# Patient Record
Sex: FEMALE | Race: WHITE | Employment: OTHER | URBAN - NONMETROPOLITAN AREA
[De-identification: names, ages, dates, MRNs, and addresses within clinical notes are randomized per-mention and may not be internally consistent; named-entity substitution may affect disease eponyms.]

---

## 2017-01-02 ENCOUNTER — HISTORY (OUTPATIENT)
Dept: EMERGENCY MEDICINE | Facility: OTHER | Age: 72
End: 2017-01-02

## 2017-01-03 ENCOUNTER — AMBULATORY - GICH (OUTPATIENT)
Dept: SCHEDULING | Facility: OTHER | Age: 72
End: 2017-01-03

## 2017-01-04 ENCOUNTER — AMBULATORY - GICH (OUTPATIENT)
Dept: SCHEDULING | Facility: OTHER | Age: 72
End: 2017-01-04

## 2017-01-06 ENCOUNTER — AMBULATORY - GICH (OUTPATIENT)
Dept: INTERNAL MEDICINE | Facility: OTHER | Age: 72
End: 2017-01-06

## 2017-01-06 DIAGNOSIS — S31.829D UNSPECIFIED OPEN WOUND OF LEFT BUTTOCK, SUBSEQUENT ENCOUNTER: ICD-10-CM

## 2017-01-09 ENCOUNTER — AMBULATORY - GICH (OUTPATIENT)
Dept: SCHEDULING | Facility: OTHER | Age: 72
End: 2017-01-09

## 2017-01-10 ENCOUNTER — HISTORY (OUTPATIENT)
Dept: INTERNAL MEDICINE | Facility: OTHER | Age: 72
End: 2017-01-10

## 2017-01-10 ENCOUNTER — OFFICE VISIT - GICH (OUTPATIENT)
Dept: INTERNAL MEDICINE | Facility: OTHER | Age: 72
End: 2017-01-10

## 2017-01-10 DIAGNOSIS — M06.9 RHEUMATOID ARTHRITIS (H): ICD-10-CM

## 2017-01-10 DIAGNOSIS — E11.29 TYPE 2 DIABETES MELLITUS WITH OTHER DIABETIC KIDNEY COMPLICATION (CODE): ICD-10-CM

## 2017-01-10 DIAGNOSIS — Z79.4 LONG TERM CURRENT USE OF INSULIN (H): ICD-10-CM

## 2017-01-10 DIAGNOSIS — N39.0 URINARY TRACT INFECTION: ICD-10-CM

## 2017-01-10 DIAGNOSIS — S31.829D UNSPECIFIED OPEN WOUND OF LEFT BUTTOCK, SUBSEQUENT ENCOUNTER: ICD-10-CM

## 2017-01-10 ASSESSMENT — PATIENT HEALTH QUESTIONNAIRE - PHQ9: SUM OF ALL RESPONSES TO PHQ QUESTIONS 1-9: 0

## 2017-01-15 ENCOUNTER — HISTORY (OUTPATIENT)
Dept: EMERGENCY MEDICINE | Facility: OTHER | Age: 72
End: 2017-01-15

## 2017-01-16 ENCOUNTER — HISTORY (OUTPATIENT)
Dept: MEDSURG UNIT | Facility: OTHER | Age: 72
End: 2017-01-16

## 2017-01-17 ENCOUNTER — COMMUNICATION - GICH (OUTPATIENT)
Dept: INTERNAL MEDICINE | Facility: OTHER | Age: 72
End: 2017-01-17

## 2017-01-19 ENCOUNTER — COMMUNICATION - GICH (OUTPATIENT)
Dept: INTERNAL MEDICINE | Facility: OTHER | Age: 72
End: 2017-01-19

## 2017-01-19 ENCOUNTER — HISTORY (OUTPATIENT)
Dept: FAMILY MEDICINE | Facility: OTHER | Age: 72
End: 2017-01-19

## 2017-01-19 ENCOUNTER — OFFICE VISIT - GICH (OUTPATIENT)
Dept: FAMILY MEDICINE | Facility: OTHER | Age: 72
End: 2017-01-19

## 2017-01-19 DIAGNOSIS — N12 TUBULO-INTERSTITIAL NEPHRITIS: ICD-10-CM

## 2017-01-23 ENCOUNTER — OFFICE VISIT - GICH (OUTPATIENT)
Dept: INTERNAL MEDICINE | Facility: OTHER | Age: 72
End: 2017-01-23

## 2017-01-23 ENCOUNTER — COMMUNICATION - GICH (OUTPATIENT)
Dept: INTERNAL MEDICINE | Facility: OTHER | Age: 72
End: 2017-01-23

## 2017-01-23 ENCOUNTER — HISTORY (OUTPATIENT)
Dept: INTERNAL MEDICINE | Facility: OTHER | Age: 72
End: 2017-01-23

## 2017-01-23 DIAGNOSIS — S31.829D UNSPECIFIED OPEN WOUND OF LEFT BUTTOCK, SUBSEQUENT ENCOUNTER: ICD-10-CM

## 2017-01-23 DIAGNOSIS — N12 TUBULO-INTERSTITIAL NEPHRITIS: ICD-10-CM

## 2017-01-23 DIAGNOSIS — M06.9 RHEUMATOID ARTHRITIS (H): ICD-10-CM

## 2017-01-23 DIAGNOSIS — M79.10 MYALGIA: ICD-10-CM

## 2017-01-24 ENCOUNTER — COMMUNICATION - GICH (OUTPATIENT)
Dept: INTERNAL MEDICINE | Facility: OTHER | Age: 72
End: 2017-01-24

## 2017-01-24 ENCOUNTER — AMBULATORY - GICH (OUTPATIENT)
Dept: SCHEDULING | Facility: OTHER | Age: 72
End: 2017-01-24

## 2017-01-24 DIAGNOSIS — I10 ESSENTIAL (PRIMARY) HYPERTENSION: ICD-10-CM

## 2017-01-25 ENCOUNTER — AMBULATORY - GICH (OUTPATIENT)
Dept: INTERNAL MEDICINE | Facility: OTHER | Age: 72
End: 2017-01-25

## 2017-01-31 ENCOUNTER — COMMUNICATION - GICH (OUTPATIENT)
Dept: INTERNAL MEDICINE | Facility: OTHER | Age: 72
End: 2017-01-31

## 2017-01-31 DIAGNOSIS — M05.9 RHEUMATOID ARTHRITIS WITH RHEUMATOID FACTOR (H): ICD-10-CM

## 2017-02-07 ENCOUNTER — COMMUNICATION - GICH (OUTPATIENT)
Dept: INTERNAL MEDICINE | Facility: OTHER | Age: 72
End: 2017-02-07

## 2017-02-07 DIAGNOSIS — I10 ESSENTIAL (PRIMARY) HYPERTENSION: ICD-10-CM

## 2017-02-13 ENCOUNTER — COMMUNICATION - GICH (OUTPATIENT)
Dept: INTERNAL MEDICINE | Facility: OTHER | Age: 72
End: 2017-02-13

## 2017-02-13 DIAGNOSIS — M05.9 RHEUMATOID ARTHRITIS WITH RHEUMATOID FACTOR (H): ICD-10-CM

## 2017-02-16 ENCOUNTER — HOSPITAL ENCOUNTER (OUTPATIENT)
Dept: INFUSION THERAPY | Facility: OTHER | Age: 72
End: 2017-02-16

## 2017-02-20 ENCOUNTER — AMBULATORY - GICH (OUTPATIENT)
Dept: SCHEDULING | Facility: OTHER | Age: 72
End: 2017-02-20

## 2017-02-21 ENCOUNTER — COMMUNICATION - GICH (OUTPATIENT)
Dept: INTERNAL MEDICINE | Facility: OTHER | Age: 72
End: 2017-02-21

## 2017-02-21 DIAGNOSIS — M06.9 RHEUMATOID ARTHRITIS (H): ICD-10-CM

## 2017-02-25 ENCOUNTER — COMMUNICATION - GICH (OUTPATIENT)
Dept: INTERNAL MEDICINE | Facility: OTHER | Age: 72
End: 2017-02-25

## 2017-02-25 DIAGNOSIS — M05.9 RHEUMATOID ARTHRITIS WITH RHEUMATOID FACTOR (H): ICD-10-CM

## 2017-02-27 ENCOUNTER — COMMUNICATION - GICH (OUTPATIENT)
Dept: INTERNAL MEDICINE | Facility: OTHER | Age: 72
End: 2017-02-27

## 2017-02-27 DIAGNOSIS — E11.29 TYPE 2 DIABETES MELLITUS WITH OTHER DIABETIC KIDNEY COMPLICATION (CODE): ICD-10-CM

## 2017-02-27 DIAGNOSIS — Z79.4 LONG TERM CURRENT USE OF INSULIN (H): ICD-10-CM

## 2017-03-02 ENCOUNTER — HISTORY (OUTPATIENT)
Dept: EMERGENCY MEDICINE | Facility: OTHER | Age: 72
End: 2017-03-02

## 2017-03-03 ENCOUNTER — HISTORY (OUTPATIENT)
Dept: MEDSURG UNIT | Facility: OTHER | Age: 72
End: 2017-03-03

## 2017-03-07 ENCOUNTER — COMMUNICATION - GICH (OUTPATIENT)
Dept: INTERNAL MEDICINE | Facility: OTHER | Age: 72
End: 2017-03-07

## 2017-03-08 ENCOUNTER — COMMUNICATION - GICH (OUTPATIENT)
Dept: INTERNAL MEDICINE | Facility: OTHER | Age: 72
End: 2017-03-08

## 2017-03-09 ENCOUNTER — COMMUNICATION - GICH (OUTPATIENT)
Dept: INTERNAL MEDICINE | Facility: OTHER | Age: 72
End: 2017-03-09

## 2017-03-09 DIAGNOSIS — M06.9 RHEUMATOID ARTHRITIS (H): ICD-10-CM

## 2017-03-10 ENCOUNTER — HOSPITAL ENCOUNTER (OUTPATIENT)
Dept: LAB | Facility: OTHER | Age: 72
End: 2017-03-10
Attending: EMERGENCY MEDICINE | Admitting: EMERGENCY MEDICINE

## 2017-03-10 ENCOUNTER — COMMUNICATION - GICH (OUTPATIENT)
Dept: INTERNAL MEDICINE | Facility: OTHER | Age: 72
End: 2017-03-10

## 2017-03-14 ENCOUNTER — COMMUNICATION - GICH (OUTPATIENT)
Dept: INTERNAL MEDICINE | Facility: OTHER | Age: 72
End: 2017-03-14

## 2017-03-14 ENCOUNTER — AMBULATORY - GICH (OUTPATIENT)
Dept: SURGERY | Facility: OTHER | Age: 72
End: 2017-03-14

## 2017-03-14 ENCOUNTER — COMMUNICATION - GICH (OUTPATIENT)
Dept: SURGERY | Facility: OTHER | Age: 72
End: 2017-03-14

## 2017-03-14 DIAGNOSIS — M06.9 RHEUMATOID ARTHRITIS (H): ICD-10-CM

## 2017-03-14 DIAGNOSIS — L89.304: ICD-10-CM

## 2017-03-14 DIAGNOSIS — S31.829D UNSPECIFIED OPEN WOUND OF LEFT BUTTOCK, SUBSEQUENT ENCOUNTER: ICD-10-CM

## 2017-03-14 DIAGNOSIS — F19.20 OTHER PSYCHOACTIVE SUBSTANCE DEPENDENCE, UNCOMPLICATED (H): ICD-10-CM

## 2017-03-16 ENCOUNTER — HISTORY (OUTPATIENT)
Dept: INTERNAL MEDICINE | Facility: OTHER | Age: 72
End: 2017-03-16

## 2017-03-16 ENCOUNTER — COMMUNICATION - GICH (OUTPATIENT)
Dept: INTERNAL MEDICINE | Facility: OTHER | Age: 72
End: 2017-03-16

## 2017-03-16 ENCOUNTER — OFFICE VISIT - GICH (OUTPATIENT)
Dept: INTERNAL MEDICINE | Facility: OTHER | Age: 72
End: 2017-03-16

## 2017-03-16 DIAGNOSIS — E11.29 TYPE 2 DIABETES MELLITUS WITH OTHER DIABETIC KIDNEY COMPLICATION (CODE): ICD-10-CM

## 2017-03-16 DIAGNOSIS — L89.304: ICD-10-CM

## 2017-03-16 DIAGNOSIS — M06.9 RHEUMATOID ARTHRITIS (H): ICD-10-CM

## 2017-03-16 DIAGNOSIS — Z79.4 LONG TERM CURRENT USE OF INSULIN (H): ICD-10-CM

## 2017-03-16 DIAGNOSIS — E11.9 TYPE 2 DIABETES MELLITUS WITHOUT COMPLICATIONS (H): ICD-10-CM

## 2017-03-17 ENCOUNTER — AMBULATORY - GICH (OUTPATIENT)
Dept: SCHEDULING | Facility: OTHER | Age: 72
End: 2017-03-17

## 2017-03-17 ENCOUNTER — COMMUNICATION - GICH (OUTPATIENT)
Dept: INTERNAL MEDICINE | Facility: OTHER | Age: 72
End: 2017-03-17

## 2017-03-22 ENCOUNTER — AMBULATORY - GICH (OUTPATIENT)
Dept: INTERNAL MEDICINE | Facility: OTHER | Age: 72
End: 2017-03-22

## 2017-03-22 ENCOUNTER — COMMUNICATION - GICH (OUTPATIENT)
Dept: INTERNAL MEDICINE | Facility: OTHER | Age: 72
End: 2017-03-22

## 2017-03-22 ENCOUNTER — HISTORY (OUTPATIENT)
Dept: INTERNAL MEDICINE | Facility: OTHER | Age: 72
End: 2017-03-22

## 2017-03-22 DIAGNOSIS — E44.0 MODERATE PROTEIN-CALORIE MALNUTRITION (H): ICD-10-CM

## 2017-03-22 DIAGNOSIS — L89.304: ICD-10-CM

## 2017-03-22 DIAGNOSIS — Z79.4 LONG TERM CURRENT USE OF INSULIN (H): ICD-10-CM

## 2017-03-22 DIAGNOSIS — N39.0 URINARY TRACT INFECTION: ICD-10-CM

## 2017-03-22 DIAGNOSIS — A41.9 SEPSIS (H): ICD-10-CM

## 2017-03-22 DIAGNOSIS — E11.29 TYPE 2 DIABETES MELLITUS WITH OTHER DIABETIC KIDNEY COMPLICATION (CODE): ICD-10-CM

## 2017-03-27 ENCOUNTER — COMMUNICATION - GICH (OUTPATIENT)
Dept: INTERNAL MEDICINE | Facility: OTHER | Age: 72
End: 2017-03-27

## 2017-03-27 DIAGNOSIS — E11.29 TYPE 2 DIABETES MELLITUS WITH OTHER DIABETIC KIDNEY COMPLICATION (CODE): ICD-10-CM

## 2017-03-27 DIAGNOSIS — Z79.4 LONG TERM CURRENT USE OF INSULIN (H): ICD-10-CM

## 2017-03-30 ENCOUNTER — AMBULATORY - GICH (OUTPATIENT)
Dept: INTERNAL MEDICINE | Facility: OTHER | Age: 72
End: 2017-03-30

## 2017-03-30 ENCOUNTER — COMMUNICATION - GICH (OUTPATIENT)
Dept: INTERNAL MEDICINE | Facility: OTHER | Age: 72
End: 2017-03-30

## 2017-03-30 DIAGNOSIS — S31.829D UNSPECIFIED OPEN WOUND OF LEFT BUTTOCK, SUBSEQUENT ENCOUNTER: ICD-10-CM

## 2017-03-30 DIAGNOSIS — M05.9 RHEUMATOID ARTHRITIS WITH RHEUMATOID FACTOR (H): ICD-10-CM

## 2017-04-11 ENCOUNTER — COMMUNICATION - GICH (OUTPATIENT)
Dept: INTERNAL MEDICINE | Facility: OTHER | Age: 72
End: 2017-04-11

## 2017-04-11 DIAGNOSIS — I10 ESSENTIAL (PRIMARY) HYPERTENSION: ICD-10-CM

## 2017-04-12 ENCOUNTER — HISTORY (OUTPATIENT)
Dept: INTERNAL MEDICINE | Facility: OTHER | Age: 72
End: 2017-04-12

## 2017-04-12 ENCOUNTER — COMMUNICATION - GICH (OUTPATIENT)
Dept: INTERNAL MEDICINE | Facility: OTHER | Age: 72
End: 2017-04-12

## 2017-04-12 ENCOUNTER — AMBULATORY - GICH (OUTPATIENT)
Dept: INTERNAL MEDICINE | Facility: OTHER | Age: 72
End: 2017-04-12

## 2017-04-12 DIAGNOSIS — M05.9 RHEUMATOID ARTHRITIS WITH RHEUMATOID FACTOR (H): ICD-10-CM

## 2017-04-12 DIAGNOSIS — E44.0 MODERATE PROTEIN-CALORIE MALNUTRITION (H): ICD-10-CM

## 2017-04-12 DIAGNOSIS — L89.304: ICD-10-CM

## 2017-04-17 ENCOUNTER — AMBULATORY - GICH (OUTPATIENT)
Dept: SCHEDULING | Facility: OTHER | Age: 72
End: 2017-04-17

## 2017-04-18 ENCOUNTER — HISTORY (OUTPATIENT)
Dept: INTERNAL MEDICINE | Facility: OTHER | Age: 72
End: 2017-04-18

## 2017-04-18 ENCOUNTER — OFFICE VISIT - GICH (OUTPATIENT)
Dept: INTERNAL MEDICINE | Facility: OTHER | Age: 72
End: 2017-04-18

## 2017-04-18 ENCOUNTER — HOSPITAL ENCOUNTER (OUTPATIENT)
Dept: INFUSION THERAPY | Facility: OTHER | Age: 72
End: 2017-04-18

## 2017-04-18 DIAGNOSIS — G89.4 CHRONIC PAIN SYNDROME: ICD-10-CM

## 2017-04-18 DIAGNOSIS — L89.304: ICD-10-CM

## 2017-04-18 DIAGNOSIS — E11.29 TYPE 2 DIABETES MELLITUS WITH OTHER DIABETIC KIDNEY COMPLICATION (CODE): ICD-10-CM

## 2017-04-18 DIAGNOSIS — Z79.4 LONG TERM CURRENT USE OF INSULIN (H): ICD-10-CM

## 2017-04-18 DIAGNOSIS — Z02.89 ENCOUNTER FOR OTHER ADMINISTRATIVE EXAMINATIONS: ICD-10-CM

## 2017-04-18 DIAGNOSIS — M06.9 RHEUMATOID ARTHRITIS (H): ICD-10-CM

## 2017-04-18 LAB
A/G RATIO - HISTORICAL: 1.4 (ref 1–2)
ABSOLUTE BASOPHILS - HISTORICAL: 0.1 THOU/CU MM
ABSOLUTE EOSINOPHILS - HISTORICAL: 0 THOU/CU MM
ABSOLUTE LYMPHOCYTES - HISTORICAL: 3.2 THOU/CU MM (ref 0.9–2.9)
ABSOLUTE MONOCYTES - HISTORICAL: 0.7 THOU/CU MM
ABSOLUTE NEUTROPHILS - HISTORICAL: 7.6 THOU/CU MM (ref 1.7–7)
ALBUMIN SERPL-MCNC: 3.6 G/DL (ref 3.5–5.7)
ALP SERPL-CCNC: 59 IU/L (ref 34–104)
ALT (SGPT) - HISTORICAL: 20 IU/L (ref 7–52)
ANION GAP - HISTORICAL: 11 (ref 5–18)
AST SERPL-CCNC: 17 IU/L (ref 13–39)
BASOPHILS # BLD AUTO: 0.9 %
BILIRUB SERPL-MCNC: 0.2 MG/DL (ref 0.3–1)
BUN SERPL-MCNC: 33 MG/DL (ref 7–25)
BUN/CREAT RATIO - HISTORICAL: 43
CALCIUM SERPL-MCNC: 9.5 MG/DL (ref 8.6–10.3)
CHLORIDE SERPLBLD-SCNC: 97 MMOL/L (ref 98–107)
CO2 SERPL-SCNC: 24 MMOL/L (ref 21–31)
CREAT SERPL-MCNC: 0.76 MG/DL (ref 0.7–1.3)
EOSINOPHIL NFR BLD AUTO: 0.2 %
ERYTHROCYTE [DISTWIDTH] IN BLOOD BY AUTOMATED COUNT: 17.5 % (ref 11.5–15.5)
ESTIMATED AVERAGE GLUCOSE: 157 MG/DL
GFR IF NOT AFRICAN AMERICAN - HISTORICAL: >60 ML/MIN/1.73M2
GLOBULIN - HISTORICAL: 2.6 G/DL (ref 2–3.7)
GLUCOSE SERPL-MCNC: 139 MG/DL (ref 70–105)
HCT VFR BLD AUTO: 31.7 % (ref 33–51)
HEMOGLOBIN A1C MONITORING (POCT) - HISTORICAL: 7.1 % (ref 4–6.2)
HEMOGLOBIN: 9.5 G/DL (ref 12–16)
LYMPHOCYTES NFR BLD AUTO: 27.4 % (ref 20–44)
MCH RBC QN AUTO: 24.1 PG (ref 26–34)
MCHC RBC AUTO-ENTMCNC: 29.9 G/DL (ref 32–36)
MCV RBC AUTO: 81 FL (ref 80–100)
MONOCYTES NFR BLD AUTO: 5.9 %
NEUTROPHILS NFR BLD AUTO: 65.6 % (ref 42–72)
PLATELET # BLD AUTO: 679 THOU/CU MM (ref 140–440)
PMV BLD: 5.8 FL (ref 6.5–11)
POTASSIUM SERPL-SCNC: 4.3 MMOL/L (ref 3.5–5.1)
PROT SERPL-MCNC: 6.2 G/DL (ref 6.4–8.9)
RED BLOOD COUNT - HISTORICAL: 3.94 MIL/CU MM (ref 4–5.2)
SODIUM SERPL-SCNC: 132 MMOL/L (ref 133–143)
WHITE BLOOD COUNT - HISTORICAL: 11.6 THOU/CU MM (ref 4.5–11)

## 2017-04-25 ENCOUNTER — COMMUNICATION - GICH (OUTPATIENT)
Dept: INTERNAL MEDICINE | Facility: OTHER | Age: 72
End: 2017-04-25

## 2017-04-25 DIAGNOSIS — M05.9 RHEUMATOID ARTHRITIS WITH RHEUMATOID FACTOR (H): ICD-10-CM

## 2017-04-26 ENCOUNTER — COMMUNICATION - GICH (OUTPATIENT)
Dept: INTERNAL MEDICINE | Facility: OTHER | Age: 72
End: 2017-04-26

## 2017-04-26 DIAGNOSIS — M06.9 RHEUMATOID ARTHRITIS (H): ICD-10-CM

## 2017-04-28 ENCOUNTER — COMMUNICATION - GICH (OUTPATIENT)
Dept: INTERNAL MEDICINE | Facility: OTHER | Age: 72
End: 2017-04-28

## 2017-04-28 ENCOUNTER — HISTORY (OUTPATIENT)
Dept: EMERGENCY MEDICINE | Facility: OTHER | Age: 72
End: 2017-04-28

## 2017-05-03 ENCOUNTER — OFFICE VISIT - GICH (OUTPATIENT)
Dept: INTERNAL MEDICINE | Facility: OTHER | Age: 72
End: 2017-05-03

## 2017-05-03 ENCOUNTER — HISTORY (OUTPATIENT)
Dept: INTERNAL MEDICINE | Facility: OTHER | Age: 72
End: 2017-05-03

## 2017-05-03 DIAGNOSIS — L08.9 LOCAL INFECTION OF SKIN AND SUBCUTANEOUS TISSUE: ICD-10-CM

## 2017-05-03 DIAGNOSIS — E44.0 MODERATE PROTEIN-CALORIE MALNUTRITION (H): ICD-10-CM

## 2017-05-03 DIAGNOSIS — D64.9 ANEMIA: ICD-10-CM

## 2017-05-03 DIAGNOSIS — L89.304: ICD-10-CM

## 2017-05-03 DIAGNOSIS — T14.8XXA OTHER INJURY OF UNSPECIFIED BODY REGION: ICD-10-CM

## 2017-05-06 ENCOUNTER — COMMUNICATION - GICH (OUTPATIENT)
Dept: INTERNAL MEDICINE | Facility: OTHER | Age: 72
End: 2017-05-06

## 2017-05-06 DIAGNOSIS — M05.9 RHEUMATOID ARTHRITIS WITH RHEUMATOID FACTOR (H): ICD-10-CM

## 2017-05-06 DIAGNOSIS — E11.29 TYPE 2 DIABETES MELLITUS WITH OTHER DIABETIC KIDNEY COMPLICATION (CODE): ICD-10-CM

## 2017-05-09 ENCOUNTER — COMMUNICATION - GICH (OUTPATIENT)
Dept: INTERNAL MEDICINE | Facility: OTHER | Age: 72
End: 2017-05-09

## 2017-05-09 DIAGNOSIS — M06.9 RHEUMATOID ARTHRITIS (H): ICD-10-CM

## 2017-05-13 ENCOUNTER — COMMUNICATION - GICH (OUTPATIENT)
Dept: INTERNAL MEDICINE | Facility: OTHER | Age: 72
End: 2017-05-13

## 2017-05-13 DIAGNOSIS — A41.9 SEPSIS (H): ICD-10-CM

## 2017-05-13 DIAGNOSIS — N39.0 URINARY TRACT INFECTION: ICD-10-CM

## 2017-05-16 ENCOUNTER — AMBULATORY - GICH (OUTPATIENT)
Dept: LAB | Facility: OTHER | Age: 72
End: 2017-05-16

## 2017-05-16 ENCOUNTER — OFFICE VISIT - GICH (OUTPATIENT)
Dept: INTERNAL MEDICINE | Facility: OTHER | Age: 72
End: 2017-05-16

## 2017-05-16 ENCOUNTER — HISTORY (OUTPATIENT)
Dept: EMERGENCY MEDICINE | Facility: OTHER | Age: 72
End: 2017-05-16

## 2017-05-16 ENCOUNTER — HOSPITAL ENCOUNTER (OUTPATIENT)
Dept: INFUSION THERAPY | Facility: OTHER | Age: 72
End: 2017-05-16
Attending: INTERNAL MEDICINE

## 2017-05-16 ENCOUNTER — AMBULATORY - GICH (OUTPATIENT)
Dept: INTERNAL MEDICINE | Facility: OTHER | Age: 72
End: 2017-05-16

## 2017-05-16 ENCOUNTER — HISTORY (OUTPATIENT)
Dept: INTERNAL MEDICINE | Facility: OTHER | Age: 72
End: 2017-05-16

## 2017-05-16 ENCOUNTER — AMBULATORY - GICH (OUTPATIENT)
Dept: SCHEDULING | Facility: OTHER | Age: 72
End: 2017-05-16

## 2017-05-16 DIAGNOSIS — Z02.89 ENCOUNTER FOR OTHER ADMINISTRATIVE EXAMINATIONS: ICD-10-CM

## 2017-05-16 DIAGNOSIS — S31.829D UNSPECIFIED OPEN WOUND OF LEFT BUTTOCK, SUBSEQUENT ENCOUNTER: ICD-10-CM

## 2017-05-16 DIAGNOSIS — G89.4 CHRONIC PAIN SYNDROME: ICD-10-CM

## 2017-05-16 DIAGNOSIS — M06.9 RHEUMATOID ARTHRITIS (H): ICD-10-CM

## 2017-05-16 DIAGNOSIS — F19.20 OTHER PSYCHOACTIVE SUBSTANCE DEPENDENCE, UNCOMPLICATED (H): ICD-10-CM

## 2017-05-16 LAB
A/G RATIO - HISTORICAL: 1.2 (ref 1–2)
ABSOLUTE BASOPHILS - HISTORICAL: 0 THOU/CU MM
ABSOLUTE EOSINOPHILS - HISTORICAL: 0 THOU/CU MM
ABSOLUTE LYMPHOCYTES - HISTORICAL: 1.5 THOU/CU MM (ref 0.9–2.9)
ABSOLUTE MONOCYTES - HISTORICAL: 1.4 THOU/CU MM
ABSOLUTE NEUTROPHILS - HISTORICAL: 14.4 THOU/CU MM (ref 1.7–7)
ALBUMIN SERPL-MCNC: 3.3 G/DL (ref 3.5–5.7)
ALP SERPL-CCNC: 60 IU/L (ref 34–104)
ALT (SGPT) - HISTORICAL: 13 IU/L (ref 7–52)
ANION GAP - HISTORICAL: 13 (ref 5–18)
AST SERPL-CCNC: 14 IU/L (ref 13–39)
BASOPHILS # BLD AUTO: 0.2 %
BILIRUB SERPL-MCNC: 0.2 MG/DL (ref 0.3–1)
BUN SERPL-MCNC: 30 MG/DL (ref 7–25)
BUN/CREAT RATIO - HISTORICAL: 45
CALCIUM SERPL-MCNC: 9.9 MG/DL (ref 8.6–10.3)
CHLORIDE SERPLBLD-SCNC: 94 MMOL/L (ref 98–107)
CO2 SERPL-SCNC: 24 MMOL/L (ref 21–31)
CREAT SERPL-MCNC: 0.67 MG/DL (ref 0.7–1.3)
EOSINOPHIL NFR BLD AUTO: 0.1 %
ERYTHROCYTE [DISTWIDTH] IN BLOOD BY AUTOMATED COUNT: 22.2 % (ref 11.5–15.5)
ERYTHROCYTE [SEDIMENTATION RATE] IN BLOOD: 53 MM/HR
GFR IF NOT AFRICAN AMERICAN - HISTORICAL: >60 ML/MIN/1.73M2
GLOBULIN - HISTORICAL: 2.8 G/DL (ref 2–3.7)
GLUCOSE SERPL-MCNC: 292 MG/DL (ref 70–105)
HCT VFR BLD AUTO: 30.8 % (ref 33–51)
HEMOGLOBIN: 9.3 G/DL (ref 12–16)
LYMPHOCYTES NFR BLD AUTO: 8.5 % (ref 20–44)
MCH RBC QN AUTO: 24.2 PG (ref 26–34)
MCHC RBC AUTO-ENTMCNC: 30.2 G/DL (ref 32–36)
MCV RBC AUTO: 80 FL (ref 80–100)
MONOCYTES NFR BLD AUTO: 8 %
NEUTROPHILS NFR BLD AUTO: 82.6 % (ref 42–72)
PLATELET # BLD AUTO: 568 THOU/CU MM (ref 140–440)
PMV BLD: 9.6 FL (ref 6.5–11)
POTASSIUM SERPL-SCNC: 4.1 MMOL/L (ref 3.5–5.1)
PROT SERPL-MCNC: 6.1 G/DL (ref 6.4–8.9)
RED BLOOD COUNT - HISTORICAL: 3.85 MIL/CU MM (ref 4–5.2)
SODIUM SERPL-SCNC: 131 MMOL/L (ref 133–143)
WHITE BLOOD COUNT - HISTORICAL: 17.4 THOU/CU MM (ref 4.5–11)

## 2017-05-16 ASSESSMENT — PATIENT HEALTH QUESTIONNAIRE - PHQ9: SUM OF ALL RESPONSES TO PHQ QUESTIONS 1-9: 0

## 2017-05-17 ENCOUNTER — AMBULATORY - GICH (OUTPATIENT)
Dept: SCHEDULING | Facility: OTHER | Age: 72
End: 2017-05-17

## 2017-05-21 ENCOUNTER — AMBULATORY - GICH (OUTPATIENT)
Dept: SCHEDULING | Facility: OTHER | Age: 72
End: 2017-05-21

## 2017-05-22 LAB
6-MONOACETYL MORPHINE - HISTORICAL: ABNORMAL NG/ML
AMPHETAMINE URINE - HISTORICAL: ABNORMAL NG/ML
BARBITURATE URINE - HISTORICAL: ABNORMAL NG/ML
BENZODIAZEPINE URINE - HISTORICAL: ABNORMAL NG/ML
BUPRENORPHRINE URINE - HISTORICAL: ABNORMAL NG/ML
COCAINE METAB URINE - HISTORICAL: ABNORMAL NG/ML
CREAT UR - HISTORICAL: 12 MG/DL
ETHYLGLUCURONIDE URINE - HISTORICAL: ABNORMAL NG/ML
FENTANYL URINE - HISTORICAL: ABNORMAL NG/ML
MASS SPECTROMETRY URINE - HISTORICAL: ABNORMAL
METHADONE URINE - HISTORICAL: ABNORMAL NG/ML
OPIATES URINE - HISTORICAL: ABNORMAL NG/ML
OXYCODONE URINE - HISTORICAL: ABNORMAL NG/ML
PH URINE - HISTORICAL: 7.5
PROPOXYPHENE URINE - HISTORICAL: ABNORMAL NG/ML
THC 50 URINE - HISTORICAL: ABNORMAL NG/ML
TRAMADOL - HISTORICAL: ABNORMAL NG/ML

## 2017-05-23 ENCOUNTER — COMMUNICATION - GICH (OUTPATIENT)
Dept: INTERNAL MEDICINE | Facility: OTHER | Age: 72
End: 2017-05-23

## 2017-05-23 DIAGNOSIS — E78.5 HYPERLIPIDEMIA: ICD-10-CM

## 2017-05-26 ENCOUNTER — HISTORY (OUTPATIENT)
Dept: INTERNAL MEDICINE | Facility: OTHER | Age: 72
End: 2017-05-26

## 2017-05-26 ENCOUNTER — OFFICE VISIT - GICH (OUTPATIENT)
Dept: INTERNAL MEDICINE | Facility: OTHER | Age: 72
End: 2017-05-26

## 2017-05-26 DIAGNOSIS — Z22.39: ICD-10-CM

## 2017-05-26 DIAGNOSIS — N30.00 ACUTE CYSTITIS WITHOUT HEMATURIA: ICD-10-CM

## 2017-05-26 DIAGNOSIS — L89.304: ICD-10-CM

## 2017-05-27 ENCOUNTER — HISTORY (OUTPATIENT)
Dept: EMERGENCY MEDICINE | Facility: OTHER | Age: 72
End: 2017-05-27

## 2017-05-30 ENCOUNTER — COMMUNICATION - GICH (OUTPATIENT)
Dept: INTERNAL MEDICINE | Facility: OTHER | Age: 72
End: 2017-05-30

## 2017-05-30 DIAGNOSIS — I10 ESSENTIAL (PRIMARY) HYPERTENSION: ICD-10-CM

## 2017-06-05 ENCOUNTER — OFFICE VISIT - GICH (OUTPATIENT)
Dept: INTERNAL MEDICINE | Facility: OTHER | Age: 72
End: 2017-06-05

## 2017-06-05 ENCOUNTER — HISTORY (OUTPATIENT)
Dept: INTERNAL MEDICINE | Facility: OTHER | Age: 72
End: 2017-06-05

## 2017-06-05 DIAGNOSIS — Z22.39: ICD-10-CM

## 2017-06-05 DIAGNOSIS — A41.02 SEPSIS DUE TO METHICILLIN RESISTANT STAPHYLOCOCCUS AUREUS (H): ICD-10-CM

## 2017-06-05 DIAGNOSIS — L89.304: ICD-10-CM

## 2017-06-19 ENCOUNTER — COMMUNICATION - GICH (OUTPATIENT)
Dept: INTERNAL MEDICINE | Facility: OTHER | Age: 72
End: 2017-06-19

## 2017-06-19 DIAGNOSIS — M05.9 RHEUMATOID ARTHRITIS WITH RHEUMATOID FACTOR (H): ICD-10-CM

## 2017-06-21 ENCOUNTER — COMMUNICATION - GICH (OUTPATIENT)
Dept: INTERNAL MEDICINE | Facility: OTHER | Age: 72
End: 2017-06-21

## 2017-06-21 DIAGNOSIS — M05.9 RHEUMATOID ARTHRITIS WITH RHEUMATOID FACTOR (H): ICD-10-CM

## 2017-06-30 ENCOUNTER — OFFICE VISIT - GICH (OUTPATIENT)
Dept: INTERNAL MEDICINE | Facility: OTHER | Age: 72
End: 2017-06-30

## 2017-06-30 ENCOUNTER — HOSPITAL ENCOUNTER (OUTPATIENT)
Dept: INFUSION THERAPY | Facility: OTHER | Age: 72
End: 2017-06-30
Attending: INTERNAL MEDICINE

## 2017-06-30 ENCOUNTER — HISTORY (OUTPATIENT)
Dept: INTERNAL MEDICINE | Facility: OTHER | Age: 72
End: 2017-06-30

## 2017-06-30 ENCOUNTER — COMMUNICATION - GICH (OUTPATIENT)
Dept: INTERNAL MEDICINE | Facility: OTHER | Age: 72
End: 2017-06-30

## 2017-06-30 DIAGNOSIS — I48.0 PAROXYSMAL ATRIAL FIBRILLATION (H): ICD-10-CM

## 2017-06-30 DIAGNOSIS — A41.9 SEPSIS (H): ICD-10-CM

## 2017-06-30 DIAGNOSIS — N39.0 URINARY TRACT INFECTION: ICD-10-CM

## 2017-06-30 DIAGNOSIS — M06.9 RHEUMATOID ARTHRITIS (H): ICD-10-CM

## 2017-06-30 DIAGNOSIS — Z79.4 LONG TERM CURRENT USE OF INSULIN (H): ICD-10-CM

## 2017-06-30 DIAGNOSIS — L89.304: ICD-10-CM

## 2017-06-30 DIAGNOSIS — E11.29 TYPE 2 DIABETES MELLITUS WITH OTHER DIABETIC KIDNEY COMPLICATION (CODE): ICD-10-CM

## 2017-06-30 DIAGNOSIS — I10 ESSENTIAL (PRIMARY) HYPERTENSION: ICD-10-CM

## 2017-06-30 DIAGNOSIS — G89.4 CHRONIC PAIN SYNDROME: ICD-10-CM

## 2017-06-30 DIAGNOSIS — F19.20 OTHER PSYCHOACTIVE SUBSTANCE DEPENDENCE, UNCOMPLICATED (H): ICD-10-CM

## 2017-06-30 DIAGNOSIS — E78.49 OTHER HYPERLIPIDEMIA: ICD-10-CM

## 2017-06-30 LAB
ABSOLUTE BASOPHILS - HISTORICAL: 0.1 THOU/CU MM
ABSOLUTE EOSINOPHILS - HISTORICAL: 0.1 THOU/CU MM
ABSOLUTE IMMATURE GRANULOCYTES(METAS,MYELOS,PROS) - HISTORICAL: 0.2 THOU/CU MM
ABSOLUTE LYMPHOCYTES - HISTORICAL: 2.7 THOU/CU MM (ref 0.9–2.9)
ABSOLUTE MONOCYTES - HISTORICAL: 1.4 THOU/CU MM
ABSOLUTE NEUTROPHILS - HISTORICAL: 12.5 THOU/CU MM (ref 1.7–7)
ANION GAP - HISTORICAL: 16 (ref 5–18)
BASOPHILS # BLD AUTO: 0.4 %
BUN SERPL-MCNC: 29 MG/DL (ref 7–25)
BUN/CREAT RATIO - HISTORICAL: 47
CALCIUM SERPL-MCNC: 9.1 MG/DL (ref 8.6–10.3)
CHLORIDE SERPLBLD-SCNC: 96 MMOL/L (ref 98–107)
CO2 SERPL-SCNC: 21 MMOL/L (ref 21–31)
CREAT SERPL-MCNC: 0.62 MG/DL (ref 0.7–1.3)
EOSINOPHIL NFR BLD AUTO: 0.4 %
ERYTHROCYTE [DISTWIDTH] IN BLOOD BY AUTOMATED COUNT: 21 % (ref 11.5–15.5)
GFR IF NOT AFRICAN AMERICAN - HISTORICAL: >60 ML/MIN/1.73M2
GLUCOSE SERPL-MCNC: 294 MG/DL (ref 70–105)
HCT VFR BLD AUTO: 30.5 % (ref 33–51)
HEMOGLOBIN: 9.6 G/DL (ref 12–16)
IMMATURE GRANULOCYTES(METAS,MYELOS,PROS) - HISTORICAL: 1.3 %
LYMPHOCYTES NFR BLD AUTO: 16.1 % (ref 20–44)
MCH RBC QN AUTO: 24.9 PG (ref 26–34)
MCHC RBC AUTO-ENTMCNC: 31.5 G/DL (ref 32–36)
MCV RBC AUTO: 79 FL (ref 80–100)
MONOCYTES NFR BLD AUTO: 8.4 %
NEUTROPHILS NFR BLD AUTO: 73.4 % (ref 42–72)
PLATELET # BLD AUTO: 1120 THOU/CU MM (ref 140–440)
PMV BLD: 8.9 FL (ref 6.5–11)
POTASSIUM SERPL-SCNC: 4.2 MMOL/L (ref 3.5–5.1)
RED BLOOD COUNT - HISTORICAL: 3.86 MIL/CU MM (ref 4–5.2)
SODIUM SERPL-SCNC: 133 MMOL/L (ref 133–143)
WHITE BLOOD COUNT - HISTORICAL: 17 THOU/CU MM (ref 4.5–11)

## 2017-07-04 ENCOUNTER — COMMUNICATION - GICH (OUTPATIENT)
Dept: INTERNAL MEDICINE | Facility: OTHER | Age: 72
End: 2017-07-04

## 2017-07-04 DIAGNOSIS — M05.9 RHEUMATOID ARTHRITIS WITH RHEUMATOID FACTOR (H): ICD-10-CM

## 2017-07-13 ENCOUNTER — AMBULATORY - GICH (OUTPATIENT)
Dept: SCHEDULING | Facility: OTHER | Age: 72
End: 2017-07-13

## 2017-07-20 ENCOUNTER — AMBULATORY - GICH (OUTPATIENT)
Dept: SCHEDULING | Facility: OTHER | Age: 72
End: 2017-07-20

## 2017-08-27 ENCOUNTER — AMBULATORY - GICH (OUTPATIENT)
Dept: SCHEDULING | Facility: OTHER | Age: 72
End: 2017-08-27

## 2017-08-30 ENCOUNTER — AMBULATORY - GICH (OUTPATIENT)
Dept: SCHEDULING | Facility: OTHER | Age: 72
End: 2017-08-30

## 2017-08-31 ENCOUNTER — AMBULATORY - GICH (OUTPATIENT)
Dept: SCHEDULING | Facility: OTHER | Age: 72
End: 2017-08-31

## 2017-09-05 ENCOUNTER — AMBULATORY - GICH (OUTPATIENT)
Dept: SCHEDULING | Facility: OTHER | Age: 72
End: 2017-09-05

## 2017-09-06 ENCOUNTER — AMBULATORY - GICH (OUTPATIENT)
Dept: SCHEDULING | Facility: OTHER | Age: 72
End: 2017-09-06

## 2017-10-11 ENCOUNTER — AMBULATORY - GICH (OUTPATIENT)
Dept: SCHEDULING | Facility: OTHER | Age: 72
End: 2017-10-11

## 2017-10-12 ENCOUNTER — AMBULATORY - GICH (OUTPATIENT)
Dept: SCHEDULING | Facility: OTHER | Age: 72
End: 2017-10-12

## 2017-10-13 ENCOUNTER — AMBULATORY - GICH (OUTPATIENT)
Dept: SCHEDULING | Facility: OTHER | Age: 72
End: 2017-10-13

## 2017-10-13 ENCOUNTER — COMMUNICATION - GICH (OUTPATIENT)
Dept: INTERNAL MEDICINE | Facility: OTHER | Age: 72
End: 2017-10-13

## 2017-10-13 DIAGNOSIS — E28.39 OTHER PRIMARY OVARIAN FAILURE: ICD-10-CM

## 2017-10-13 DIAGNOSIS — K21.9 GASTRO-ESOPHAGEAL REFLUX DISEASE WITHOUT ESOPHAGITIS: ICD-10-CM

## 2017-10-17 ENCOUNTER — HISTORY (OUTPATIENT)
Dept: INTERNAL MEDICINE | Facility: OTHER | Age: 72
End: 2017-10-17

## 2017-10-17 ENCOUNTER — OFFICE VISIT - GICH (OUTPATIENT)
Dept: INTERNAL MEDICINE | Facility: OTHER | Age: 72
End: 2017-10-17

## 2017-10-17 DIAGNOSIS — Z79.4 LONG TERM CURRENT USE OF INSULIN (H): ICD-10-CM

## 2017-10-17 DIAGNOSIS — K21.9 GASTRO-ESOPHAGEAL REFLUX DISEASE WITHOUT ESOPHAGITIS: ICD-10-CM

## 2017-10-17 DIAGNOSIS — M06.9 RHEUMATOID ARTHRITIS (H): ICD-10-CM

## 2017-10-17 DIAGNOSIS — L89.304: ICD-10-CM

## 2017-10-17 DIAGNOSIS — Z02.89 ENCOUNTER FOR OTHER ADMINISTRATIVE EXAMINATIONS: ICD-10-CM

## 2017-10-17 DIAGNOSIS — E11.29 TYPE 2 DIABETES MELLITUS WITH OTHER DIABETIC KIDNEY COMPLICATION (CODE): ICD-10-CM

## 2017-10-18 ENCOUNTER — AMBULATORY - GICH (OUTPATIENT)
Dept: SCHEDULING | Facility: OTHER | Age: 72
End: 2017-10-18

## 2017-10-18 ENCOUNTER — AMBULATORY - GICH (OUTPATIENT)
Dept: INTERNAL MEDICINE | Facility: OTHER | Age: 72
End: 2017-10-18

## 2017-10-18 DIAGNOSIS — L89.304: ICD-10-CM

## 2017-10-19 ENCOUNTER — COMMUNICATION - GICH (OUTPATIENT)
Dept: INTERNAL MEDICINE | Facility: OTHER | Age: 72
End: 2017-10-19

## 2017-10-19 DIAGNOSIS — E11.42 TYPE 2 DIABETES MELLITUS WITH DIABETIC POLYNEUROPATHY (H): ICD-10-CM

## 2017-10-24 ENCOUNTER — COMMUNICATION - GICH (OUTPATIENT)
Dept: INTERNAL MEDICINE | Facility: OTHER | Age: 72
End: 2017-10-24

## 2017-10-24 DIAGNOSIS — M05.9 RHEUMATOID ARTHRITIS WITH RHEUMATOID FACTOR (H): ICD-10-CM

## 2017-10-26 ENCOUNTER — HOSPITAL ENCOUNTER (OUTPATIENT)
Dept: INFUSION THERAPY | Facility: OTHER | Age: 72
End: 2017-10-26
Attending: INTERNAL MEDICINE

## 2017-11-08 ENCOUNTER — COMMUNICATION - GICH (OUTPATIENT)
Dept: INTERNAL MEDICINE | Facility: OTHER | Age: 72
End: 2017-11-08

## 2017-11-08 DIAGNOSIS — Z79.4 LONG TERM CURRENT USE OF INSULIN (H): ICD-10-CM

## 2017-11-08 DIAGNOSIS — E11.29 TYPE 2 DIABETES MELLITUS WITH OTHER DIABETIC KIDNEY COMPLICATION (CODE): ICD-10-CM

## 2017-11-14 ENCOUNTER — HISTORY (OUTPATIENT)
Dept: INTERNAL MEDICINE | Facility: OTHER | Age: 72
End: 2017-11-14

## 2017-11-14 ENCOUNTER — HOSPITAL ENCOUNTER (OUTPATIENT)
Dept: INFUSION THERAPY | Facility: OTHER | Age: 72
End: 2017-11-14
Attending: INTERNAL MEDICINE

## 2017-11-14 ENCOUNTER — OFFICE VISIT - GICH (OUTPATIENT)
Dept: INTERNAL MEDICINE | Facility: OTHER | Age: 72
End: 2017-11-14

## 2017-11-14 DIAGNOSIS — E11.29 TYPE 2 DIABETES MELLITUS WITH OTHER DIABETIC KIDNEY COMPLICATION (CODE): ICD-10-CM

## 2017-11-14 DIAGNOSIS — L89.304: ICD-10-CM

## 2017-11-14 DIAGNOSIS — E46 PROTEIN-CALORIE MALNUTRITION (H): ICD-10-CM

## 2017-11-14 DIAGNOSIS — Z02.89 ENCOUNTER FOR OTHER ADMINISTRATIVE EXAMINATIONS: ICD-10-CM

## 2017-11-14 DIAGNOSIS — M06.9 RHEUMATOID ARTHRITIS (H): ICD-10-CM

## 2017-11-14 DIAGNOSIS — F19.20 OTHER PSYCHOACTIVE SUBSTANCE DEPENDENCE, UNCOMPLICATED (H): ICD-10-CM

## 2017-11-14 DIAGNOSIS — Z79.4 LONG TERM CURRENT USE OF INSULIN (H): ICD-10-CM

## 2017-11-14 LAB
A/G RATIO - HISTORICAL: 2.3 (ref 1–2)
ABSOLUTE BASOPHILS - HISTORICAL: 0.1 THOU/CU MM
ABSOLUTE EOSINOPHILS - HISTORICAL: 0.1 THOU/CU MM
ABSOLUTE IMMATURE GRANULOCYTES(METAS,MYELOS,PROS) - HISTORICAL: 0.1 THOU/CU MM
ABSOLUTE LYMPHOCYTES - HISTORICAL: 1.6 THOU/CU MM (ref 0.9–2.9)
ABSOLUTE MONOCYTES - HISTORICAL: 0.9 THOU/CU MM
ABSOLUTE NEUTROPHILS - HISTORICAL: 12.8 THOU/CU MM (ref 1.7–7)
ALBUMIN SERPL-MCNC: 4.1 G/DL (ref 3.5–5.7)
ALP SERPL-CCNC: 83 IU/L (ref 34–104)
ALT (SGPT) - HISTORICAL: 30 IU/L (ref 7–52)
ANION GAP - HISTORICAL: 14 (ref 5–18)
AST SERPL-CCNC: 17 IU/L (ref 13–39)
BASOPHILS # BLD AUTO: 0.4 %
BILIRUB SERPL-MCNC: 0.2 MG/DL (ref 0.3–1)
BUN SERPL-MCNC: 39 MG/DL (ref 7–25)
BUN/CREAT RATIO - HISTORICAL: 49
CALCIUM SERPL-MCNC: 9.6 MG/DL (ref 8.6–10.3)
CHLORIDE SERPLBLD-SCNC: 99 MMOL/L (ref 98–107)
CO2 SERPL-SCNC: 21 MMOL/L (ref 21–31)
CREAT SERPL-MCNC: 0.79 MG/DL (ref 0.7–1.3)
EOSINOPHIL NFR BLD AUTO: 0.6 %
ERYTHROCYTE [DISTWIDTH] IN BLOOD BY AUTOMATED COUNT: 17.5 % (ref 11.5–15.5)
ESTIMATED AVERAGE GLUCOSE: 197 MG/DL
GFR IF NOT AFRICAN AMERICAN - HISTORICAL: >60 ML/MIN/1.73M2
GLOBULIN - HISTORICAL: 1.8 G/DL (ref 2–3.7)
GLUCOSE SERPL-MCNC: 273 MG/DL (ref 70–105)
HCT VFR BLD AUTO: 41.5 % (ref 33–51)
HEMOGLOBIN A1C MONITORING (POCT) - HISTORICAL: 8.5 % (ref 4–6.2)
HEMOGLOBIN: 13.6 G/DL (ref 12–16)
IMMATURE GRANULOCYTES(METAS,MYELOS,PROS) - HISTORICAL: 0.8 %
LYMPHOCYTES NFR BLD AUTO: 10 % (ref 20–44)
MCH RBC QN AUTO: 29.8 PG (ref 26–34)
MCHC RBC AUTO-ENTMCNC: 32.8 G/DL (ref 32–36)
MCV RBC AUTO: 91 FL (ref 80–100)
MONOCYTES NFR BLD AUTO: 5.9 %
NEUTROPHILS NFR BLD AUTO: 82.3 % (ref 42–72)
PLATELET # BLD AUTO: 436 THOU/CU MM (ref 140–440)
PMV BLD: 9.4 FL (ref 6.5–11)
POTASSIUM SERPL-SCNC: 4.4 MMOL/L (ref 3.5–5.1)
PROT SERPL-MCNC: 5.9 G/DL (ref 6.4–8.9)
RED BLOOD COUNT - HISTORICAL: 4.56 MIL/CU MM (ref 4–5.2)
SODIUM SERPL-SCNC: 134 MMOL/L (ref 133–143)
TRANSFERRIN: 273.1 MG/DL (ref 203–362)
WHITE BLOOD COUNT - HISTORICAL: 15.6 THOU/CU MM (ref 4.5–11)

## 2017-11-14 ASSESSMENT — PATIENT HEALTH QUESTIONNAIRE - PHQ9: SUM OF ALL RESPONSES TO PHQ QUESTIONS 1-9: 0

## 2017-11-21 ENCOUNTER — COMMUNICATION - GICH (OUTPATIENT)
Dept: INTERNAL MEDICINE | Facility: OTHER | Age: 72
End: 2017-11-21

## 2017-11-21 DIAGNOSIS — I10 ESSENTIAL (PRIMARY) HYPERTENSION: ICD-10-CM

## 2017-11-28 ENCOUNTER — COMMUNICATION - GICH (OUTPATIENT)
Dept: INTERNAL MEDICINE | Facility: OTHER | Age: 72
End: 2017-11-28

## 2017-11-28 DIAGNOSIS — I10 ESSENTIAL (PRIMARY) HYPERTENSION: ICD-10-CM

## 2017-12-05 ENCOUNTER — COMMUNICATION - GICH (OUTPATIENT)
Dept: INTERNAL MEDICINE | Facility: OTHER | Age: 72
End: 2017-12-05

## 2017-12-05 DIAGNOSIS — M54.12 RADICULOPATHY OF CERVICAL REGION: ICD-10-CM

## 2017-12-11 ENCOUNTER — COMMUNICATION - GICH (OUTPATIENT)
Dept: INTERNAL MEDICINE | Facility: OTHER | Age: 72
End: 2017-12-11

## 2017-12-11 DIAGNOSIS — M05.9 RHEUMATOID ARTHRITIS WITH RHEUMATOID FACTOR (H): ICD-10-CM

## 2017-12-12 ENCOUNTER — HOSPITAL ENCOUNTER (OUTPATIENT)
Dept: INFUSION THERAPY | Facility: OTHER | Age: 72
End: 2017-12-12
Attending: INTERNAL MEDICINE

## 2017-12-12 ENCOUNTER — COMMUNICATION - GICH (OUTPATIENT)
Dept: INTERNAL MEDICINE | Facility: OTHER | Age: 72
End: 2017-12-12

## 2017-12-12 DIAGNOSIS — M06.9 RHEUMATOID ARTHRITIS (H): ICD-10-CM

## 2017-12-27 ENCOUNTER — HISTORY (OUTPATIENT)
Dept: INTERNAL MEDICINE | Facility: OTHER | Age: 72
End: 2017-12-27

## 2017-12-27 ENCOUNTER — COMMUNICATION - GICH (OUTPATIENT)
Dept: INTERNAL MEDICINE | Facility: OTHER | Age: 72
End: 2017-12-27

## 2017-12-27 ENCOUNTER — OFFICE VISIT - GICH (OUTPATIENT)
Dept: INTERNAL MEDICINE | Facility: OTHER | Age: 72
End: 2017-12-27

## 2017-12-27 DIAGNOSIS — E11.29 TYPE 2 DIABETES MELLITUS WITH OTHER DIABETIC KIDNEY COMPLICATION (CODE): ICD-10-CM

## 2017-12-27 DIAGNOSIS — M06.9 RHEUMATOID ARTHRITIS (H): ICD-10-CM

## 2017-12-27 DIAGNOSIS — L89.304: ICD-10-CM

## 2017-12-27 DIAGNOSIS — G89.4 CHRONIC PAIN SYNDROME: ICD-10-CM

## 2017-12-27 DIAGNOSIS — Z79.4 LONG TERM CURRENT USE OF INSULIN (H): ICD-10-CM

## 2017-12-27 ASSESSMENT — PATIENT HEALTH QUESTIONNAIRE - PHQ9: SUM OF ALL RESPONSES TO PHQ QUESTIONS 1-9: 0

## 2017-12-27 NOTE — PROGRESS NOTES
Patient Information     Patient Name MRN Sex Karen Francis [id number] Female 1945      Progress Notes by Hammann, Jean, RN at 2017  1:45 PM     Author:  Hammann, Jean, RN  Service:  (none) Author Type:  NURS- Registered Nurse     Filed:  2017  2:57 PM  Date of Service:  2017  1:45 PM Status:  Addendum     :  Hammann, Jean, RN (NURS- Registered Nurse)        Related Notes: Original Note by Hammann, Jean, RN (NURS- Registered Nurse) filed at 2017  2:57 PM            Patient here for lab draw from clinic orders, port accessed with out difficulty, easily identified landmarks. Flushes with out resistance but unable to with draw labs, no blood return. Patient unable to wait today for alteplase but is willing to have lad technician draw and made appointment for next month for flush and possible alteplase. Will seek orders from primary physician for orders incase needed next time. Patient left via wheelchair with .

## 2017-12-27 NOTE — PROGRESS NOTES
Patient Information     Patient Name MRN Karen Randle [id number] Female 1945      Progress Notes by Hammann, Jean, RN at 10/26/2017 10:36 AM     Author:  Hammann, Jean, RN Service:  (none) Author Type:  NURS- Registered Nurse     Filed:  10/26/2017 10:37 AM Date of Service:  10/26/2017 10:36 AM Status:  Signed     :  Hammann, Jean, RN (NURS- Registered Nurse)            Patient here for port maintenance, good brisk blood return and no labs needed today. Patient will call and schedule next appointment when needed. Patient left via wheelchair.
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
## 2017-12-27 NOTE — PROGRESS NOTES
Patient Information     Patient Name MRN Karen Randle [id number] Female 1945      Progress Notes by Hugo Duarte MD at 2017  1:00 PM     Author:  Hugo Duarte MD Service:  (none) Author Type:  Physician     Filed:  2017  1:14 PM Encounter Date:  2017 Status:  Signed     :  Hugo Duarte MD (Physician)            SUBJECTIVE:    Karen Duncan is a 72 y.o. female who presents for recheck on problems.    HPI Comments: She is here today for follow-up. See previous note. She has a chronic pain syndrome related to her rheumatoid arthritis. She takes prednisone daily and she takes a pain medication every 6 hours. She does not feel that she can decrease the dose of the pain medication at this point in time and doubts that she will ever be able to as her neck is really quite painful for her.    Her decubitus ulcer problem is hopefully behind her. She has no further skin breakdown and everything is healed up fine. She will be going back to [place] in 2 weeks for recheck but that will probably be her last visit there.    She is due for some lab work. She has diabetes and anemia and is likely malnourished. She does try to exercise and is getting a little bit stronger but is somewhat frustrated by how long this is taking. She is trying to add extra protein to her diet.      Allergies      Allergen   Reactions     Adhesive Tapes [Adhesive]  *Unknown     Paper tapes, fragile skin      Aliskiren  Cough     Lisinopril  Cough     Ace cough      Losartan  Cough   ,   Current Outpatient Prescriptions     Medication  Sig     ACCU-CHEK MULTICLIX LANCET USE TO CHECK GLUCOSE ONCE TO TWICE DAILY     amLODIPine (NORVASC) 2.5 mg tablet Take 1 tablet by mouth once daily.     ascorbic acid, vitamin C, (ASCORBIC ACID WITH NIECY HIPS) 500 mg tablet Take 1,000 mg by mouth once daily.     aspirin 81 mg tablet Take 1 tablet by mouth once daily with a meal.     atorvastatin (LIPITOR) 20 mg tablet  "TAKE ONE TABLET BY MOUTH AT BEDTIME     baclofen (LIORESAL) 10 mg tablet Take 1 tablet by mouth 3 times daily.     BENADRYL 25 MG CAP two tablets orally at bedtime     blood sugar diagnostic (ACCU-CHEK LENIN PLUS TEST STRP) strip Test one to two times daily E11.9     blood sugar diagnostic (BLOOD GLUCOSE TEST) strip Dispense item covered by pt ins. Test 4 times daily E11.29     CALCIUM CARBONATE (TUMS 500 ORAL) Take 2-3 Tabs by mouth 2 times daily if needed (HEARTBURN).     cloNIDine HCl (CATAPRES) 0.1 mg tablet Take 1 tablet by mouth once daily.     CYANOCOBALAMIN, VITAMIN B-12, (VITAMIN B-12 ORAL) Take 1 Tab by mouth once daily.     diltiazem CD (CARDIZEM CD) 360 mg extended release 24 hr capsule Take 1 capsule by mouth once daily.     docusate (COLACE) 100 mg capsule Take 100 mg by mouth 2 times daily if needed for Constipation.     estradiol (ESTRACE) 0.5 mg tablet TAKE ONE TABLET BY MOUTH ONCE DAILY     ferrous sulfate 325 mg delayed release tablet Take 650 mg by mouth once daily.     furosemide (LASIX) 40 mg tablet Take 1 tablet by mouth every morning.     gabapentin (NEURONTIN) 100 mg capsule Take 1 capsule by mouth 3 times daily.     HYDROcodone-acetaminophen, 5-325 mg, (NORCO) per tablet Take 1 tablet by mouth 4 times daily  Max acetaminophen dose: 4000 mg in 24 hrs.     insulin aspart (NOVOLOG FLEXPEN) 100 unit/mL solution for injection Inject 4 times daily per sliding scale, 12 units max daily     insulin aspart (NOVOLOG) 100 unit/mL injection Inject 1-3 Units subcutaneous 4 times daily before meals and at bedtime. PER SLIDING SCALE     insulin glargine (LANTUS SOLOSTAR) 100 unit/mL (3 mL) pen 16 units am, 14 units pm     Insulin Safety Needles, Disp, 30 gauge x 1/3\" For administering insulin at home 5 times daily.  Dx code e11.9     metFORMIN (GLUCOPHAGE XR) 500 mg Extended-Release tablet Take 2 tablets by mouth 2 times daily with meals.     Omega-3-DHA-EPA-Fish Oil 1,200 (144-216) mg capsule Take 2 " capsules by mouth once daily.     Omeprazole 20 mg tablet Take 1 tablet by mouth before breakfast.     predniSONE (DELTASONE) 5 mg tablet TAKE ONE TABLET BY MOUTH TWICE DAILY WITH MEALS     psyllium (METAMUCIL) 0.52 gram capsule Take 3 capsules by mouth once daily.     Saccharomyces boulardii (FLORASTOR) 250 mg capsule Take 250 mg by mouth once daily.     spironolactone (ALDACTONE) 25 mg tablet TAKE ONE TABLET BY MOUTH ONCE DAILY     traMADol (ULTRAM) 50 mg tablet TAKE TWO TABLETS BY MOUTH EVERY 6 HOURS AS NEEDED FOR PAIN     No current facility-administered medications for this visit.      Medications have been reviewed by me and are current to the best of my knowledge and ability. ,   Past Medical History:     Diagnosis  Date     Atrial fibrillation (HC) 2000     Diabetes mellitus, type II (HC)      Elevated liver function tests 2010    Fatty liver on US and CT.  Iron nl. 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    IV Vancomycin to resolve, L 3rd digit      Pneumothorax     spondylolysis (right) at age 39 followed by spondylolysis (left) several years later      Psoas muscle abscess (HC) 2016    Strep viridans      Rheumatoid arthritis(714.0) 2000     Urinary sepsis 2012    hospitalized with hypotension, blood cultures showed Escherichia coli    ,   Patient Active Problem List       Diagnosis  Date Noted     Decubitus ulcer of buttock, stage 4 (HC)  05/29/2017     Chronic pain syndrome  04/18/2017     Pain medication agreement  04/18/2017     Anemia  01/16/2017     Elevated LFTs  01/16/2017     Corticosteroid dependence (HC)  01/15/2017     Pseudomonas aeruginosa colonization  08/31/2016     Estrogen deficiency  08/22/2016     Hypertension  10/25/2012     Hyperlipemia       Neuropathy (HC)       Diabetes mellitus type II       a system change updated this  record. This will not affect patient care or billing. This comment can be deleted.        Atrial fibrillation (HC)       DIVERTICULOSIS, COLON  02/18/2010     Rheumatoid arthritis(714.0)  12/07/2006    and   Past Surgical History:      Procedure  Laterality Date     BREAST BIOPSY      Left       CATARACT REMOVAL Bilateral      COLONOSCOPY SCREENING  2001    repeat in 10 years per patient       COLONOSCOPY SCREENING  2010    sigmoid diverticulosis, no polyps, due 2020       COLONOSCOPY SCREENING  2011    mild sigmoid diverticulosis, EGD biopsies       CT GUIDE PERC DRAIN CATH ([place])  2016     ESOPHAGOGASTRODUODENOSCOPY  2/2011    Normal       FOREARM/WRIST SURGERY  2010    removed nodule Right wrist       KNEE REPLACEMENT Bilateral 07/10/08     LUMBAR LAMINECTOMY       MUSCLE FLAP  2017    Left ischial ulcer       PORTACATH      hx chest tube surgery x2       PORTACATH  2012    power port placement, Dr. Taylor       SPINE SURGERY      Back surgery with fusion of the lumbar discs       MOISES AND BSO      secondary to fibroids         REVIEW OF SYSTEMS:  Review of Systems   All other systems reviewed and are negative.      OBJECTIVE:  /84  Pulse 76  Wt 51 kg (112 lb 8 oz)  Breastfeeding? No  BMI 19.93 kg/m2    EXAM:   Physical Exam   Constitutional: She is well-developed, well-nourished, and in no distress. No distress.   In wheelchair   HENT:   Head: Normocephalic.   Neck: Normal range of motion. Neck supple.   Cardiovascular: Normal rate, regular rhythm and normal heart sounds.    Pulmonary/Chest: Effort normal and breath sounds normal. No respiratory distress. She has no wheezes. She has no rales.   Abdominal: Soft. Bowel sounds are normal. She exhibits no distension. There is no tenderness.   Musculoskeletal: She exhibits no edema.   Neurological: She is alert.   Skin: Skin is warm and dry. She is not diaphoretic.   Psychiatric: Affect normal.   Nursing note and vitals reviewed.      ASSESSMENT/PLAN:     ICD-10-CM    1. Pain medication agreement Z02.89    2. Corticosteroid dependence (HC) F19.20    3. Type 2 diabetes mellitus with other diabetic kidney complication, with long-term current use of insulin (HC) E11.29 COMPLETE METABOLIC PANEL     Z79.4 HEMOGLOBIN A1C MONITORING (POCT)   4. Rheumatoid arthritis, involving unspecified site, unspecified rheumatoid factor presence (HC) M06.9 CBC WITH DIFFERENTIAL   5. Decubitus ulcer of buttock, stage 4, unspecified laterality (HC) L89.304    6. Protein-calorie malnutrition, unspecified severity (HC) E46 TRANSFERRIN        Plan:  In general, she appears to certainly be doing better than she has been for a long time. I encouraged her to avoid pressure on the buttocks so she doesn't get a recurrence of any ulceration. Medications will stay the same. Complete lab drawn and pending, I will send her a letter with the results and any recommendations. She will run out of her pain pills right around the first of the year, I will have her back the last week of December or so for recheck. Return sooner for problems. Of note is that it's unlikely that we are going to get her on a lower dose of pain medication as she needs to take it 4 times daily for her rheumatoid arthritis pain.

## 2017-12-27 NOTE — PROGRESS NOTES
Patient Information     Patient Name MRN Karen Randle [id number] Female 1945      Progress Notes by Hugo Duarte MD at 10/17/2017  3:20 PM     Author:  Hugo Duarte MD Service:  (none) Author Type:  Physician     Filed:  10/17/2017  3:44 PM Encounter Date:  10/17/2017 Status:  Signed     :  Hugo Duarte MD (Physician)            SUBJECTIVE:    Karen Duncan is a 72 y.o. female who presents for hospital follow up.    HPI Comments: She comes in today for follow-up. She had a issue will decubitus ulcer that was nonhealing. She had a flap placed over this in July. This was complicated temporarily by a minor infection that was subsequently drained and treated with vancomycin. Overall she spent 3 months in [place]. She is now back home. The wound is entirely healed up. She feels well other than being very weak from being in bed for 3 months. I spent a bit of time today reviewing and reconciling her medications. She feels as though she is doing well with her diabetes. We talked about her prednisone, she is not really able to take any other agent for her rheumatoid arthritis at this time due to the immunosuppression that it causes. She knows that this is detrimental to her healing, etc. but she really does need to take it a cause of her discomfort. She continues taking the hydrocodone pain medication as needed as well. She is quite happy to finally be home and is looking forward to getting her strength back and getting back to normal functioning.      Allergies      Allergen   Reactions     Adhesive Tapes [Adhesive]  *Unknown     Paper tapes, fragile skin      Aliskiren  Cough     Lisinopril  Cough     Ace cough      Losartan  Cough   ,   Current Outpatient Prescriptions     Medication  Sig     ACCU-CHEK MULTICLIX LANCET USE ONE LANCET TO CHECK GLUCOSE ONCE OR TWICE DAILY     amLODIPine (NORVASC) 2.5 mg tablet Take 1 tablet by mouth once daily.     ascorbic acid, vitamin C, (ASCORBIC  "ACID WITH NIECY HIPS) 500 mg tablet Take 1,000 mg by mouth once daily.     aspirin 81 mg tablet Take 1 tablet by mouth once daily with a meal.     atorvastatin (LIPITOR) 20 mg tablet TAKE ONE TABLET BY MOUTH AT BEDTIME     baclofen (LIORESAL) 10 mg tablet Take 1 tablet by mouth 3 times daily.     BENADRYL 25 MG CAP two tablets orally at bedtime     blood sugar diagnostic (ACCU-CHEK LENIN PLUS TEST STRP) strip Test one to two times daily E11.9     blood sugar diagnostic (BLOOD GLUCOSE TEST) strip Dispense item covered by pt ins. Test 4 times daily E11.29     CALCIUM CARBONATE (TUMS 500 ORAL) Take 2-3 Tabs by mouth 2 times daily if needed (HEARTBURN).     cloNIDine HCl (CATAPRES) 0.1 mg tablet Take 1 tablet by mouth once daily.     CYANOCOBALAMIN, VITAMIN B-12, (VITAMIN B-12 ORAL) Take 1 Tab by mouth once daily.     diltiazem CD (CARDIZEM CD) 360 mg extended release 24 hr capsule Take 1 capsule by mouth once daily.     docusate (COLACE) 100 mg capsule Take 100 mg by mouth 2 times daily if needed for Constipation.     estradiol (ESTRACE) 0.5 mg tablet TAKE ONE TABLET BY MOUTH ONCE DAILY     ferrous sulfate 325 mg delayed release tablet Take 650 mg by mouth once daily.     furosemide (LASIX) 40 mg tablet Take 1 tablet by mouth every morning.     gabapentin (NEURONTIN) 100 mg capsule Take 1 capsule by mouth 3 times daily.     HYDROcodone-acetaminophen, 5-325 mg, (NORCO) per tablet Take 1 tablet by mouth 4 times daily  Max acetaminophen dose: 4000 mg in 24 hrs.     insulin aspart (NOVOLOG) 100 unit/mL injection Inject 1-3 Units subcutaneous 4 times daily before meals and at bedtime. PER SLIDING SCALE     insulin glargine (LANTUS SOLOSTAR) 100 unit/mL (3 mL) pen 16 units am, 14 units pm     Insulin Safety Needles, Disp, 30 gauge x 1/3\" For administering insulin at home 5 times daily.  Dx code e11.9     metFORMIN (GLUCOPHAGE XR) 500 mg Extended-Release tablet Take 2 tablets by mouth 2 times daily with meals.     " Omega-3-DHA-EPA-Fish Oil 1,200 (144-216) mg capsule Take 2 capsules by mouth once daily.     Omeprazole 20 mg tablet Take 1 tablet by mouth before breakfast.     predniSONE (DELTASONE) 5 mg tablet TAKE ONE TABLET BY MOUTH TWICE DAILY WITH MEALS     psyllium (METAMUCIL) 0.52 gram capsule Take 3 capsules by mouth once daily.     Saccharomyces boulardii (FLORASTOR) 250 mg capsule Take 250 mg by mouth once daily.     spironolactone (ALDACTONE) 25 mg tablet TAKE ONE TABLET BY MOUTH ONCE DAILY     traMADol (ULTRAM) 50 mg tablet TAKE TWO TABLETS BY MOUTH EVERY 6 HOURS AS NEEDED FOR PAIN     No current facility-administered medications for this visit.      Medications have been reviewed by me and are current to the best of my knowledge and ability. ,   Past Medical History:     Diagnosis  Date     Atrial fibrillation (HC) 2000     Diabetes mellitus, type II (HC)      Elevated liver function tests 2010    Fatty liver on US and CT.  Iron nl. 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    IV Vancomycin to resolve, L 3rd digit      Pneumothorax     spondylolysis (right) at age 39 followed by spondylolysis (left) several years later      Psoas muscle abscess (HC) 2016    Strep viridans      Rheumatoid arthritis(714.0) 2000     Urinary sepsis 2012    hospitalized with hypotension, blood cultures showed Escherichia coli    ,   Patient Active Problem List       Diagnosis  Date Noted     Decubitus ulcer of buttock, stage 4 (HC)  05/29/2017     Chronic pain syndrome  04/18/2017     Pain medication agreement  04/18/2017     Anemia  01/16/2017     Elevated LFTs  01/16/2017     Corticosteroid dependence (HC)  01/15/2017     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 This will not affect patient care or billing. This comment can be deleted.        Atrial fibrillation (HC)       DIVERTICULOSIS, COLON  02/18/2010     Rheumatoid arthritis(714.0)  12/07/2006   ,   Past Surgical History:      Procedure  Laterality Date     BREAST BIOPSY      Left       CATARACT REMOVAL Bilateral      COLONOSCOPY SCREENING  2001    repeat in 10 years per patient       COLONOSCOPY SCREENING  2010    sigmoid diverticulosis, no polyps, due 2020       COLONOSCOPY SCREENING  2011    mild sigmoid diverticulosis, EGD biopsies       CT GUIDE PERC DRAIN CATH (IA)  2016     ESOPHAGOGASTRODUODENOSCOPY  2/2011    Normal       FOREARM/WRIST SURGERY  2010    removed nodule Right wrist       KNEE REPLACEMENT Bilateral 07/10/08     LUMBAR LAMINECTOMY       MUSCLE FLAP  2017    Left ischial ulcer       PORTACATH      hx chest tube surgery x2       PORTACATH  2012    power port placement, Dr. Taylor       SPINE SURGERY      Back surgery with fusion of the lumbar discs       MOISES AND BSO      secondary to fibroids      and   Social History        Substance Use Topics          Smoking status:   Former Smoker      Packs/day:  3.00      Years:  25.00      Types:  Cigarettes      Quit date:  5/27/1974      Smokeless tobacco:   Never Used       Comment: quit 1984       Alcohol use   8.4 oz/week     14 Cans of beer per week        Comment: 1 beers/day         REVIEW OF SYSTEMS:  Review of Systems   All other systems reviewed and are negative.      OBJECTIVE:  /76  Temp 96.5  F (35.8  C) (Temporal)  Breastfeeding? No    EXAM:   Physical Exam   Constitutional: She is well-developed, well-nourished, and in no distress. No distress.   In wheelchair   Cardiovascular: Normal rate, regular rhythm and normal heart sounds.    Pulmonary/Chest: She has decreased breath sounds. She has no  wheezes. She has no rhonchi. She has no rales.   Skin: She is not diaphoretic.   Her left ischial area is well-healed   Nursing note and vitals reviewed.      ASSESSMENT/PLAN:    ICD-10-CM    1. Type 2 diabetes mellitus with other diabetic kidney complication, with long-term current use of insulin (HC) E11.29 insulin glargine (LANTUS SOLOSTAR) 100 unit/mL (3 mL) pen     Z79.4    2. Gastroesophageal reflux disease without esophagitis K21.9 Omeprazole 20 mg tablet   3. Pain medication agreement Z02.89    4. Rheumatoid arthritis, involving unspecified site, unspecified rheumatoid factor presence (HC) M06.9    5. Decubitus ulcer of buttock, stage 4, unspecified laterality () L89.304         Plan:  She appears to be doing well at this time. I encouraged her to keep pressure off of this area and keep her diabetes under control. Unfortunately she needs to stay on prednisone for her rheumatoid arthritis. She will continue with protein supplements as well to try to help her nutritional status. No changes are made in her medications. I will have her back to see me again in 1 month. We will plan on doing lab work at that time including a metabolic panel, CBC and A1c and any other lab that is deemed necessary. Follow-up sooner if there are problems. Continue physical therapy in the interim. Her narcotic contract does not need to be updated until April or May.

## 2017-12-28 ENCOUNTER — COMMUNICATION - GICH (OUTPATIENT)
Dept: INTERNAL MEDICINE | Facility: OTHER | Age: 72
End: 2017-12-28

## 2017-12-28 NOTE — PROGRESS NOTES
Patient Information     Patient Name MRN Karen Randle [id number] Female 1945      Progress Notes by Hugo Duarte MD at 2017  2:00 PM     Author:  Hugo Duarte MD Service:  (none) Author Type:  Physician     Filed:  2017  2:34 PM Encounter Date:  2017 Status:  Signed     :  Hugo Duarte MD (Physician)            SUBJECTIVE:    Karen Duncan is a 72 y.o. female who presents for hospital f/u.    HPI Comments: She comes in today for follow-up. She was recently hospitalized with sepsis. She had multi-organism sepsis from her decubitus wound. She is on Cipro and sulfa and seems to be tolerating these well. She's had no further illness associated with the sepsis since her discharge to home. She knows that she needs to have surgery on the ulcer to ever get this to heal as the wound VAC does not seem to be adequate at this point in time. She keeps having episodes of sepsis, occasionally from lung or urine as well. Her recent chest x-ray was negative and her urine was negative as well. She has no other complaints or concerns at this time other than feeling quite anxious about having the surgery. She knows that this is going to really limit her in regards to her positioning, etc. This is the main reason she's been putting it off for so long.      Allergies      Allergen   Reactions     Adhesive Tapes [Adhesive]  *Unknown     Paper tapes, fragile skin      Aliskiren  Cough     Lisinopril  Cough     Ace cough      Losartan  Cough   ,   Current Outpatient Prescriptions     Medication  Sig     ACCU-CHEK MULTICLIX LANCET USE ONE LANCET TO CHECK GLUCOSE ONCE OR TWICE DAILY     amLODIPine (NORVASC) 2.5 mg tablet Take 1 tablet by mouth once daily.     ascorbic acid, vitamin C, (ASCORBIC ACID WITH NIECY HIPS) 500 mg tablet Take 1,000 mg by mouth once daily.     aspirin 81 mg tablet Take 1 tablet by mouth once daily with a meal.     atorvastatin (LIPITOR) 20 mg tablet TAKE ONE TABLET  "BY MOUTH AT BEDTIME     baclofen (LIORESAL) 10 mg tablet Take 1 tablet by mouth 3 times daily.     BENADRYL 25 MG CAP two tablets orally at bedtime     blood sugar diagnostic (ACCU-CHEK LENIN PLUS TEST STRP) strip Test one to two times daily E11.9     blood sugar diagnostic (BLOOD GLUCOSE TEST) strip Dispense item covered by pt ins. Test 4 times daily E11.29     CALCIUM CARBONATE (TUMS 500 ORAL) Take 2-3 Tabs by mouth 2 times daily if needed (HEARTBURN).     ciprofloxacin HCl (CIPRO) 500 mg tablet Take 1 tablet by mouth 2 times daily for 10 days.     cloNIDine HCl (CATAPRES) 0.1 mg tablet Take 1 tablet by mouth once daily.     CYANOCOBALAMIN, VITAMIN B-12, (VITAMIN B-12 ORAL) Take 1 Tab by mouth once daily.     diltiazem CD (CARDIZEM CD) 360 mg extended release 24 hr capsule Take 1 capsule by mouth once daily.     docusate (COLACE) 100 mg capsule Take 100 mg by mouth 2 times daily if needed for Constipation.     estradiol (ESTRACE) 0.5 mg tablet Take 1 tablet by mouth once daily.     ferrous sulfate 325 mg delayed release tablet Take 650 mg by mouth once daily.     furosemide (LASIX) 40 mg tablet Take 1 tablet by mouth every morning.     gabapentin (NEURONTIN) 100 mg capsule Take 1 capsule by mouth 3 times daily.     HYDROcodone-acetaminophen, 5-325 mg, (NORCO) per tablet Take 1 tablet by mouth 4 times daily  Max acetaminophen dose: 4000 mg in 24 hrs.     insulin aspart (NOVOLOG) 100 unit/mL injection Inject 1-3 Units subcutaneous 4 times daily before meals and at bedtime. PER SLIDING SCALE     insulin glargine (LANTUS SOLOSTAR) 100 unit/mL (3 mL) pen 16 units am, 14 units pm     Insulin Safety Needles, Disp, 30 gauge x 1/3\" For administering insulin at home 5 times daily.  Dx code e11.9     metFORMIN (GLUCOPHAGE XR) 500 mg Extended-Release tablet Take 2 tablets by mouth 2 times daily with meals.     Omega-3-DHA-EPA-Fish Oil 1,200 (144-216) mg capsule Take 2 capsules by mouth once daily.     predniSONE (DELTASONE) 5 " mg tablet TAKE ONE TABLET BY MOUTH TWICE DAILY WITH MEALS     psyllium (METAMUCIL) 0.52 gram capsule Take 3 capsules by mouth once daily.     Saccharomyces boulardii (FLORASTOR) 250 mg capsule Take 250 mg by mouth once daily.     sodium hypochlorite (1/2 STRENGTH DAKIN'S SOLUTION) 0.25% soln Apply  topically to affected area(s) 2 times daily.     Sodium hypochlorite (DAKIN'S) 0.25 % soln Apply 5 mL topically to affected area(s) 2 times daily if needed for Other (Specify).     spironolactone (ALDACTONE) 25 mg tablet TAKE ONE TABLET BY MOUTH ONCE DAILY     TAZTIA  mg capsule TAKE ONE CAPSULE BY MOUTH ONCE DAILY     traMADol (ULTRAM) 50 mg tablet TAKE TWO TABLETS BY MOUTH EVERY 6 HOURS AS NEEDED FOR PAIN     trimethoprim-sulfamethoxazole, 160-800 mg, (BACTRIM DS, SEPTRA DS) tablet Take 1 tablet by mouth 2 times daily for 10 days.     No current facility-administered medications for this visit.      Medications have been reviewed by me and are current to the best of my knowledge and ability. ,   Past Medical History:     Diagnosis  Date     Atrial fibrillation () 2000     Diabetes mellitus, type II ()      Elevated liver function tests 2010    Fatty liver on US and CT.  Iron nl. 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    IV Vancomycin to resolve, L 3rd digit      Pneumothorax     spondylolysis (right) at age 39 followed by spondylolysis (left) several years later      Psoas muscle abscess (HC) 2016    Strep viridans      Rheumatoid arthritis () 2000     Urinary sepsis 2012    hospitalized with hypotension, blood cultures showed Escherichia coli    ,   Patient Active Problem List       Diagnosis  Date Noted     Decubitus ulcer of buttock, stage 4 (HC)  05/29/2017     Sepsis (HC)  05/27/2017     Chronic pain syndrome  04/18/2017     Pain medication  agreement  04/18/2017     Anemia  01/16/2017     Elevated LFTs  01/16/2017     Corticosteroid dependence (HC)  01/15/2017     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 This will not affect patient care or billing. This comment can be deleted.        Atrial fibrillation (HC)       DIVERTICULOSIS, COLON  02/18/2010     Rheumatoid arthritis(714.0)  12/07/2006   ,   Past Surgical History:      Procedure  Laterality Date     BREAST BIOPSY      Left       CATARACT REMOVAL Bilateral      COLONOSCOPY SCREENING  2001    repeat in 10 years per patient       COLONOSCOPY SCREENING  2010    sigmoid diverticulosis, no polyps, due 2020       COLONOSCOPY SCREENING  2011    mild sigmoid diverticulosis, EGD biopsies       CT GUIDE PERC DRAIN CATH (IA)  2016     ESOPHAGOGASTRODUODENOSCOPY  2/2011    Normal       FOREARM/WRIST SURGERY  2010    removed nodule Right wrist       KNEE REPLACEMENT Bilateral 07/10/08     PORTACATH      hx chest tube surgery x2       PORTACATH  2012    power port placement, Dr. Taylor       SPINE SURGERY      Back surgery with fusion of the lumbar discs       MOISES AND BSO      secondary to fibroids      and   Social History        Substance Use Topics          Smoking status:   Former Smoker      Packs/day:  3.00      Years:  25.00      Types:  Cigarettes      Quit date:  5/27/1974      Smokeless tobacco:   Never Used       Comment: quit 1984       Alcohol use   8.4 oz/week     14 Cans of beer per week        Comment: 2 beers/day         REVIEW OF SYSTEMS:  Review of Systems   All other systems reviewed and are negative.      OBJECTIVE:  /68  Pulse 84  Wt 49.9 kg (110 lb)  Breastfeeding? No   BMI 19.49 kg/m2    EXAM:   Physical Exam   Constitutional: She is well-developed, well-nourished, and in no distress. No distress.   Cardiovascular: Normal rate, regular rhythm and normal heart sounds.    Pulmonary/Chest: Effort normal and breath sounds normal. No respiratory distress. She has no wheezes. She has no rales.   Musculoskeletal: She exhibits no edema.   Neurological: She is alert.   Skin: Skin is warm and dry. She is not diaphoretic.   Psychiatric: Affect normal.   Nursing note and vitals reviewed.      ASSESSMENT/PLAN:    ICD-10-CM    1. Decubitus ulcer of buttock, stage 4, unspecified laterality L89.304    2. Sepsis due to methicillin resistant Staphylococcus aureus (MRSA) () A41.02    3. Pseudomonas aeruginosa colonization Z22.39         Plan:  She appears to be stable at this point. Medications were reconciled. No further treatment is needed today as she is doing well and is still finishing off her antibiotics. Her next follow-up visit will be in one week in [place] is mentioned, I did not schedule a follow-up visit with me pending the results of that. It sounds as though she may be having surgery to repair the wound. I will either see her postoperatively or potentially preoperatively for a physical.

## 2017-12-28 NOTE — TELEPHONE ENCOUNTER
Patient Information     Patient Name MRN Karen Randle [id number] Female 1945      Telephone Encounter by Anne Pitts RN at 10/13/2017  4:03 PM     Author:  Anne Pitts RN Service:  (none) Author Type:  NURS- Registered Nurse     Filed:  10/13/2017  4:05 PM Encounter Date:  10/13/2017 Status:  Signed     :  Anne Pitts RN (NURS- Registered Nurse)            Hormones    Office visit in the past 12 months or per provider note.    Last visit with LEO GIVENS was on: 2017 in [facility]  Next visit with LEO GIVENS is on: 10/17/2017 in [facility]  Next visit with Internal Medicine is on: 10/17/2017 in [facility]    Max refill for 12 months from last office visit or per provider note.    Prescription refilled per RN Medication Refill Policy.................... Anne Pitts RN ....................  10/13/2017   4:04 PM

## 2017-12-28 NOTE — TELEPHONE ENCOUNTER
Patient Information     Patient Name MRN Karen Randle [id number] Female 1945      Telephone Encounter by Anne Piña RN at 10/27/2017 10:01 AM     Author:  Anne Piña RN Service:  (none) Author Type:  NURS- Registered Nurse     Filed:  10/27/2017 10:09 AM Encounter Date:  10/24/2017 Status:  Signed     :  Anne Piña RN (NURS- Registered Nurse)            Tramadol not on RN refill protocol. Patient does have appointment scheduled for 1 month follow-up and medication review on 17 as requested by Leo Duarte MD. Routed to PCP for consideration of refill.    Unable to complete prescription refill per RN Medication Refill Policy.................... Anne Piña RN ....................  10/27/2017   10:02 AM    This is a Refill request from: [facility]  Medication: TraMADol (ULTRAM) 50 mg tablet  Prescription #:[id number]    Qty:90 tablet   Ref:0  Start:2017    End:              Route:Oral                  GISELE:No   Class:Print    Sig:TAKE TWO TABLETS BY MOUTH EVERY 6 HOURS AS NEEDED FOR PAIN  Quantity requested: 90  Last fill date: 17 for #90  Last visit with LEO DUARTE was on: 10/17/2017 in [facility]  PCP:  Leo Duarte MD  Controlled Substance Agreement: 17  Diagnosis r/t this medication request: Rheumatoid arthritis

## 2017-12-28 NOTE — TELEPHONE ENCOUNTER
Patient Information     Patient Name MRN Karen Randle [id number] Female 1945      Telephone Encounter by Damaris Potter RN at 2017  9:24 AM     Author:  Damaris Potter RN Service:  (none) Author Type:  NURS- Registered Nurse     Filed:  2017  9:29 AM Encounter Date:  2017 Status:  Signed     :  Damaris Potter RN (NURS- Registered Nurse)            This is a Refill request from: [facility]  Name of Medication: Ultram  Quantity requested: 90 is a 12 day supply  Last fill date: 17  Due for refill: yes  Last visit with LEO GIVENS was on: 2017 in [facility]  PCP:  Leo Givens MD  Controlled Substance Agreement:  17   Diagnosis r/t this medication request: Rheumatoid arthritis with positive rheumatoid factor, involving unspecified site (HC    Dr. Givens out of office until  will route to covering team let for review and consideration of refill.       Unable to complete prescription refill per RN Medication Refill Policy.................... DAMARIS POTTER RN ....................  2017   9:25 AM

## 2017-12-28 NOTE — TELEPHONE ENCOUNTER
Patient Information     Patient Name MRN Karen Randle [id number] Female 1945      Telephone Encounter by Damaris Arora RN at 2017 10:36 AM     Author:  Damaris Arora RN Service:  (none) Author Type:  NURS- Registered Nurse     Filed:  2017 10:55 AM Encounter Date:  2017 Status:  Signed     :  Damaris Arora RN (NURS- Registered Nurse)            cloNIDine HCl (CATAPRES) 0.1 mg tablet  TAKE ONE TABLET BY MOUTH ONCE DAILY       Disp: 90 tablet Refills: 2    Class: eRx Start: 2017    For: Hypertension  Documented:5 years ago  Last refill:2017  To be filled at: [facility] [address] [place], [place] [address]Phone: [phone]    Last visit with LEO GIVENS was on: 2017 in [facility]  Next visit with LEO GIVENS is on: 2017 in [facility]  Next visit with Internal Medicine is on: 2017 in [facility]    Last BP review noted on 17 but patient has been in hospital since.    Unable to complete prescription refill per RN Medication Refill Policy.................... DAMARIS ARORA, ELIEL ....................  2017   10:55 AM

## 2017-12-28 NOTE — TELEPHONE ENCOUNTER
Patient Information     Patient Name MRN Karen Randle [id number] Female 1945      Telephone Encounter by Lissa Bauer RN at 2017 11:29 AM     Author:  Lissa Bauer RN Service:  (none) Author Type:  NURS- Registered Nurse     Filed:  2017 11:32 AM Encounter Date:  2017 Status:  Signed     :  Lissa Bauer RN (NURS- Registered Nurse)            Medication is not on refill protocol. Unable to complete prescription refill per RN Medication Refill Policy.................... LISSA BAUER RN ....................  2017   11:30 AM      This is a Refill request from: [facility]  Name of Medication: Ultram 50 mg  Quantity requested: 90  Last fill date: 17  Last visit with SIOBHAN MENARD was on: 2017 in [facility]  PCP:  Hugo Duarte MD  Controlled Substance Agreement:  17   Diagnosis r/t this medication request: pain

## 2017-12-28 NOTE — TELEPHONE ENCOUNTER
Patient Information     Patient Name MRN Karen Randle [id number] Female 1945      Telephone Encounter by Damaris Arora RN at 2017  4:24 PM     Author:  Damaris Arora RN Service:  (none) Author Type:  NURS- Registered Nurse     Filed:  2017  4:28 PM Encounter Date:  2017 Status:  Signed     :  Damaris Arora RN (NURS- Registered Nurse)            traMADol (ULTRAM) 50 mg tablet  TAKE TWO TABLETS BY MOUTH EVERY 6 HOURS AS NEEDED FOR PAIN  Disp: 90 tablet Refills: 0    Class: eRx Start: 2017    For: Rheumatoid arthritis with positive rheumatoid factor, involving unspecified site (HC)  Documented:7 months ago  Last refill:2017  To be filled at: [facility] [address] [place] [address].Phone: [phone]     Noted as refilled per system on 17 #90 x 2 refills.  Called [facility] and they have refills.    Unable to complete prescription refill per RN Medication Refill Policy.................... DAMARIS ARORA RN ....................  2017   4:24 PM

## 2017-12-28 NOTE — TELEPHONE ENCOUNTER
Patient Information     Patient Name MRN Karen Randle [id number] Female 1945      Telephone Encounter by Pam Guerrero RN at 2017 12:47 PM     Author:  Pam Guerrero RN  Service:  (none) Author Type:  NURS- Registered Nurse     Filed:  2017  2:35 PM  Encounter Date:  2017 Status:  Addendum     :  Pam Guerrero RN (NURS- Registered Nurse)        Related Notes: Original Note by Pam Guerrero RN (NURS- Registered Nurse) filed at 2017 12:48 PM            This is a Refill request from: [facility]  Name of Medication: Diltiazem CD (CARDIZEM CD) 360 mg extended release 24 hr capsule  Quantity requested: 90 x 3   Last fill date: listed as historical dated 2017  Due for refill: yes per pharmacy  Diagnosis r/t this medication request:HTN   Last visit with LEO GIVENS was on: 2017  Next visit with LEO GIVENS is on: 2017     BP Readings from Last 4 Encounters:    17 122/84   10/17/17 126/76   17 137/74   17 112/70     Unable to complete prescription refill per RN Medication Refill Policy.................... Pam Guerrero RN ....................  2017   2:35 PM

## 2017-12-28 NOTE — TELEPHONE ENCOUNTER
Patient Information     Patient Name MRN Karen Randle [id number] Female 1945      Telephone Encounter by Damaris Arora RN at 10/23/2017  2:07 PM     Author:  Damaris Arora RN Service:  (none) Author Type:  NURS- Registered Nurse     Filed:  10/23/2017  2:10 PM Encounter Date:  10/19/2017 Status:  Signed     :  Damaris Arora RN (NURS- Registered Nurse)            Diabetic Supplies    Office visit in the past 12 months.    Last visit with LEO GIVENS was on: 10/17/2017 in [facility]  Next visit with LEO GIVENS is on: 2017 in [facility]  Next visit with Internal Medicine is on: 2017 in [facility]    Max refill for 12 months from last office visit.  Always add ICD-9 code.    Needs not be listed on Med List to fill.  Need new RX every 12 months or if exceeds the limitations set by Medicare, then every 6 months.  Also when testing frequency is changed is there a need to obtain a new order.  A refill request does not need to be approved by the ordering physician-a beneficiary or their caregiver may request refills.  Physicians are not required to fill out additional forms such as home testing results for suppliers or provide additional documentation unless the supplier is audited and the  is requesting such documentation.    Prescription refilled per RN Medication Refill Policy.................... DAMARIS ARORA RN ....................  10/23/2017   2:09 PM

## 2017-12-28 NOTE — TELEPHONE ENCOUNTER
Patient Information     Patient Name MRN Karen Randle [id number] Female 1945      Telephone Encounter by Susana Mcrae at 10/27/2017 10:15 AM     Author:  Susana Mcrae Service:  (none) Author Type:  (none)     Filed:  10/27/2017 10:15 AM Encounter Date:  10/24/2017 Status:  Signed     :  Susana Mcrae            MAF- Patient wants to know why her Tramadol prescription is not being refilled.

## 2017-12-28 NOTE — TELEPHONE ENCOUNTER
Patient Information     Patient Name MRN Karen Randle [id number] Female 1945      Telephone Encounter by Georgina Johnston LPN at 2017 12:55 PM     Author:  Georgina Johnston LPN Service:  (none) Author Type:  NURS- Licensed Practical Nurse     Filed:  2017 12:55 PM Encounter Date:  2017 Status:  Signed     :  Georgina Johnston LPN (NURS- Licensed Practical Nurse)            Rx faxed to pharmacy.  Georgina Johnston LPN.........2017   12:55 PM

## 2017-12-28 NOTE — PROGRESS NOTES
Patient Information     Patient Name MRN Sex Karen Francis [id number] Female 1945      Progress Notes by Anne Gonzales RN at 2017  2:00 PM     Author:  Anne Gonzales RN Service:  (none) Author Type:  NURS- Registered Nurse     Filed:  2017  2:22 PM Date of Service:  2017  2:00 PM Status:  Signed     :  Anne Gonzales RN (NURS- Registered Nurse)            Pt here for lab draw and portacath flush.   Portacath accessed with blood return and flushed with normal saline without incident.  Labs drawn from portacath and sent to lab for analysis.   Portacath with blood return, flushed, and deaccessed upon discharge.  Pt left via w/c with her .

## 2017-12-28 NOTE — TELEPHONE ENCOUNTER
Patient Information     Patient Name MRN Karen Randle [id number] Female 1945      Telephone Encounter by Damaris Potter RN at 2017 11:43 AM     Author:  Damaris Potter RN Service:  (none) Author Type:  NURS- Registered Nurse     Filed:  2017 11:46 AM Encounter Date:  2017 Status:  Signed     :  Damaris Potter RN (NURS- Registered Nurse)            FLORASTOR 250 mg capsule  The source prescription has been discontinued.  TAKE TWO CAPSULES BY MOUTH TWICE DAILY       Disp: 120 capsule Refills: 0 GISELE    Class: eRx Start: 2017    For: Sepsis due to urinary tract infection (HC)  Originally ordered: 4 months ago by Will Ortega MD  Last refill:5/15/2017  To be filled at: [facility] [address] [place] [address].Phone: [phone]  Last visit with LEO GIVENS was on: 2017 in [facility]  PCP:  Leo Givens MD     Noted as historical on current medication list.    Will route to covering team let for consideration of refill due to refill request on 17 and Dr Givens not back until .    Unable to complete prescription refill per RN Medication Refill Policy.................... DAMARIS POTTER RN ....................  2017   11:46 AM

## 2017-12-28 NOTE — TELEPHONE ENCOUNTER
Patient Information     Patient Name MRN Karen Randle [id number] Female 1945      Telephone Encounter by Anne Piña RN at 10/13/2017  4:06 PM     Author:  Anne Piña RN Service:  (none) Author Type:  NURS- Registered Nurse     Filed:  10/13/2017  4:15 PM Encounter Date:  10/13/2017 Status:  Signed     :  Anne Piña RN (NURS- Registered Nurse)            [facility] faxed a request to refill Omeprazole 20mg which was not listed in chart. Per chart, this was not ordered here, but medication is listed on a discharge summary dated 10-11-17 from [facility] after surgical procedure. Instructions read take one capsule once daily before breakfast.      Omeprazole orders pulled in from new orders to reconcile and routed to Hugo Duarte MD for final approval of fill request.   Unable to complete prescription refill per RN Medication Refill Policy.................... Anne Piña RN ....................  10/13/2017   4:10 PM

## 2017-12-28 NOTE — TELEPHONE ENCOUNTER
Patient Information     Patient Name MRN Karen Randle [id number] Female 1945      Telephone Encounter by Damaris Arora RN at 2017  3:09 PM     Author:  Damaris Arora RN Service:  (none) Author Type:  NURS- Registered Nurse     Filed:  2017  3:18 PM Encounter Date:  2017 Status:  Signed     :  Damaris Arora RN (NURS- Registered Nurse)            Called patient and patient states that she was prescribed Novolog flexpen while in hospital at [facility]. Noted as discharged on 10/11/17  States is seeing Dr. Duarte on 17 but does not have enough to get her through.  States it is the Novolog 100 unit/ml flexpen 4 times daily per sliding scale max units 12 per day. Would like refill sent to [facility].  This is set up according to patient.  Please review and sign if okay.  DAMARIS ARORA RN ....................  2017   3:12 PM

## 2017-12-30 NOTE — NURSING NOTE
Patient Information     Patient Name MRN Karen Randle [id number] Female 1945      Nursing Note by Daisha Hunter at 10/17/2017  3:20 PM     Author:  Daisha Hunter Service:  (none) Author Type:  (none)     Filed:  10/17/2017  3:24 PM Encounter Date:  10/17/2017 Status:  Signed     :  Daisha Hunter            Patient presents to the clinic for a follow up on a pressure sore on her bottom, she was in [place] for 3 months for the pressure sore.  Daisha Hunter LPN....................10/17/2017 3:15 PM

## 2017-12-30 NOTE — NURSING NOTE
Patient Information     Patient Name MRN Karen Randle [id number] Female 1945      Nursing Note by Josefa Pandya LPN at 2017  2:00 PM     Author:  Josefa Pandya LPN Service:  (none) Author Type:  NURS- Licensed Practical Nurse     Filed:  2017  2:23 PM Encounter Date:  2017 Status:  Signed     :  Josefa Pandya LPN (NURS- Licensed Practical Nurse)            The patient is here today to have a hospital follow up.  Josefa Pandya LPN......2017  2:19 PM

## 2017-12-30 NOTE — NURSING NOTE
Patient Information     Patient Name MRN Sex Karen Francis [id number] Female 1945      Nursing Note by Josefa Pandya LPN at 2017  1:20 PM     Author:  Josefa Pandya LPN Service:  (none) Author Type:  NURS- Licensed Practical Nurse     Filed:  2017  1:27 PM Encounter Date:  2017 Status:  Signed     :  Josefa Pandya LPN (NURS- Licensed Practical Nurse)            This patient presents today for a Preoperative exam for this procedure: buttock wound flap surgery    Date of Surgery: 2017   Surgeon:  Dr. blank   Facility:  [facility] LPN..................2017  1:22 PM

## 2017-12-30 NOTE — NURSING NOTE
Patient Information     Patient Name MRN Karen Randle [id number] Female 1945      Nursing Note by Josefa Pandya LPN at 2017  1:00 PM     Author:  Josefa Pandya LPN Service:  (none) Author Type:  NURS- Licensed Practical Nurse     Filed:  2017  1:05 PM Encounter Date:  2017 Status:  Signed     :  Josefa Pandya LPN (NURS- Licensed Practical Nurse)            The patient is here today to have a four week follow up.  Josefa Pandya LPN......2017  12:59 PM

## 2018-01-01 ENCOUNTER — OFFICE VISIT (OUTPATIENT)
Dept: INTERNAL MEDICINE | Facility: OTHER | Age: 73
End: 2018-01-01
Attending: INTERNAL MEDICINE
Payer: COMMERCIAL

## 2018-01-01 ENCOUNTER — TELEPHONE (OUTPATIENT)
Dept: INTERNAL MEDICINE | Facility: OTHER | Age: 73
End: 2018-01-01

## 2018-01-01 VITALS
RESPIRATION RATE: 18 BRPM | DIASTOLIC BLOOD PRESSURE: 76 MMHG | HEART RATE: 72 BPM | BODY MASS INDEX: 23.95 KG/M2 | SYSTOLIC BLOOD PRESSURE: 124 MMHG | HEIGHT: 60 IN | WEIGHT: 122 LBS

## 2018-01-01 VITALS
BODY MASS INDEX: 23.83 KG/M2 | WEIGHT: 122 LBS | HEART RATE: 80 BPM | SYSTOLIC BLOOD PRESSURE: 126 MMHG | DIASTOLIC BLOOD PRESSURE: 82 MMHG

## 2018-01-01 DIAGNOSIS — I10 ESSENTIAL HYPERTENSION: Primary | ICD-10-CM

## 2018-01-01 DIAGNOSIS — G62.9 NEUROPATHY: ICD-10-CM

## 2018-01-01 DIAGNOSIS — G89.29 CHRONIC RIGHT-SIDED LOW BACK PAIN WITH RIGHT-SIDED SCIATICA: Primary | ICD-10-CM

## 2018-01-01 DIAGNOSIS — I10 HYPERTENSION: ICD-10-CM

## 2018-01-01 DIAGNOSIS — M54.41 CHRONIC RIGHT-SIDED LOW BACK PAIN WITH RIGHT-SIDED SCIATICA: Primary | ICD-10-CM

## 2018-01-01 DIAGNOSIS — G89.4 CHRONIC PAIN DISORDER: ICD-10-CM

## 2018-01-01 PROCEDURE — 99213 OFFICE O/P EST LOW 20 MIN: CPT | Performed by: INTERNAL MEDICINE

## 2018-01-01 PROCEDURE — G0463 HOSPITAL OUTPT CLINIC VISIT: HCPCS

## 2018-01-01 RX ORDER — CLONIDINE HYDROCHLORIDE 0.1 MG/1
TABLET ORAL
Qty: 90 TABLET | Refills: 0 | Status: SHIPPED | OUTPATIENT
Start: 2018-01-01 | End: 2019-01-01

## 2018-01-01 RX ORDER — GABAPENTIN 100 MG/1
CAPSULE ORAL
Qty: 270 CAPSULE | Refills: 3 | COMMUNITY
Start: 2018-01-01 | End: 2019-01-01

## 2018-01-01 RX ORDER — HYDROCODONE BITARTRATE AND ACETAMINOPHEN 5; 325 MG/1; MG/1
1 TABLET ORAL 4 TIMES DAILY
Qty: 120 TABLET | Refills: 0 | Status: SHIPPED | OUTPATIENT
Start: 2018-01-01 | End: 2018-01-01

## 2018-01-01 RX ORDER — DILTIAZEM HYDROCHLORIDE 360 MG/1
CAPSULE, EXTENDED RELEASE ORAL
Qty: 90 CAPSULE | Refills: 3 | Status: SHIPPED | OUTPATIENT
Start: 2018-01-01

## 2018-01-01 RX ORDER — TRAMADOL HYDROCHLORIDE 50 MG/1
TABLET ORAL
Qty: 112 TABLET | Refills: 3 | Status: SHIPPED | OUTPATIENT
Start: 2018-01-01 | End: 2019-01-01

## 2018-01-01 RX ORDER — TRAMADOL HYDROCHLORIDE 50 MG/1
TABLET ORAL
Qty: 112 TABLET | Refills: 0 | Status: SHIPPED | OUTPATIENT
Start: 2018-01-01 | End: 2018-01-01

## 2018-01-01 RX ORDER — HYDROCODONE BITARTRATE AND ACETAMINOPHEN 5; 325 MG/1; MG/1
1 TABLET ORAL 4 TIMES DAILY
Qty: 120 TABLET | Refills: 0 | Status: SHIPPED | OUTPATIENT
Start: 2018-01-01 | End: 2019-01-01

## 2018-01-01 RX ORDER — AMLODIPINE BESYLATE 2.5 MG/1
TABLET ORAL
Qty: 90 TABLET | Refills: 0 | Status: SHIPPED | OUTPATIENT
Start: 2018-01-01 | End: 2019-01-01

## 2018-01-01 ASSESSMENT — ENCOUNTER SYMPTOMS
ENDOCRINE NEGATIVE: 1
CONSTITUTIONAL NEGATIVE: 1
CONSTITUTIONAL NEGATIVE: 1
EYES NEGATIVE: 1
ENDOCRINE NEGATIVE: 1
EYES NEGATIVE: 1
ALLERGIC/IMMUNOLOGIC NEGATIVE: 1
ALLERGIC/IMMUNOLOGIC NEGATIVE: 1

## 2018-01-01 ASSESSMENT — MIFFLIN-ST. JEOR: SCORE: 984.89

## 2018-01-01 ASSESSMENT — PATIENT HEALTH QUESTIONNAIRE - PHQ9
SUM OF ALL RESPONSES TO PHQ QUESTIONS 1-9: 0
SUM OF ALL RESPONSES TO PHQ QUESTIONS 1-9: 0

## 2018-01-02 NOTE — NURSING NOTE
Patient Information     Patient Name MRN Karen Randle [id number] Female 1945      Nursing Note by Josefa Pandya LPN at 1/10/2017  1:20 PM     Author:  Josefa Pandya LPN Service:  (none) Author Type:  NURS- Licensed Practical Nurse     Filed:  1/10/2017  1:20 PM Encounter Date:  1/10/2017 Status:  Signed     :  Josefa Pandya LPN (NURS- Licensed Practical Nurse)            The patient is here today to have a hospital follow up.  Josefa Pandya LPN......1/10/2017  1:13 PM

## 2018-01-03 NOTE — TELEPHONE ENCOUNTER
Patient Information     Patient Name MRN Karen Randle [id number] Female 1945      Telephone Encounter by Damaris Potter RN at 2017 10:23 AM     Author:  Damaris Potter RN Service:  (none) Author Type:  NURS- Registered Nurse     Filed:  2017 10:44 AM Encounter Date:  2017 Status:  Signed     :  Damaris Potter RN (NURS- Registered Nurse)            traMADol (ULTRAM) 50 mg tablet  TAKE TWO TABLETS BY MOUTH EVERY 6 HOURS AS NEEDED FOR PAIN  Disp: 90 tablet Refills: 0    Class: eRx Start: 2017    For: Rheumatoid arthritis with positive rheumatoid factor, involving unspecified site  Documented:3 months ago  Last refill: 2017  [phone]    Last visit with LEO GIVENS was on: 2017 in [facility]  PCP:  Leo Givens MD  Controlled Substance Agreement:  None noted,       Unable to complete prescription refill per RN Medication Refill Policy.................... DAMARIS POTTER RN ....................  2017   10:23 AM

## 2018-01-03 NOTE — TELEPHONE ENCOUNTER
Patient Information     Patient Name MRN Karen Randle [id number] Female 1945      Telephone Encounter by Josefa Pandya LPN at 3/10/2017  3:49 PM     Author:  Josefa Pandya LPN Service:  (none) Author Type:  NURS- Licensed Practical Nurse     Filed:  3/10/2017  3:49 PM Encounter Date:  3/10/2017 Status:  Signed     :  Josefa Pandya LPN (NURS- Licensed Practical Nurse)            Orders for port flush have been faxed to [phone].  Josefa Pandya LPN......3/10/2017  3:49 PM

## 2018-01-03 NOTE — TELEPHONE ENCOUNTER
Patient Information     Patient Name MRN Karen Randle [id number] Female 1945      Telephone Encounter by Josefa Pandya LPN at 3/9/2017  8:24 AM     Author:  Josefa Pandya LPN Service:  (none) Author Type:  NURS- Licensed Practical Nurse     Filed:  3/9/2017  8:26 AM Encounter Date:  3/9/2017 Status:  Signed     :  Josefa Pandya LPN (NURS- Licensed Practical Nurse)            Contacted [facility] the nurse stated the patient is taking 100mg of tramadol every 6 hours as needed for pain in her hip area. The patient is not getting pain relief from this and is rating her pain 7/10 a hour or two after taking the tramadol. They are requesting a different pain medication for the patient to help give her some relief.  Josefa Pandya LPN......3/9/2017  8:26 AM

## 2018-01-03 NOTE — TELEPHONE ENCOUNTER
Patient Information     Patient Name MRN Karen Randle [id number] Female 1945      Telephone Encounter by Lissa Bauer RN at 2017  9:17 AM     Author:  Lissa Bauer RN Service:  (none) Author Type:  NURS- Registered Nurse     Filed:  2017  9:20 AM Encounter Date:  2017 Status:  Signed     :  Lissa Bauer RN (NURS- Registered Nurse)            Diuretics (may be prescribed for edema)    Office visit in the past 12 months or per provider note.    Last visit with LEO GIVENS was on: 2017 in [facility]  Next visit with LEO GIVENS is on: No future appointment listed with this provider  Next visit with Internal Medicine is on: No future appointment listed in this department    Lab testing requirements:  Creatinine and Potassium annually, if ordering lab, order BMP.  CREATININE (mg/dL)    Date Value   2017 0.33 (L)     POTASSIUM (mmol/L)    Date Value   2017 4.2       Review last provider visit note.  If BP reviewed and plan is noted, can refill.  Max refill for 12 months from last office visit or per provider note.  Prescription refilled per RN Medication Refill Policy.................... LISSA BAUER RN ....................  2017   9:18 AM

## 2018-01-03 NOTE — NURSING NOTE
Patient Information     Patient Name MRN Karen Randle [id number] Female 1945      Nursing Note by Marah Mosquera at 3/16/2017  8:30 AM     Author:  Marah Mosquera Service:  (none) Author Type:  (none)     Filed:  3/16/2017  8:54 AM Encounter Date:  3/16/2017 Status:  Signed     :  Marah Mosquera            Patient presents to the clinic for hospital follow up and to discuss pain medication.  Marah Mosquera..[facility], ([facility]3/16/2017..8:29 AM

## 2018-01-03 NOTE — TELEPHONE ENCOUNTER
Patient Information     Patient Name MRN Karen Randle [id number] Female 1945      Telephone Encounter by Itzel Palafox at 3/7/2017  3:08 PM     Author:  Itzel Palafox Service:  (none) Author Type:  (none)     Filed:  3/7/2017  3:08 PM Encounter Date:  3/7/2017 Status:  Signed     :  Itzel Palafox            Left message to call back  ....................  3/7/2017   3:08 PM  Itzel Palafox LPN.......................... 3/7/2017  3:08 PM

## 2018-01-03 NOTE — NURSING NOTE
Patient Information     Patient Name MRKaren Ramirez [id number] Female 1945      Nursing Note by Ranjan Stoner at 2017 10:45 AM     Author:  Ranjan Stoner Service:  (none) Author Type:  (none)     Filed:  2017 10:53 AM Encounter Date:  2017 Status:  Signed     :  Ranjan Stoner            Pt here today with breathing problems that started this morning, pt stated its her pills shes taking. Pt has had a stomach ache ever since she started taking the medication Cefrin.  Ranjan Stoner LPN .............2017  10:44 AM

## 2018-01-03 NOTE — TELEPHONE ENCOUNTER
Patient Information     Patient Name MRN Karen Randle [id number] Female 1945      Telephone Encounter by Lissa Bauer RN at 2017 10:33 AM     Author:  Lissa Bauer RN Service:  (none) Author Type:  NURS- Registered Nurse     Filed:  2017 10:36 AM Encounter Date:  2017 Status:  Signed     :  Lissa Bauer RN (NURS- Registered Nurse)            Medication is not on refill protocol. Unable to complete prescription refill per RN Medication Refill Policy.................... LISSA BAUER RN ....................  2017   10:34 AM        This is a Refill request from: [facility]  Name of Medication: Baclofen 10mg  Quantity requested: 100  Last fill date: 1/10/17  Last visit with LEO GIVENS was on: 2017 in [facility]  PCP:  Leo Givens MD  Controlled Substance Agreement:  na   Diagnosis r/t this medication request: unsure

## 2018-01-03 NOTE — TELEPHONE ENCOUNTER
Patient Information     Patient Name MRN Karen Randle [id number] Female 1945      Telephone Encounter by Hugo Duarte MD at 2017 11:51 AM     Author:  Hugo Duarte MD Service:  (none) Author Type:  Physician     Filed:  2017 11:51 AM Encounter Date:  2017 Status:  Signed     :  Hugo Duarte MD (Physician)            With her blood sugars up, I would worry about an infection. She needs to have a follow-up in [place]. For the elevated blood sugars, consultation with diabetic education is placed.

## 2018-01-03 NOTE — TELEPHONE ENCOUNTER
Patient Information     Patient Name MRKaren Ramirez [id number] Female 1945      Telephone Encounter by Josefa Pandya LPN at 3/9/2017  8:54 AM     Author:  Josefa Pandya LPN Service:  (none) Author Type:  NURS- Licensed Practical Nurse     Filed:  3/9/2017  8:54 AM Encounter Date:  3/9/2017 Status:  Signed     :  Josefa Pandya LPN (NURS- Licensed Practical Nurse)            Contacted santiago at [facility] and gave her the new medication orders for the norco. The script was faxed to [facility] and to lcaudio daniel.  Josefa Pandya LPN......3/9/2017  8:54 AM

## 2018-01-03 NOTE — PROGRESS NOTES
Patient Information     Patient Name MRN Karen Randle [id number] Female 1945      Progress Notes by Goodell, Brenda, RN at 2017 10:37 AM     Author:  Goodell, Brenda, RN Service:  (none) Author Type:  NURS- Registered Nurse     Filed:  2017 10:39 AM Date of Service:  2017 10:37 AM Status:  Signed     :  Goodell, Brenda, RN (NURS- Registered Nurse)            Patient arrived via w/c for port flush. Brisk blood return. No labs needed. Port de accessed. Small drsg applied to site. Patient left via w/c with her .

## 2018-01-03 NOTE — TELEPHONE ENCOUNTER
Patient Information     Patient Name MRN Karen Randle [id number] Female 1945      Telephone Encounter by Lissa Bauer RN at 2017  8:21 AM     Author:  Lissa Bauer RN Service:  (none) Author Type:  NURS- Registered Nurse     Filed:  2017  8:23 AM Encounter Date:  2017 Status:  Signed     :  Lissa Bauer RN (NURS- Registered Nurse)            Medication is not on refill protocol. Unable to complete prescription refill per RN Medication Refill Policy.................... LISSA BAUER RN ....................  2017   8:22 AM        This is a Refill request from: [facility]  Name of Medication: taztia XT 360mg capsule  Quantity requested: 90  Last fill date: 16  Last visit with LEO GIVENS was on: 2017 in [facility]  PCP:  Leo Givens MD  Controlled Substance Agreement:  na   Diagnosis r/t this medication request: hypertension

## 2018-01-03 NOTE — TELEPHONE ENCOUNTER
Patient Information     Patient Name MRKaren Ramirez [id number] Female 1945      Telephone Encounter by Daisy Ascencio at 3/14/2017  2:51 PM     Author:  Daisy Ascencio Service:  (none) Author Type:  (none)     Filed:  3/14/2017  2:54 PM Encounter Date:  3/14/2017 Status:  Signed     :  Daisy Ascencio            [facility] called with questions regarding her upcoming appt with [facility].  It is a hospital follow up but they are wondering if they can change it to a face-to-face so she can get home care, or if she would need another appt.  Please call to discuss and advise.  Daisy Ascencio ....................  3/14/2017   2:54 PM

## 2018-01-03 NOTE — TELEPHONE ENCOUNTER
Patient Information     Patient Name MRN Karen Randle [id number] Female 1945      Telephone Encounter by Lissa Bauer RN at 3/16/2017  3:28 PM     Author:  Lissa Bauer RN Service:  (none) Author Type:  NURS- Registered Nurse     Filed:  3/16/2017  3:30 PM Encounter Date:  3/16/2017 Status:  Signed     :  Lissa Bauer RN (NURS- Registered Nurse)            Diabetic Supplies    Office visit in the past 12 months.    Last visit with LEO GIVENS was on: 2017 in [facility]  Next visit with LEO GIVENS is on: 2017 in [facility]  Next visit with Internal Medicine is on: 2017 in [facility]    Max refill for 12 months from last office visit.  Always add ICD-9 code.    Needs not be listed on Med List to fill.  Need new RX every 12 months or if exceeds the limitations set by Medicare, then every 6 months.  Also when testing frequency is changed is there a need to obtain a new order.  A refill request does not need to be approved by the ordering physician-a beneficiary or their caregiver may request refills.  Physicians are not required to fill out additional forms such as home testing results for suppliers or provide additional documentation unless the supplier is audited and the  is requesting such documentation.    Patient is injecting 5 times daily.  Prescription refilled per RN Medication Refill Policy.................... LISSA BAUER RN ....................  3/16/2017   3:29 PM

## 2018-01-03 NOTE — TELEPHONE ENCOUNTER
Patient Information     Patient Name MRKaren Ramirez [id number] Female 1945      Telephone Encounter by Josefa Pandya LPN at 3/8/2017  2:38 PM     Author:  Josefa Pandya LPN Service:  (none) Author Type:  NURS- Licensed Practical Nurse     Filed:  3/8/2017  2:39 PM Encounter Date:  3/8/2017 Status:  Signed     :  Josefa Pandya LPN (NURS- Licensed Practical Nurse)            Contacted petros at [facility] and she is requesting the patients tramadol order be every six hours. She stated the patient is trying to take her medications with in the first few hours of the day.  Josefa Pandya LPN......3/8/2017  2:39 PM

## 2018-01-03 NOTE — PROGRESS NOTES
Patient Information     Patient Name MRN Sex Karen Francis [id number] Female 1945      Progress Notes by Isa Gaines NP at 3/22/2017 10:30 AM     Author:  Isa Gaines NP Service:  (none) Author Type:  PHYS- Nurse Practitioner     Filed:  3/22/2017 11:20 AM Encounter Date:  3/22/2017 Status:  Signed     :  Isa Gaines NP (PHYS- Nurse Practitioner)            SUBJECTIVE:    Karen Duncan is a 71 y.o. female who presents for a wound VAC evaluation.    HPI  patient has a stage IV decubitus ulcer along the right buttock. This is been present for quite some time. She has home care services 3 times weekly for dressing change. She states the wound VAC has been working well but sometimes the tape comes undone. She has not developed any new pressure ulcers. She does have malnutrition and type 2 diabetes. She has been increasing protein intake through nutritional shakes and protein rich foods. She has been afebrile and denies pain at the buttock. There has been no purulent drainage.  Allergies      Allergen   Reactions     Adhesive Tapes [Adhesive]  *Unknown     Paper tapes, fragile skin      Aliskiren  Cough     Lisinopril  Cough     Ace cough      Losartan  Cough     Paper Tape [Adhesive Tape]       Skin comes off    ,   Current Outpatient Prescriptions on File Prior to Visit       Medication  Sig Dispense Refill     ACCU-CHEK MULTICLIX LANCET USE ONE LANCET TO CHECK GLUCOSE ONCE OR TWICE DAILY 200 Each 1     amLODIPine (NORVASC) 2.5 mg tablet Take 1 tablet by mouth once daily. 90 tablet 3     ascorbic acid, vitamin C, (ASCORBIC ACID WITH NIECY HIPS) 500 mg tablet Take 1,000 mg by mouth once daily.       aspirin 81 mg tablet Take 1 tablet by mouth once daily with a meal.       atorvastatin (LIPITOR) 20 mg tablet TAKE ONE TABLET BY MOUTH AT BEDTIME 90 tablet 2     baclofen (LIORESAL) 10 mg tablet Take 1 tablet by mouth 3 times daily. 100 tablet 0     BENADRYL 25 MG  "CAP two tablets orally at bedtime       blood sugar diagnostic (ACCU-CHEK LENIN PLUS TEST STRP) strip Test one to two times daily E11.9 100 Strip 6     blood sugar diagnostic (BLOOD GLUCOSE TEST) strip Dispense item covered by pt ins. Test 4 times daily E11.29 400 Each 1     CALCIUM CARBONATE (TUMS 500 ORAL) Take 2 Tabs by mouth 2 times daily if needed (HEARTBURN).       CALCIUM CARBONATE/VITAMIN D3 (CALCIUM + D ORAL) Take 2 tablets by mouth once daily.       cloNIDine HCl (CATAPRES) 0.1 mg tablet Take 1 tablet by mouth once daily. 90 tablet 2     docusate (COLACE) 100 mg capsule Take 100 mg by mouth 2 times daily if needed for Constipation.       estradiol (ESTRACE) 0.5 mg tablet Take 1 tablet by mouth once daily. 90 tablet 3     ferrous sulfate 325 mg delayed release tablet Take 650 mg by mouth once daily.       gabapentin (NEURONTIN) 100 mg capsule Take 1 capsule by mouth 3 times daily. 270 capsule 3     HYDROcodone-acetaminophen, 5-325 mg, (NORCO) per tablet Take 1 tablet by mouth 4 times daily  Max acetaminophen dose: 4000 mg in 24 hrs. 120 tablet 0     insulin glargine (LANTUS SOLOSTAR) 100 unit/mL (3 mL) pen 16 units am, 14 units pm 1 box 0     Insulin Safety Needles, Disp, 30 gauge x 1/3\" For administering insulin at home 5 times daily.  Dx code e11.9 3 box 2     metFORMIN (GLUCOPHAGE XR) 500 mg Extended-Release tablet Take 2 tablets by mouth 2 times daily with meals. 360 tablet 2     NOVOLOG FLEXPEN 100 unit/mL solution for injection INJECT AS DIRECTED PER SS FOUR TIMES DAILY (MAX 32 UNITS DAILY)  99     Omega-3-DHA-EPA-Fish Oil 1,200 (144-216) mg capsule Take 1 capsule by mouth once daily.       omeprazole (PRILOSEC) 20 mg Delayed-Release capsule TAKE ONE CAPSULE BY MOUTH ONCE DAILY BEFORE  A  MEAL 90 capsule 3     predniSONE (DELTASONE) 5 mg tablet TAKE ONE TABLET BY MOUTH TWICE DAILY WITH MEALS 60 tablet 5     psyllium (METAMUCIL) 0.52 gram capsule Take 3 capsules by mouth once daily.       Saccharomyces " boulardii (FLORASTOR) 250 mg capsule Take 2 capsules by mouth 2 times daily. 60 capsule 0     sodium hypochlorite (1/2 STRENGTH DAKIN'S SOLUTION) 0.25% soln Apply  topically to affected area(s) 2 times daily.  0     spironolactone (ALDACTONE) 25 mg tablet TAKE ONE TABLET BY MOUTH ONCE DAILY 90 tablet 2     TAZTIA  mg capsule TAKE ONE CAPSULE BY MOUTH ONCE DAILY 90 capsule 0     traMADol (ULTRAM) 50 mg tablet Take 2 tablets by mouth 4 times daily if needed for Pain. 90 tablet 0     No current facility-administered medications on file prior to visit.     and   Past Medical History      Diagnosis   Date     Atrial fibrillation (HC)  2000     Diabetes mellitus, type II (HC)       Elevated liver function tests  2010     Fatty liver on US and CT.  Iron nl. 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 No  BMI 19.13 kg/m2    EXAM:   Physical Exam  pleasant female without acute distress. Affect normal. Alert and pleasant. She is placed in the right side-lying position. Previous dressing was removed and wound is irrigated with saline solution. The canister is a third of the way full. Wound has granulating wound bed. There was only a small piece of foam in the wound and this did not appear to be completely packing the wound however was saturated with drainage. The  drainage is nonodorous. Wound measures 2.4 x 2 x 6 cm. Foam is placed into the tunnel and then bridge formed along the upper right buttock. Suction tubing replace all tape secured. Wound VAC is started at 1 25 mmHg pressure and draining freely and comfortable for patient.  Albumin 2.4 March 3, 2017  ASSESSMENT/PLAN:    ICD-10-CM    1. Decubitus ulcer of ischial area, stage 4 (HC) L89.304    2. Type 2 diabetes mellitus with other diabetic kidney complication, with long-term current use of insulin (HC) E11.29      Z79.4    3. Moderate protein malnutrition (HC) E44.0         Plan:  Continue with 3 time weekly wound VAC dressing change. She will continue with home care services now that she is no longer at nursing home. She will follow-up in wound clinic in 3 weeks, sooner if problems. If she develops copious purulent drainage, redness around wound, fever etc. she needs to be seen urgently. Also asked her to discuss with home care services to make sure the tunnel is filled completely with the sponge to prevent accumulation of drainage inside wound cavity. She will also continue with protein supplementation to improve nutritional status. She is malnourished and this does impair wound healing. She also will continue to prevent pressure from the area. Consider repeat albumin at next visit.  40 minutes of face-to-face time is spent with patient on wound care and counseling.

## 2018-01-03 NOTE — TELEPHONE ENCOUNTER
Patient Information     Patient Name MRKaren Ramirez [id number] Female 1945      Telephone Encounter by Carola Howard at 2017  4:07 PM     Author:  Carola Howard Service:  (none) Author Type:  (none)     Filed:  2017  4:11 PM Encounter Date:  2017 Status:  Signed     :  Carola Howard            Last office visit was 1/10/2017. Patient was admitted to [facility] on 1/15/2017 and discharged 2017. Ida from [facility] would like to get the okay to start home care.   Carola Howard CMA (AAMA)........2017 4:10 PM

## 2018-01-03 NOTE — PROGRESS NOTES
Patient Information     Patient Name MRN Sex Karen Francis [id number] Female 1945      Progress Notes by Ina Duncan PA-C at 2017 10:45 AM     Author:  Ina Duncan PA-C Service:  (none) Author Type:  PHYS- Physician Assistant     Filed:  2017 11:44 AM Encounter Date:  2017 Status:  Signed     :  Ina Duncan PA-C (PHYS- Physician Assistant)            Nursing Notes:   Ranjan Stoner  2017 10:53 AM  Signed  Pt here today with breathing problems that started this morning, pt stated its her pills shes taking. Pt has had a stomach ache ever since she started taking the medication Cefrin.  Ranjan Stoner LPN .............2017  10:44 AM      HPI:    Karen Duncan is a 71 y.o. female who presents for breathing problems that started this morning, pt stated its her pills shes taking. Pt has had a stomach ache ever since she started taking the medication Ceftin. Patient was recently in the hospital from 1/ for severe sepsis likely due to pyelonephritis. She also has an open wound of the left buttock.   Hx diarrhea with augmentin - would prefer this antibiotic.   Stomach aches started on  with start of ceftin. SOB this morning. Got up this morning. SOB started a little after it started. No chest pain, palpitations. No nausea, vomiting.  No fevers. Last fever was about a week ago.  No dysuria.  No further urgency.  Diarrhea 2 days ago. Chest tight.  Last took antibiotic yesterday.        Past Medical History      Diagnosis   Date     Atrial fibrillation (HC)       Diabetes mellitus, type II (HC)       Elevated liver function tests       Fatty liver on US and CT.  Iron nl. Hep C neg      Epidural abscess       H/O cardiovascular stress test       stress cardiolyte neg; EF 65%      Hyperlipidemia       Hypertension       Osteomyelitis of finger of left hand (HC)       IV Vancomycin to resolve, L 3rd digit      Pneumothorax        spondylolysis (right) at age 39 followed by spondylolysis (left) several years later      Psoas muscle abscess (HC)  2016     Strep viridans      Rheumatoid arthritis(714.0)  2000     Urinary sepsis  2012     hospitalized with hypotension, blood cultures showed Escherichia coli        Past Surgical History       Procedure   Laterality Date     Spine surgery        Back surgery with fusion of the lumbar discs       Jeb and bso        secondary to fibroids       Portacath        hx chest tube surgery x2       Cataract removal  Bilateral      Breast biopsy        Left       Knee replacement  Bilateral 07/10/08     Colonoscopy screening   2001     repeat in 10 years per patient       Colonoscopy screening   2010     sigmoid diverticulosis, no polyps, due 2020       Forearm/wrist surgery   2010     removed nodule Right wrist       Colonoscopy screening   2011     mild sigmoid diverticulosis, EGD biopsies       Portacath   2012     power port placement, Dr. Taylor       Esophagogastroduodenoscopy   2/2011     Normal       Ct guide perc drain cath (ia)   2016       Social History        Substance Use Topics          Smoking status:   Former Smoker      Packs/day:  3.00      Years:  25.00      Types:  Cigarettes      Quit date:  5/27/1974      Smokeless tobacco:   Never Used       Comment: quit 1984       Alcohol use   16.8 oz/week     28 Cans of beer per week        Comment: beer/ wine 4 per day         Current Outpatient Prescriptions       Medication  Sig Dispense Refill     ACCU-CHEK MULTICLIX LANCET USE ONE LANCET TO CHECK GLUCOSE ONCE OR TWICE DAILY 200 Each 1     acetaminophen SR (TYLENOL ARTHRITIS) 650 mg Extended-Release tablet Take 1,300 mg by mouth every 8 hours if needed. Max acetaminophen dose: 4000mg in 24 hrs.       amLODIPine (NORVASC) 2.5 mg tablet Take 1 tablet by mouth once daily. 90 tablet 3     ascorbic acid, vitamin C, (ASCORBIC ACID WITH NIECY HIPS) 500 mg tablet Take 1,000 mg by mouth once daily.        "aspirin 81 mg tablet Take 1 tablet by mouth once daily with a meal.       atorvastatin (LIPITOR) 20 mg tablet TAKE ONE TABLET BY MOUTH AT BEDTIME 90 tablet 2     baclofen (LIORESAL) 10 mg tablet Take 1 tablet by mouth 3 times daily.  0     BENADRYL 25 MG CAP two tablets orally at bedtime       blood sugar diagnostic (ACCU-CHEK LENIN PLUS TEST STRP) strip Test one to two times daily E11.9 100 Strip 6     blood sugar diagnostic (BLOOD GLUCOSE TEST) strip Dispense item covered by pt ins. Test 4 times daily E11.29 400 Each 1     CALCIUM CARBONATE (CALCIUM 500 ORAL) Take 3 tablets by mouth.       cefuroxime axetil (CEFTIN) 500 mg tablet Take 1 tablet by mouth 2 times daily for 5 days. Indications: Urinary Tract Infection 10 tablet 0     cloNIDine HCl (CATAPRES) 0.1 mg tablet Take 1 tablet by mouth once daily. 90 tablet 2     docusate (COLACE) 100 mg capsule Take 100 mg by mouth 2 times daily if needed for Constipation.       estradiol (ESTRACE) 0.5 mg tablet Take 1 tablet by mouth once daily. 90 tablet 3     ferrous sulfate, 65 mg elemental, tablet Take 1 tablet by mouth once daily with a meal. 180 tablet 3     furosemide (LASIX) 40 mg tablet TAKE ONE TABLET BY MOUTH ONCE DAILY IN THE MORNING 90 tablet 3     gabapentin (NEURONTIN) 100 mg capsule Take 1 capsule by mouth 3 times daily. 270 capsule 3     insulin glargine (LANTUS SOLOSTAR) 100 unit/mL (3 mL) pen 16 units am, 14 units pm 1 box 0     Insulin Safety Needles, Disp, 30 gauge x 1/3\" For administering insulin at home 4 times daily.  Dx code e11.9 3 box 2     metFORMIN (GLUCOPHAGE XR) 500 mg Extended-Release tablet Take 2 tablets by mouth 2 times daily with meals. 360 tablet 2     morphine 10 mg/5 mL solution Take 2.5 mg by mouth 2 times daily       NOVOLOG FLEXPEN 100 unit/mL solution for injection INJECT AS DIRECTED PER SS FOUR TIMES DAILY (MAX 32 UNITS DAILY)  99     Omega-3-DHA-EPA-Fish Oil 1,200 (144-216) mg capsule Take 1 capsule by mouth once daily.       " omeprazole (PRILOSEC) 20 mg Delayed-Release capsule TAKE ONE CAPSULE BY MOUTH ONCE DAILY BEFORE  A  MEAL 90 capsule 3     Potassium 99 mg disintegrating tablet Take 1 tablet by mouth once daily.  0     predniSONE (DELTASONE) 5 mg tablet TAKE ONE TABLET BY MOUTH TWICE DAILY WITH MEALS 60 tablet 5     psyllium (METAMUCIL) 0.52 gram capsule Take 3 capsules by mouth.  0     spironolactone (ALDACTONE) 25 mg tablet TAKE ONE TABLET BY MOUTH ONCE DAILY 90 tablet 1     TAZTIA  mg capsule TAKE ONE CAPSULE BY MOUTH ONCE DAILY 90 capsule 0     traMADol (ULTRAM) 50 mg tablet TAKE TWO TABLETS BY MOUTH EVERY 6 HOURS AS NEEDED FOR PAIN 90 tablet 2     No current facility-administered medications for this visit.      Medications have been reviewed by me and are current to the best of my knowledge and ability.      Allergies      Allergen   Reactions     Adhesive Tapes [Adhesive]  *Unknown     Paper tapes, fragile skin      Aliskiren  Cough     Lisinopril  Cough     Ace cough      Losartan  Cough     Paper Tape [Adhesive Tape]       Skin comes off        REVIEW OF SYSTEMS:  Refer to HPI.    EXAM:   Vitals:    /76  Pulse 88  Temp 98.4  F (36.9  C) (Tympanic)  SpO2 97%    General Appearance: Pleasant, alert, appropriate appearance for age. No acute distress  Ear Exam: Normal TM's bilaterally, normal grey, and translucent. Normal auditory canals and external ears. Non-tender.   OroPharynx Exam:  Dental hygiene adequate. Normal buccal mucosa. Normal pharynx.  Neck Exam:  Supple, no masses or nodes.  Chest/Respiratory Exam: Normal chest wall and respirations. Clear to auscultation.  Cardiovascular Exam: Regular rate and rhythm. S1, S2, no murmur, click, gallop, or rubs.  Skin: no rash or abnormalities  Psychiatric Exam: Alert and oriented - appropriate affect.      ASSESSMENT AND PLAN:      ICD-10-CM    1. Pyelonephritis N12 amoxicillin-clavulanate 875-125 mg tablet (AUGMENTIN)       Offered chest xray, labs and EKG; however  patient declined testing at this time.     Stop ceftin.   Started on augmentin.     Patient was instructed in increase fluids including water and cranberry juice.  Monitor for fevers, chills, back pain.  Return to clinic after antibiotic completion if symptoms are not resolved.  Call clinic if symptoms change/worsen.    Encouraged to eat yogurt daily for the next few weeks or take a probiotic pill.     Monitor for chest pain, palpitations, increased problems breathing, fevers, increased belly pain, coughing up blood, lightheadedness or dizziness.     Consulted with PCP, Dr. Duarte.   Gave patient warning signs/symptoms.   Return to clinic or ER with change/worsening of symptoms.       Patient Instructions   Stop ceftin.   Started on augmentin.     Patient was instructed in increase fluids including water and cranberry juice.  Monitor for fevers, chills, back pain.  Return to clinic after antibiotic completion if symptoms are not resolved.  Call clinic if symptoms change/worsen.    Encouraged to eat yogurt daily for the next few weeks or take a probiotic pill.     Monitor for chest pain, palpitations, increased problems breathing, fevers, increased belly pain, coughing up blood, lightheadedness or dizziness.       Ina Duncan PA-C..................1/19/2017 10:50 AM

## 2018-01-03 NOTE — NURSING NOTE
Patient Information     Patient Name MRN Karen Randle [id number] Female 1945      Nursing Note by Josefa Pandya LPN at 2017  1:20 PM     Author:  Josefa Pandya LPN Service:  (none) Author Type:  NURS- Licensed Practical Nurse     Filed:  2017  1:23 PM Encounter Date:  2017 Status:  Signed     :  Josefa Pandya LPN (NURS- Licensed Practical Nurse)            The patient is here today to discuss going back on her morphine medication and have a toe on her right foot looked at.  Josefa Pandya LPN......2017  1:19 PM

## 2018-01-03 NOTE — TELEPHONE ENCOUNTER
Patient Information     Patient Name MRN Karen Randle [id number] Female 1945      Telephone Encounter by Josefa Pandya LPN at 3/8/2017  2:29 PM     Author:  Josefa Pandya LPN Service:  (none) Author Type:  NURS- Licensed Practical Nurse     Filed:  3/8/2017  2:30 PM Encounter Date:  3/8/2017 Status:  Signed     :  Josefa Pandya LPN (NURS- Licensed Practical Nurse)            Left a message for petros at [facility] to call back.  Josefa Pandya LPN......3/8/2017  2:29 PM

## 2018-01-03 NOTE — PROGRESS NOTES
Patient Information     Patient Name MRN Karen Randle [id number] Female 1945      Progress Notes by Hugo Duarte MD at 1/10/2017  1:20 PM     Author:  Hugo Duarte MD Service:  (none) Author Type:  Physician     Filed:  1/10/2017  1:34 PM Encounter Date:  1/10/2017 Status:  Signed     :  Hugo Duarte MD (Physician)            SUBJECTIVE:    Karen Duncan is a 71 y.o. female who presents for hospital follow up.    HPI Comments: She is here for follow-up. She was sent to [place] from our emergency room with a fever. The concern was that she was having further problems with infection related to her open wound based on the findings of CT scanning. In [place], it was felt as though her fever was related to a urinary infection and that the CT findings were just normal changes associated with healing. She was treated for a couple of days with antibiotic send these were then discontinued and she was sent home. She is now resuming her morphine taper. She is not taking any antibiotic so. She did have some diarrhea this morning but not any after that. I told her if that persisted we would certainly need to check her for C. difficile infection.    Apparently her wound is healing quite well. She will be going back to [place] in about 6 weeks to have this rechecked by the plastic surgeon. Her wound VAC is being changed 3 times weekly by the home health nurse. I talked to her again about her prednisone with her rheumatoid arthritis, she really doesn't feel that she can get lower than 5 mg twice daily. She has been on that dose forever.      Allergies      Allergen   Reactions     Adhesive Tapes [Adhesive]  *Unknown     Paper tapes      Aliskiren  Cough     Gabapentin  Cough     weakness      Lisinopril  Cough     Ace cough      Losartan  Cough     Paper Tape [Adhesive Tape]       Skin comes off    ,   Current Outpatient Prescriptions     Medication  Sig     ACCU-CHEK MULTICLIX LANCET USE ONE  "LANCET TO CHECK GLUCOSE ONCE OR TWICE DAILY     acetaminophen SR (TYLENOL ARTHRITIS) 650 mg Extended-Release tablet Take 1,300 mg by mouth every 8 hours if needed. Max acetaminophen dose: 4000mg in 24 hrs.     amLODIPine (NORVASC) 2.5 mg tablet Take 1 tablet by mouth once daily.     ascorbic acid (VITAMIN C) 500 mg tablet Take 1 tablet by mouth once daily.     aspirin 81 mg tablet Take 1 tablet by mouth once daily with a meal.     atorvastatin (LIPITOR) 20 mg tablet TAKE ONE TABLET BY MOUTH AT BEDTIME     baclofen (LIORESAL) 10 mg tablet Take 1 tablet by mouth 3 times daily.     BENADRYL 25 MG CAP two tablets orally at bedtime     blood sugar diagnostic (ACCU-CHEK LENIN PLUS TEST STRP) strip Test one to two times daily E11.9     blood sugar diagnostic (BLOOD GLUCOSE TEST) strip Dispense item covered by pt ins. Test 4 times daily E11.29     CALCIUM CARBONATE (CALCIUM 500 ORAL) Take 3 tablets by mouth.     cloNIDine HCl (CATAPRES) 0.1 mg tablet Take 1 tablet by mouth once daily.     estradiol (ESTRACE) 0.5 mg tablet Take 1 tablet by mouth once daily.     ferrous sulfate, 65 mg elemental, tablet Take 1 tablet by mouth once daily with a meal.     furosemide (LASIX) 40 mg tablet TAKE ONE TABLET BY MOUTH ONCE DAILY IN THE MORNING     gabapentin (NEURONTIN) 100 mg capsule Take 1 capsule by mouth 3 times daily.     insulin glargine (LANTUS SOLOSTAR) 100 unit/mL (3 mL) solution for injection 14 units am, 14 units pm     Insulin Safety Needles, Disp, 30 gauge x 1/3\" For administering insulin at home 4 times daily.  Dx code e11.9     metFORMIN (GLUCOPHAGE XR) 500 mg Extended-Release tablet Take 2 tablets by mouth 2 times daily with meals.     NOVOLOG FLEXPEN 100 unit/mL solution for injection INJECT AS DIRECTED PER SS FOUR TIMES DAILY (MAX 32 UNITS DAILY)     omeprazole (PRILOSEC) 20 mg Delayed-Release capsule TAKE ONE CAPSULE BY MOUTH ONCE DAILY BEFORE  A  MEAL     Potassium 99 mg disintegrating tablet Take 1 tablet by mouth " once daily.     predniSONE (DELTASONE) 5 mg tablet TAKE ONE TABLET BY MOUTH TWICE DAILY WITH MEALS     psyllium (METAMUCIL) 0.52 gram capsule Take 3 capsules by mouth.     sodium chloride 0.9% 0.9 % irrigation Use for wet-to-dry dressings BID once green drainage resolved     spironolactone (ALDACTONE) 25 mg tablet TAKE ONE TABLET BY MOUTH ONCE DAILY     TAZTIA  mg capsule TAKE ONE CAPSULE BY MOUTH ONCE DAILY     traMADol (ULTRAM) 50 mg tablet TAKE TWO TABLETS BY MOUTH EVERY 6 HOURS AS NEEDED FOR PAIN     No current facility-administered medications for this visit.      Medications have been reviewed by me and are current to the best of my knowledge and ability. ,   Past Medical History      Diagnosis   Date     Atrial fibrillation (HC)  2000     Diabetes mellitus, type II (HC)       Elevated liver function tests  2010     Fatty liver on US and CT.  Iron nl. 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     IV Vancomycin to resolve, L 3rd digit      Pneumothorax       spondylolysis (right) at age 39 followed by spondylolysis (left) several years later      Psoas muscle abscess (HC)  2016     Strep viridans      Rheumatoid arthritis(714.0)  2000     Urinary sepsis  2012     hospitalized with hypotension, blood cultures showed Escherichia coli    ,   Patient Active Problem List       Diagnosis  Date Noted     Open wound of left buttock  10/13/2016     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 This will not affect patient care or billing. This comment can be deleted.        Atrial fibrillation (HC)       DIVERTICULOSIS, COLON  02/18/2010     Rheumatoid arthritis(714.0)  12/07/2006   ,   Past Surgical History       Procedure   Laterality Date     Spine surgery        Back surgery with fusion of the lumbar discs       Jeb and bso        secondary to fibroids       Portacath        hx chest tube surgery x2       Cataract removal  Bilateral      Breast biopsy        Left       Knee replacement  Bilateral 07/10/08     Colonoscopy screening   2001     repeat in 10 years per patient       Colonoscopy screening   2010     sigmoid diverticulosis, no polyps, due 2020       Forearm/wrist surgery   2010     removed nodule Right wrist       Colonoscopy screening   2011     mild sigmoid diverticulosis, EGD biopsies       Portacath   2012     power port placement, Dr. Taylor       Esophagogastroduodenoscopy   2/2011     Normal       Ct guide perc drain cath (ia)   2016    and   Social History        Substance Use Topics          Smoking status:   Former Smoker      Packs/day:  3.00      Years:  25.00      Types:  Cigarettes      Quit date:  5/27/1974      Smokeless tobacco:   Never Used       Comment: quit 1984       Alcohol use   16.8 oz/week     28 Cans of beer per week        Comment: beer/ wine 4 per day         REVIEW OF SYSTEMS:  Review of Systems   All other systems reviewed and are negative.      OBJECTIVE:  /68  Pulse 88  Wt 45.8 kg (101 lb)  Breastfeeding? No  BMI 17.89 kg/m2    EXAM:   Physical Exam   Constitutional:   In wheelchair   Neck: Normal range of motion. Neck supple.   Cardiovascular: Normal rate and regular rhythm.    Murmur heard.   Systolic murmur is present with a grade of 2/6   Pulmonary/Chest: She has decreased breath sounds. She has no wheezes. She has no rhonchi. She has no rales.   Abdominal: Soft. Bowel sounds are normal. She exhibits no distension. There is no tenderness.    Musculoskeletal: She exhibits no edema.   Neurological: She is alert.   Skin: Skin is warm and dry.   Nursing note and vitals reviewed.      ASSESSMENT/PLAN:    ICD-10-CM    1. Urinary tract infection without hematuria, site unspecified N39.0    2. Rheumatoid arthritis, involving unspecified site, unspecified rheumatoid factor presence M06.9    3. Type 2 diabetes mellitus with other diabetic kidney complication, with long-term current use of insulin (HC) E11.29      Z79.4    4. Open wound of left buttock, subsequent encounter S31.829D         Plan:  She will continue with her current medications including the morphine taper. I will have her back on February 9 as previously scheduled. If diarrhea recurs she will let me know and we'll check for C. difficile infection. She will obviously let me know in the interim if she has other problems. Encouraged her to work on strengthening, she is doing some walking but not much. Medications were reconciled today. Of note is that she feels that her diabetes is doing reasonable, her sugars were a little bit high during the time that she had the fever and infection.

## 2018-01-03 NOTE — TELEPHONE ENCOUNTER
Patient Information     Patient Name MRN Karen Randle [id number] Female 1945      Telephone Encounter by Josefa Pandya LPN at 3/14/2017 10:58 AM     Author:  Josefa Pandya LPN Service:  (none) Author Type:  NURS- Licensed Practical Nurse     Filed:  3/14/2017 10:58 AM Encounter Date:  3/14/2017 Status:  Signed     :  Josefa Pandya LPN (NURS- Licensed Practical Nurse)            [facility] was given the information below.  Josefa Pandya LPN......3/14/2017  10:58 AM

## 2018-01-03 NOTE — TELEPHONE ENCOUNTER
Patient Information     Patient Name MRKaren Ramirez [id number] Female 1945      Telephone Encounter by Josefa Pandya LPN at 2017  3:53 PM     Author:  Josefa Pandya LPN Service:  (none) Author Type:  NURS- Licensed Practical Nurse     Filed:  2017  4:13 PM Encounter Date:  2017 Status:  Signed     :  Josefa Pandya LPN (NURS- Licensed Practical Nurse)            Contacted the patient and she stated she wanted her home care orders to be sent to [facility] in [facility]. The fax number is [phone].  Josefa Pandya LPN......2017  3:57 PM

## 2018-01-03 NOTE — PATIENT INSTRUCTIONS
Patient Information     Patient Name MRN Sex Karen Francis [id number] Female 1945      Patient Instructions by Jazzy Taylor MD at 3/14/2017  8:50 AM     Author:  Jazzy Taylor MD Service:  (none) Author Type:  Physician     Filed:  3/14/2017  9:18 AM Encounter Date:  3/14/2017 Status:  Signed     :  Jazzy Taylor MD (Physician)            VAC dressing with sponge in tunnel, to 125 pressure, change 3 times a week. Follow up with [facility] for wound checks. Home care for wound care when discharged from [facility]. If the dressing applied today doesn't work with her machine-remove it and return to wet to dry until can get proper dressing. Thanks!

## 2018-01-03 NOTE — TELEPHONE ENCOUNTER
Patient Information     Patient Name MRN Karen Randle [id number] Female 1945      Telephone Encounter by Jazzy Taylor MD at 3/14/2017  2:39 PM     Author:  Jazzy Taylor MD Service:  (none) Author Type:  Physician     Filed:  3/14/2017  2:41 PM Encounter Date:  3/14/2017 Status:  Signed     :  Jazzy Taylor MD (Physician)            Yes that's ok as long as they can get it back on appropriately. Jazzy Taylor MD ....................  3/14/2017   2:40 PM

## 2018-01-03 NOTE — TELEPHONE ENCOUNTER
Patient Information     Patient Name MRN Karen Randle [id number] Female 1945      Telephone Encounter by Hugo Duarte MD at 3/17/2017 11:17 AM     Author:  Hugo Duarte MD Service:  (none) Author Type:  Physician     Filed:  3/17/2017 11:17 AM Encounter Date:  3/17/2017 Status:  Signed     :  Hugo Duarte MD (Physician)            [place]

## 2018-01-03 NOTE — TELEPHONE ENCOUNTER
Patient Information     Patient Name MRN Karen Randle [id number] Female 1945      Telephone Encounter by Hugo Duarte MD at 3/8/2017  2:45 PM     Author:  Hugo Duarte MD Service:  (none) Author Type:  Physician     Filed:  3/8/2017  2:46 PM Encounter Date:  3/8/2017 Status:  Signed     :  Hugo Duarte MD (Physician)            [place]

## 2018-01-03 NOTE — TELEPHONE ENCOUNTER
Patient Information     Patient Name MRN Karen Randle [id number] Female 1945      Telephone Encounter by Daisha Ansari at 3/8/2017  2:21 PM     Author:  Daisha Ansari Service:  (none) Author Type:  (none)     Filed:  3/8/2017  2:22 PM Encounter Date:  3/8/2017 Status:  Signed     :  Daisha Ansari            Needs clarification of timing of the Ultram medication.

## 2018-01-03 NOTE — TELEPHONE ENCOUNTER
Patient Information     Patient Name MRN Karen Randle [id number] Female 1945      Telephone Encounter by Josefa Pandya LPN at 3/9/2017  8:12 AM     Author:  Josefa Pandya LPN Service:  (none) Author Type:  NURS- Licensed Practical Nurse     Filed:  3/9/2017  8:12 AM Encounter Date:  3/9/2017 Status:  Signed     :  Josefa Pandya LPN (NURS- Licensed Practical Nurse)            Left a message for santiago at [facility] to call back.  Josefa Pandya LPN......3/9/2017  8:12 AM

## 2018-01-03 NOTE — PROGRESS NOTES
Patient Information     Patient Name MRN Karen Randle [id number] Female 1945      Progress Notes by Hugo Duarte MD at 3/16/2017  8:30 AM     Author:  Hugo Duarte MD Service:  (none) Author Type:  Physician     Filed:  3/16/2017  9:14 AM Encounter Date:  3/16/2017 Status:  Signed     :  Hugo Duarte MD (Physician)            SUBJECTIVE:    Karen Duncan is a 71 y.o. female who presents for NH discharge.    HPI Comments: She returns today for follow-up. She was recently hospitalized with another illness including a pneumonia. Her wound is apparently healing quite well, I did discuss the situation with surgery earlier this week. She has the wound VAC on and is having this changed every 2-3 days. She will need home care nursing to assist with this when she goes home from the nursing home. She does plan to go home today. She is doing well in all other respects and has no other complaints or concerns.    She is on tramadol as well as Norco, 4 times daily for each of these. She says that her pain is well controlled at this point. Hopefully, in the future we will be able to wean one or both of these down substantially.    I spent some time today reviewing her hospital records as well as updating her past medical history, past surgical history and social history. She had her medications reconciled today as well. She does need a prescription for the Norco but her other meds are not needing to be refilled.    She is still quite weak. She is wheelchair bound. She is a high fall risk. She will need to have home health care to assist because of these issues.      Allergies      Allergen   Reactions     Adhesive Tapes [Adhesive]  *Unknown     Paper tapes, fragile skin      Aliskiren  Cough     Lisinopril  Cough     Ace cough      Losartan  Cough     Paper Tape [Adhesive Tape]       Skin comes off    ,   Current Outpatient Prescriptions     Medication  Sig     ACCU-CHEK MULTICLIX LANCET  "USE ONE LANCET TO CHECK GLUCOSE ONCE OR TWICE DAILY     amLODIPine (NORVASC) 2.5 mg tablet Take 1 tablet by mouth once daily.     ascorbic acid, vitamin C, (ASCORBIC ACID WITH NIECY HIPS) 500 mg tablet Take 1,000 mg by mouth once daily.     aspirin 81 mg tablet Take 1 tablet by mouth once daily with a meal.     atorvastatin (LIPITOR) 20 mg tablet TAKE ONE TABLET BY MOUTH AT BEDTIME     baclofen (LIORESAL) 10 mg tablet Take 1 tablet by mouth 3 times daily.     BENADRYL 25 MG CAP two tablets orally at bedtime     blood sugar diagnostic (ACCU-CHEK LENIN PLUS TEST STRP) strip Test one to two times daily E11.9     blood sugar diagnostic (BLOOD GLUCOSE TEST) strip Dispense item covered by pt ins. Test 4 times daily E11.29     CALCIUM CARBONATE (TUMS 500 ORAL) Take 2 Tabs by mouth 2 times daily if needed (HEARTBURN).     CALCIUM CARBONATE/VITAMIN D3 (CALCIUM + D ORAL) Take 2 tablets by mouth once daily.     cloNIDine HCl (CATAPRES) 0.1 mg tablet Take 1 tablet by mouth once daily.     docusate (COLACE) 100 mg capsule Take 100 mg by mouth 2 times daily if needed for Constipation.     estradiol (ESTRACE) 0.5 mg tablet Take 1 tablet by mouth once daily.     ferrous sulfate 325 mg delayed release tablet Take 650 mg by mouth once daily.     gabapentin (NEURONTIN) 100 mg capsule Take 1 capsule by mouth 3 times daily.     HYDROcodone-acetaminophen, 5-325 mg, (NORCO) per tablet Take 1 tablet by mouth every 6 hours if needed  for Pain Max acetaminophen dose: 4000 mg in 24 hrs.     insulin glargine (LANTUS SOLOSTAR) 100 unit/mL (3 mL) pen 16 units am, 14 units pm     Insulin Safety Needles, Disp, 30 gauge x 1/3\" For administering insulin at home 4 times daily.  Dx code e11.9     metFORMIN (GLUCOPHAGE XR) 500 mg Extended-Release tablet Take 2 tablets by mouth 2 times daily with meals.     NOVOLOG FLEXPEN 100 unit/mL solution for injection INJECT AS DIRECTED PER SS FOUR TIMES DAILY (MAX 32 UNITS DAILY)     nystatin (MYCOSTATIN) 100,000 " unit/mL suspension Swish and swallow 5 mL by mouth 4 times daily for 10 days.     Omega-3-DHA-EPA-Fish Oil 1,200 (144-216) mg capsule Take 1 capsule by mouth once daily.     omeprazole (PRILOSEC) 20 mg Delayed-Release capsule TAKE ONE CAPSULE BY MOUTH ONCE DAILY BEFORE  A  MEAL     predniSONE (DELTASONE) 5 mg tablet TAKE ONE TABLET BY MOUTH TWICE DAILY WITH MEALS     psyllium (METAMUCIL) 0.52 gram capsule Take 3 capsules by mouth once daily.     Saccharomyces boulardii (FLORASTOR) 250 mg capsule Take 2 capsules by mouth 2 times daily.     sodium hypochlorite (1/2 STRENGTH DAKIN'S SOLUTION) 0.25% soln Apply  topically to affected area(s) 2 times daily.     spironolactone (ALDACTONE) 25 mg tablet TAKE ONE TABLET BY MOUTH ONCE DAILY     TAZTIA  mg capsule TAKE ONE CAPSULE BY MOUTH ONCE DAILY     traMADol (ULTRAM) 50 mg tablet Take 2 tablets by mouth 4 times daily if needed for Pain.     No current facility-administered medications for this visit.      Medications have been reviewed by me and are current to the best of my knowledge and ability. ,   Past Medical History      Diagnosis   Date     Atrial fibrillation (HC)  2000     Diabetes mellitus, type II ()       Elevated liver function tests  2010     Fatty liver on US and CT.  Iron nl. 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     IV Vancomycin to resolve, L 3rd digit      Pneumothorax       spondylolysis (right) at age 39 followed by spondylolysis (left) several years later      Psoas muscle abscess (HC)  2016     Strep viridans      Rheumatoid arthritis(714.0)  2000     Urinary sepsis  2012     hospitalized with hypotension, blood cultures showed Escherichia coli    ,   Patient Active Problem List       Diagnosis  Date Noted     Decubitus ulcer of ischial area, stage 4 (HC)  03/02/2017     Anemia   01/16/2017     Elevated LFTs  01/16/2017     Corticosteroid dependence (HC)  01/15/2017     Open wound of left buttock  10/13/2016     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 This will not affect patient care or billing. This comment can be deleted.        Atrial fibrillation (HC)       DIVERTICULOSIS, COLON  02/18/2010     Rheumatoid arthritis(714.0)  12/07/2006   ,   Past Surgical History       Procedure   Laterality Date     Spine surgery        Back surgery with fusion of the lumbar discs       Jeb and bso        secondary to fibroids       Portacath        hx chest tube surgery x2       Cataract removal  Bilateral      Breast biopsy        Left       Knee replacement  Bilateral 07/10/08     Colonoscopy screening   2001     repeat in 10 years per patient       Colonoscopy screening   2010     sigmoid diverticulosis, no polyps, due 2020       Forearm/wrist surgery   2010     removed nodule Right wrist       Colonoscopy screening   2011     mild sigmoid diverticulosis, EGD biopsies       Portacath   2012     power port placement, Dr. Taylor       Esophagogastroduodenoscopy   2/2011     Normal       Ct guide perc drain cath (ia)   2016    and   Social History       Substance Use Topics         Smoking status:   Former Smoker     Packs/day:  3.00     Years:  25.00     Types:  Cigarettes     Quit date:  5/27/1974     Smokeless tobacco:   Never Used      Comment: quit 1984      Alcohol use   No       REVIEW OF SYSTEMS:  Review of Systems   All other systems reviewed and are negative.      OBJECTIVE:  /64  Pulse 70  Wt 49.2 kg (108 lb 6.4 oz)  BMI 19.2 kg/m2    EXAM:   Physical  Exam   Constitutional: She is well-developed, well-nourished, and in no distress. No distress.   In wheelchair   Cardiovascular: Normal rate and regular rhythm.    Murmur heard.   Systolic murmur is present with a grade of 2/6   Pulmonary/Chest: Effort normal and breath sounds normal. No respiratory distress. She has no wheezes. She has no rales.   Musculoskeletal: She exhibits no edema.   Neurological: She is alert.   Skin: Skin is warm and dry. She is not diaphoretic.   Nursing note and vitals reviewed.      ASSESSMENT/PLAN:    ICD-10-CM    1. Decubitus ulcer of ischial area, stage 4 (HC) L89.304 [facility] PHYSICIAN INITIAL CERTIFICATION FOR [facility]HA   2. Rheumatoid arthritis, involving unspecified site, unspecified rheumatoid factor presence M06.9 HYDROcodone-acetaminophen, 5-325 mg, (NORCO) per tablet   3. Type 2 diabetes mellitus with other diabetic kidney complication, with long-term current use of insulin (HC) E11.29      Z79.4         Plan:  She appears to be doing very well at this time. She will be discharged to the nursing home. Medications were reconciled. I did give her a refill on her Norco today, she is taking 4 daily. Hopefully as things progress we will be able to wean this down. If not, we are going to have to get her started on a narcotic contract. She will be seeing the plastic surgeon the beginning part of April. I will have her back to see me in about 3 weeks which should be shortly after that visit at which time we will reassess how things are progressing. She will need lab at that visit.    Home Health Certification/Face to Face Attestation:     I certify that this patient is under my care and that I, or a nurse practitioner (NP) with whom I collaborate or physician assistant (PA) whom I supervise, had a face-to-face encounter that meets the face-to-face encounter requirements with this patient during this visit.    Date of the Face to Face Encounter: today    I certify, based on my findings, review of  the medical records, and/or collaboration with the NP or PA, the following services are medically necessary home health services:  Skilled Nursing     Clinical findings support the need for the above services for this patient because of: The need for wound care      This patient is homebound based on my clinical findings, review of the medical records, and/or collaboration with the NP or the PA whom I supervise because:   The patient requires assistance to leave the home for safety due to weakness and comorbidities    Patient requires the assistance of a wheelchair in order to leave the home.    The services identified in this certification are medically necessary home health services.        Electronically signed,   Hugo Duarte MD ....................  3/16/2017   9:10 AM

## 2018-01-03 NOTE — TELEPHONE ENCOUNTER
Patient Information     Patient Name MRKaren Ramirez [id number] Female 1945      Telephone Encounter by Daisha Shell at 2017 11:30 AM     Author:  Daisha Shell Service:  (none) Author Type:  (none)     Filed:  2017 11:40 AM Encounter Date:  2017 Status:  Signed     :  Daisha Shell            States blood sugars have been up for the past several checks. States that her wound went from 6 cm down to 2 cm and now back up to 6 cm. States her blood sugar with morning was 398 and recently had one of 438. She does not write her numbers down and is going to try to view the history on her machine. She will call back with the numbers if she is able to get them.  Daisha Shell LPN   2017  11:40 AM

## 2018-01-03 NOTE — TELEPHONE ENCOUNTER
Patient Information     Patient Name MRN Karen Randle [id number] Female 1945      Telephone Encounter by Josefa Pandya LPN at 3/10/2017  3:42 PM     Author:  Josefa Pandya LPN Service:  (none) Author Type:  NURS- Licensed Practical Nurse     Filed:  3/10/2017  3:46 PM Encounter Date:  3/10/2017 Status:  Signed     :  Josefa Pandya LPN (NURS- Licensed Practical Nurse)            Contacted milenai the nurse at [facility] and she is looking for some kind of order to state it is ok to flush out port after blood draw with heparin and normal saline. She stated she will do the blood drawn personally.  Josefa Pandya LPN......3/10/2017  3:46 PM

## 2018-01-03 NOTE — TELEPHONE ENCOUNTER
Patient Information     Patient Name MRN Karen Randle [id number] Female 1945      Telephone Encounter by Josefa Pandya LPN at 2017  9:30 AM     Author:  Josefa Pandya LPN Service:  (none) Author Type:  NURS- Licensed Practical Nurse     Filed:  2017  9:33 AM Encounter Date:  2017 Status:  Signed     :  Josefa Pandya LPN (NURS- Licensed Practical Nurse)            diamond with [facility] was contacted and given the verbal ok.  Josefa Pandya LPN......2017  9:30 AM

## 2018-01-03 NOTE — TELEPHONE ENCOUNTER
Patient Information     Patient Name MRN Karen Randle [id number] Female 1945      Telephone Encounter by tIzel Palafox at 3/7/2017  3:56 PM     Author:  Itzel Palafox Service:  (none) Author Type:  (none)     Filed:  3/7/2017  3:56 PM Encounter Date:  3/7/2017 Status:  Signed     :  Itzel Palafox            Caregiver notified.  Itzel Palafox LPN.......................... 3/7/2017  3:56 PM

## 2018-01-03 NOTE — TELEPHONE ENCOUNTER
Patient Information     Patient Name MRN Karen Randle [id number] Female 1945      Telephone Encounter by Farida Adrian CNA at 3/7/2017  2:52 PM     Author:  Farida Adrian CNA Service:  (none) Author Type:  NURS- Nurse Assist/Care Tech     Filed:  3/7/2017  2:55 PM Encounter Date:  3/7/2017 Status:  Signed     :  Farida Adrian CNA (NURS- Nurse Assist/Care Tech)            Wendy from  called in regards to getting some speech therapy orders for the patient. Please call her back regarding this. Thank you!    Farida Adrian CNA ....................  3/7/2017   2:55 PM

## 2018-01-03 NOTE — TELEPHONE ENCOUNTER
Patient Information     Patient Name MRN Karen Randle [id number] Female 1945      Telephone Encounter by Crista Brower at 3/14/2017  3:12 PM     Author:  Crista Brower Service:  (none) Author Type:  (none)     Filed:  3/14/2017  3:13 PM Encounter Date:  3/14/2017 Status:  Signed     :  Crista Brower            [facility] notified.  They said they did find a  in the mean time so she will get hooked back up to [facility].  Crista Brower LPN ....................  3/14/2017   3:12 PM

## 2018-01-03 NOTE — TELEPHONE ENCOUNTER
Patient Information     Patient Name MRN Karen Randle [id number] Female 1945      Telephone Encounter by Hugo Duarte MD at 3/14/2017  3:22 PM     Author:  Hugo Duarte MD Service:  (none) Author Type:  Physician     Filed:  3/14/2017  3:22 PM Encounter Date:  3/14/2017 Status:  Signed     :  Hugo Duarte MD (Physician)            Both are fine.

## 2018-01-03 NOTE — TELEPHONE ENCOUNTER
Patient Information     Patient Name MRN Karen Randle [id number] Female 1945      Telephone Encounter by Josefa Pandya LPN at 3/10/2017  2:48 PM     Author:  Josefa Pandya LPN Service:  (none) Author Type:  NURS- Licensed Practical Nurse     Filed:  3/10/2017  2:49 PM Encounter Date:  3/10/2017 Status:  Signed     :  Josefa Pandya LPN (NURS- Licensed Practical Nurse)            Left a message for sumeet at [facility] to call back.  Josefa Pandya LPN......3/10/2017  2:49 PM

## 2018-01-03 NOTE — TELEPHONE ENCOUNTER
Patient Information     Patient Name MRN Karen Randle [id number] Female 1945      Telephone Encounter by Josefa Pandya LPN at 3/8/2017  2:54 PM     Author:  Josefa Pandya LPN Service:  (none) Author Type:  NURS- Licensed Practical Nurse     Filed:  3/8/2017  2:55 PM Encounter Date:  3/8/2017 Status:  Signed     :  Josefa Pandya LPN (NURS- Licensed Practical Nurse)            Contacted petros at [facility] and gave her the ok to do the tramadol every six hours as needed.  Josefa Pandya LPN......3/8/2017  2:55 PM

## 2018-01-03 NOTE — PATIENT INSTRUCTIONS
Patient Information     Patient Name MRN Karen Randle [id number] Female 1945      Patient Instructions by Ina Duncan PA-C at 2017 10:45 AM     Author:  Ina Duncan PA-C Service:  (none) Author Type:  PHYS- Physician Assistant     Filed:  2017 11:10 AM Encounter Date:  2017 Status:  Signed     :  Ina Duncan PA-C (MARISOL- Physician Assistant)            Stop ceftin.   Started on augmentin.     Patient was instructed in increase fluids including water and cranberry juice.  Monitor for fevers, chills, back pain.  Return to clinic after antibiotic completion if symptoms are not resolved.  Call clinic if symptoms change/worsen.    Encouraged to eat yogurt daily for the next few weeks or take a probiotic pill.     Monitor for chest pain, palpitations, increased problems breathing, fevers, increased belly pain, coughing up blood, lightheadedness or dizziness.

## 2018-01-03 NOTE — TELEPHONE ENCOUNTER
Patient Information     Patient Name MRN Karen Ranlde [id number] Female 1945      Telephone Encounter by Daisha Ansari at 3/10/2017  2:44 PM     Author:  Daisha Ansari Service:  (none) Author Type:  (none)     Filed:  3/10/2017  2:46 PM Encounter Date:  3/10/2017 Status:  Signed     :  Daisha Ansari            Unable to draw the 2 vials of blood we requested (after the incident during her stay at [facility]).  Her staff is unable to draw the blood (poor veins).  Wants to know if we want to send a tech to [facility] and do the draw.

## 2018-01-03 NOTE — TELEPHONE ENCOUNTER
Patient Information     Patient Name MRKaren Ramirez [id number] Female 1945      Telephone Encounter by Josefa Pandya LPN at 3/17/2017 11:10 AM     Author:  Josefa Pandya LPN Service:  (none) Author Type:  NURS- Licensed Practical Nurse     Filed:  3/17/2017 11:11 AM Encounter Date:  3/17/2017 Status:  Signed     :  Josefa Pandya LPN (NURS- Licensed Practical Nurse)            deirdre from [facility] stated she needs a verbal ok for the patient to have a home care nurse come three times a week to change her wound vac.  Josefa Pandya LPN......3/17/2017  11:11 AM

## 2018-01-03 NOTE — TELEPHONE ENCOUNTER
Patient Information     Patient Name MRKaren Ramirez [id number] Female 1945      Telephone Encounter by Josefa Pandya LPN at 3/17/2017 11:30 AM     Author:  Josefa Pandya LPN Service:  (none) Author Type:  NURS- Licensed Practical Nurse     Filed:  3/17/2017 11:30 AM Encounter Date:  3/17/2017 Status:  Signed     :  Josefa Pandya LPN (NURS- Licensed Practical Nurse)            Contacted deirdre at [facility] and gave her the okay below for homecare.  Josefa Pandya LPN......3/17/2017  11:30 AM

## 2018-01-03 NOTE — TELEPHONE ENCOUNTER
Patient Information     Patient Name MRN Karen Randle [id number] Female 1945      Telephone Encounter by Damaris Potter RN at 2017  2:48 PM     Author:  Damaris Potter RN Service:  (none) Author Type:  NURS- Registered Nurse     Filed:  2017  2:50 PM Encounter Date:  2017 Status:  Signed     :  Damaris Potter RN (NURS- Registered Nurse)            traMADol (ULTRAM) 50 mg tablet  TAKE TWO TABLETS BY MOUTH EVERY 6 HOURS AS NEEDED FOR PAIN  Disp: 90 tablet Refills: 0    Class: eRx Start: 2017    For: Rheumatoid arthritis with positive rheumatoid factor, involving unspecified site  Documented:2 months ago  Last refill: 2017  [phone]    Last visit with LEO GIVENS was on: 2017 in [facility]  PCP:  Leo Givens MD     Unable to complete prescription refill per RN Medication Refill Policy.................... DAMARIS POTTER RN ....................  2017   2:49 PM

## 2018-01-03 NOTE — TELEPHONE ENCOUNTER
Patient Information     Patient Name MRN Karen Randle [id number] Female 1945      Telephone Encounter by Hugo Duarte MD at 3/10/2017  3:31 PM     Author:  Hugo Duarte MD Service:  (none) Author Type:  Physician     Filed:  3/10/2017  3:32 PM Encounter Date:  3/10/2017 Status:  Signed     :  Hugo Duarte MD (Physician)            This lab needs to be done because of possible needle stick exposure that the patient may have had.

## 2018-01-03 NOTE — TELEPHONE ENCOUNTER
Patient Information     Patient Name MRN Karen Randle [id number] Female 1945      Telephone Encounter by Hugo Duarte MD at 3/10/2017  3:47 PM     Author:  Hugo Duarte MD Service:  (none) Author Type:  Physician     Filed:  3/10/2017  3:47 PM Encounter Date:  3/10/2017 Status:  Signed     :  Hugo Duarte MD (Physician)            Floyd

## 2018-01-03 NOTE — TELEPHONE ENCOUNTER
Patient Information     Patient Name MRN Karen Randle [id number] Female 1945      Telephone Encounter by Itzel Palafox at 3/7/2017  3:19 PM     Author:  Itzel Palafox Service:  (none) Author Type:  (none)     Filed:  3/7/2017  3:19 PM Encounter Date:  3/7/2017 Status:  Signed     :  Itzel Palafox            Left message to call back  ....................  3/7/2017   3:19 PM  Itzel Palafox LPN.......................... 3/7/2017  3:19 PM

## 2018-01-03 NOTE — PROGRESS NOTES
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 Left buttock wound dressing was removed. Wound was explored and found to be clean without active infection.   VAC sponge was cut to fit the tunneling wound and inserted. Skin prep was used on surrounding skin. Occlusive dressing was placed over the sponge and surrounding skin. Window was cut in the dressing and the disc placed over the window. Occlusive dressing was placed at the edges of the disc. The drain tubing was secured on the left buttock. The patient tolerated the procedure without difficulty. Wound care instructions were given to the patient and written on aftercare paperwork for [facility].

## 2018-01-03 NOTE — TELEPHONE ENCOUNTER
Patient Information     Patient Name MRN Karen Randle [id number] Female 1945      Telephone Encounter by Josefa Pandya LPN at 2017 11:55 AM     Author:  Josefa Pandya LPN Service:  (none) Author Type:  NURS- Licensed Practical Nurse     Filed:  2017 11:56 AM Encounter Date:  2017 Status:  Signed     :  Josefa Pandya LPN (NURS- Licensed Practical Nurse)            The patient was contacted and given the information below.  Josefa Pandya LPN......2017  11:56 AM

## 2018-01-03 NOTE — TELEPHONE ENCOUNTER
Patient Information     Patient Name MRN Karen Randle [id number] Female 1945      Telephone Encounter by Hugo Duarte MD at 2017  4:34 PM     Author:  Hugo Duarte MD Service:  (none) Author Type:  Physician     Filed:  2017  4:34 PM Encounter Date:  2017 Status:  Signed     :  Hugo Duarte MD (Physician)            [place]

## 2018-01-03 NOTE — TELEPHONE ENCOUNTER
Patient Information     Patient Name MRN Karen Randle [id number] Female 1945      Telephone Encounter by Josefa Pandya LPN at 2017  9:19 AM     Author:  Josefa Pandya LPN Service:  (none) Author Type:  NURS- Licensed Practical Nurse     Filed:  2017  9:19 AM Encounter Date:  2017 Status:  Signed     :  Josefa Pandya LPN (NURS- Licensed Practical Nurse)            Left a message for diamond at [facility] to call back.  Josefa Pandya LPN......2017  9:19 AM

## 2018-01-03 NOTE — TELEPHONE ENCOUNTER
Patient Information     Patient Name MRN Karen Randle [id number] Female 1945      Telephone Encounter by Damaris Potter RN at 2017  2:48 PM     Author:  Damaris Potter RN Service:  (none) Author Type:  NURS- Registered Nurse     Filed:  2017  2:52 PM Encounter Date:  2017 Status:  Signed     :  Damaris Potter RN (NURS- Registered Nurse)            traMADol (ULTRAM) 50 mg tablet  TAKE TWO TABLETS BY MOUTH EVERY 6 HOURS AS NEEDED FOR PAIN  Disp: 90 tablet Refills: 0    Class: eRx Start: 2017    For: Rheumatoid arthritis with positive rheumatoid factor, involving unspecified site  Documented:3 months ago  Last refill: 2017  To be filled at: [facility] [address] [place], [place] [address]Phone: [phone]  Last visit with LEO GIVENS was on: 2017 in [facility]  PCP:  Leo Givens MD  Controlled Substance Agreement:  None noted ? Home Care  Unable to complete prescription refill per RN Medication Refill Policy.................... DAMARIS POTTER RN ....................  2017   2:50 PM

## 2018-01-03 NOTE — TELEPHONE ENCOUNTER
Patient Information     Patient Name MRN Karen Randle [id number] Female 1945      Telephone Encounter by Josefa Pandya LPN at 3/14/2017 10:14 AM     Author:  Josefa Pandya LPN Service:  (none) Author Type:  NURS- Licensed Practical Nurse     Filed:  3/14/2017 10:42 AM Encounter Date:  3/14/2017 Status:  Signed     :  Josefa Pandya LPN (NURS- Licensed Practical Nurse)            A nurse from [facility] called on the patient and stated she needs a refill on the patients norco 5-325mg.. She stated they asked Dr. Del Rio and he stated it needed to come from the patients primary provider. They also stated they have concerns with the patient taking the norco 5/325mg every 6 hours and the tramadol 100mg every 6 hours. She takes them three hours apart from each other. They are wondering if the tramadol could be discontinued and the norco dose be increased and given every 4 hours instead.  Josefa Pandya LPN......3/14/2017  10:41 AM

## 2018-01-03 NOTE — TELEPHONE ENCOUNTER
Patient Information     Patient Name MRN Karen Randle [id number] Female 1945      Telephone Encounter by Ni Fontanez at 3/9/2017  7:53 AM     Author:  Ni Fontanez Service:  (none) Author Type:  (none)     Filed:  3/9/2017  7:54 AM Encounter Date:  3/9/2017 Status:  Signed     :  Ni Fontanez            MAF- PAIN MANAGMENT

## 2018-01-03 NOTE — TELEPHONE ENCOUNTER
"Patient Information     Patient Name MRN Karen Randle [id number] Female 1945      Telephone Encounter by Lissa Bauer RN at 2017  9:46 AM     Author:  Lissa Bauer RN Service:  (none) Author Type:  NURS- Registered Nurse     Filed:  2017  9:59 AM Encounter Date:  2017 Status:  Signed     :  Lissa Bauer RN (NURS- Registered Nurse)            Patient states she started Ceftin on Tuesday for UTI, and now is having shortness of breath starting this morning.  Transferred patient to the appointment line.  Will call 911 if worsens.  LISSA BAUER RN ....................  2017   9:59 AM      Reason for Disposition    [1] MILD difficulty breathing (e.g., minimal/no SOB at rest, SOB with walking, pulse <100) AND [2] NEW-onset or WORSE than normal    Answer Assessment - Initial Assessment Questions  1. RESPIRATORY STATUS: \"Describe your breathing?\" (e.g., wheezing, shortness of breath, unable to speak, severe coughing)       Short of breath, hard to get breath in, is able to speak, and denies trouble swallowing, always has a chronic cough  2. ONSET: \"When did this breathing problem begin?\"       This morning  3. PATTERN \"Does the difficult breathing come and go, or has it been constant since it started?\"       constant  4. SEVERITY: \"How bad is your breathing?\" (e.g., mild, moderate, severe)     - MILD: No SOB at rest, mild SOB with walking, speaks normally in sentences, can lay down, no retractions, pulse < 100.     - MODERATE: SOB at rest, SOB with minimal exertion and prefers to sit, cannot lie down flat, speaks in phrases, mild retractions, audible wheezing, pulse 100-120.     - SEVERE: Very SOB at rest, speaks in single words, struggling to breathe, sitting hunched forward, retractions, pulse > 120       Mild, no racing pulse  5. RECURRENT SYMPTOM: \"Have you had difficulty breathing before?\" If so, ask: \"When was the last time?\" and \"What happened that " "time?\"       Not like this  6. CARDIAC HISTORY: \"Do you have any history of heart disease?\" (e.g., heart attack, angina, bypass surgery, angioplasty)       no  7. LUNG HISTORY: \"Do you have any history of lung disease?\"  (e.g., pulmonary embolus, asthma, emphysema)      no  8. CAUSE: \"What do you think is causing the breathing problem?\"       The new antibiotic  9. OTHER SYMPTOMS: \"Do you have any other symptoms? (e.g., dizziness, runny nose, cough, chest pain, fever)      Diarrhea and stomach ache, has had these since started the antibiotic  10. PREGNANCY: \"Is there any chance you are pregnant?\" \"When was your last menstrual period?\"      na    Protocols used: ADULT BREATHING DIFFICULTY-A-AH            "

## 2018-01-03 NOTE — NURSING NOTE
Patient Information     Patient Name MRN Karen Randle [id number] Female 1945      Nursing Note by Crista Brower at 3/14/2017  8:50 AM     Author:  Crista Brower Service:  (none) Author Type:  (none)     Filed:  3/14/2017  8:55 AM Encounter Date:  3/14/2017 Status:  Signed     :  Crista Brower            Patient comes in for a sore on left buttocks.  Crista Brower LPN ....................  3/14/2017   8:55 AM

## 2018-01-03 NOTE — TELEPHONE ENCOUNTER
Patient Information     Patient Name MRN Karen Randle [id number] Female 1945      Telephone Encounter by Bartolo Herrmann LPN at 2017  3:34 PM     Author:  Bartolo Herrmann LPN Service:  (none) Author Type:  NURS- Licensed Practical Nurse     Filed:  2017  3:35 PM Encounter Date:  2017 Status:  Signed     :  Bartolo Herrmann LPN (NURS- Licensed Practical Nurse)            Called patient to let her know [facility] after giving last name and date of birth.  Bartolo Herrmann LPN ..............2017 3:34 PM
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
## 2018-01-03 NOTE — TELEPHONE ENCOUNTER
Patient Information     Patient Name MRN Karen Randle [id number] Female 1945      Telephone Encounter by Ranjan Stoner at 3/14/2017  3:27 PM     Author:  Ranjan Stoner Service:  (none) Author Type:  (none)     Filed:  3/14/2017  3:27 PM Encounter Date:  3/14/2017 Status:  Signed     :  Ranjan Stoner            [facility] notified.Ranjan Stoner LPN .............3/14/2017  3:27 PM

## 2018-01-03 NOTE — TELEPHONE ENCOUNTER
Patient Information     Patient Name MRN Karen Randle [id number] Female 1945      Telephone Encounter by Itzel Palafox at 3/7/2017  3:31 PM     Author:  Itzel Palafox Service:  (none) Author Type:  (none)     Filed:  3/7/2017  3:32 PM Encounter Date:  3/7/2017 Status:  Signed     :  Itzel Palafox            Patient has some loose dentures and isn't sure if she wants to change the texture of her diet. They are wanting to start with speech therapy.    Itzel Paalfox LPN.......................... 3/7/2017  3:31 PM

## 2018-01-03 NOTE — TELEPHONE ENCOUNTER
Patient Information     Patient Name MRN Karen Randle [id number] Female 1945      Telephone Encounter by Hugo Duarte MD at 3/7/2017  3:54 PM     Author:  Hugo Duarte MD Service:  (none) Author Type:  Physician     Filed:  3/7/2017  3:54 PM Encounter Date:  3/7/2017 Status:  Signed     :  Hugo Duarte MD (Physician)            ok

## 2018-01-03 NOTE — TELEPHONE ENCOUNTER
Patient Information     Patient Name MRN Karen Randle [id number] Female 1945      Telephone Encounter by Josefa Pandya LPN at 3/10/2017  3:24 PM     Author:  Josefa Pandya LPN Service:  (none) Author Type:  NURS- Licensed Practical Nurse     Filed:  3/10/2017  3:26 PM Encounter Date:  3/10/2017 Status:  Signed     :  Josefa Pandya LPN (NURS- Licensed Practical Nurse)            Contacted sumeet at [facility], she stated they received orders from somewhere that the patient needed two vials of blood drawn today at [facility]. They tried her arms multiple times and is unable to get the blood from her veins and they are not able to do a port access draw there. They are wondering if Hugo Duarte MD knows anything about the orders and if blood needs to be drawn on the patient.  Josefa Pandya LPN......3/10/2017  3:26 PM

## 2018-01-03 NOTE — TELEPHONE ENCOUNTER
Patient Information     Patient Name MRN Karen Randle [id number] Female 1945      Telephone Encounter by Daisha Shell at 2017 11:41 AM     Author:  Daisha Shell Service:  (none) Author Type:  (none)     Filed:  2017 11:46 AM Encounter Date:  2017 Status:  Signed     :  Daisha Shell            398 this morning  [phone], (dinner, lunch, breakfast)  [phone],(dinner, lunch, breakfast)  [phone],  [phone],  [phone]  Daisha Shell LPN   2017  11:44 AM

## 2018-01-04 NOTE — TELEPHONE ENCOUNTER
Patient Information     Patient Name MRN Karen Randle [id number] Female 1945      Telephone Encounter by Damaris Arora RN at 2017 10:40 AM     Author:  Damaris Arora RN Service:  (none) Author Type:  NURS- Registered Nurse     Filed:  2017 10:42 AM Encounter Date:  2017 Status:  Signed     :  Damaris Arora RN (NURS- Registered Nurse)            Diabetic Supplies    Office visit in the past 12 months.    Last visit with LEO GIVENS was on: 2017 in [facility]  Next visit with LEO GIVENS is on: 2017 in [facility]  Next visit with Internal Medicine is on: 2017 in [facility]    Max refill for 12 months from last office visit.  Always add ICD-9 code.    Needs not be listed on Med List to fill.  Need new RX every 12 months or if exceeds the limitations set by Medicare, then every 6 months.  Also when testing frequency is changed is there a need to obtain a new order.  A refill request does not need to be approved by the ordering physician-a beneficiary or their caregiver may request refills.  Physicians are not required to fill out additional forms such as home testing results for suppliers or provide additional documentation unless the supplier is audited and the  is requesting such documentation.    Prescription refilled per RN Medication Refill Policy.................... DAMARIS ARORA RN ....................  2017   10:40 AM

## 2018-01-04 NOTE — NURSING NOTE
Patient Information     Patient Name MRN Karen Randle [id number] Female 1945      Nursing Note by Georgina Johnston LPN at 2017 10:30 AM     Author:  Georgina Johnston LPN Service:  (none) Author Type:  NURS- Licensed Practical Nurse     Filed:  2017 11:25 AM Encounter Date:  2017 Status:  Signed     :  Georgina Johnston LPN (NURS- Licensed Practical Nurse)            Karen Duncan is a 71 y.o. female here today for wound care of the left buttock. Declines mychart.  Georgina PALMA. LLOYD Johnston.........2017   10:32 AM

## 2018-01-04 NOTE — TELEPHONE ENCOUNTER
Patient Information     Patient Name MRN Karen Randle [id number] Female 1945      Telephone Encounter by Josefa Pandya LPN at 2017  1:50 PM     Author:  Josefa Pandya LPN Service:  (none) Author Type:  NURS- Licensed Practical Nurse     Filed:  2017  1:51 PM Encounter Date:  2017 Status:  Signed     :  Josefa Pandya LPN (NURS- Licensed Practical Nurse)            Left a message for the patient to call back.  Josefa Pandya LPN......2017  1:51 PM

## 2018-01-04 NOTE — TELEPHONE ENCOUNTER
Patient Information     Patient Name MRN Karen Randle [id number] Female 1945      Telephone Encounter by Alireza Guy at 2017  1:58 PM     Author:  Alireza Guy Service:  (none) Author Type:  (none)     Filed:  2017  1:59 PM Encounter Date:  2017 Status:  Signed     :  Alireza Guy            Patient notified as requested by provider.  Alireza Guy LPN ....................  2017   1:58 PM

## 2018-01-04 NOTE — TELEPHONE ENCOUNTER
Patient Information     Patient Name MRN Karen Randle [id number] Female 1945      Telephone Encounter by Isa Gaines NP at 2017 12:10 PM     Author:  Isa Gaines NP Service:  (none) Author Type:  PHYS- Nurse Practitioner     Filed:  2017 12:11 PM Encounter Date:  2017 Status:  Signed     :  Isa Gaines NP (PHYS- Nurse Practitioner)            Sounds like she needs to be seen in ER

## 2018-01-04 NOTE — NURSING NOTE
Patient Information     Patient Name MRN Karen Randle [id number] Female 1945      Nursing Note by Georgina Johnston LPN at 3/22/2017 10:30 AM     Author:  Georgnia Johnston LPN Service:  (none) Author Type:  NURS- Licensed Practical Nurse     Filed:  3/22/2017 11:20 AM Encounter Date:  3/22/2017 Status:  Signed     :  Georgina Johnston LPN (NURS- Licensed Practical Nurse)            Karen Duncan is a 71 y.o. female here today for wound care of the left buttocks.  Georgina Johnston LPN.........3/22/2017   10:31 AM

## 2018-01-04 NOTE — PROGRESS NOTES
Patient Information     Patient Name MRN Karen Randle [id number] Female 1945      Progress Notes by Hugo Duarte MD at 2017 11:30 AM     Author:  Hugo Duarte MD Service:  (none) Author Type:  Physician     Filed:  2017 11:52 AM Encounter Date:  2017 Status:  Signed     :  Hugo Duarte MD (Physician)            SUBJECTIVE:    Karen Duncan is a 71 y.o. female who presents for follow up on issues.    HPI Comments: She comes in today for follow-up. She has a few issues. The first issue is her chronic wound and infections. She is getting home care and has a wound VAC in place. She sees plastic surgery. She was told at the last visit that this may be present for years, if it heals at all. She was somewhat disappointed with that but certainly can deal with the fact.    She has chronic rheumatoid arthritis with pain. She wonders about starting a new agent such as a monoclonal antibody. I told her that with her infection problems, etc., that this would not be a good idea and she certainly understands.    She does have a chronic pain syndrome that is likely to last. She is taking for pain pills daily. I told her that we would need to start her on a narcotic contract for this and she is accepting of that.      Allergies      Allergen   Reactions     Adhesive Tapes [Adhesive]  *Unknown     Paper tapes, fragile skin      Aliskiren  Cough     Lisinopril  Cough     Ace cough      Losartan  Cough     Paper Tape [Adhesive Tape]       Skin comes off    ,   Current Outpatient Prescriptions     Medication  Sig     ACCU-CHEK MULTICLIX LANCET USE ONE LANCET TO CHECK GLUCOSE ONCE OR TWICE DAILY     amLODIPine (NORVASC) 2.5 mg tablet Take 1 tablet by mouth once daily.     ascorbic acid, vitamin C, (ASCORBIC ACID WITH NIECY HIPS) 500 mg tablet Take 1,000 mg by mouth once daily.     aspirin 81 mg tablet Take 1 tablet by mouth once daily with a meal.     atorvastatin (LIPITOR) 20 mg  "tablet TAKE ONE TABLET BY MOUTH AT BEDTIME     baclofen (LIORESAL) 10 mg tablet Take 1 tablet by mouth 3 times daily.     BENADRYL 25 MG CAP two tablets orally at bedtime     blood sugar diagnostic (ACCU-CHEK LENIN PLUS TEST STRP) strip Test one to two times daily E11.9     blood sugar diagnostic (BLOOD GLUCOSE TEST) strip Dispense item covered by pt ins. Test 4 times daily E11.29     CALCIUM CARBONATE (TUMS 500 ORAL) Take 2 Tabs by mouth 2 times daily if needed (HEARTBURN).     CALCIUM CARBONATE/VITAMIN D3 (CALCIUM + D ORAL) Take 2 tablets by mouth once daily.     cloNIDine HCl (CATAPRES) 0.1 mg tablet Take 1 tablet by mouth once daily.     diltiazem CD (CARDIZEM CD) 360 mg extended release 24 hr capsule Take 1 capsule by mouth once daily.     diphenhydrAMINE (BENADRYL) 25 mg capsule Take 1 capsule by mouth at bedtime if needed.     docusate (COLACE) 100 mg capsule Take 100 mg by mouth 2 times daily if needed for Constipation.     estradiol (ESTRACE) 0.5 mg tablet Take 1 tablet by mouth once daily.     ferrous sulfate 325 mg delayed release tablet Take 1 tablet by mouth once daily.     furosemide (LASIX) 40 mg tablet Take 1 tablet by mouth every morning.     gabapentin (NEURONTIN) 100 mg capsule Take 1 capsule by mouth 3 times daily.     HYDROcodone-acetaminophen, 5-325 mg, (NORCO) per tablet Take 1 tablet by mouth 4 times daily  Max acetaminophen dose: 4000 mg in 24 hrs.     insulin aspart (NOVOLOG) 100 unit/mL solution for injection Inject  subcutaneous 4 times daily before meals and at bedtime. Per sliding scale     insulin glargine (LANTUS SOLOSTAR) 100 unit/mL (3 mL) pen 16 units am, 14 units pm     Insulin Safety Needles, Disp, 30 gauge x 1/3\" For administering insulin at home 5 times daily.  Dx code e11.9     metFORMIN (GLUCOPHAGE XR) 500 mg Extended-Release tablet Take 2 tablets by mouth 2 times daily with meals.     nystatin (MYCOSTATIN) 100,000 unit/mL suspension TAKE 5 mLS BY MOUTH FOUR TIMES DAILY FOR " 10 DAYS.     Omega-3-DHA-EPA-Fish Oil 1,200 (144-216) mg capsule Take 1 capsule by mouth once daily.     omeprazole (PRILOSEC) 20 mg Delayed-Release capsule TAKE ONE CAPSULE BY MOUTH ONCE DAILY BEFORE  A  MEAL     predniSONE (DELTASONE) 5 mg tablet TAKE ONE TABLET BY MOUTH TWICE DAILY WITH MEALS     psyllium (METAMUCIL) 0.52 gram capsule Take 3 capsules by mouth once daily.     Saccharomyces boulardii (FLORASTOR) 250 mg capsule Take 2 capsules by mouth 2 times daily.     sodium hypochlorite (1/2 STRENGTH DAKIN'S SOLUTION) 0.25% soln Apply  topically to affected area(s) 2 times daily.     spironolactone (ALDACTONE) 25 mg tablet TAKE ONE TABLET BY MOUTH ONCE DAILY     traMADol (ULTRAM) 50 mg tablet TAKE TWO TABLETS BY MOUTH EVERY 6 HOURS AS NEEDED FOR PAIN     No current facility-administered medications for this visit.      Medications have been reviewed by me and are current to the best of my knowledge and ability. ,   Past Medical History:     Diagnosis  Date     Atrial fibrillation (HC) 2000     Diabetes mellitus, type II ()      Elevated liver function tests 2010    Fatty liver on US and CT.  Iron nl. 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    IV Vancomycin to resolve, L 3rd digit      Pneumothorax     spondylolysis (right) at age 39 followed by spondylolysis (left) several years later      Psoas muscle abscess (HC) 2016    Strep viridans      Rheumatoid arthritis(714.0) 2000     Urinary sepsis 2012    hospitalized with hypotension, blood cultures showed Escherichia coli    ,   Patient Active Problem List       Diagnosis  Date Noted     Chronic pain syndrome  04/18/2017     Pain medication agreement  04/18/2017     Decubitus ulcer of ischial area, stage 4 (HC)  03/02/2017     Anemia  01/16/2017     Elevated LFTs  01/16/2017     Corticosteroid dependence  (HC)  01/15/2017     Open wound of left buttock  10/13/2016     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 This will not affect patient care or billing. This comment can be deleted.        Atrial fibrillation (HC)       DIVERTICULOSIS, COLON  02/18/2010     Rheumatoid arthritis(714.0)  12/07/2006   ,   Past Surgical History:      Procedure  Laterality Date     BREAST BIOPSY      Left       CATARACT REMOVAL Bilateral      COLONOSCOPY SCREENING  2001    repeat in 10 years per patient       COLONOSCOPY SCREENING  2010    sigmoid diverticulosis, no polyps, due 2020       COLONOSCOPY SCREENING  2011    mild sigmoid diverticulosis, EGD biopsies       CT GUIDE PERC DRAIN CATH (IA)  2016     ESOPHAGOGASTRODUODENOSCOPY  2/2011    Normal       FOREARM/WRIST SURGERY  2010    removed nodule Right wrist       KNEE REPLACEMENT Bilateral 07/10/08     PORTACATH      hx chest tube surgery x2       PORTACATH  2012    power port placement, Dr. Taylor       SPINE SURGERY      Back surgery with fusion of the lumbar discs       MOISES AND BSO      secondary to fibroids      and   Social History       Substance Use Topics         Smoking status:   Former Smoker     Packs/day:  3.00     Years:  25.00     Types:  Cigarettes     Quit date:  5/27/1974     Smokeless tobacco:   Never Used      Comment: quit 1984      Alcohol use   No       REVIEW OF SYSTEMS:  Review of Systems   All other systems reviewed and are negative.      OBJECTIVE:  /82  Pulse 72  Wt 51 kg (112 lb 8 oz)  Breastfeeding? No  BMI 19.93 kg/m2    EXAM:   Physical Exam   Constitutional: She is well-developed, well-nourished, and in no  distress. No distress.   In wheelchair   Skin: She is not diaphoretic.   Nursing note and vitals reviewed.      ASSESSMENT/PLAN:    ICD-10-CM    1. Decubitus ulcer of ischial area, stage 4 () L89.304 COMPLETE METABOLIC PANEL      CBC WITH DIFFERENTIAL   2. Type 2 diabetes mellitus with other diabetic kidney complication, with long-term current use of insulin (HC) E11.29 HEMOGLOBIN A1C MONITORING (POCT)     Z79.4    3. Rheumatoid arthritis, involving unspecified site, unspecified rheumatoid factor presence M06.9 HYDROcodone-acetaminophen, 5-325 mg, (NORCO) per tablet   4. Chronic pain syndrome G89.4    5. Pain medication agreement Z79.899         Plan:  Medications will continue without change. Lab is drawn and pending, I will send a letter with the results. She will continue with home care wound treatments. She will be seeing the plastic surgeon again towards the end of May. She feels as though she needs to stay on the narcotic medications long-term so a narcotic contract was filled out today and the [facility] website was reviewed. I did not yet do a Toxasure, consider that with one of her following visits. Assuming all goes well, recheck with me in 1 month as I only gave her one month of pain medications today.

## 2018-01-04 NOTE — PROGRESS NOTES
Patient Information     Patient Name MRN Karen Randle [id number] Female 1945      Progress Notes by Katya Mendieta RN at 2017 12:26 PM     Author:  Katya Mendieta RN Service:  (none) Author Type:  NURS- Registered Nurse     Filed:  2017 12:27 PM Date of Service:  2017 12:26 PM Status:  Signed     :  Katya Mendieta RN (NURS- Registered Nurse)            Patient arrived via wheelchair with spouses assist for labs ordered by primary MD.  Port accessed with brisk blood return for ordered labs, port then flushed with normal saline and heparin then de accessed.  Patient discharged via wheelchair with spouses assist. Katya Mendieta RN ....................  2017   12:27 PM

## 2018-01-04 NOTE — TELEPHONE ENCOUNTER
Patient Information     Patient Name MRN Karen Randle [id number] Female 1945      Telephone Encounter by Damaris Potter RN at 2017 11:55 AM     Author:  Damaris Potter RN Service:  (none) Author Type:  NURS- Registered Nurse     Filed:  2017 12:07 PM Encounter Date:  2017 Status:  Signed     :  Damaris Potter RN (NURS- Registered Nurse)            : furosemide (LASIX) 40 mg tablet  The source prescription has been discontinued.  TAKE ONE TABLET BY MOUTH ONCE DAILY IN THE MORNING       Disp: 30 tablet Refills: 0    Class: eRx Start: 2017    For: Hypertension  Originally ordered: 1 year ago by Irma Stone DO  Last refill:3/24/2016  To be filled at: [facility] [address] [place], [place] [address]Phone: [phone]    Medication noted as for patient to stop taking on hospital discharge 3/7/17 and not noted on current medication list.    Call and spoke with patient and patient states she has been taking it the whole time.    Will route to Hugo Duarte MD for review and consideration of refill    Unable to complete prescription refill per RN Medication Refill Policy.................... DAMARIS POTTER RN ....................  2017   12:06 PM

## 2018-01-04 NOTE — TELEPHONE ENCOUNTER
Patient Information     Patient Name MRN Karen Randle [id number] Female 1945      Telephone Encounter by Bernie Gregory RN at 2017  8:19 AM     Author:  Bernie Gregory RN Service:  (none) Author Type:  NURS- Registered Nurse     Filed:  2017  8:22 AM Encounter Date:  2017 Status:  Signed     :  Bernie Gregory RN (NURS- Registered Nurse)            This is a Refill request from: [facility]  Name of Medication: traMADol (ULTRAM) 50 mg tablet  TAKE TWO TABLETS BY MOUTH EVERY 6 HOURS AS NEEDED FOR PAIN  Quantity requested: 90  Last fill date: 3/31/17  Due for refill: yes  Last visit with LEO GIVENS was on: 2017 in [facility]  PCP:  Leo Givens MD  Controlled Substance Agreement:  none   Diagnosis r/t this medication request: RA     Unable to complete prescription refill per RN Medication Refill Policy.................... BERNIE GREGORY RN ....................  2017   8:19 AM

## 2018-01-04 NOTE — TELEPHONE ENCOUNTER
Patient Information     Patient Name MRN Karen Randle [id number] Female 1945      Telephone Encounter by Damaris Potter RN at 2017  1:45 PM     Author:  Damaris Potter RN Service:  (none) Author Type:  NURS- Registered Nurse     Filed:  2017  1:50 PM Encounter Date:  2017 Status:  Signed     :  Damaris Potter RN (NURS- Registered Nurse)            This is a Refill request from: [facility]  Name of Medication: Baclofen 10 mg  Quantity requested: 100  Last fill date: 17    Last visit with LEO GIVENS was on: 2017 in [facility]  PCP:  Leo Givens MD    Unable to complete prescription refill per RN Medication Refill Policy.................... DAMARIS POTTER RN ....................  2017   1:45 PM

## 2018-01-04 NOTE — NURSING NOTE
Patient Information     Patient Name MRN Karen Randle [id number] Female 1945      Nursing Note by Georgina Johnston LPN at 5/3/2017  9:40 AM     Author:  Georgina Johnston LPN Service:  (none) Author Type:  NURS- Licensed Practical Nurse     Filed:  5/3/2017 10:29 AM Encounter Date:  5/3/2017 Status:  Signed     :  Georgina Johnston LPN (NURS- Licensed Practical Nurse)            Karen Duncan is a 71 y.o. female here today for wound vac change. Declines mychart.  Georgina Johnston LPN.........5/3/2017   9:38 AM

## 2018-01-04 NOTE — PROGRESS NOTES
Patient Information     Patient Name MRN Sex Karen Francis [id number] Female 1945      Progress Notes by Isa Gaines NP at 5/3/2017  9:40 AM     Author:  Isa Gaines NP Service:  (none) Author Type:  PHYS- Nurse Practitioner     Filed:  5/3/2017 10:29 AM Encounter Date:  5/3/2017 Status:  Signed     :  Isa Gaines NP (PHYS- Nurse Practitioner)            SUBJECTIVE:    Karen Duncan is a 71 y.o. female who presents for a wound VAC evaluation.    HPI  she was seen in the emergency department last week for wound infection. She had an elevated white count and CRP. She had a fever and elevated blood sugars. She had purulent yellow drainage in the wound VAC container. She was started on Bactrim DS twice daily. She has 4 days of the antibiotic. She states she is feelling significantly better. Her sugars are back to normal and the wound drainage has decreased significantly. It is no longer purulent. She does not have an appointment with the plastic surgeon until . She continues to struggle with protein malnutrition and chronic microcytic anemia. She is on iron. She also is on chronic corticosteroids. All of these factors increased risk of infection and reduce wound healing. She has been offloading pressure. She continues to have home care nursing change wound VAC 3 times weekly. Canister was last changed on Monday.  Allergies      Allergen   Reactions     Adhesive Tapes [Adhesive]  *Unknown     Paper tapes, fragile skin      Aliskiren  Cough     Lisinopril  Cough     Ace cough      Losartan  Cough     Paper Tape [Adhesive Tape]       Skin comes off    ,   Current Outpatient Prescriptions on File Prior to Visit       Medication  Sig Dispense Refill     ACCU-CHEK MULTICLIX LANCET USE ONE LANCET TO CHECK GLUCOSE ONCE OR TWICE DAILY 200 Each 1     amLODIPine (NORVASC) 2.5 mg tablet Take 1 tablet by mouth once daily. 90 tablet 3     ascorbic acid, vitamin C,  "(ASCORBIC ACID WITH NICEY HIPS) 500 mg tablet Take 1,000 mg by mouth once daily.       aspirin 81 mg tablet Take 1 tablet by mouth once daily with a meal.       atorvastatin (LIPITOR) 20 mg tablet TAKE ONE TABLET BY MOUTH AT BEDTIME 90 tablet 2     baclofen (LIORESAL) 10 mg tablet Take 1 tablet by mouth 3 times daily. 100 tablet 3     BENADRYL 25 MG CAP two tablets orally at bedtime       blood sugar diagnostic (ACCU-CHEK LENIN PLUS TEST STRP) strip Test one to two times daily E11.9 100 Strip 6     blood sugar diagnostic (BLOOD GLUCOSE TEST) strip Dispense item covered by pt ins. Test 4 times daily E11.29 400 Each 1     CALCIUM CARBONATE (TUMS 500 ORAL) Take 2 Tabs by mouth 2 times daily if needed (HEARTBURN).       cloNIDine HCl (CATAPRES) 0.1 mg tablet Take 1 tablet by mouth once daily. 90 tablet 2     diltiazem CD (CARDIZEM CD) 360 mg extended release 24 hr capsule Take 1 capsule by mouth once daily.  0     docusate (COLACE) 100 mg capsule Take 100 mg by mouth 2 times daily if needed for Constipation.       estradiol (ESTRACE) 0.5 mg tablet Take 1 tablet by mouth once daily. 90 tablet 3     ferrous sulfate 325 mg delayed release tablet Take 1 tablet by mouth once daily.  0     furosemide (LASIX) 40 mg tablet Take 1 tablet by mouth every morning. 30 tablet 11     gabapentin (NEURONTIN) 100 mg capsule Take 1 capsule by mouth 3 times daily. 270 capsule 3     HYDROcodone-acetaminophen, 5-325 mg, (NORCO) per tablet Take 1 tablet by mouth 4 times daily  Max acetaminophen dose: 4000 mg in 24 hrs. 120 tablet 0     insulin aspart (NOVOLOG) 100 unit/mL solution for injection Inject  subcutaneous 4 times daily before meals and at bedtime. Per sliding scale  0     insulin glargine (LANTUS SOLOSTAR) 100 unit/mL (3 mL) pen 16 units am, 14 units pm 1 box 3     Insulin Safety Needles, Disp, 30 gauge x 1/3\" For administering insulin at home 5 times daily.  Dx code e11.9 3 box 2     metFORMIN (GLUCOPHAGE XR) 500 mg Extended-Release " tablet Take 2 tablets by mouth 2 times daily with meals. 360 tablet 2     Omega-3-DHA-EPA-Fish Oil 1,200 (144-216) mg capsule Take 1 capsule by mouth once daily.       omeprazole (PRILOSEC) 20 mg Delayed-Release capsule TAKE ONE CAPSULE BY MOUTH ONCE DAILY BEFORE  A  MEAL 90 capsule 3     predniSONE (DELTASONE) 5 mg tablet TAKE ONE TABLET BY MOUTH TWICE DAILY WITH MEALS 60 tablet 5     psyllium (METAMUCIL) 0.52 gram capsule Take 3 capsules by mouth once daily.       Saccharomyces boulardii (FLORASTOR) 250 mg capsule Take 2 capsules by mouth 2 times daily. 120 capsule 0     sodium hypochlorite (1/2 STRENGTH DAKIN'S SOLUTION) 0.25% soln Apply  topically to affected area(s) 2 times daily.  0     spironolactone (ALDACTONE) 25 mg tablet TAKE ONE TABLET BY MOUTH ONCE DAILY 90 tablet 2     traMADol (ULTRAM) 50 mg tablet TAKE TWO TABLETS BY MOUTH EVERY 6 HOURS AS NEEDED FOR PAIN 90 tablet 0     trimethoprim-sulfamethoxazole, 160-800 mg, (BACTRIM DS, SEPTRA DS) tablet Take 1 tablet by mouth 2 times daily for 10 days. 20 tablet 0     No current facility-administered medications on file prior to visit.     and   Past Medical History:     Diagnosis  Date     Atrial fibrillation (HC) 2000     Diabetes mellitus, type II (HC)      Elevated liver function tests 2010    Fatty liver on US and CT.  Iron nl. 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    IV Vancomycin to resolve, L 3rd digit      Pneumothorax     spondylolysis (right) at age 39 followed by spondylolysis (left) several years later      Psoas muscle abscess (HC) 2016    Strep viridans      Rheumatoid arthritis (HC) 2000     Urinary sepsis 2012    hospitalized with hypotension, blood cultures showed Escherichia coli        REVIEW OF SYSTEMS:  ROS  see history of present illness  OBJECTIVE:  /72  Pulse 72  Temp  97.1  F (36.2  C) (Tympanic)  Breastfeeding? No    EXAM:   Physical Exam  pleasant female without acute distress. Affect normal. Alert and pleasant. She is placed in the right side-lying position. Previous dressing was removed and wound is irrigated with saline solution. The canister is a 1/4 of the way full with serosanguineous drainage. Wound has granulating wound bed. Wound had been packed with foam appropriately at time of dressing removal. The drainage is nonodorous. Wound measures 2 x 1.5 x 7 cm. Foam is placed into the tunnel and then bridge formed along the upper right buttock. Suction tubing replace all tape secured. Wound VAC is started at 125 mmHg pressure and draining freely and comfortable for patient. In her left buttock ulcer that was seen at previous visit has resolved.  Albumin 3.0 at emergency department visit one week ago. Improved from previous  ASSESSMENT/PLAN:    ICD-10-CM    1. Decubitus ulcer of ischial area, stage 4 (HC) L89.304 FL NEG PRESS WOUND TX =<50 W DME   2. Moderate protein malnutrition (HC) E44.0    3. Wound infection T14.8      L08.9    4. Anemia, unspecified type D64.9         Plan:  Continue with 3 time weekly wound VAC dressing change. Finish out course of antibiotics. Infection has resolved. If symptoms return she needs to be evaluated once again. There has been no improvement in the depth of the wound. This is multifactorial. Does not appear that the wound VAC is presenting any advantage to wound healing. She has an appointment with plastic surgeon in June. At that time it will be discussed whether wound VAC be continued or she switch to different type of treatment plan for surgical intervention. She has declined surgical intervention in the past. She is aware that wound can show progressive worsening and she is at higher risk of infection as long as she still has an open area she will follow-up again in wound clinic in 3 weeks, sooner with problems. Continue to work on  increasing protein in diet and she will follow with primary doctor on May 16 for follow-up ER visit, anemia, etc.

## 2018-01-04 NOTE — TELEPHONE ENCOUNTER
Patient Information     Patient Name MRN Karen Randle [id number] Female 1945      Telephone Encounter by Damaris Potter RN at 3/27/2017  2:32 PM     Author:  Damaris Potter RN Service:  (none) Author Type:  NURS- Registered Nurse     Filed:  3/27/2017  3:16 PM Encounter Date:  3/27/2017 Status:  Signed     :  Damaris Potter RN (NURS- Registered Nurse)            This is a Refill request from: [facility]  Name of Medication: Lantus inject 14 units subQ in the morning and 10 units at bedtime    Quantity requested: 15  Last fill date: 17    ast visit with LEO GIVENS was on: 2017 in [facility]  Next visit with LEO GIVENS is on: 2017 in [facility]  Next visit with Internal Medicine is on: 2017 in [facility]    Lab test requirements:  HgbA1c annually or per provider note.  HEMOGLOBIN A1C MONITORING (POCT)    Date Value   01/15/2017 7.3 % (H)   2013 7.2 % NGSP (H)     HEMOGLOBIN A1C GIH (%)    Date Value   2012 5.9       Called to clarify instructions with patient and patient states she is doing 16units AM and 14 units pm as noted on current medication list by Dr. Givens.    Unsure what diabetic ICD 10 code to associate with medication.    Unable to complete prescription refill per RN Medication Refill Policy.................... DAMARIS POTTER RN ....................  3/27/2017   3:15 PM

## 2018-01-04 NOTE — PROGRESS NOTES
Patient Information     Patient Name MRN Sex Karen Francis [id number] Female 1945      Progress Notes by Isa Gaines NP at 2017 10:30 AM     Author:  Isa Gaines NP Service:  (none) Author Type:  PHYS- Nurse Practitioner     Filed:  2017 11:25 AM Encounter Date:  2017 Status:  Signed     :  Isa Gaines NP (PHYS- Nurse Practitioner)            SUBJECTIVE:    Karen Duncan is a 71 y.o. female who presents for a wound VAC evaluation.    HPI  patient has a stage IV decubitus ulcer along the right buttock. This is been present for quite some time. She has home care services 3 times weekly for dressing change. She states the wound VAC has been working well. She does have malnutrition and type 2 diabetes. She has been increasing protein intake through nutritional shakes and protein rich foods. She has been afebrile and denies pain at the buttock. There has been no purulent drainage.  Allergies      Allergen   Reactions     Adhesive Tapes [Adhesive]  *Unknown     Paper tapes, fragile skin      Aliskiren  Cough     Lisinopril  Cough     Ace cough      Losartan  Cough     Paper Tape [Adhesive Tape]       Skin comes off    ,   Current Outpatient Prescriptions on File Prior to Visit       Medication  Sig Dispense Refill     ACCU-CHEK MULTICLIX LANCET USE ONE LANCET TO CHECK GLUCOSE ONCE OR TWICE DAILY 200 Each 1     amLODIPine (NORVASC) 2.5 mg tablet Take 1 tablet by mouth once daily. 90 tablet 3     ascorbic acid, vitamin C, (ASCORBIC ACID WITH NIECY HIPS) 500 mg tablet Take 1,000 mg by mouth once daily.       aspirin 81 mg tablet Take 1 tablet by mouth once daily with a meal.       atorvastatin (LIPITOR) 20 mg tablet TAKE ONE TABLET BY MOUTH AT BEDTIME 90 tablet 2     baclofen (LIORESAL) 10 mg tablet Take 1 tablet by mouth 3 times daily. 100 tablet 0     BENADRYL 25 MG CAP two tablets orally at bedtime       blood sugar diagnostic (ACCU-CHEK LENIN PLUS  "TEST STRP) strip Test one to two times daily E11.9 100 Strip 6     blood sugar diagnostic (BLOOD GLUCOSE TEST) strip Dispense item covered by pt ins. Test 4 times daily E11.29 400 Each 1     CALCIUM CARBONATE (TUMS 500 ORAL) Take 2 Tabs by mouth 2 times daily if needed (HEARTBURN).       CALCIUM CARBONATE/VITAMIN D3 (CALCIUM + D ORAL) Take 2 tablets by mouth once daily.       cloNIDine HCl (CATAPRES) 0.1 mg tablet Take 1 tablet by mouth once daily. 90 tablet 2     diltiazem CD (CARDIZEM CD) 360 mg extended release 24 hr capsule Take 1 capsule by mouth once daily.  0     diphenhydrAMINE (BENADRYL) 25 mg capsule Take 1 capsule by mouth at bedtime if needed.  0     docusate (COLACE) 100 mg capsule Take 100 mg by mouth 2 times daily if needed for Constipation.       estradiol (ESTRACE) 0.5 mg tablet Take 1 tablet by mouth once daily. 90 tablet 3     ferrous sulfate 325 mg delayed release tablet Take 1 tablet by mouth once daily.  0     furosemide (LASIX) 40 mg tablet Take 1 tablet by mouth every morning. 30 tablet 11     gabapentin (NEURONTIN) 100 mg capsule Take 1 capsule by mouth 3 times daily. 270 capsule 3     HYDROcodone-acetaminophen, 5-325 mg, (NORCO) per tablet Take 1 tablet by mouth 4 times daily  Max acetaminophen dose: 4000 mg in 24 hrs. 120 tablet 0     insulin aspart (NOVOLOG) 100 unit/mL solution for injection Inject  subcutaneous 4 times daily before meals and at bedtime. Per sliding scale  0     insulin glargine (LANTUS SOLOSTAR) 100 unit/mL (3 mL) pen 16 units am, 14 units pm 1 box 3     Insulin Safety Needles, Disp, 30 gauge x 1/3\" For administering insulin at home 5 times daily.  Dx code e11.9 3 box 2     metFORMIN (GLUCOPHAGE XR) 500 mg Extended-Release tablet Take 2 tablets by mouth 2 times daily with meals. 360 tablet 2     nystatin (MYCOSTATIN) 100,000 unit/mL suspension TAKE 5 mLS BY MOUTH FOUR TIMES DAILY FOR 10 DAYS.  99     Omega-3-DHA-EPA-Fish Oil 1,200 (144-216) mg capsule Take 1 capsule by " mouth once daily.       omeprazole (PRILOSEC) 20 mg Delayed-Release capsule TAKE ONE CAPSULE BY MOUTH ONCE DAILY BEFORE  A  MEAL 90 capsule 3     predniSONE (DELTASONE) 5 mg tablet TAKE ONE TABLET BY MOUTH TWICE DAILY WITH MEALS 60 tablet 5     psyllium (METAMUCIL) 0.52 gram capsule Take 3 capsules by mouth once daily.       Saccharomyces boulardii (FLORASTOR) 250 mg capsule Take 2 capsules by mouth 2 times daily. 120 capsule 0     sodium hypochlorite (1/2 STRENGTH DAKIN'S SOLUTION) 0.25% soln Apply  topically to affected area(s) 2 times daily.  0     spironolactone (ALDACTONE) 25 mg tablet TAKE ONE TABLET BY MOUTH ONCE DAILY 90 tablet 2     traMADol (ULTRAM) 50 mg tablet TAKE TWO TABLETS BY MOUTH EVERY 6 HOURS AS NEEDED FOR PAIN 90 tablet 0     No current facility-administered medications on file prior to visit.     and   Past Medical History:     Diagnosis  Date     Atrial fibrillation (HC) 2000     Diabetes mellitus, type II (HC)      Elevated liver function tests 2010    Fatty liver on US and CT.  Iron nl. 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    IV Vancomycin to resolve, L 3rd digit      Pneumothorax     spondylolysis (right) at age 39 followed by spondylolysis (left) several years later      Psoas muscle abscess (HC) 2016    Strep viridans      Rheumatoid arthritis(714.0) 2000     Urinary sepsis 2012    hospitalized with hypotension, blood cultures showed Escherichia coli        REVIEW OF SYSTEMS:  ROS  see history of present illness  OBJECTIVE:  /70  Temp 98.1  F (36.7  C) (Tympanic)  Wt 49.9 kg (110 lb)  Breastfeeding? No  BMI 19.49 kg/m2    EXAM:   Physical Exam  pleasant female without acute distress. Affect normal. Alert and pleasant. She is placed in the right side-lying position. Previous dressing was removed and wound is irrigated  with saline solution. The canister is a half of the way full. Wound has granulating wound bed. Wound was packed with foam appropriately at time of dressing removal. The drainage is nonodorous. Wound measures 1.5 x 1.6 x 7 cm. Foam is placed into the tunnel and then bridge formed along the upper right buttock. Suction tubing replace all tape secured. Wound VAC is started at 125 mmHg pressure and draining freely and comfortable for patient. Patient does have a new ulcer along the inner left buttock which measures 0.5 x 0.2 x 0.1 cm with scant slough and no drainage. No surrounding erythema. No dressing applied but just make sure that no tape was over this area.  Albumin 2.4 March 3, 2017  ASSESSMENT/PLAN:    ICD-10-CM    1. Decubitus ulcer of ischial area, stage 4 (HC) L89.304 [place] NEG PRESS WOUND TX =<50 W DME   2. Moderate protein malnutrition (HC) E44.0         Plan:  Continue with 3 time weekly wound VAC dressing change. Wound is deeper, she sees plastic surgeon on Monday. She will discussed with him to see if he wants to recheck protein levels. Up to this point patient has not wanted any type of skin flap. She will continue to alleviate pressure. The new ulcer will be watched and if develops worsening then May need new treatment plan.  40 minutes of face-to-face time is spent with patient on wound care and counseling.

## 2018-01-04 NOTE — TELEPHONE ENCOUNTER
Patient Information     Patient Name MRN Karen Randle [id number] Female 1945      Telephone Encounter by Lissa Bauer RN at 3/22/2017  3:55 PM     Author:  Lissa Bauer RN Service:  (none) Author Type:  NURS- Registered Nurse     Filed:  3/22/2017  3:57 PM Encounter Date:  3/22/2017 Status:  Signed     :  Lissa Bauer RN (NURS- Registered Nurse)            Medication is not on rfill protocol. Unable to complete prescription refill per RN Medication Refill Policy.................... LISSA BAUER RN ....................  3/22/2017   3:56 PM        This is a Refill request from: [facility]  Name of Medication: Florastor 250mg  Quantity requested: 20  Last fill date: not listed  Last visit with LEO GIVENS was on: 2017 in [facility]  PCP:  Leo Givens MD  Controlled Substance Agreement:  na   Diagnosis r/t this medication request: sepsis due to UTI

## 2018-01-04 NOTE — TELEPHONE ENCOUNTER
Patient Information     Patient Name MRN Karen Randle [id number] Female 1945      Telephone Encounter by Alireza Guy at 2017  2:01 PM     Author:  Alireza Guy Service:  (none) Author Type:  (none)     Filed:  2017  2:03 PM Encounter Date:  2017 Status:  Signed     :  Alireza Guy            [facility] had also called us. She will let Karen know that she should go to the ED as recommended by Isa SPRING.

## 2018-01-04 NOTE — TELEPHONE ENCOUNTER
Patient Information     Patient Name MRN Karen Randle [id number] Female 1945      Telephone Encounter by Bernie Gregory RN at 2017  9:44 AM     Author:  Bernie Gregory RN Service:  (none) Author Type:  NURS- Registered Nurse     Filed:  2017  9:45 AM Encounter Date:  2017 Status:  Signed     :  Bernie Gregory RN (NURS- Registered Nurse)            This is a Refill request from: [facility]  Name of Medication: traMADol (ULTRAM) 50 mg tablet  TAKE TWO TABLETS BY MOUTH EVERY 6 HOURS AS NEEDED FOR PAIN  Quantity requested: 90  Last fill date: 17  Due for refill: yes  Last visit with LEO GIVENS was on: 2017 in [facility]  PCP:  Leo Givens MD  Controlled Substance Agreement:  none   Diagnosis r/t this medication request: RA     Unable to complete prescription refill per RN Medication Refill Policy.................... BERNIE GREGORY RN ....................  2017   9:44 AM

## 2018-01-04 NOTE — TELEPHONE ENCOUNTER
Patient Information     Patient Name MRN Karen Randle [id number] Female 1945      Telephone Encounter by Lissa Bauer RN at 3/31/2017  7:41 AM     Author:  Lissa Bauer RN Service:  (none) Author Type:  NURS- Registered Nurse     Filed:  3/31/2017  7:44 AM Encounter Date:  3/30/2017 Status:  Signed     :  Lissa Bauer RN (NURS- Registered Nurse)            Medication is not on refill protocol. Unable to complete prescription refill per RN Medication Refill Policy.................... LISSA BAUER RN ....................  3/31/2017   7:42 AM        This is a Refill request from: [facility]  Name of Medication: Ultram 50 mg  Quantity requested: 90  Last fill date: 3/7/17  Last visit with LEO GIVENS was on: 2017 in [facility]  PCP:  Leo Givens MD  Controlled Substance Agreement:  none   Diagnosis r/t this medication request: rheumatoid arthritis

## 2018-01-04 NOTE — TELEPHONE ENCOUNTER
Patient Information     Patient Name MRN Karen Randle [id number] Female 1945      Telephone Encounter by Damaris Potter RN at 2017 10:38 AM     Author:  Damaris Potter RN Service:  (none) Author Type:  NURS- Registered Nurse     Filed:  2017 10:42 AM Encounter Date:  2017 Status:  Signed     :  Damaris Potter RN (NURS- Registered Nurse)            traMADol (ULTRAM) 50 mg tablet  TAKE TWO TABLETS BY MOUTH EVERY 6 HOURS AS NEEDED FOR PAIN  Disp: 90 tablet Refills: 0    Class: eRx Start: 2017    For: Rheumatoid arthritis with positive rheumatoid factor, involving unspecified site (HC)  Documented:5 months ago  Last refill:2017  To be filled at: [facility] [address] [place], [place] [address]Phone: [phone]    Last visit with LEO GIVENS was on: 2017 in [facility] INTER[facility] MED [facility]  PCP:  Leo Givens MD  Controlled Substance Agreement:  17   Unable to complete prescription refill per RN Medication Refill Policy.................... DAMARIS POTTER RN ....................  2017   10:38 AM

## 2018-01-04 NOTE — NURSING NOTE
Patient Information     Patient Name MRN Karen Randle [id number] Female 1945      Nursing Note by Josefa Pandya LPN at 2017 11:30 AM     Author:  Josefa Pandya LPN Service:  (none) Author Type:  NURS- Licensed Practical Nurse     Filed:  2017 11:33 AM Encounter Date:  2017 Status:  Signed     :  Josefa Pandya LPN (NURS- Licensed Practical Nurse)            The patient is here today to have a three week follow up.  Josefa Pandya LPN......2017  11:26 AM

## 2018-01-04 NOTE — TELEPHONE ENCOUNTER
Patient Information     Patient Name MRN Karen Randle [id number] Female 1945      Telephone Encounter by Alireza Guy at 2017  2:00 PM     Author:  Alireza Guy Service:  (none) Author Type:  (none)     Filed:  2017  2:01 PM Encounter Date:  2017 Status:  Signed     :  Alireza Guy at [facility] was notified that Karen should go to the ED.  Alireza Guy LPN ....................  2017   2:01 PM

## 2018-01-04 NOTE — TELEPHONE ENCOUNTER
Patient Information     Patient Name MRN Karen Randle [id number] Female 1945      Telephone Encounter by Alireza Guy at 2017 10:23 AM     Author:  Alireza Guy Service:  (none) Author Type:  (none)     Filed:  2017 10:27 AM Encounter Date:  2017 Status:  Signed     :  Alireza Guy            [facility] states that she thinks that Karen needs to be seen today. She has a blood sugar of 215 which usually means she has something going on. Is also very sweaty and clammy. States that she doesn't feel good at all.The wound isn't healing at all and there is excessive greenish yellow drainage. Please advise.  Alireza Guy LPN ....................  2017   10:27 AM

## 2018-01-05 ENCOUNTER — COMMUNICATION - GICH (OUTPATIENT)
Dept: INTERNAL MEDICINE | Facility: OTHER | Age: 73
End: 2018-01-05

## 2018-01-05 ENCOUNTER — HISTORY (OUTPATIENT)
Dept: INTERNAL MEDICINE | Facility: OTHER | Age: 73
End: 2018-01-05

## 2018-01-05 ENCOUNTER — OFFICE VISIT - GICH (OUTPATIENT)
Dept: INTERNAL MEDICINE | Facility: OTHER | Age: 73
End: 2018-01-05

## 2018-01-05 DIAGNOSIS — L97.521 NON-PRESSURE CHRONIC ULCER OF OTHER PART OF LEFT FOOT LIMITED TO BREAKDOWN OF SKIN (H): ICD-10-CM

## 2018-01-05 ASSESSMENT — PATIENT HEALTH QUESTIONNAIRE - PHQ9: SUM OF ALL RESPONSES TO PHQ QUESTIONS 1-9: 0

## 2018-01-05 NOTE — TELEPHONE ENCOUNTER
Patient Information     Patient Name MRN Karen Randle [id number] Female 1945      Telephone Encounter by Damaris Potter RN at 2017 12:06 PM     Author:  Damaris Potter RN Service:  (none) Author Type:  NURS- Registered Nurse     Filed:  2017 12:11 PM Encounter Date:  2017 Status:  Signed     :  Damaris Potter RN (NURS- Registered Nurse)            atorvastatin (LIPITOR) 20 mg tablet  TAKE ONE TABLET BY MOUTH AT BEDTIME       Disp: 90 tablet Refills: 0    Class: eRx Start: 2017    For: Hyperlipemia  Originally ordered: 4 years ago by Austin Dumont MD  Last refill:1/10/2017  [phone]      Last visit with LEO GIVENS was on: 2017 in [facility]  Next visit with LEO GIVENS is on: 2017 in [facility]  Next visit with Internal Medicine is on: 2017 in [facility] INTER[facility] MED [facility]    Last Lipids:  Chol: 194    3/17/2016  T    3/17/2016  HDL:   101    3/17/2016  LDL:  57    3/17/2016  LDL DIRECT:  No results found in past 5 years    .    Will route to Leo Givens MD for review and consideration of refill, patient has appointment on 17,  ? Lipid panel    Unable to complete prescription refill per RN Medication Refill Policy.................... DAMARIS POTTER RN ....................  2017   12:09 PM

## 2018-01-05 NOTE — PROGRESS NOTES
Patient Information     Patient Name MRN Sex Karen Francis [id number] Female 1945      Progress Notes by Hugo Duarte MD at 2017 10:00 AM     Author:  Hugo Duarte MD Service:  (none) Author Type:  Physician     Filed:  2017 10:32 AM Encounter Date:  2017 Status:  Signed     :  Hugo Duarte MD (Physician)            SUBJECTIVE:    Karen Duncan is a 71 y.o. female who presents for follow-up evaluation.    HPI Comments: She comes in today for follow-up evaluation. See previous notes. She was recently in the emergency room with an infection. She was put on sulfa and this did seem to take care of things quite nicely. She was seen by Florecita for follow-up and things looked fine. Unfortunately, her wound healing is impaired by her malnutrition, the location, as well as immunosuppression by her prednisone. She is trying to offload the wound is much as possible but being on her buttock and is certainly hard. It seems as though the wound VAC is not really facilitating much healing.    She is also here for follow-up on her chronic pain. The last time she was in we started her on a narcotic contract. She needs a refill on her medications. We did do a narcotic contract as well as  website reviewed but not a urine Toxasure. That needs to be done today.    She has no other major complaints or concerns at this time. She is just frustrated by the fact that her wound is not healing. She will be seeing the plastic surgeon in  and apparently there has been discussion about possible surgery to repair this.      Allergies      Allergen   Reactions     Adhesive Tapes [Adhesive]  *Unknown     Paper tapes, fragile skin      Aliskiren  Cough     Lisinopril  Cough     Ace cough      Losartan  Cough     Paper Tape [Adhesive Tape]       Skin comes off    ,   Current Outpatient Prescriptions     Medication  Sig     ACCU-CHEK MULTICLIX LANCET USE ONE LANCET TO CHECK GLUCOSE ONCE OR TWICE  "DAILY     amLODIPine (NORVASC) 2.5 mg tablet Take 1 tablet by mouth once daily.     ascorbic acid, vitamin C, (ASCORBIC ACID WITH NIECY HIPS) 500 mg tablet Take 1,000 mg by mouth once daily.     aspirin 81 mg tablet Take 1 tablet by mouth once daily with a meal.     atorvastatin (LIPITOR) 20 mg tablet TAKE ONE TABLET BY MOUTH AT BEDTIME     baclofen (LIORESAL) 10 mg tablet Take 1 tablet by mouth 3 times daily.     BENADRYL 25 MG CAP two tablets orally at bedtime     blood sugar diagnostic (ACCU-CHEK LENIN PLUS TEST STRP) strip Test one to two times daily E11.9     blood sugar diagnostic (BLOOD GLUCOSE TEST) strip Dispense item covered by pt ins. Test 4 times daily E11.29     CALCIUM CARBONATE (TUMS 500 ORAL) Take 2 Tabs by mouth 2 times daily if needed (HEARTBURN).     cloNIDine HCl (CATAPRES) 0.1 mg tablet Take 1 tablet by mouth once daily.     diltiazem CD (CARDIZEM CD) 360 mg extended release 24 hr capsule Take 1 capsule by mouth once daily.     docusate (COLACE) 100 mg capsule Take 100 mg by mouth 2 times daily if needed for Constipation.     estradiol (ESTRACE) 0.5 mg tablet Take 1 tablet by mouth once daily.     ferrous sulfate 325 mg delayed release tablet Take 1 tablet by mouth once daily.     FLORASTOR 250 mg capsule TAKE TWO CAPSULES BY MOUTH TWICE DAILY     furosemide (LASIX) 40 mg tablet Take 1 tablet by mouth every morning.     gabapentin (NEURONTIN) 100 mg capsule Take 1 capsule by mouth 3 times daily.     HYDROcodone-acetaminophen, 5-325 mg, (NORCO) per tablet Take 1 tablet by mouth 4 times daily  Max acetaminophen dose: 4000 mg in 24 hrs.     insulin aspart (NOVOLOG) 100 unit/mL solution for injection Inject  subcutaneous 4 times daily before meals and at bedtime. Per sliding scale     insulin glargine (LANTUS SOLOSTAR) 100 unit/mL (3 mL) pen 16 units am, 14 units pm     Insulin Safety Needles, Disp, 30 gauge x 1/3\" For administering insulin at home 5 times daily.  Dx code e11.9     metFORMIN " (GLUCOPHAGE XR) 500 mg Extended-Release tablet Take 2 tablets by mouth 2 times daily with meals.     Omega-3-DHA-EPA-Fish Oil 1,200 (144-216) mg capsule Take 1 capsule by mouth once daily.     omeprazole (PRILOSEC) 20 mg Delayed-Release capsule TAKE ONE CAPSULE BY MOUTH ONCE DAILY BEFORE  A  MEAL     predniSONE (DELTASONE) 5 mg tablet TAKE ONE TABLET BY MOUTH TWICE DAILY WITH MEALS     psyllium (METAMUCIL) 0.52 gram capsule Take 3 capsules by mouth once daily.     sodium hypochlorite (1/2 STRENGTH DAKIN'S SOLUTION) 0.25% soln Apply  topically to affected area(s) 2 times daily.     spironolactone (ALDACTONE) 25 mg tablet TAKE ONE TABLET BY MOUTH ONCE DAILY     traMADol (ULTRAM) 50 mg tablet TAKE TWO TABLETS BY MOUTH EVERY 6 HOURS AS NEEDED FOR PAIN     No current facility-administered medications for this visit.      Medications have been reviewed by me and are current to the best of my knowledge and ability. ,   Past Medical History:     Diagnosis  Date     Atrial fibrillation () 2000     Diabetes mellitus, type II ()      Elevated liver function tests 2010    Fatty liver on US and CT.  Iron nl. 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    IV Vancomycin to resolve, L 3rd digit      Pneumothorax     spondylolysis (right) at age 39 followed by spondylolysis (left) several years later      Psoas muscle abscess (HC) 2016    Strep viridans      Rheumatoid arthritis (HC) 2000     Urinary sepsis 2012    hospitalized with hypotension, blood cultures showed Escherichia coli    ,   Patient Active Problem List       Diagnosis  Date Noted     Chronic pain syndrome  04/18/2017     Pain medication agreement  04/18/2017     Decubitus ulcer of ischial area, stage 4 (HC)  03/02/2017     Anemia  01/16/2017     Elevated LFTs  01/16/2017     Corticosteroid dependence ()   01/15/2017     Open wound of left buttock  10/13/2016     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 This will not affect patient care or billing. This comment can be deleted.        Atrial fibrillation (HC)       DIVERTICULOSIS, COLON  02/18/2010     Rheumatoid arthritis(714.0)  12/07/2006    and   Past Surgical History:      Procedure  Laterality Date     BREAST BIOPSY      Left       CATARACT REMOVAL Bilateral      COLONOSCOPY SCREENING  2001    repeat in 10 years per patient       COLONOSCOPY SCREENING  2010    sigmoid diverticulosis, no polyps, due 2020       COLONOSCOPY SCREENING  2011    mild sigmoid diverticulosis, EGD biopsies       CT GUIDE PERC DRAIN CATH (IA)  2016     ESOPHAGOGASTRODUODENOSCOPY  2/2011    Normal       FOREARM/WRIST SURGERY  2010    removed nodule Right wrist       KNEE REPLACEMENT Bilateral 07/10/08     PORTACATH      hx chest tube surgery x2       PORTACATH  2012    power port placement, Dr. Taylor       SPINE SURGERY      Back surgery with fusion of the lumbar discs       MOISES AND BSO      secondary to fibroids         REVIEW OF SYSTEMS:  ROS    OBJECTIVE:  /72  Pulse 74  Wt 50.8 kg (112 lb)  Breastfeeding? No  BMI 19.84 kg/m2    EXAM:   Physical Exam    ASSESSMENT/PLAN:    ICD-10-CM    1. Pain medication agreement Z02.89 DRUG SCREEN - PAIN MANAGEMENT - TOXASSURE      DRUG SCREEN - PAIN MANAGEMENT - TOXASSURE   2. Chronic pain syndrome G89.4    3. Open wound of left buttock, subsequent encounter S31.829D COMPLETE METABOLIC PANEL      CBC WITH DIFFERENTIAL      SEDIMENTATION RATE   4. Corticosteroid dependence (HC) F19.20    5. Rheumatoid  arthritis, involving unspecified site, unspecified rheumatoid factor presence (HC) M06.9 HYDROcodone-acetaminophen, 5-325 mg, (NORCO) per tablet      DISCONTINUED: HYDROcodone-acetaminophen, 5-325 mg, (NORCO) per tablet      DISCONTINUED: HYDROcodone-acetaminophen, 5-325 mg, (NORCO) per tablet        Plan:  She appears to be stable at this time. I did refill her pain medications today for 3 months. Toxasure is pending. Lab is drawn and pending and I will send her a letter with the results. This will be in part to follow-up on the inflammatory nature of her emergency room notes but also to reassess her protein status, etc. I encouraged her to try to decrease the prednisone that she is taking, she will try decreasing the afternoon dose to half tablet daily to see if this would be agreeable. This would help with overall healing as well as diabetic care. Follow-up will be in 3 months with me, she will be visiting with plastic surgery in the interim regarding her wound.

## 2018-01-05 NOTE — NURSING NOTE
Patient Information     Patient Name MRN Karne Randle [id number] Female 1945      Nursing Note by Ada Doherty at 2017  2:20 PM     Author:  Ada Doherty Service:  (none) Author Type:  (none)     Filed:  2017  2:32 PM Encounter Date:  2017 Status:  Signed     :  Ada Doherty            Patient is here today for a F/U from the hospital. Ada Doherty LPN......................2017 2:17 PM

## 2018-01-05 NOTE — TELEPHONE ENCOUNTER
Patient Information     Patient Name MRN Karen Randle [id number] Female 1945      Telephone Encounter by Lissa Bauer RN at 5/15/2017  8:58 AM     Author:  Lissa Bauer RN Service:  (none) Author Type:  NURS- Registered Nurse     Filed:  5/15/2017  9:01 AM Encounter Date:  2017 Status:  Signed     :  Lissa Bauer RN (NURS- Registered Nurse)            Medication is not on refill protocol. Unable to complete prescription refill per RN Medication Refill Policy.................... LISSA BAUER RN ....................  5/15/2017   8:59 AM        This is a Refill request from: [facility]  Name of Medication: Florastor 250mg capsule  Quantity requested: 120  Last fill date: 3/22/17  Last visit with MARCIN GAMEZ was on: No past appointments listed with this provider  PCP:  Hugo Duarte MD  Controlled Substance Agreement:  na   Diagnosis r/t this medication request: sepsis due to UTI

## 2018-01-05 NOTE — PROGRESS NOTES
Patient Information     Patient Name MRN Karen Randle [id number] Female 1945      Progress Notes by Goodell, Brenda, RN at 2017 11:00 AM     Author:  Goodell, Brenda, RN Service:  (none) Author Type:  NURS- Registered Nurse     Filed:  2017 11:07 AM Date of Service:  2017 11:00 AM Status:  Signed     :  Goodell, Brenda, RN (NURS- Registered Nurse)            Patient arrived via w/c from the clinic for port flush and lab draw which was done with brisk blood return. De accessed and small dressing applied to the site. Pt left via w/c.

## 2018-01-05 NOTE — NURSING NOTE
Patient Information     Patient Name MRN Karen Randle [id number] Female 1945      Nursing Note by Josefa Pandya LPN at 2017 10:00 AM     Author:  Josefa Pandya LPN Service:  (none) Author Type:  NURS- Licensed Practical Nurse     Filed:  2017 10:10 AM Encounter Date:  2017 Status:  Signed     :  Josefa Pandya LPN (NURS- Licensed Practical Nurse)            The patient is here today to get a refill on her pain medications.  Josefa Pandya LPN......2017  10:02 AM

## 2018-01-05 NOTE — TELEPHONE ENCOUNTER
Patient Information     Patient Name MRN Karen Randle [id number] Female 1945      Telephone Encounter by Damaris Potter RN at 5/10/2017 11:58 AM     Author:  Damaris Potter RN Service:  (none) Author Type:  NURS- Registered Nurse     Filed:  5/10/2017 11:59 AM Encounter Date:  2017 Status:  Signed     :  Damaris Potter RN (NURS- Registered Nurse)            predniSONE (DELTASONE) 5 mg tablet  TAKE ONE TABLET BY MOUTH TWICE DAILY WITH MEALS       Disp: 60 tablet Refills: 0    Class: eRx Start: 2017    For: Rheumatoid arthritis, involving unspecified site, unspecified rheumatoid factor presence (HC)  Documented:4 years ago  Last refill:3/21/2017  To be filled at: [facility] [address] [place], [place] [address]Phone: [phone]    Last visit with LEO GIVENS was on: 2017 in [facility]  PCP:  Leo Givens MD     Unable to complete prescription refill per RN Medication Refill Policy.................... DAMARIS POTTER RN ....................  5/10/2017   11:58 AM

## 2018-01-05 NOTE — PROGRESS NOTES
Patient Information     Patient Name MRN Karen Randle [id number] Female 1945      Progress Notes by Hugo Duarte MD at 2017  2:20 PM     Author:  Hugo Duarte MD Service:  (none) Author Type:  Physician     Filed:  2017  2:49 PM Encounter Date:  2017 Status:  Signed     :  Hugo Duarte MD (Physician)            SUBJECTIVE:    Karen Duncan is a 72 y.o. female who presents for hospital f/u.    HPI Comments: She returns today for follow-up. She was hospitalized in [place] with a urine infection. It turned out that she had both pseudomonas and MRSA. She was treated appropriately and by the time she was discharged to home she was actually done with antibiotic therapy. Her other medications were continued without change. She seems to be asymptomatic at this time but tells me that she is drinking cranberry juice in order to try to clean out her urine completely. She did not have any side effects from the antibiotics such as diarrhea, etc. and feels as though she is back to normal. She does admit that with all of that she continues to have increased weakness and she really would like to get some of her strength back.    She will be going back to the plastic surgeon on  or so. She does not want to have surgery on this because of the immobilization that she would have to have for 4 weeks or more. It's not clear whether there is much more that can be done for it without surgery.      Allergies      Allergen   Reactions     Adhesive Tapes [Adhesive]  *Unknown     Paper tapes, fragile skin      Aliskiren  Cough     Lisinopril  Cough     Ace cough      Losartan  Cough     Paper Tape [Adhesive Tape]       Skin comes off    ,   Current Outpatient Prescriptions     Medication  Sig     ACCU-CHEK MULTICLIX LANCET USE ONE LANCET TO CHECK GLUCOSE ONCE OR TWICE DAILY     amLODIPine (NORVASC) 2.5 mg tablet Take 1 tablet by mouth once daily.     ascorbic acid, vitamin C,  "(ASCORBIC ACID WITH NIECY HIPS) 500 mg tablet Take 1,000 mg by mouth once daily.     aspirin 81 mg tablet Take 1 tablet by mouth once daily with a meal.     atorvastatin (LIPITOR) 20 mg tablet TAKE ONE TABLET BY MOUTH AT BEDTIME     baclofen (LIORESAL) 10 mg tablet Take 1 tablet by mouth 3 times daily.     BENADRYL 25 MG CAP two tablets orally at bedtime     blood sugar diagnostic (ACCU-CHEK LENIN PLUS TEST STRP) strip Test one to two times daily E11.9     blood sugar diagnostic (BLOOD GLUCOSE TEST) strip Dispense item covered by pt ins. Test 4 times daily E11.29     CALCIUM CARBONATE (TUMS 500 ORAL) Take 2-3 Tabs by mouth 2 times daily if needed (HEARTBURN).     cloNIDine HCl (CATAPRES) 0.1 mg tablet Take 1 tablet by mouth once daily.     CYANOCOBALAMIN, VITAMIN B-12, (VITAMIN B-12 ORAL) Take 1 Tab by mouth once daily.     diltiazem CD (CARDIZEM CD) 360 mg extended release 24 hr capsule Take 1 capsule by mouth once daily.     docusate (COLACE) 100 mg capsule Take 100 mg by mouth 2 times daily if needed for Constipation.     estradiol (ESTRACE) 0.5 mg tablet Take 1 tablet by mouth once daily.     ferrous sulfate 325 mg delayed release tablet Take 650 mg by mouth once daily.     FLORASTOR 250 mg capsule TAKE TWO CAPSULES BY MOUTH TWICE DAILY     furosemide (LASIX) 40 mg tablet Take 1 tablet by mouth every morning.     gabapentin (NEURONTIN) 100 mg capsule Take 1 capsule by mouth 3 times daily.     HYDROcodone-acetaminophen, 5-325 mg, (NORCO) per tablet Take 1 tablet by mouth 4 times daily  Max acetaminophen dose: 4000 mg in 24 hrs.     insulin aspart (NOVOLOG) 100 unit/mL solution for injection Inject  subcutaneous 4 times daily before meals and at bedtime. Per sliding scale     insulin glargine (LANTUS SOLOSTAR) 100 unit/mL (3 mL) pen 16 units am, 14 units pm     Insulin Safety Needles, Disp, 30 gauge x 1/3\" For administering insulin at home 5 times daily.  Dx code e11.9     metFORMIN (GLUCOPHAGE XR) 500 mg " Extended-Release tablet Take 2 tablets by mouth 2 times daily with meals.     Omega-3-DHA-EPA-Fish Oil 1,200 (144-216) mg capsule Take 2 capsules by mouth once daily.     omeprazole (PRILOSEC) 20 mg Delayed-Release capsule TAKE ONE CAPSULE BY MOUTH ONCE DAILY BEFORE  A  MEAL     predniSONE (DELTASONE) 5 mg tablet TAKE ONE TABLET BY MOUTH TWICE DAILY WITH MEALS     psyllium (METAMUCIL) 0.52 gram capsule Take 3 capsules by mouth once daily.     sodium hypochlorite (1/2 STRENGTH DAKIN'S SOLUTION) 0.25% soln Apply  topically to affected area(s) 2 times daily.     spironolactone (ALDACTONE) 25 mg tablet TAKE ONE TABLET BY MOUTH ONCE DAILY     traMADol (ULTRAM) 50 mg tablet TAKE TWO TABLETS BY MOUTH EVERY 6 HOURS AS NEEDED FOR PAIN     No current facility-administered medications for this visit.      Medications have been reviewed by me and are current to the best of my knowledge and ability. ,   Past Medical History:     Diagnosis  Date     Atrial fibrillation () 2000     Diabetes mellitus, type II ()      Elevated liver function tests 2010    Fatty liver on US and CT.  Iron nl. 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    IV Vancomycin to resolve, L 3rd digit      Pneumothorax     spondylolysis (right) at age 39 followed by spondylolysis (left) several years later      Psoas muscle abscess (HC) 2016    Strep viridans      Rheumatoid arthritis (HC) 2000     Urinary sepsis 2012    hospitalized with hypotension, blood cultures showed Escherichia coli    ,   Patient Active Problem List       Diagnosis  Date Noted     Chronic pain syndrome  04/18/2017     Pain medication agreement  04/18/2017     Decubitus ulcer of ischial area, stage 4 (HC)  03/02/2017     Anemia  01/16/2017     Elevated LFTs  01/16/2017     Corticosteroid dependence (HC)  01/15/2017     Open wound  of left buttock  10/13/2016     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 This will not affect patient care or billing. This comment can be deleted.        Atrial fibrillation (HC)       DIVERTICULOSIS, COLON  02/18/2010     Rheumatoid arthritis(714.0)  12/07/2006    and   Past Surgical History:      Procedure  Laterality Date     BREAST BIOPSY      Left       CATARACT REMOVAL Bilateral      COLONOSCOPY SCREENING  2001    repeat in 10 years per patient       COLONOSCOPY SCREENING  2010    sigmoid diverticulosis, no polyps, due 2020       COLONOSCOPY SCREENING  2011    mild sigmoid diverticulosis, EGD biopsies       CT GUIDE PERC DRAIN CATH (IA)  2016     ESOPHAGOGASTRODUODENOSCOPY  2/2011    Normal       FOREARM/WRIST SURGERY  2010    removed nodule Right wrist       KNEE REPLACEMENT Bilateral 07/10/08     PORTACATH      hx chest tube surgery x2       PORTACATH  2012    power port placement, Dr. aTylor       SPINE SURGERY      Back surgery with fusion of the lumbar discs       MOISES AND BSO      secondary to fibroids         REVIEW OF SYSTEMS:  Review of Systems   All other systems reviewed and are negative.      OBJECTIVE:  /58  Pulse 96  Temp 98.1  F (36.7  C) (Tympanic)  Breastfeeding? No    EXAM:   Physical Exam   Constitutional: She is well-developed, well-nourished, and in no distress. No distress.   In wheelchair.   Neck: Normal range of motion. Neck supple.   Cardiovascular: Normal rate, regular rhythm and normal heart sounds.    Pulmonary/Chest: Effort normal and breath sounds normal. No respiratory distress. She has no wheezes. She has no rales.   Musculoskeletal: She exhibits no edema.    Neurological: She is alert.   Skin: Skin is warm and dry. She is not diaphoretic.   Psychiatric: Affect normal.   Nursing note and vitals reviewed.      ASSESSMENT/PLAN:    ICD-10-CM    1. Acute cystitis without hematuria N30.00 URINE CULTURE      URINE CULTURE   2. Decubitus ulcer of ischial area, stage 4 () L89.304    3. Pseudomonas aeruginosa colonization Z22.39         Plan:  We will do a urine culture today if she is able to provide one just to ensure that her urine infection has resolved. Medications will continue without change. She will continue with her wound VAC for now although this may end if she does not get any improvement and she may be just going back to wet to dry dressings. She will see plastic surgery in mid June, I will have her see me again towards the end of June or sooner if there are problems or concerns. I did not feel that we needed to do any lab today, she recently had complete lab with all of her hospitalization.

## 2018-01-11 ENCOUNTER — COMMUNICATION - GICH (OUTPATIENT)
Dept: INTERNAL MEDICINE | Facility: OTHER | Age: 73
End: 2018-01-11

## 2018-01-11 DIAGNOSIS — E11.9 TYPE 2 DIABETES MELLITUS WITHOUT COMPLICATIONS (H): ICD-10-CM

## 2018-01-11 DIAGNOSIS — Z79.4 LONG TERM CURRENT USE OF INSULIN (H): ICD-10-CM

## 2018-01-12 ENCOUNTER — COMMUNICATION - GICH (OUTPATIENT)
Dept: INTERNAL MEDICINE | Facility: OTHER | Age: 73
End: 2018-01-12

## 2018-01-12 DIAGNOSIS — Z79.4 LONG TERM CURRENT USE OF INSULIN (H): ICD-10-CM

## 2018-01-12 DIAGNOSIS — E11.29 TYPE 2 DIABETES MELLITUS WITH OTHER DIABETIC KIDNEY COMPLICATION (CODE): ICD-10-CM

## 2018-01-15 ENCOUNTER — HISTORY (OUTPATIENT)
Dept: INTERNAL MEDICINE | Facility: OTHER | Age: 73
End: 2018-01-15

## 2018-01-15 ENCOUNTER — COMMUNICATION - GICH (OUTPATIENT)
Dept: INTERNAL MEDICINE | Facility: OTHER | Age: 73
End: 2018-01-15

## 2018-01-15 ENCOUNTER — OFFICE VISIT - GICH (OUTPATIENT)
Dept: INTERNAL MEDICINE | Facility: OTHER | Age: 73
End: 2018-01-15

## 2018-01-15 DIAGNOSIS — S81.811A LACERATION OF RIGHT LOWER LEG WITHOUT FOREIGN BODY: ICD-10-CM

## 2018-01-15 ASSESSMENT — PATIENT HEALTH QUESTIONNAIRE - PHQ9: SUM OF ALL RESPONSES TO PHQ QUESTIONS 1-9: 0

## 2018-01-16 PROBLEM — D64.9 ANEMIA: Status: ACTIVE | Noted: 2017-01-16

## 2018-01-16 PROBLEM — F19.20 CORTICOSTEROID DEPENDENCE (H): Status: ACTIVE | Noted: 2017-01-15

## 2018-01-16 PROBLEM — E78.5 HYPERLIPEMIA: Status: ACTIVE | Noted: 2018-01-16

## 2018-01-16 PROBLEM — R79.89 ELEVATED LFTS: Status: ACTIVE | Noted: 2017-01-16

## 2018-01-16 PROBLEM — I48.91 ATRIAL FIBRILLATION (H): Status: ACTIVE | Noted: 2018-01-16

## 2018-01-16 PROBLEM — G89.4 CHRONIC PAIN DISORDER: Status: ACTIVE | Noted: 2017-04-18

## 2018-01-16 PROBLEM — E11.9 DIABETES MELLITUS, TYPE II (H): Status: ACTIVE | Noted: 2018-01-16

## 2018-01-16 PROBLEM — G62.9 NEUROPATHY: Status: ACTIVE | Noted: 2018-01-16

## 2018-01-16 PROBLEM — Z02.89 PAIN MEDICATION AGREEMENT: Status: ACTIVE | Noted: 2017-04-18

## 2018-01-16 PROBLEM — L89.304: Status: ACTIVE | Noted: 2017-05-29

## 2018-01-16 RX ORDER — PREDNISONE 5 MG/1
5 TABLET ORAL
COMMUNITY
Start: 2017-05-10 | End: 2018-02-16

## 2018-01-16 RX ORDER — DILTIAZEM HYDROCHLORIDE 360 MG/1
360 CAPSULE, EXTENDED RELEASE ORAL
COMMUNITY
Start: 2017-11-29 | End: 2018-03-27

## 2018-01-16 RX ORDER — ESTRADIOL 0.5 MG/1
0.5 TABLET ORAL
COMMUNITY
Start: 2017-10-13 | End: 2018-03-27

## 2018-01-16 RX ORDER — CLONIDINE HYDROCHLORIDE 0.1 MG/1
0.1 TABLET ORAL
COMMUNITY
Start: 2017-11-22 | End: 2018-03-27

## 2018-01-16 RX ORDER — DILTIAZEM HYDROCHLORIDE 360 MG/1
360 CAPSULE, EXTENDED RELEASE ORAL
COMMUNITY
Start: 2017-03-27 | End: 2018-03-27

## 2018-01-16 RX ORDER — NICOTINE POLACRILEX 4 MG/1
20 GUM, CHEWING ORAL
COMMUNITY
Start: 2017-10-17 | End: 2018-03-27

## 2018-01-16 RX ORDER — CALCIUM CARBONATE 500 MG/1
TABLET, CHEWABLE ORAL
COMMUNITY
End: 2018-03-27

## 2018-01-16 RX ORDER — ASCORBIC ACID 500 MG
1000 TABLET ORAL
COMMUNITY
End: 2018-03-27

## 2018-01-16 RX ORDER — GABAPENTIN 100 MG/1
100 CAPSULE ORAL
COMMUNITY
Start: 2017-12-06 | End: 2018-03-13

## 2018-01-16 RX ORDER — FUROSEMIDE 40 MG
40 TABLET ORAL
COMMUNITY
Start: 2017-04-11 | End: 2018-03-27

## 2018-01-16 RX ORDER — TRAMADOL HYDROCHLORIDE 50 MG/1
TABLET ORAL
COMMUNITY
Start: 2017-12-13 | End: 2018-03-05

## 2018-01-16 RX ORDER — DOCUSATE SODIUM 100 MG/1
100 CAPSULE, LIQUID FILLED ORAL
COMMUNITY
End: 2018-03-27

## 2018-01-16 RX ORDER — CYANOCOBALAMIN (VITAMIN B-12) 1000 MCG
TABLET, EXTENDED RELEASE ORAL
COMMUNITY
End: 2018-03-27

## 2018-01-16 RX ORDER — AMLODIPINE BESYLATE 2.5 MG/1
2.5 TABLET ORAL
COMMUNITY
Start: 2016-12-14 | End: 2018-02-26

## 2018-01-16 RX ORDER — HYDROCODONE BITARTRATE AND ACETAMINOPHEN 5; 325 MG/1; MG/1
1 TABLET ORAL
COMMUNITY
Start: 2017-12-27 | End: 2018-03-27

## 2018-01-16 RX ORDER — SACCHAROMYCES BOULARDII 250 MG
250 CAPSULE ORAL
COMMUNITY
End: 2018-03-27

## 2018-01-16 RX ORDER — SPIRONOLACTONE 25 MG/1
25 TABLET ORAL
COMMUNITY
Start: 2017-02-07 | End: 2018-03-13

## 2018-01-16 RX ORDER — BACLOFEN 10 MG/1
10 TABLET ORAL
COMMUNITY
Start: 2017-12-13 | End: 2018-03-27

## 2018-01-16 RX ORDER — METFORMIN HCL 500 MG
1000 TABLET, EXTENDED RELEASE 24 HR ORAL
COMMUNITY
Start: 2016-12-07 | End: 2018-03-27

## 2018-01-16 RX ORDER — ATORVASTATIN CALCIUM 20 MG/1
20 TABLET, FILM COATED ORAL
COMMUNITY
Start: 2017-05-24 | End: 2018-03-27

## 2018-01-16 RX ORDER — FERROUS SULFATE 325(65) MG
650 TABLET, DELAYED RELEASE (ENTERIC COATED) ORAL
COMMUNITY
Start: 2017-03-27 | End: 2018-03-27

## 2018-01-18 ENCOUNTER — COMMUNICATION - GICH (OUTPATIENT)
Dept: INTERNAL MEDICINE | Facility: OTHER | Age: 73
End: 2018-01-18

## 2018-01-18 DIAGNOSIS — E11.9 TYPE 2 DIABETES MELLITUS WITHOUT COMPLICATIONS (H): ICD-10-CM

## 2018-01-22 ENCOUNTER — COMMUNICATION - GICH (OUTPATIENT)
Dept: INTERNAL MEDICINE | Facility: OTHER | Age: 73
End: 2018-01-22

## 2018-01-22 DIAGNOSIS — M05.9 RHEUMATOID ARTHRITIS WITH RHEUMATOID FACTOR (H): ICD-10-CM

## 2018-01-23 ENCOUNTER — COMMUNICATION - GICH (OUTPATIENT)
Dept: INTERNAL MEDICINE | Facility: OTHER | Age: 73
End: 2018-01-23

## 2018-01-23 DIAGNOSIS — D50.9 IRON DEFICIENCY ANEMIA: ICD-10-CM

## 2018-01-24 ENCOUNTER — HOSPITAL ENCOUNTER (OUTPATIENT)
Dept: INFUSION THERAPY | Facility: OTHER | Age: 73
End: 2018-01-24
Attending: INTERNAL MEDICINE

## 2018-01-25 ENCOUNTER — COMMUNICATION - GICH (OUTPATIENT)
Dept: INTERNAL MEDICINE | Facility: OTHER | Age: 73
End: 2018-01-25

## 2018-01-26 ENCOUNTER — COMMUNICATION - GICH (OUTPATIENT)
Dept: INTERNAL MEDICINE | Facility: OTHER | Age: 73
End: 2018-01-26

## 2018-01-26 VITALS
OXYGEN SATURATION: 97 % | TEMPERATURE: 98.4 F | SYSTOLIC BLOOD PRESSURE: 130 MMHG | DIASTOLIC BLOOD PRESSURE: 76 MMHG | HEART RATE: 88 BPM

## 2018-01-26 VITALS
WEIGHT: 110 LBS | WEIGHT: 110 LBS | DIASTOLIC BLOOD PRESSURE: 68 MMHG | SYSTOLIC BLOOD PRESSURE: 112 MMHG | DIASTOLIC BLOOD PRESSURE: 70 MMHG | HEIGHT: 63 IN | DIASTOLIC BLOOD PRESSURE: 70 MMHG | HEART RATE: 88 BPM | HEART RATE: 84 BPM | BODY MASS INDEX: 17.89 KG/M2 | WEIGHT: 101 LBS | BODY MASS INDEX: 19.49 KG/M2 | BODY MASS INDEX: 19.49 KG/M2 | SYSTOLIC BLOOD PRESSURE: 110 MMHG | SYSTOLIC BLOOD PRESSURE: 118 MMHG | HEART RATE: 84 BPM

## 2018-01-26 VITALS
HEART RATE: 96 BPM | BODY MASS INDEX: 19.93 KG/M2 | DIASTOLIC BLOOD PRESSURE: 84 MMHG | SYSTOLIC BLOOD PRESSURE: 102 MMHG | WEIGHT: 112.5 LBS | SYSTOLIC BLOOD PRESSURE: 122 MMHG | DIASTOLIC BLOOD PRESSURE: 58 MMHG | TEMPERATURE: 98.1 F | HEART RATE: 76 BPM

## 2018-01-26 VITALS — SYSTOLIC BLOOD PRESSURE: 126 MMHG | DIASTOLIC BLOOD PRESSURE: 76 MMHG | TEMPERATURE: 96.5 F

## 2018-01-26 VITALS
DIASTOLIC BLOOD PRESSURE: 82 MMHG | WEIGHT: 108 LBS | BODY MASS INDEX: 19.13 KG/M2 | TEMPERATURE: 98.1 F | DIASTOLIC BLOOD PRESSURE: 80 MMHG | SYSTOLIC BLOOD PRESSURE: 124 MMHG | WEIGHT: 112.5 LBS | BODY MASS INDEX: 19.93 KG/M2 | SYSTOLIC BLOOD PRESSURE: 132 MMHG | HEART RATE: 72 BPM

## 2018-01-26 VITALS
SYSTOLIC BLOOD PRESSURE: 138 MMHG | WEIGHT: 108.4 LBS | DIASTOLIC BLOOD PRESSURE: 72 MMHG | BODY MASS INDEX: 19.2 KG/M2 | TEMPERATURE: 97.1 F | DIASTOLIC BLOOD PRESSURE: 64 MMHG | HEART RATE: 70 BPM | HEART RATE: 72 BPM | SYSTOLIC BLOOD PRESSURE: 136 MMHG

## 2018-01-26 VITALS
SYSTOLIC BLOOD PRESSURE: 116 MMHG | WEIGHT: 112 LBS | BODY MASS INDEX: 19.84 KG/M2 | HEART RATE: 74 BPM | DIASTOLIC BLOOD PRESSURE: 72 MMHG

## 2018-01-26 VITALS
SYSTOLIC BLOOD PRESSURE: 138 MMHG | BODY MASS INDEX: 19.49 KG/M2 | WEIGHT: 110 LBS | DIASTOLIC BLOOD PRESSURE: 70 MMHG | TEMPERATURE: 98.1 F

## 2018-01-26 VITALS
DIASTOLIC BLOOD PRESSURE: 68 MMHG | WEIGHT: 110 LBS | SYSTOLIC BLOOD PRESSURE: 110 MMHG | HEART RATE: 84 BPM | BODY MASS INDEX: 19.49 KG/M2

## 2018-01-26 VITALS — DIASTOLIC BLOOD PRESSURE: 70 MMHG | SYSTOLIC BLOOD PRESSURE: 110 MMHG | WEIGHT: 109 LBS | TEMPERATURE: 98 F

## 2018-01-28 ASSESSMENT — PATIENT HEALTH QUESTIONNAIRE - PHQ9
SUM OF ALL RESPONSES TO PHQ QUESTIONS 1-9: 0

## 2018-02-09 VITALS
SYSTOLIC BLOOD PRESSURE: 134 MMHG | HEART RATE: 72 BPM | WEIGHT: 115 LBS | DIASTOLIC BLOOD PRESSURE: 82 MMHG | TEMPERATURE: 98.3 F | BODY MASS INDEX: 20.37 KG/M2

## 2018-02-09 VITALS
DIASTOLIC BLOOD PRESSURE: 78 MMHG | HEART RATE: 72 BPM | SYSTOLIC BLOOD PRESSURE: 124 MMHG | WEIGHT: 115 LBS | BODY MASS INDEX: 20.37 KG/M2

## 2018-02-09 VITALS
BODY MASS INDEX: 20.37 KG/M2 | WEIGHT: 115 LBS | SYSTOLIC BLOOD PRESSURE: 128 MMHG | DIASTOLIC BLOOD PRESSURE: 82 MMHG | HEART RATE: 68 BPM

## 2018-02-10 ASSESSMENT — PATIENT HEALTH QUESTIONNAIRE - PHQ9: SUM OF ALL RESPONSES TO PHQ QUESTIONS 1-9: 0

## 2018-02-11 ASSESSMENT — PATIENT HEALTH QUESTIONNAIRE - PHQ9
SUM OF ALL RESPONSES TO PHQ QUESTIONS 1-9: 0
SUM OF ALL RESPONSES TO PHQ QUESTIONS 1-9: 0

## 2018-02-12 NOTE — PROGRESS NOTES
Patient Information     Patient Name MRN Karen Randle [id number] Female 1945      Progress Notes by Hugo Duarte MD at 2017 11:20 AM     Author:  Hugo Duarte MD Service:  (none) Author Type:  Physician     Filed:  2017 11:37 AM Encounter Date:  2017 Status:  Signed     :  Hugo Duarte MD (Physician)            SUBJECTIVE:    Karen Duncan is a 72 y.o. female who presents for f/u on issues.    HPI Comments: She returns for a follow-up visit. The last time she was in her lab looked fine although her A1c was somewhat high. She brings in blood sugars today and they are reviewed. They actually look quite good. I wonder if some of the elevation in the A1c is not still related to her previous illness. Now that she is back to functioning normally and improving, her diabetes seems to be doing adequately.    She needs a refill on her pain medications. In addition to the tramadol she does take opioid medication 4 times daily. She is up-to-date with her cardiac contract and  website review but will need to have that done at her next visit.    Other than that she feels well and has no complaints or concerns. She does not see any impediments for her to get back to functioning at a relatively high level. She has had no further skin breakdown.      Allergies      Allergen   Reactions     Adhesive Tapes [Adhesive]  *Unknown     Paper tapes, fragile skin      Aliskiren  Cough     Lisinopril  Cough     Ace cough      Losartan  Cough   ,   Current Outpatient Prescriptions     Medication  Sig     amLODIPine (NORVASC) 2.5 mg tablet Take 1 tablet by mouth once daily.     ascorbic acid, vitamin C, (ASCORBIC ACID WITH NIECY HIPS) 500 mg tablet Take 1,000 mg by mouth once daily.     aspirin 81 mg tablet Take 1 tablet by mouth once daily with a meal.     atorvastatin (LIPITOR) 20 mg tablet TAKE ONE TABLET BY MOUTH AT BEDTIME     baclofen (LIORESAL) 10 mg tablet TAKE ONE TABLET BY  "MOUTH THREE TIMES DAILY     BENADRYL 25 MG CAP two tablets orally at bedtime     blood sugar diagnostic (ACCU-CHEK LENIN PLUS TEST STRP) strip Test 4 times daily     blood sugar diagnostic (BLOOD GLUCOSE TEST) strip Dispense item covered by pt ins. Test 4 times daily E11.29     CALCIUM CARBONATE (TUMS 500 ORAL) Take 2-3 Tabs by mouth 2 times daily if needed (HEARTBURN).     cloNIDine HCl (CATAPRES) 0.1 mg tablet TAKE ONE TABLET BY MOUTH ONCE DAILY     CYANOCOBALAMIN, VITAMIN B-12, (VITAMIN B-12 ORAL) Take 1 Tab by mouth once daily.     diltiazem CD (CARDIZEM CD) 360 mg extended release 24 hr capsule Take 1 capsule by mouth once daily.     diltiazem CR (TIAZAC; TAZTIA XT) 360 mg capsule TAKE ONE CAPSULE BY MOUTH ONCE DAILY     docusate (COLACE) 100 mg capsule Take 100 mg by mouth 2 times daily if needed for Constipation.     estradiol (ESTRACE) 0.5 mg tablet TAKE ONE TABLET BY MOUTH ONCE DAILY     ferrous sulfate 325 mg delayed release tablet Take 650 mg by mouth once daily.     furosemide (LASIX) 40 mg tablet Take 1 tablet by mouth every morning.     gabapentin (NEURONTIN) 100 mg capsule TAKE ONE CAPSULE BY MOUTH THREE TIMES DAILY     HYDROcodone-acetaminophen, 5-325 mg, (NORCO) per tablet Take 1 tablet by mouth 4 times daily  Max acetaminophen dose: 4000 mg in 24 hrs.     insulin aspart (NOVOLOG FLEXPEN) 100 unit/mL solution for injection Inject 4 times daily per sliding scale, 16 units max daily     insulin glargine (LANTUS SOLOSTAR) 100 unit/mL (3 mL) pen 16 units am, 14 units pm     Insulin Safety Needles, Disp, 30 gauge x 1/3\" For administering insulin at home 5 times daily.  Dx code e11.9     lancets (ACCU-CHEK MULTICLIX LANCET) Test 4 times daily     metFORMIN (GLUCOPHAGE XR) 500 mg Extended-Release tablet Take 2 tablets by mouth 2 times daily with meals.     Omega-3-DHA-EPA-Fish Oil 1,200 (144-216) mg capsule Take 2 capsules by mouth once daily.     Omeprazole 20 mg tablet Take 1 tablet by mouth before " breakfast.     predniSONE (DELTASONE) 5 mg tablet TAKE ONE TABLET BY MOUTH TWICE DAILY WITH MEALS     psyllium (METAMUCIL) 0.52 gram capsule Take 3 capsules by mouth once daily.     Saccharomyces boulardii (FLORASTOR) 250 mg capsule Take 250 mg by mouth once daily.     spironolactone (ALDACTONE) 25 mg tablet TAKE ONE TABLET BY MOUTH ONCE DAILY     traMADol (ULTRAM) 50 mg tablet TAKE TWO TABLETS BY MOUTH EVERY 6 HOURS AS NEEDED FOR PAIN     No current facility-administered medications for this visit.      Medications have been reviewed by me and are current to the best of my knowledge and ability. ,   Past Medical History:     Diagnosis  Date     Atrial fibrillation (HC) 2000     Diabetes mellitus, type II (HC)      Elevated liver function tests 2010    Fatty liver on US and CT.  Iron nl. 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    IV Vancomycin to resolve, L 3rd digit      Pneumothorax     spondylolysis (right) at age 39 followed by spondylolysis (left) several years later      Psoas muscle abscess (HC) 2016    Strep viridans      Rheumatoid arthritis(714.0) 2000     Urinary sepsis 2012    hospitalized with hypotension, blood cultures showed Escherichia coli    ,   Patient Active Problem List       Diagnosis  Date Noted     Decubitus ulcer of buttock, stage 4 (HC)  05/29/2017     Chronic pain syndrome  04/18/2017     Pain medication agreement  04/18/2017     Anemia  01/16/2017     Elevated LFTs  01/16/2017     Corticosteroid dependence (HC)  01/15/2017     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 This will not affect patient care or billing. This comment can be deleted.         Atrial fibrillation (HC)       DIVERTICULOSIS, COLON  02/18/2010     Rheumatoid arthritis(714.0)  12/07/2006    and   Past Surgical History:      Procedure  Laterality Date     BREAST BIOPSY      Left       CATARACT REMOVAL Bilateral      COLONOSCOPY SCREENING  2001    repeat in 10 years per patient       COLONOSCOPY SCREENING  2010    sigmoid diverticulosis, no polyps, due 2020       COLONOSCOPY SCREENING  2011    mild sigmoid diverticulosis, EGD biopsies       CT GUIDE PERC DRAIN CATH (IA)  2016     ESOPHAGOGASTRODUODENOSCOPY  2/2011    Normal       FOREARM/WRIST SURGERY  2010    removed nodule Right wrist       KNEE REPLACEMENT Bilateral 07/10/08     LUMBAR LAMINECTOMY       MUSCLE FLAP  2017    Left ischial ulcer       PORTACATH      hx chest tube surgery x2       PORTACATH  2012    power port placement, Dr. Taylor       SPINE SURGERY      Back surgery with fusion of the lumbar discs       MOISES AND BSO      secondary to fibroids         REVIEW OF SYSTEMS:  Review of Systems   All other systems reviewed and are negative.      OBJECTIVE:  /82 (Cuff Site: Right Arm, Position: Sitting, Cuff Size: Adult Regular)  Pulse 68  Wt 52.2 kg (115 lb)  Breastfeeding? No  BMI 20.37 kg/m2    EXAM:   Physical Exam   Constitutional: She is well-developed, well-nourished, and in no distress. No distress.   In wheelchair   Cardiovascular: Normal rate and regular rhythm.    Murmur heard.   Systolic murmur is present with a grade of 2/6   Pulmonary/Chest: Effort normal and breath sounds normal. No respiratory distress. She has no wheezes. She has no rales.   Abdominal: Soft. Bowel sounds are normal. She exhibits no distension and no mass. There is no tenderness. There is no rebound and no guarding.   Musculoskeletal: She exhibits no edema.   Neurological: She is alert.   Skin: Skin is warm and dry. She is not diaphoretic.   Psychiatric: Affect normal.   Nursing note and vitals reviewed.      ASSESSMENT/PLAN:    ICD-10-CM     1. Type 2 diabetes mellitus with other diabetic kidney complication, with long-term current use of insulin (HC) E11.29 insulin aspart (NOVOLOG FLEXPEN) 100 unit/mL solution for injection     Z79.4 lancets (ACCU-CHEK MULTICLIX LANCET)      blood sugar diagnostic (ACCU-CHEK LENIN PLUS TEST STRP) strip   2. Rheumatoid arthritis, involving unspecified site, unspecified rheumatoid factor presence (HC) M06.9 HYDROcodone-acetaminophen, 5-325 mg, (NORCO) per tablet      DISCONTINUED: HYDROcodone-acetaminophen, 5-325 mg, (NORCO) per tablet      DISCONTINUED: HYDROcodone-acetaminophen, 5-325 mg, (NORCO) per tablet   3. Decubitus ulcer of buttock, stage 4, unspecified laterality (HC) L89.304    4. Chronic pain syndrome G89.4         Plan:  Overall, she appears to be doing well. As mentioned her diabetic control appears to be improved so I did not make any insulin changes at this time. All meds will continue the same. No lab is indicated. All other health measures are up-to-date. Her pain medications are refilled today for 3 months. She will need to return at that time for redo on her narcotic contract,  website as well as urine tox screen. We will plan on rechecking an A1c at that visit as well. Follow-up sooner if there are problems. Encouraged her to continue working on exercise and strengthening as well as a healthy diet.

## 2018-02-12 NOTE — TELEPHONE ENCOUNTER
Patient Information     Patient Name MRN Karen Randle [id number] Female 1945      Telephone Encounter by Ada Doherty at 2017  1:18 PM     Author:  Ada Doherty Service:  (none) Author Type:  (none)     Filed:  2017  1:18 PM Encounter Date:  2017 Status:  Signed     :  Ada Doherty            LMTCB. Ada Doherty LPN......................2017 1:18 PM

## 2018-02-12 NOTE — TELEPHONE ENCOUNTER
Patient Information     Patient Name MRN Karen Randle [id number] Female 1945      Telephone Encounter by Anne Piña RN at 2017  3:36 PM     Author:  Anne Piña RN Service:  (none) Author Type:  NURS- Registered Nurse     Filed:  2017  3:42 PM Encounter Date:  2017 Status:  Signed     :  Anne Piña RN (NURS- Registered Nurse)            This is a Refill request from: [facility]    Medication:Baclofen (LIORESAL) 10 mg tablet    Qty:100 tablet  Ref:3  Start:2017   End:              Route:Oral                  GISELE:No   Class:eRx    Sig:Take 1 tablet by mouth 3 times daily.    Quantity requested: 100 X 3 refills  Last fill date: 17  Last visit with LEO DUARTE was on: 2017 in [facility]  PCP:  Leo Duarte MD  Diagnosis r/t this medication request:Rheumatoid Arthrits     Unable to complete prescription refill per RN Medication Refill Policy.................... Anne Piña RN ....................  2017   3:39 PM

## 2018-02-12 NOTE — TELEPHONE ENCOUNTER
Patient Information     Patient Name MRN Karen Randle [id number] Female 1945      Telephone Encounter by Damaris Arora RN at 2017  9:45 AM     Author:  Damaris Arora RN Service:  (none) Author Type:  NURS- Registered Nurse     Filed:  2017  9:48 AM Encounter Date:  2017 Status:  Signed     :  Damaris Arora RN (NURS- Registered Nurse)            Nsaids  Gabapentin  Office visit in the past 12 months or per provider note.    Last visit with LEO GIVENS was on: 2017 in [facility]  Next visit with LEO GIVENS is on: 2017 in [facility]  Next visit with Internal Medicine is on: 2017 in [facility]    Max refill for 12 months from last office visit or per provider note.    Prescription refilled per RN Medication Refill Policy.................... DAMARIS ARORA RN ....................  2017   9:46 AM

## 2018-02-12 NOTE — TELEPHONE ENCOUNTER
Patient Information     Patient Name MRN Karen Randle [id number] Female 1945      Telephone Encounter by Anne Piña RN at 2017 12:21 PM     Author:  Anne Piña RN Service:  (none) Author Type:  NURS- Registered Nurse     Filed:  2017 12:31 PM Encounter Date:  2017 Status:  Signed     :  Anne Piña RN (NURS- Registered Nurse)            This is a Refill request from: [facility]  Medication: TraMADol (ULTRAM) 50 mg tablet  Prescription #:[id number]    Qty:90 tablet   Ref:3  Start:10/27/2017  End:              Route:Oral                  GISELE:No   Class:Print    Sig:TAKE TWO TABLETS BY MOUTH EVERY 6 HOURS AS NEEDED FOR PAIN    Quantity requested: 90  Last fill date: 10-27-17 for #90 X 3 refills  Last visit with LEO DUARTE was on: 2017 in [facility]  PCP:  Leo Duarte MD  Controlled Substance Agreement: 17  Diagnosis r/t this medication request: Rheumatoid arthritis     Unable to complete prescription refill per RN Medication Refill Policy.................... Anne Piña RN ....................  2017   12:29 PM

## 2018-02-12 NOTE — TELEPHONE ENCOUNTER
Patient Information     Patient Name MRN Karen Randle [id number] Female 1945      Telephone Encounter by Maya Javier at 2017  1:02 PM     Author:  Maya Javier Service:  (none) Author Type:  (none)     Filed:  2017  1:05 PM Encounter Date:  2017 Status:  Signed     :  Maya Javier            MAF - Patient requesting correction on Novalog RX. Patient states that they requested provider raise Novalog to 20,000 units. Pharmacy filled at 16,000. Please call.    Maya Javier ....................  2017   1:04 PM

## 2018-02-13 NOTE — TELEPHONE ENCOUNTER
Patient Information     Patient Name Karen Roberts [id number] Female 1945      Telephone Encounter by Ina Nguyen at 2018 11:09 AM     Author:  Ina Nguyen Service:  (none) Author Type:  (none)     Filed:  2018 11:15 AM Encounter Date:  2018 Status:  Signed     :  Ina Nguyen            11:10 AM  Reason for call: Patient is calling today inquiring about her refill on Tramadol. She will run out tomorrow and would like to resolve this issue before the weekend. Patient requested a refill on Monday, 2018 and spoke with Dr. Duarte's nurse Josefa on 2018 and is wondering where things are at with getting this RX filled. Please call patient with details.    Was an appointment offered for this call? n/a    Preferred method for responding to this message: telephone, best time to reach caller is anytime    If we cannot reach you directly, may we leave a detailed response at the number you provided? yes    Can this message wait until your PCP/provider returns, if unavailable today? no    Ina Nguyen ....................  2018   11:14 AM

## 2018-02-13 NOTE — TELEPHONE ENCOUNTER
Patient Information     Patient Name MRKaren Ramirez [id number] Female 1945      Telephone Encounter by Susana Mcrae at 2018  8:40 AM     Author:  Susana Mcrae Service:  (none) Author Type:  (none)     Filed:  2018  8:42 AM Encounter Date:  2018 Status:  Signed     :  Susana Mcrae            Patient wants to know why her refill of tramadol can not be filled. Please call as soon as possible.

## 2018-02-13 NOTE — TELEPHONE ENCOUNTER
Patient Information     Patient Name MRKaren Ramirez [id number] Female 1945      Telephone Encounter by Paige Alaniz at 1/15/2018 10:26 AM     Author:  Paige Alaniz Service:  (none) Author Type:  (none)     Filed:  1/15/2018 10:29 AM Encounter Date:  1/15/2018 Status:  Signed     :  Paige Alaniz            Patient has laceration on rt leg that she says is all pussy and believes is infected.  She is wondering if you can work her in.    Paige Alaniz ....................  1/15/2018   10:28 AM

## 2018-02-13 NOTE — TELEPHONE ENCOUNTER
Patient Information     Patient Name MRN Karen Randle [id number] Female 1945      Telephone Encounter by Ada Doherty at 2017  1:29 PM     Author:  Ada Doherty Service:  (none) Author Type:  (none)     Filed:  2017  1:35 PM Encounter Date:  2017 Status:  Signed     :  Ada Doherty            Spoke with patient she said her insurance will not cover the Novolog [id number] units but will only cover [id number] right now. Ada Doherty LPN......................2017 1:34 PM

## 2018-02-13 NOTE — TELEPHONE ENCOUNTER
Patient Information     Patient Name MRN Karen Randle [id number] Female 1945      Telephone Encounter by Damaris Potter RN at 2018  8:04 AM     Author:  Damaris Potter RN  Service:  (none) Author Type:  NURS- Registered Nurse     Filed:  2018  9:02 AM  Encounter Date:  2018 Status:  Addendum     :  Damaris Potter RN (NURS- Registered Nurse)        Related Notes: Original Note by Damaris Potter RN (NURS- Registered Nurse) filed at 2018  8:06 AM            traMADol (ULTRAM) 50 mg tablet  TAKE TWO TABLETS BY MOUTH EVERY 6 HOURS AS NEEDED FOR PAIN  Disp: 90 tablet Refills: 3    Class: eRx Start: 2018    For: Rheumatoid arthritis with positive rheumatoid factor, involving unspecified site (HC)  Documented:1 year ago  Last refill:2018  To be filled at: [facility] [address] [place], [place] [address]Phone: [phone]    Last visit with LEO GIVENS was on: 01/15/2018 in [facility]  PCP:  Leo Givens MD  Controlled Substance Agreement:  17     Noted as refilled 17 #90 x 3 refills.  Patient has used all refills per instrutctions      Unable to complete prescription refill per RN Medication Refill Policy.................... DAMARIS POTTER RN ....................  2018   8:04 AM

## 2018-02-13 NOTE — TELEPHONE ENCOUNTER
"Patient Information     Patient Name MRN Karen Randle [id number] Female 1945      Telephone Encounter by Anne Piña RN at 2018  9:20 AM     Author:  Anne Piña RN  Service:  (none) Author Type:  NURS- Registered Nurse     Filed:  2018  9:32 AM  Encounter Date:  2018 Status:  Addendum     :  Anne Piña RN (NURS- Registered Nurse)        Related Notes: Original Note by Anne Piña RN (NURS- Registered Nurse) filed at 2018  9:20 AM            Reached patient who stated that the second toe of her left foot appears to be infected and painful. 2nd toe is swollen and has redness along the top of toe which is now observed also on left foot. Denies red streaking. Doesn't think she has a fever, but states \"I'm sweating right now\".  Symptoms first noticed yesterday morning due to pain. Patient alreadys on painkillers (Norco and Tramadol) and still reports moderate pain to toe and left foot affecting her normal activities. Has been unable to wear shoes/slippers since. This toe was broken 2 years ago and \"sticks up\" so that it is always rubbing on top of shoes. Has had blisters there frequently, currently observes yellow pus under redness at top of toe. Denies discharge. Has tried foot soaks.     Per protocol care guidelines it is recommended patient be seen today. Patient is diabetic and has history of wounds which took months to heal and hospitalization for wounds.  Routed to Hugo Duarte MD to see if he wants to work her in or other advice.   Anne Piña RN ....................  2018   9:30 AM      Reason for Disposition    Wound looks infected    [1] Looks infected (spreading redness, red streak, pus) AND [2] fever    Answer Assessment - Initial Assessment Questions  1. ONSET: \"When did the pain start?\"       Yesterday she noticed pain in toe   2. LOCATION: \"Where is the pain located?\"   (e.g., around nail, entire toe, at foot joint)       Left foot " "and second toe, whole toe is kind of swolen with redness to top  3. PAIN: \"How bad is the pain?\"    (Scale 1-10; or mild, moderate, severe)    -  MILD (1-3): doesn't interfere with normal activities     -  MODERATE (4-7): interferes with normal activities (e.g., work or school) or awakens from sleep, limping     -  SEVERE (8-10): excruciating pain, unable to do any normal activities, unable to walk      Moderate, can't wear slippers or shoes on that foot because they hurt.   4. APPEARANCE: \"What does the toe look like?\" (e.g., redness, swelling, bruising, pallor)      Swelling to whole 2nd toe, no bruising.  Redness runs along whole top of toe. Kind of yellow in middle where blisters have been on top before.   5. CAUSE: \"What do you think is causing the toe pain?\"      This toe was broken a couple of years ago and sticks up which causes rubbing on top of shoe.   6. OTHER SYMPTOMS: \"Do you have any other symptoms?\" (e.g., leg pain, rash, fever, numbness)      Redness is starting up her foot. She states that left foot is a little warmer and red. States that only toe is swolen. No streaking or pattern with the redness.   7. PREGNANCY: \"Is there any chance you are pregnant?\" \"When was your last menstrual period?\"      No    Protocols used: ADULT TOE PAIN-A-AH, ADULT WOUND INFECTION-A-AH          "

## 2018-02-13 NOTE — TELEPHONE ENCOUNTER
Patient Information     Patient Name MRN Karen Randle [id number] Female 1945      Telephone Encounter by Magi Blackwell at 2017 10:51 AM     Author:  Magi Blackwell Service:  (none) Author Type:  (none)     Filed:  2017 10:51 AM Encounter Date:  2017 Status:  Signed     :  Magi Blackwell            Patient notified.  Magi Blackwell LPN..........2017  10:51 AM

## 2018-02-13 NOTE — TELEPHONE ENCOUNTER
Patient Information     Patient Name MRN Karen Randle [id number] Female 1945      Telephone Encounter by Daamris Potter RN at 2018 10:52 AM     Author:  Damaris Potter RN Service:  (none) Author Type:  NURS- Registered Nurse     Filed:  2018 10:55 AM Encounter Date:  2018 Status:  Signed     :  Damaris Potter RN (NURS- Registered Nurse)            IRON, FERROUS SULFATE, 325 mg (65 mg iron) tablet  The source prescription has been discontinued.  TAKE ONE TABLET BY MOUTH TWICE DAILY       Disp: 180 tablet Refills: 3    Class: eRx Start: 2018    For: Iron deficiency anemia, unspecified iron deficiency anemia type  Documented:5 years ago  Last refill:1/10/2017  To be filled at: [facility] [address] [place], [place] [address]Phone: [phone]    Last visit with LEO GIVENS was on: 01/15/2018 in [facility]  PCP:  Leo Givens MD    Medication noted as historical on current medication list and instructions Take 650 mg by mouth once daily. Requested instructions take one tablet by mouth twice daily.    Will route to Leo Givens MD for review and consideration of refill.     Unable to complete prescription refill per RN Medication Refill Policy.................... DAMARIS POTTER RN ....................  2018   10:52 AM

## 2018-02-13 NOTE — PROGRESS NOTES
Patient Information     Patient Name MRN Sex Karen Francis [id number] Female 1945      Progress Notes by Hugo Duarte MD at 2018  2:40 PM     Author:  Hugo Duarte MD Service:  (none) Author Type:  Physician     Filed:  2018  2:57 PM Encounter Date:  2018 Status:  Signed     :  Hugo Duarte MD (Physician)            SUBJECTIVE:    Karen Duncan is a 72 y.o. female who presents for toe concerns.    HPI Comments: She is in today with the complaints of an infection on her toe. This toe always has a deformity and she wore some new slippers and it rubbed on the top of the slippers. The toe is now red and it has an ulcer present. She hasn't had a fever. There's been no drainage.      Allergies      Allergen   Reactions     Adhesive Tapes [Adhesive]  *Unknown     Paper tapes, fragile skin      Aliskiren  Cough     Lisinopril  Cough     Ace cough      Losartan  Cough   ,   Current Outpatient Prescriptions     Medication  Sig     amLODIPine (NORVASC) 2.5 mg tablet Take 1 tablet by mouth once daily.     ascorbic acid, vitamin C, (ASCORBIC ACID WITH NIECY HIPS) 500 mg tablet Take 1,000 mg by mouth once daily.     aspirin 81 mg tablet Take 1 tablet by mouth once daily with a meal.     atorvastatin (LIPITOR) 20 mg tablet TAKE ONE TABLET BY MOUTH AT BEDTIME     baclofen (LIORESAL) 10 mg tablet TAKE ONE TABLET BY MOUTH THREE TIMES DAILY     BENADRYL 25 MG CAP two tablets orally at bedtime     blood sugar diagnostic (ACCU-CHEK LENIN PLUS TEST STRP) strip Test 4 times daily     blood sugar diagnostic (BLOOD GLUCOSE TEST) strip Dispense item covered by pt ins. Test 4 times daily E11.29     CALCIUM CARBONATE (TUMS 500 ORAL) Take 2-3 Tabs by mouth 2 times daily if needed (HEARTBURN).     cloNIDine HCl (CATAPRES) 0.1 mg tablet TAKE ONE TABLET BY MOUTH ONCE DAILY     CYANOCOBALAMIN, VITAMIN B-12, (VITAMIN B-12 ORAL) Take 1 Tab by mouth once daily.     diltiazem CD (CARDIZEM CD) 360 mg  "extended release 24 hr capsule Take 1 capsule by mouth once daily.     diltiazem CR (TIAZAC; TAZTIA XT) 360 mg capsule TAKE ONE CAPSULE BY MOUTH ONCE DAILY     docusate (COLACE) 100 mg capsule Take 100 mg by mouth 2 times daily if needed for Constipation.     estradiol (ESTRACE) 0.5 mg tablet TAKE ONE TABLET BY MOUTH ONCE DAILY     ferrous sulfate 325 mg delayed release tablet Take 650 mg by mouth once daily.     furosemide (LASIX) 40 mg tablet Take 1 tablet by mouth every morning.     gabapentin (NEURONTIN) 100 mg capsule TAKE ONE CAPSULE BY MOUTH THREE TIMES DAILY     HYDROcodone-acetaminophen, 5-325 mg, (NORCO) per tablet Take 1 tablet by mouth 4 times daily  Max acetaminophen dose: 4000 mg in 24 hrs.     insulin aspart (NOVOLOG FLEXPEN) 100 unit/mL solution for injection Inject 4 times daily per sliding scale, 24 units max daily     insulin glargine (LANTUS SOLOSTAR) 100 unit/mL (3 mL) pen 16 units am, 14 units pm     Insulin Safety Needles, Disp, 30 gauge x 1/3\" For administering insulin at home 5 times daily.  Dx code e11.9     lancets (ACCU-CHEK MULTICLIX LANCET) Test 4 times daily     metFORMIN (GLUCOPHAGE XR) 500 mg Extended-Release tablet Take 2 tablets by mouth 2 times daily with meals.     Omega-3-DHA-EPA-Fish Oil 1,200 (144-216) mg capsule Take 2 capsules by mouth once daily.     Omeprazole 20 mg tablet Take 1 tablet by mouth before breakfast.     predniSONE (DELTASONE) 5 mg tablet TAKE ONE TABLET BY MOUTH TWICE DAILY WITH MEALS     psyllium (METAMUCIL) 0.52 gram capsule Take 3 capsules by mouth once daily.     Saccharomyces boulardii (FLORASTOR) 250 mg capsule Take 250 mg by mouth once daily.     spironolactone (ALDACTONE) 25 mg tablet TAKE ONE TABLET BY MOUTH ONCE DAILY     traMADol (ULTRAM) 50 mg tablet TAKE TWO TABLETS BY MOUTH EVERY 6 HOURS AS NEEDED FOR PAIN     No current facility-administered medications for this visit.      Medications have been reviewed by me and are current to the best of my " knowledge and ability. ,   Past Medical History:     Diagnosis  Date     Atrial fibrillation (HC) 2000     Diabetes mellitus, type II (HC)      Elevated liver function tests 2010    Fatty liver on US and CT.  Iron nl. 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    IV Vancomycin to resolve, L 3rd digit      Pneumothorax     spondylolysis (right) at age 39 followed by spondylolysis (left) several years later      Psoas muscle abscess (HC) 2016    Strep viridans      Rheumatoid arthritis(714.0) 2000     Urinary sepsis 2012    hospitalized with hypotension, blood cultures showed Escherichia coli     and   Patient Active Problem List       Diagnosis  Date Noted     Decubitus ulcer of buttock, stage 4 (HC)  05/29/2017     Chronic pain syndrome  04/18/2017     Pain medication agreement  04/18/2017     Anemia  01/16/2017     Elevated LFTs  01/16/2017     Corticosteroid dependence (HC)  01/15/2017     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 This will not affect patient care or billing. This comment can be deleted.        Atrial fibrillation (HC)       DIVERTICULOSIS, COLON  02/18/2010     Rheumatoid arthritis(714.0)  12/07/2006       REVIEW OF SYSTEMS:  Review of Systems   All other systems reviewed and are negative.      OBJECTIVE:  /78  Pulse 72  Wt 52.2 kg (115 lb)  Breastfeeding? No  BMI 20.37 kg/m2    EXAM:   Physical Exam   Constitutional: She is well-developed, well-nourished, and in no distress. No distress.   Musculoskeletal:   Her left second toe had a 5 mm superficial abrasion from where it was being rubbed on. There was some surrounding erythema but no warmth and no  drainage. I did not think this was infected.   Skin: She is not diaphoretic.   Nursing note and vitals reviewed.      ASSESSMENT/PLAN:    ICD-10-CM    1. Skin ulcer of toe of left foot, limited to breakdown of skin (HC) L97.521         Plan:  I think this should heal adequately if she avoids any further irritation or abrasion to the skin. She will use moleskin to cover this and she will quit wearing the new slippers that caused this. I want her to change the dressing at least daily and to use warm soapy water soaks. Return or call if things seem to worsen, I reviewed signs and symptoms of infection with her today.

## 2018-02-13 NOTE — TELEPHONE ENCOUNTER
Patient Information     Patient Name MRN Karen Randle [id number] Female 1945      Telephone Encounter by Hugo Duarte MD at 2017  1:47 PM     Author:  Hugo Duarte MD Service:  (none) Author Type:  Physician     Filed:  2017  1:47 PM Encounter Date:  2017 Status:  Signed     :  Hugo Duarte MD (Physician)            Okay. Not sure what I'm supposed to do with this.

## 2018-02-13 NOTE — NURSING NOTE
Patient Information     Patient Name MRN Karen Randle [id number] Female 1945      Nursing Note by Josefa Pandya LPN at 1/15/2018  1:40 PM     Author:  Josefa Pandya LPN Service:  (none) Author Type:  NURS- Licensed Practical Nurse     Filed:  1/15/2018  1:51 PM Encounter Date:  1/15/2018 Status:  Signed     :  Josefa Pandya LPN (NURS- Licensed Practical Nurse)            The patient is here today to have a laceration on her right lower leg looked at. The patient states it happened on 2018.  Josefa Pandya LPN......1/15/2018  1:42 PM

## 2018-02-13 NOTE — PROGRESS NOTES
Patient Information     Patient Name MRN Sex Karen Francis [id number] Female 1945      Progress Notes by Hugo Duarte MD at 1/15/2018  1:40 PM     Author:  Hugo Duarte MD Service:  (none) Author Type:  Physician     Filed:  1/15/2018  2:36 PM Encounter Date:  1/15/2018 Status:  Signed     :  Hugo Duarte MD (Physician)            SUBJECTIVE:    Karen Duncan is a 72 y.o. female who presents for leg wound.    HPI Comments: She is here today concerned about an infection. 10 days ago when she was leaving here she bumped her shin on the car and developed a wound. She's been treating this at home and she saw some yellow discharge this morning. She is worried about a possible infection. No increase in pain. No fever.      Allergies      Allergen   Reactions     Adhesive Tapes [Adhesive]  *Unknown     Paper tapes, fragile skin      Aliskiren  Cough     Lisinopril  Cough     Ace cough      Losartan  Cough   ,   Current Outpatient Prescriptions     Medication  Sig     amLODIPine (NORVASC) 2.5 mg tablet Take 1 tablet by mouth once daily.     ascorbic acid, vitamin C, (ASCORBIC ACID WITH NIECY HIPS) 500 mg tablet Take 1,000 mg by mouth once daily.     aspirin 81 mg tablet Take 1 tablet by mouth once daily with a meal.     atorvastatin (LIPITOR) 20 mg tablet TAKE ONE TABLET BY MOUTH AT BEDTIME     baclofen (LIORESAL) 10 mg tablet TAKE ONE TABLET BY MOUTH THREE TIMES DAILY     BENADRYL 25 MG CAP two tablets orally at bedtime     blood sugar diagnostic (ACCU-CHEK LENIN PLUS TEST STRP) strip Test 4 times daily     blood sugar diagnostic (BLOOD GLUCOSE TEST) strip Dispense item covered by pt ins. Test 4 times daily E11.29     CALCIUM CARBONATE (TUMS 500 ORAL) Take 2-3 Tabs by mouth 2 times daily if needed (HEARTBURN).     cloNIDine HCl (CATAPRES) 0.1 mg tablet TAKE ONE TABLET BY MOUTH ONCE DAILY     CYANOCOBALAMIN, VITAMIN B-12, (VITAMIN B-12 ORAL) Take 1 Tab by mouth once daily.     diltiazem CD  "(CARDIZEM CD) 360 mg extended release 24 hr capsule Take 1 capsule by mouth once daily.     diltiazem CR (TIAZAC; TAZTIA XT) 360 mg capsule TAKE ONE CAPSULE BY MOUTH ONCE DAILY     docusate (COLACE) 100 mg capsule Take 100 mg by mouth 2 times daily if needed for Constipation.     estradiol (ESTRACE) 0.5 mg tablet TAKE ONE TABLET BY MOUTH ONCE DAILY     ferrous sulfate 325 mg delayed release tablet Take 650 mg by mouth once daily.     furosemide (LASIX) 40 mg tablet Take 1 tablet by mouth every morning.     gabapentin (NEURONTIN) 100 mg capsule TAKE ONE CAPSULE BY MOUTH THREE TIMES DAILY     HYDROcodone-acetaminophen, 5-325 mg, (NORCO) per tablet Take 1 tablet by mouth 4 times daily  Max acetaminophen dose: 4000 mg in 24 hrs.     insulin aspart (NOVOLOG FLEXPEN) 100 unit/mL solution for injection Inject 4 times daily per sliding scale, 24 units max daily     insulin glargine (LANTUS SOLOSTAR) 100 unit/mL (3 mL) pen 16 units am, 14 units pm     Insulin Safety Needles, Disp, 30 gauge x 1/3\" For administering insulin at home 5 times daily.  Dx code e11.9     lancets (ACCU-CHEK MULTICLIX LANCET) Test 4 times daily     metFORMIN (GLUCOPHAGE XR) 500 mg Extended-Release tablet TAKE TWO TABLETS BY MOUTH TWICE DAILY     [place]-3-DHA-EPA-Fish Oil 1,200 (144-216) mg capsule Take 2 capsules by mouth once daily.     Omeprazole 20 mg tablet Take 1 tablet by mouth before breakfast.     predniSONE (DELTASONE) 5 mg tablet TAKE ONE TABLET BY MOUTH TWICE DAILY WITH MEALS     psyllium (METAMUCIL) 0.52 gram capsule Take 3 capsules by mouth once daily.     Saccharomyces boulardii (FLORASTOR) 250 mg capsule Take 250 mg by mouth once daily.     spironolactone (ALDACTONE) 25 mg tablet TAKE ONE TABLET BY MOUTH ONCE DAILY     traMADol (ULTRAM) 50 mg tablet TAKE TWO TABLETS BY MOUTH EVERY 6 HOURS AS NEEDED FOR PAIN     No current facility-administered medications for this visit.      Medications have been reviewed by me and are current to the " best of my knowledge and ability. ,   Past Medical History:     Diagnosis  Date     Atrial fibrillation (HC) 2000     Diabetes mellitus, type II (HC)      Elevated liver function tests 2010    Fatty liver on US and CT.  Iron nl. 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    IV Vancomycin to resolve, L 3rd digit      Pneumothorax     spondylolysis (right) at age 39 followed by spondylolysis (left) several years later      Psoas muscle abscess (HC) 2016    Strep viridans      Rheumatoid arthritis(714.0) 2000     Urinary sepsis 2012    hospitalized with hypotension, blood cultures showed Escherichia coli    ,   Patient Active Problem List       Diagnosis  Date Noted     Decubitus ulcer of buttock, stage 4 (HC)  05/29/2017     Chronic pain syndrome  04/18/2017     Pain medication agreement  04/18/2017     Anemia  01/16/2017     Elevated LFTs  01/16/2017     Corticosteroid dependence (HC)  01/15/2017     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 This will not affect patient care or billing. This comment can be deleted.        Atrial fibrillation (HC)       DIVERTICULOSIS, COLON  02/18/2010     Rheumatoid arthritis(714.0)  12/07/2006   ,   Past Surgical History:      Procedure  Laterality Date     BREAST BIOPSY      Left       CATARACT REMOVAL Bilateral      COLONOSCOPY SCREENING  2001    repeat in 10 years per patient       COLONOSCOPY SCREENING  2010    sigmoid diverticulosis, no polyps, due 2020       COLONOSCOPY SCREENING  2011    mild sigmoid diverticulosis, EGD biopsies       CT GUIDE PERC DRAIN CATH (IA)  2016     ESOPHAGOGASTRODUODENOSCOPY  2/2011    Normal       FOREARM/WRIST  SURGERY  2010    removed nodule Right wrist       KNEE REPLACEMENT Bilateral 07/10/08     LUMBAR LAMINECTOMY       MUSCLE FLAP  2017    Left ischial ulcer       PORTACATH      hx chest tube surgery x2       PORTACATH  2012    power port placement, Dr. Taylor       SPINE SURGERY      Back surgery with fusion of the lumbar discs       MOISES AND BSO      secondary to fibroids      and   Social History        Substance Use Topics          Smoking status:   Former Smoker      Packs/day:  3.00      Years:  25.00      Types:  Cigarettes      Quit date:  5/27/1974      Smokeless tobacco:   Never Used       Comment: quit 1984       Alcohol use   8.4 oz/week     14 Cans of beer per week        Comment: 1 beers/day         REVIEW OF SYSTEMS:  Review of Systems   All other systems reviewed and are negative.      OBJECTIVE:  /82 (Cuff Site: Right Arm, Position: Sitting, Cuff Size: Adult Regular)  Pulse 72  Temp 98.3  F (36.8  C) (Tympanic)  Wt 52.2 kg (115 lb)  Breastfeeding? No  BMI 20.37 kg/m2    EXAM:   Physical Exam   Constitutional: She is well-developed, well-nourished, and in no distress. No distress.   In wheelchair   Skin: She is not diaphoretic.   On her right shin she has a skin tear measuring approximately 4 cm x 4 cm. Most of the flap his back over the wound. No sign of infection.   Nursing note and vitals reviewed.      ASSESSMENT/PLAN:    ICD-10-CM    1. Skin tear of lower leg without complication, right, initial encounter S81.811A DME      OMNI TD PRESERVATIVE FREE >6 YO        Plan:  Tegaderm was placed over the wound, she will change this every 3 days. Tetanus given today. Follow-up if not improving.

## 2018-02-13 NOTE — TELEPHONE ENCOUNTER
Patient Information     Patient Name MRN Karen Randle [id number] Female 1945      Telephone Encounter by Magi Blackwell at 2017  9:54 AM     Author:  Magi Blackwell Service:  (none) Author Type:  (none)     Filed:  2017 10:00 AM Encounter Date:  2017 Status:  Signed     :  Magi Blackwell            In yesterday to see Dr. Duarte.  She states that he increased her Novalog to 16 max units daily.  She is not able to get a refill until  this is increased to 18 units max daily.  [facility] will not dispense.  Magi Blackwell LPN..........2017  9:56 AM

## 2018-02-13 NOTE — TELEPHONE ENCOUNTER
Patient Information     Patient Name MRN Karen Randle [id number] Female 1945      Telephone Encounter by Anne Piña RN at 2018  8:28 AM     Author:  Anne Piña RN Service:  (none) Author Type:  NURS- Registered Nurse     Filed:  2018  8:32 AM Encounter Date:  2018 Status:  Signed     :  Anne Piña RN (NURS- Registered Nurse)            Attempted to return call to patient for triage of symptoms. Unable to reach, left message to call back. Anne Piña RN ....................  2018   8:31 AM

## 2018-02-13 NOTE — TELEPHONE ENCOUNTER
Patient Information     Patient Name MRN Karen Randle [id number] Female 1945      Telephone Encounter by Damaris Arora RN at 1/15/2018  9:48 AM     Author:  Damaris Arora RN Service:  (none) Author Type:  NURS- Registered Nurse     Filed:  1/15/2018  9:51 AM Encounter Date:  2018 Status:  Signed     :  Damaris Arora RN (NURS- Registered Nurse)            Biguanides    Office visit in the past 12 months or per provider note.    Last visit with LEO GIVENS was on: 2018 in [facility]  Next visit with LEO GIVENS is on: No future appointment listed with this provider  Next visit with Internal Medicine is on: No future appointment listed in this department    Lab test requirements:  HgbA1c annually or per provider note.  HEMOGLOBIN A1C MONITORING (POCT)    Date Value   2017 8.5 % (H)   2013 7.2 % NGSP (H)       Max refill for 12 months from last office visit or per provider note.    If taking for polycystic ovary disease, may refill for 12 months.  Per OV on 17 patient to follow up in 3 months. With note of no medication changes.  Prescription refilled per RN Medication Refill Policy.................... DAMARIS ARORA RN ....................  1/15/2018   9:49 AM

## 2018-02-13 NOTE — TELEPHONE ENCOUNTER
Patient Information     Patient Name MRN Sex Karen Francis [id number] Female 1945      Telephone Encounter by Irma Stone DO at 2017 10:42 AM     Author:  Irma Stone DO Service:  (none) Author Type:  PHYS- Osteopathic     Filed:  2017 10:44 AM Encounter Date:  2017 Status:  Signed     :  Irma Stone DO (PHYS- Osteopathic)            NovoLog orders sent in yesterday already states 24 units max daily.

## 2018-02-13 NOTE — TELEPHONE ENCOUNTER
Patient Information     Patient Name MRN Karen Randle [id number] Female 1945      Telephone Encounter by Anne Piña RN at 2017  4:24 PM     Author:  Anne Piña RN Service:  (none) Author Type:  NURS- Registered Nurse     Filed:  2017  4:26 PM Encounter Date:  2017 Status:  Signed     :  Anne Piña RN (NURS- Registered Nurse)            Called regarding fax from [facility] for clarification on patient's flexpen. With review of chart patient was seen by Hugo Duarte MD in office today with medication reviewed and Rx for Novolog pen sent over to [facility]. Confirmed with pharmacy Cammie parmar, that order has been received and all concerns are resolved at this time.   Anne Piña RN ....................  2017   4:26 PM

## 2018-02-13 NOTE — TELEPHONE ENCOUNTER
Patient Information     Patient Name MRKaren Ramirez [id number] Female 1945      Telephone Encounter by Josefa Pandya LPN at 2018  9:01 AM     Author:  Josefa Pandya LPN Service:  (none) Author Type:  NURS- Licensed Practical Nurse     Filed:  2018  9:01 AM Encounter Date:  2018 Status:  Signed     :  Josefa Pandya LPN (NURS- Licensed Practical Nurse)            Contacted the patient and let her know that we did receive a refill request and that I would talk to the RN refill nurse. Damaris stated she would call the pharmacy and take a look at the refill request for the patient.  Josefa Pandya LPN......2018  9:01 AM

## 2018-02-13 NOTE — TELEPHONE ENCOUNTER
Patient Information     Patient Name MRN Karen Randle [id number] Female 1945      Telephone Encounter by Josefa Pandya LPN at 2018 10:18 AM     Author:  Josefa Pandya LPN Service:  (none) Author Type:  NURS- Licensed Practical Nurse     Filed:  2018 10:19 AM Encounter Date:  2018 Status:  Signed     :  Josefa Pandya LPN (NURS- Licensed Practical Nurse)            The patient was contacted and given the appointment time below. She was placed in Hugo Duarte MD schedule.  Josefa Pandya LPN......2018  10:19 AM

## 2018-02-13 NOTE — TELEPHONE ENCOUNTER
Patient Information     Patient Name MRN Karen Randle [id number] Female 1945      Telephone Encounter by Damaris Potter RN at 1/15/2018  3:10 PM     Author:  Damaris Potter RN Service:  (none) Author Type:  NURS- Registered Nurse     Filed:  1/15/2018  3:14 PM Encounter Date:  2018 Status:  Signed     :  Damaris Potter RN (NURS- Registered Nurse)            LANTUS SOLOSTAR 100 unit/mL (3 mL) pen  The source prescription has been discontinued.  INJECT 16 UNITS SUBCUTANEOUSLY IN THE MORNING AND 14 UNITS IN THE EVENING       Disp: Not specified (Pharmacy requested 15 NO_MATCH)     Refills: 3     Class: eRx Start: 2018    Documented:1 year ago  Last refill:2017  To pharmacy: Please consider 90 day supplies to promote better adherence  To be filled at: [facility] [address] [place], [place] [address]Phone: [phone]    Last visit with LEO GIVENS was on: 01/15/2018 in [facility]  PCP:  Leo Givens MD  Patient seen today  Will route to Leo Givens MD for review and consideration of refills.  Unable to complete prescription refill per RN Medication Refill Policy.................... DAMARIS POTTER RN ....................  1/15/2018   3:11 PM

## 2018-02-13 NOTE — TELEPHONE ENCOUNTER
Patient Information     Patient Name MRN Karen Randle [id number] Female 1945      Telephone Encounter by Anne Piña RN at 2018 10:21 AM     Author:  Anne Piña RN Service:  (none) Author Type:  NURS- Registered Nurse     Filed:  2018 10:21 AM Encounter Date:  2018 Status:  Signed     :  Anne Piña RN (NURS- Registered Nurse)            Please refer to triage note of same date.

## 2018-02-13 NOTE — TELEPHONE ENCOUNTER
Patient Information     Patient Name MRN Karen Randle [id number] Female 1945      Telephone Encounter by Janell Keita at 2018  8:14 AM     Author:  Janell Keita Service:  (none) Author Type:  (none)     Filed:  2018  8:15 AM Encounter Date:  2018 Status:  Signed     :  Janell Keita            Patient requesting work in today for toe inflammation. Thank you.

## 2018-02-13 NOTE — TELEPHONE ENCOUNTER
Patient Information     Patient Name MRN Karen Randle [id number] Female 1945      Telephone Encounter by Josefa Pandya LPN at 1/15/2018 11:15 AM     Author:  Josefa Pandya LPN Service:  (none) Author Type:  NURS- Licensed Practical Nurse     Filed:  1/15/2018 11:15 AM Encounter Date:  1/15/2018 Status:  Signed     :  Josefa Pandya LPN (NURS- Licensed Practical Nurse)            Called the patient and left a message for the patient with the appointment time below.  Josefa Pandya LPN......1/15/2018  11:15 AM

## 2018-02-13 NOTE — TELEPHONE ENCOUNTER
Patient Information     Patient Name MRN Karen Randle [id number] Female 1945      Telephone Encounter by Hugo Duarte MD at 1/15/2018 10:53 AM     Author:  Hugo Durate MD Service:  (none) Author Type:  Physician     Filed:  1/15/2018 10:53 AM Encounter Date:  1/15/2018 Status:  Signed     :  Hugo Duarte MD (Physician)            1:40 PM today

## 2018-02-13 NOTE — TELEPHONE ENCOUNTER
Patient Information     Patient Name MRKaren Ramirez [id number] Female 1945      Telephone Encounter by Yusra Zeng at 2018  9:41 AM     Author:  Yusra Zeng Service:  (none) Author Type:  (none)     Filed:  2018  9:43 AM Encounter Date:  2018 Status:  Signed     :  Yusra Zeng            Patient had a request sent over on Friday for more needles. She is not out and can't do her insulin. She was wondering if something had been sent back yet?

## 2018-02-13 NOTE — TELEPHONE ENCOUNTER
Patient Information     Patient Name MRN Karen Randle [id number] Female 1945      Telephone Encounter by Josefa Pandya LPN at 2018 10:08 AM     Author:  Josefa Pandya LPN Service:  (none) Author Type:  NURS- Licensed Practical Nurse     Filed:  2018 10:09 AM Encounter Date:  2018 Status:  Signed     :  Josefa Pandya LPN (NURS- Licensed Practical Nurse)            Patient called and is out of the needles for her insulin as of this morning. The order is teed up below.  Josefa Pandya LPN......2018  10:08 AM

## 2018-02-13 NOTE — TELEPHONE ENCOUNTER
Patient Information     Patient Name MRN Karen Randle [id number] Female 1945      Telephone Encounter by Pam Guerrero RN at 2018 11:28 AM     Author:  Pam Guerrero RN Service:  (none) Author Type:  NURS- Registered Nurse     Filed:  2018 11:33 AM Encounter Date:  2018 Status:  Signed     :  Pam Guerrero RN (NURS- Registered Nurse)            Return call to patient as requested. Patient states she had contacted [facility] this morning and was told they hadn't received rx for Ultram yet. Contacted pharmacy who verified that yes indeed this rx was received. Pharmacy to contact patient and let her know. Pam Guerrero RN ....................  2018   11:33 AM      Prescribing Provider: Hugo Duarte MD                   Order Date: 2018  Ordered by: HUGO DUARTE  Medication:traMADol (ULTRAM) 50 mg tablet  Prescription #:[id number]    Qty:90 tablet   Ref:2  Start:2018   End:              Route:Oral                  GISELE:No   Class:Print    Sig:TAKE TWO TABLETS BY MOUTH EVERY 6 HOURS AS NEEDED FOR PAIN    Pharmacy:[facility] - [address]

## 2018-02-13 NOTE — TELEPHONE ENCOUNTER
Patient Information     Patient Name MRKaren Ramirez [id number] Female 1945      Telephone Encounter by Janell Keita at 2017  9:34 AM     Author:  Janell Keita Service:  (none) Author Type:  (none)     Filed:  2017  9:35 AM Encounter Date:  2017 Status:  Signed     :  Janell Keita            Patient would like to speak with nurse regarding insulin refill.

## 2018-02-13 NOTE — PROGRESS NOTES
Patient Information     Patient Name MRN Karen Randle [id number] Female 1945      Progress Notes by Goodell, Brenda, RN at 2018 10:34 AM     Author:  Goodell, Brenda, RN Service:  (none) Author Type:  NURS- Registered Nurse     Filed:  2018 10:36 AM Date of Service:  2018 10:34 AM Status:  Signed     :  Goodell, Brenda, RN (NURS- Registered Nurse)            Patient arrived via w/c assist wit her  for port flush. Port accessed with brisk blood return. No labs needed today. Flushed easily with saline and Heparin. De accessed. Left via w/c.

## 2018-02-13 NOTE — TELEPHONE ENCOUNTER
Patient Information     Patient Name MRN Karen Randle [id number] Female 1945      Telephone Encounter by Ada Doherty at 2017  2:08 PM     Author:  Ada Doherty Service:  (none) Author Type:  (none)     Filed:  2017  2:11 PM Encounter Date:  2017 Status:  Signed     :  Ada Doherty            Spoke with [facility] pharmacy, they said it is too soon to fill this RX. Patient stated to Pharmacy that she has been using 24 units a day instead of RX directions. Pharmacy stated the only way Rx could possibly be filled is if Hugo Duarte MD  Changes RX to at least 24 units per day. Ada Doherty LPN......................2017 2:10 PM

## 2018-02-13 NOTE — TELEPHONE ENCOUNTER
Patient Information     Patient Name MRN Karen Randle [id number] Female 1945      Telephone Encounter by Hugo Duarte MD at 2018 10:12 AM     Author:  Hugo Duarte MD Service:  (none) Author Type:  Physician     Filed:  2018 10:12 AM Encounter Date:  2018 Status:  Signed     :  Hugo Duarte MD (Physician)            2:40 PM today

## 2018-02-16 DIAGNOSIS — M06.9 RHEUMATOID ARTHRITIS (H): ICD-10-CM

## 2018-02-16 DIAGNOSIS — M06.9 RHEUMATOID ARTHRITIS, INVOLVING UNSPECIFIED SITE, UNSPECIFIED RHEUMATOID FACTOR PRESENCE: Primary | ICD-10-CM

## 2018-02-19 RX ORDER — PREDNISONE 5 MG/1
5 TABLET ORAL 2 TIMES DAILY
Qty: 180 TABLET | Refills: 1 | Status: SHIPPED | OUTPATIENT
Start: 2018-02-19 | End: 2018-08-14

## 2018-02-19 NOTE — TELEPHONE ENCOUNTER
Routing refill request for Prednisone to provider for review/approval because:  Drug not on the [facility] refill protocol.   Seen by Hguo Duarte on 12-27-17 with medications reviewed. Anne Piña RN on 2/19/2018 at 11:55 AM

## 2018-02-21 ENCOUNTER — TELEPHONE (OUTPATIENT)
Dept: FAMILY MEDICINE | Facility: OTHER | Age: 73
End: 2018-02-21

## 2018-02-21 NOTE — TELEPHONE ENCOUNTER
Prescription approved per [facility] Refill Protocol.  Anne Piña RN on 2/21/2018 at 8:50 AM

## 2018-02-26 DIAGNOSIS — I10 HYPERTENSION: Primary | ICD-10-CM

## 2018-02-26 RX ORDER — AMLODIPINE BESYLATE 2.5 MG/1
2.5 TABLET ORAL DAILY
Qty: 90 TABLET | Refills: 2 | Status: SHIPPED | OUTPATIENT
Start: 2018-02-26 | End: 2018-01-01

## 2018-02-26 NOTE — TELEPHONE ENCOUNTER
Prescription approved per [facility] Refill Protocol.  LOV 1/15/2018  Last creatinine 11/14/17  Last BP; 134/82  Damaris Arora RN on 2/26/2018 at 3:12 PM

## 2018-03-05 DIAGNOSIS — R52 PAIN: Primary | ICD-10-CM

## 2018-03-05 RX ORDER — TRAMADOL HYDROCHLORIDE 50 MG/1
100 TABLET ORAL EVERY 6 HOURS PRN
Qty: 60 TABLET | Refills: 0 | Status: SHIPPED | OUTPATIENT
Start: 2018-03-05 | End: 2018-03-13

## 2018-03-05 NOTE — TELEPHONE ENCOUNTER
The patient was contacted and states she still has not received a refill on her tramadol that was requested last week. The patient is completely out and needs this today. The order is teed up below.  LUIZ RICHARD LPN 3/5/2018 11:08 AM

## 2018-03-06 ENCOUNTER — INFUSION THERAPY VISIT (OUTPATIENT)
Dept: INFUSION THERAPY | Facility: OTHER | Age: 73
End: 2018-03-06

## 2018-03-06 DIAGNOSIS — Z53.9 ERRONEOUS ENCOUNTER--DISREGARD: Primary | ICD-10-CM

## 2018-03-06 RX ORDER — HEPARIN SODIUM (PORCINE) LOCK FLUSH IV SOLN 100 UNIT/ML 100 UNIT/ML
500 SOLUTION INTRAVENOUS
Status: DISCONTINUED | OUTPATIENT
Start: 2018-03-06 | End: 2018-07-16 | Stop reason: HOSPADM

## 2018-03-06 NOTE — MR AVS SNAPSHOT
"              After Visit Summary   3/6/2018    Karen Duncan    MRN: [id number]           Patient Information     Date Of Birth          1945        Visit Information        Provider Department      3/6/2018 3:08 PM Hugo Duarte MD [facility]        Today's Diagnoses     ERRONEOUS ENCOUNTER--DISREGARD    -  1       Follow-ups after your visit        Who to contact     If you have questions or need follow up information about today's clinic visit or your schedule please contact [facility] directly at [phone].  Normal or non-critical lab and imaging results will be communicated to you by [employer], letter or phone within 4 business days after the clinic has received the results. If you do not hear from us within 7 days, please contact the clinic through [employer] or phone. If you have a critical or abnormal lab result, we will notify you by phone as soon as possible.  Submit refill requests through [employer] or call your pharmacy and they will forward the refill request to us. Please allow 3 business days for your refill to be completed.          Additional Information About Your Visit        MyChart Information     [employer] [facility] lets you send messages to your doctor, view your test results, renew your prescriptions, schedule appointments and more. To sign up, go to [email or web address] . Click on \"Log in\" on the left side of the screen, which will take you to the Welcome page. Then click on \"Sign up Now\" on the right side of the page.     You will be asked to enter the access code listed below, as well as some personal information. Please follow the directions to create your username and password.     Your access code is: [email or web address]  Expires: 2018  6:40 AM     Your access code will  in 90 days. If you need help or a new code, please call your [place] clinic or [phone].        Care EveryWhere ID     This is your Care EveryWhere ID. This could be " used by other organizations to access your [facility] medical records  [id number]         Blood Pressure from Last 3 Encounters:   03/07/18 137/67   01/15/18 134/82   01/05/18 124/78    Weight from Last 3 Encounters:   01/15/18 115 lb (52.2 kg)   01/05/18 115 lb (52.2 kg)   12/27/17 115 lb (52.2 kg)              Today, you had the following     No orders found for display       Primary Care Provider Office Phone # Fax #    Hugo Duarte [phone]       [address]  [facility] [address]        Equal Access to Services     [facility] Community HospitalKATELYNN : Hadii yamileth Ayoub, walo cummings, pawan youngmada musa, sal chavez . So [facility] [phone].    ATENCIÓN: Si habla español, tiene a lawson disposición servicios gratuitos de asistencia lingüística. LlSelect Medical [facility] [phone].    We comply with applicable federal civil rights laws and [place] laws. We do not discriminate on the basis of race, color, national origin, age, disability, sex, sexual orientation, or gender identity.            Thank you!     Thank you for choosing [facility] AND \[facility]\""  for your care. Our goal is always to provide you with excellent care. Hearing back from our patients is one way we can continue to improve our services. Please take a few minutes to complete the written survey that you may receive in the mail after your visit with us. Thank you!             Your Updated Medication List - Protect others around you: Learn how to safely use, store and throw away your medicines at www.disposemymeds.org.          This list is accurate as of 3/6/18 11:59 PM.  Always use your most recent med list.                   Brand Name Dispense Instructions for use Diagnosis    amLODIPine 2.5 MG tablet    NORVASC    90 tablet    Take 1 tablet (2.5 mg) by mouth daily    Hypertension       ascorbic acid 500 MG tablet    VITAMIN C     Take 1,000 mg by mouth        atorvastatin 20 MG tablet     LIPITOR     Take 20 mg by mouth        baclofen 10 MG tablet    LIORESAL     Take 10 mg by mouth        blood glucose monitoring lancets      Test 4 times daily        blood glucose monitoring test strip    no brand specified    200 strip    Test 4 times daily. Dispense item covered by patient's insurance. Diagnosis code E11.29    Uncontrolled diabetes with kidney complications (H)       calcium carbonate 500 MG chewable tablet    TUMS          cloNIDine 0.1 MG tablet    CATAPRES     Take 0.1 mg by mouth        diltiazem 360 MG 24 hr capsule    TIAZAC     Take 360 mg by mouth        diltiazem 360 MG 24 hr CD capsule    CARDIZEM CD; CARTIA XT     Take 360 mg by mouth        DOCOSAHEXAENOIC ACID PO      Take 2 capsules by mouth        docusate sodium 100 MG capsule    COLACE     Take 100 mg by mouth        estradiol 0.5 MG tablet    ESTRACE     Take 0.5 mg by mouth        ferrous sulfate 325 (65 FE) MG Tbec EC tablet      Take 650 mg by mouth        FLORASTOR 250 MG capsule   Generic drug:  saccharomyces boulardii      Take 250 mg by mouth        furosemide 40 MG tablet    LASIX     Take 40 mg by mouth        gabapentin 100 MG capsule    NEURONTIN     Take 100 mg by mouth        HYDROcodone-acetaminophen 5-325 MG per tablet    NORCO     Take 1 tablet by mouth        insulin aspart 100 UNIT/ML injection    NovoLOG PEN     Inject 4 times daily per sliding scale, 24 units max daily        insulin glargine 100 UNIT/ML injection    LANTUS     16 units am, 14 units pm        metFORMIN 500 MG 24 hr tablet    GLUCOPHAGE-XR     Take 1,000 mg by mouth        predniSONE 5 MG tablet    DELTASONE    180 tablet    Take 1 tablet (5 mg) by mouth 2 times daily    Rheumatoid arthritis, involving unspecified site, unspecified rheumatoid factor presence (H)       RA OMEPRAZOLE 20 MG tablet   Generic drug:  omeprazole      Take 20 mg by mouth        spironolactone 25 MG tablet    ALDACTONE     Take 25 mg by mouth        TGT PSYLLIUM FIBER  0.52 G capsule   Generic drug:  psyllium      Take 3 capsules by mouth        traMADol 50 MG tablet    ULTRAM    60 tablet    Take 2 tablets (100 mg) by mouth every 6 hours as needed for moderate pain    Pain       Vitamin B-12 CR 1000 MCG Tbcr

## 2018-03-07 ENCOUNTER — HOSPITAL ENCOUNTER (OUTPATIENT)
Dept: INFUSION THERAPY | Facility: OTHER | Age: 73
Discharge: HOME | End: 2018-03-07
Attending: INTERNAL MEDICINE | Admitting: INTERNAL MEDICINE
Payer: MEDICARE

## 2018-03-07 VITALS
RESPIRATION RATE: 18 BRPM | HEART RATE: 89 BPM | SYSTOLIC BLOOD PRESSURE: 137 MMHG | TEMPERATURE: 99 F | DIASTOLIC BLOOD PRESSURE: 67 MMHG

## 2018-03-07 DIAGNOSIS — M06.9 RHEUMATOID ARTHRITIS (H): ICD-10-CM

## 2018-03-07 PROCEDURE — 25000128 H RX IP 250 OP 636: Performed by: INTERNAL MEDICINE

## 2018-03-07 PROCEDURE — 96523 IRRIG DRUG DELIVERY DEVICE: CPT

## 2018-03-07 RX ORDER — HEPARIN SODIUM (PORCINE) LOCK FLUSH IV SOLN 100 UNIT/ML 100 UNIT/ML
500 SOLUTION INTRAVENOUS ONCE
Status: CANCELLED
Start: 2018-03-07 | End: 2018-03-07

## 2018-03-07 RX ORDER — HEPARIN SODIUM (PORCINE) LOCK FLUSH IV SOLN 100 UNIT/ML 100 UNIT/ML
500 SOLUTION INTRAVENOUS ONCE
Status: COMPLETED | OUTPATIENT
Start: 2018-03-07 | End: 2018-03-07

## 2018-03-07 RX ADMIN — Medication 500 UNITS: at 10:37

## 2018-03-07 NOTE — PROGRESS NOTES
Pt here via w/c with her significant other for portacath maintenance.  Portacath accessed with blood return and flushed with normal saline without incident.  Portacath with blood return, flushed, and deaccessed upon discharge.  No planned scheduled return at this time.  Pt left via w/c with her significant other..... Anne Gonzales RN.

## 2018-03-08 DIAGNOSIS — R52 PAIN: ICD-10-CM

## 2018-03-08 RX ORDER — TRAMADOL HYDROCHLORIDE 50 MG/1
TABLET ORAL
Qty: 60 TABLET | Refills: 0
Start: 2018-03-08

## 2018-03-08 NOTE — TELEPHONE ENCOUNTER
Reviewed with Dr. Scout Marie nurse and Tramadol Rx was filled on 3/5/18 by Dr. Butterfield she has the copy and will refax to [facility].    Called [facility] and they state they do have the one for 3/5/18 unsure why request was sent.  Informed Miriam that it was being refaxed and to disregard then.    Damaris Arora RN on 3/8/2018 at 3:15 PM

## 2018-03-08 NOTE — TELEPHONE ENCOUNTER
This is a Refill request from: [facility]  Name of Medication and Dose:  Tramadol 50 mg  Quantity requested: 60 tablets   Last fill date: was filled on 3/5/18  Last Office Visit: 1/15/18 for Laceration of leg  PCP:  Hugo Duarte MD  Controlled Substance Agreement: YES  Associated Diagnosis: N/A    Cynthia Carrizales LPN ....................  3/8/2018   2:04 PM

## 2018-03-13 ENCOUNTER — TELEPHONE (OUTPATIENT)
Dept: FAMILY MEDICINE | Facility: OTHER | Age: 73
End: 2018-03-13

## 2018-03-13 DIAGNOSIS — G62.9 NEUROPATHY: Primary | ICD-10-CM

## 2018-03-13 DIAGNOSIS — R52 PAIN: ICD-10-CM

## 2018-03-13 DIAGNOSIS — I10 ESSENTIAL HYPERTENSION: ICD-10-CM

## 2018-03-13 RX ORDER — TRAMADOL HYDROCHLORIDE 50 MG/1
100 TABLET ORAL EVERY 6 HOURS PRN
Qty: 60 TABLET | Refills: 3 | Status: SHIPPED | OUTPATIENT
Start: 2018-03-13 | End: 2018-04-20

## 2018-03-13 RX ORDER — GABAPENTIN 100 MG/1
100 CAPSULE ORAL 3 TIMES DAILY
Qty: 270 CAPSULE | Refills: 3 | Status: SHIPPED | OUTPATIENT
Start: 2018-03-13 | End: 2018-01-01

## 2018-03-13 RX ORDER — SPIRONOLACTONE 25 MG/1
25 TABLET ORAL DAILY
Qty: 90 TABLET | Refills: 3 | Status: SHIPPED | OUTPATIENT
Start: 2018-03-13 | End: 2019-01-01

## 2018-03-13 NOTE — TELEPHONE ENCOUNTER
Call placed to [facility] and pharmacist Magi states they do not have a rx for Tramadol for pt from Lewis Butterfield MD.  Spoke to Zahra and pt has refills waiting for Hugo Duarte MD to fill.  She will have them sent to him.  Cynthia Carrizales LPN ....................  3/13/2018   3:26 PM

## 2018-03-13 NOTE — TELEPHONE ENCOUNTER
Patient states [facility] will not give her pain pills to her until tomorrow (3/14) but she runs out today.  She is wondering if they can be switched too today.  Thank you.

## 2018-03-13 NOTE — TELEPHONE ENCOUNTER
Pt has rx for Tramadol from Lewis Butterfield MD   but is written to fill until tomorrow (3/14).  Will run out before tomorrow and is wondering if Hugo Duarte MD would give the ok for the rx to be filled a day early at [facility].  LLOYD Barajas ....................  3/13/2018   12:33 PM

## 2018-03-27 ENCOUNTER — OFFICE VISIT (OUTPATIENT)
Dept: INTERNAL MEDICINE | Facility: OTHER | Age: 73
End: 2018-03-27
Attending: INTERNAL MEDICINE
Payer: MEDICARE

## 2018-03-27 VITALS
BODY MASS INDEX: 21.08 KG/M2 | HEART RATE: 88 BPM | DIASTOLIC BLOOD PRESSURE: 88 MMHG | WEIGHT: 119 LBS | SYSTOLIC BLOOD PRESSURE: 134 MMHG

## 2018-03-27 DIAGNOSIS — G89.4 CHRONIC PAIN DISORDER: Primary | ICD-10-CM

## 2018-03-27 DIAGNOSIS — I10 ESSENTIAL HYPERTENSION: ICD-10-CM

## 2018-03-27 DIAGNOSIS — M06.9 RHEUMATOID ARTHRITIS, INVOLVING UNSPECIFIED SITE, UNSPECIFIED RHEUMATOID FACTOR PRESENCE: ICD-10-CM

## 2018-03-27 DIAGNOSIS — D64.9 ANEMIA, UNSPECIFIED TYPE: ICD-10-CM

## 2018-03-27 DIAGNOSIS — Z02.89 PAIN MEDICATION AGREEMENT: ICD-10-CM

## 2018-03-27 DIAGNOSIS — E11.9 TYPE 2 DIABETES MELLITUS WITHOUT COMPLICATION, WITH LONG-TERM CURRENT USE OF INSULIN (H): ICD-10-CM

## 2018-03-27 DIAGNOSIS — Z79.4 TYPE 2 DIABETES MELLITUS WITHOUT COMPLICATION, WITH LONG-TERM CURRENT USE OF INSULIN (H): ICD-10-CM

## 2018-03-27 LAB
ALBUMIN SERPL-MCNC: 4.1 G/DL (ref 3.5–5.7)
ALP SERPL-CCNC: 56 U/L (ref 34–104)
ALT SERPL W P-5'-P-CCNC: 19 U/L (ref 7–52)
AMPHETAMINES UR QL: NEGATIVE NG/ML
ANION GAP SERPL CALCULATED.3IONS-SCNC: 13 MMOL/L (ref 3–14)
AST SERPL W P-5'-P-CCNC: 17 U/L (ref 13–39)
BARBITURATES UR QL SCN: NEGATIVE NG/ML
BENZODIAZ UR QL SCN: NEGATIVE NG/ML
BILIRUB SERPL-MCNC: 0.2 MG/DL (ref 0.3–1)
BUN SERPL-MCNC: 41 MG/DL (ref 7–25)
BUPRENORPHINE UR QL: NEGATIVE NG/ML
CALCIUM SERPL-MCNC: 9.5 MG/DL (ref 8.6–10.3)
CANNABINOIDS UR QL: NEGATIVE NG/ML
CHLORIDE SERPL-SCNC: 98 MMOL/L (ref 98–107)
CHOLEST SERPL-MCNC: 159 MG/DL
CO2 SERPL-SCNC: 26 MMOL/L (ref 21–31)
COCAINE UR QL SCN: NEGATIVE NG/ML
CREAT SERPL-MCNC: 0.65 MG/DL (ref 0.6–1.2)
D-METHAMPHET UR QL: NEGATIVE NG/ML
ERYTHROCYTE [DISTWIDTH] IN BLOOD BY AUTOMATED COUNT: 14.4 % (ref 10–15)
GFR SERPL CREATININE-BSD FRML MDRD: 90 ML/MIN/1.7M2
GLUCOSE SERPL-MCNC: 195 MG/DL (ref 70–105)
HBA1C MFR BLD: 6.5 % (ref 4–6)
HCT VFR BLD AUTO: 38.4 % (ref 35–47)
HDLC SERPL-MCNC: 63 MG/DL (ref 23–92)
HGB BLD-MCNC: 13 G/DL (ref 11.7–15.7)
LDLC SERPL CALC-MCNC: 77 MG/DL
MCH RBC QN AUTO: 29.9 PG (ref 26.5–33)
MCHC RBC AUTO-ENTMCNC: 33.9 G/DL (ref 31.5–36.5)
MCV RBC AUTO: 88 FL (ref 78–100)
METHADONE UR QL SCN: NEGATIVE NG/ML
NONHDLC SERPL-MCNC: 96 MG/DL
OPIATES UR QL SCN: NEGATIVE NG/ML
OXYCODONE UR QL SCN: NEGATIVE NG/ML
PCP UR QL SCN: NEGATIVE NG/ML
PLATELET # BLD AUTO: 411 10E9/L (ref 150–450)
POTASSIUM SERPL-SCNC: 4.7 MMOL/L (ref 3.5–5.1)
PROPOXYPH UR QL: NEGATIVE NG/ML
PROT SERPL-MCNC: 5.8 G/DL (ref 6.4–8.9)
RBC # BLD AUTO: 4.35 10E12/L (ref 3.8–5.2)
SODIUM SERPL-SCNC: 137 MMOL/L (ref 134–144)
TRICYCLICS UR QL SCN: NEGATIVE NG/ML
TRIGL SERPL-MCNC: 97 MG/DL
WBC # BLD AUTO: 14.4 10E9/L (ref 4–11)

## 2018-03-27 PROCEDURE — 36415 COLL VENOUS BLD VENIPUNCTURE: CPT | Performed by: INTERNAL MEDICINE

## 2018-03-27 PROCEDURE — 80306 DRUG TEST PRSMV INSTRMNT: CPT | Performed by: INTERNAL MEDICINE

## 2018-03-27 PROCEDURE — 83036 HEMOGLOBIN GLYCOSYLATED A1C: CPT | Performed by: INTERNAL MEDICINE

## 2018-03-27 PROCEDURE — 80061 LIPID PANEL: CPT | Performed by: INTERNAL MEDICINE

## 2018-03-27 PROCEDURE — 99214 OFFICE O/P EST MOD 30 MIN: CPT | Performed by: INTERNAL MEDICINE

## 2018-03-27 PROCEDURE — 85027 COMPLETE CBC AUTOMATED: CPT | Performed by: INTERNAL MEDICINE

## 2018-03-27 PROCEDURE — G0463 HOSPITAL OUTPT CLINIC VISIT: HCPCS

## 2018-03-27 PROCEDURE — 80053 COMPREHEN METABOLIC PANEL: CPT | Performed by: INTERNAL MEDICINE

## 2018-03-27 RX ORDER — ASCORBIC ACID 500 MG
1000 TABLET ORAL DAILY
Qty: 30 TABLET | COMMUNITY
Start: 2018-03-27

## 2018-03-27 RX ORDER — CLONIDINE HYDROCHLORIDE 0.1 MG/1
0.1 TABLET ORAL EVERY EVENING
Qty: 30 TABLET | COMMUNITY
Start: 2018-03-27 | End: 2018-01-01

## 2018-03-27 RX ORDER — HYDROCODONE BITARTRATE AND ACETAMINOPHEN 5; 325 MG/1; MG/1
1 TABLET ORAL 4 TIMES DAILY
Qty: 120 TABLET | Refills: 0 | Status: SHIPPED | OUTPATIENT
Start: 2018-03-27 | End: 2018-03-27

## 2018-03-27 RX ORDER — BACLOFEN 10 MG/1
10 TABLET ORAL 3 TIMES DAILY
Qty: 90 TABLET | Refills: 11 | Status: SHIPPED | OUTPATIENT
Start: 2018-03-27 | End: 2019-01-01

## 2018-03-27 RX ORDER — FUROSEMIDE 40 MG
40 TABLET ORAL DAILY
Qty: 30 TABLET | COMMUNITY
Start: 2018-03-27 | End: 2018-04-17

## 2018-03-27 RX ORDER — METFORMIN HCL 500 MG
1000 TABLET, EXTENDED RELEASE 24 HR ORAL 2 TIMES DAILY WITH MEALS
Qty: 30 TABLET | COMMUNITY
Start: 2018-03-27 | End: 2018-04-28

## 2018-03-27 RX ORDER — HYDROCODONE BITARTRATE AND ACETAMINOPHEN 5; 325 MG/1; MG/1
1 TABLET ORAL 4 TIMES DAILY
Qty: 20 TABLET | COMMUNITY
Start: 2018-03-27 | End: 2018-03-27

## 2018-03-27 RX ORDER — BACLOFEN 10 MG/1
10 TABLET ORAL 3 TIMES DAILY
Qty: 90 TABLET | COMMUNITY
Start: 2018-03-27 | End: 2018-03-27

## 2018-03-27 RX ORDER — FERROUS SULFATE 325(65) MG
650 TABLET, DELAYED RELEASE (ENTERIC COATED) ORAL DAILY
Qty: 30 TABLET | COMMUNITY
Start: 2018-03-27 | End: 2019-01-01

## 2018-03-27 RX ORDER — CYANOCOBALAMIN (VITAMIN B-12) 1000 MCG
1 TABLET, EXTENDED RELEASE ORAL DAILY
Qty: 30 TABLET | COMMUNITY
Start: 2018-03-27

## 2018-03-27 RX ORDER — ATORVASTATIN CALCIUM 20 MG/1
20 TABLET, FILM COATED ORAL DAILY
Qty: 30 TABLET | COMMUNITY
Start: 2018-03-27 | End: 2018-08-21

## 2018-03-27 RX ORDER — HYDROCODONE BITARTRATE AND ACETAMINOPHEN 5; 325 MG/1; MG/1
1 TABLET ORAL 4 TIMES DAILY
Qty: 120 TABLET | Refills: 0 | Status: SHIPPED | OUTPATIENT
Start: 2018-03-27 | End: 2018-06-18

## 2018-03-27 RX ORDER — ESTRADIOL 0.5 MG/1
0.5 TABLET ORAL DAILY
Qty: 42 TABLET | COMMUNITY
Start: 2018-03-27 | End: 2018-10-02

## 2018-03-27 RX ORDER — DILTIAZEM HYDROCHLORIDE 360 MG/1
360 CAPSULE, EXTENDED RELEASE ORAL DAILY
Qty: 30 CAPSULE | COMMUNITY
Start: 2018-03-27 | End: 2019-01-01

## 2018-03-27 RX ORDER — DOCUSATE SODIUM 100 MG/1
200 CAPSULE, LIQUID FILLED ORAL 2 TIMES DAILY
Qty: 60 CAPSULE | COMMUNITY
Start: 2018-03-27

## 2018-03-27 RX ORDER — SACCHAROMYCES BOULARDII 250 MG
500 CAPSULE ORAL 2 TIMES DAILY
Qty: 14 CAPSULE | COMMUNITY
Start: 2018-03-27 | End: 2018-05-15

## 2018-03-27 ASSESSMENT — ENCOUNTER SYMPTOMS
CONSTITUTIONAL NEGATIVE: 1
ALLERGIC/IMMUNOLOGIC NEGATIVE: 1
HEMATOLOGIC/LYMPHATIC NEGATIVE: 1
ENDOCRINE NEGATIVE: 1

## 2018-03-27 NOTE — MR AVS SNAPSHOT
"              After Visit Summary   3/27/2018    Karen Duncan    MRN: [id number]           Patient Information     Date Of Birth          1945        Visit Information        Provider Department      3/27/2018 4:00 PM Hugo Duarte MD [facility]        Today's Diagnoses     Chronic pain disorder    -  1    Type 2 diabetes mellitus without complication, with long-term current use of insulin (H)        Anemia, unspecified type        Essential hypertension        Pain medication agreement        Rheumatoid arthritis, involving unspecified site, unspecified rheumatoid factor presence (H)           Follow-ups after your visit        Who to contact     If you have questions or need follow up information about today's clinic visit or your schedule please contact [facility] directly at [phone].  Normal or non-critical lab and imaging results will be communicated to you by [employer]ist[employer], letter or phone within 4 business days after the clinic has received the results. If you do not hear from us within 7 days, please contact the clinic through [employer] or phone. If you have a critical or abnormal lab result, we will notify you by phone as soon as possible.  Submit refill requests through [employer] or call your pharmacy and they will forward the refill request to us. Please allow 3 business days for your refill to be completed.          Additional Information About Your Visit        [employer] Information     [employer] lets you send messages to your doctor, view your test results, renew your prescriptions, schedule appointments and more. To sign up, go to [email or web address] . Click on \"Log in\" on the left side of the screen, which will take you to the Welcome page. Then click on \"Sign up Now\" on the right side of the page.     You will be asked to enter the access code listed below, as well as some personal information. Please follow the directions to create your username " and password.     Your access code is: [email or web address]  Expires: 2018  7:40 AM     Your access code will  in 90 days. If you need help or a new code, please call your [place] clinic or [phone].        Care EveryWhere ID     This is your Care EveryWhere ID. This could be used by other organizations to access your [place] medical records  [id number]        Your Vitals Were     Pulse Breastfeeding? BMI (Body Mass Index)             88 No 21.08 kg/m2          Blood Pressure from Last 3 Encounters:   18 134/88   18 137/67   01/15/18 134/82    Weight from Last 3 Encounters:   18 119 lb (54 kg)   01/15/18 115 lb (52.2 kg)   18 115 lb (52.2 kg)              We Performed the Following     CBC with platelets     Comprehensive metabolic panel (BMP + Alb, Alk Phos, ALT, AST, Total. Bili, TP)     Drug Abuse Screen Panel 13, Urine (Pain Care Package)     Hemoglobin A1c     Lipid Profile (Chol, Trig, HDL, LDL calc) - FASTING          Today's Medication Changes          These changes are accurate as of 3/27/18  4:45 PM.  If you have any questions, ask your nurse or doctor.               Start taking these medicines.        Dose/Directions    baclofen 10 MG tablet   Commonly known as:  LIORESAL   Used for:  Chronic pain disorder   Started by:  Hugo Duarte MD        Dose:  10 mg   Take 1 tablet (10 mg) by mouth 3 times daily   Quantity:  90 tablet   Refills:  11       HYDROcodone-acetaminophen 5-325 MG per tablet   Commonly known as:  NORCO   Used for:  Chronic pain disorder   Started by:  Hugo Duarte MD        Dose:  1 tablet   Take 1 tablet by mouth 4 times daily   Quantity:  120 tablet   Refills:  0         These medicines have changed or have updated prescriptions.        Dose/Directions    ascorbic acid 500 MG tablet   Commonly known as:  VITAMIN C   This may have changed:  when to take this   Changed by:  Hugo Duarte MD        Dose:  1000 mg   Take 2 tablets (1,000 mg) by mouth  daily   Quantity:  30 tablet   Refills:  0       atorvastatin 20 MG tablet   Commonly known as:  LIPITOR   This may have changed:  when to take this   Changed by:  Hugo Duarte MD        Dose:  20 mg   Take 1 tablet (20 mg) by mouth daily   Quantity:  30 tablet   Refills:  0       cloNIDine 0.1 MG tablet   Commonly known as:  CATAPRES   This may have changed:  when to take this   Changed by:  Hugo Duarte MD        Dose:  0.1 mg   Take 1 tablet (0.1 mg) by mouth At Bedtime   Quantity:  30 tablet   Refills:  0       diltiazem 360 MG 24 hr CD capsule   Commonly known as:  CARDIZEM CD; CARTIA XT   This may have changed:  when to take this   Changed by:  Hugo Duarte MD        Dose:  360 mg   Take 1 capsule (360 mg) by mouth daily   Quantity:  30 capsule   Refills:  0       docusate sodium 100 MG capsule   Commonly known as:  COLACE   This may have changed:    - how much to take  - when to take this   Changed by:  Hugo Duarte MD        Dose:  200 mg   Take 2 capsules (200 mg) by mouth 2 times daily   Quantity:  60 capsule   Refills:  0       estradiol 0.5 MG tablet   Commonly known as:  ESTRACE   This may have changed:  when to take this   Changed by:  Hugo Duarte MD        Dose:  0.5 mg   Take 1 tablet (0.5 mg) by mouth daily   Quantity:  42 tablet   Refills:  0       ferrous sulfate 325 (65 FE) MG Tbec EC tablet   This may have changed:  when to take this   Changed by:  Hugo Duarte MD        Dose:  650 mg   Take 2 tablets (650 mg) by mouth daily   Quantity:  30 tablet   Refills:  0       FLORASTOR 250 MG capsule   This may have changed:    - how much to take  - when to take this   Generic drug:  saccharomyces boulardii   Changed by:  Hugo Duarte MD        Dose:  500 mg   Take 2 capsules (500 mg) by mouth 2 times daily   Quantity:  14 capsule   Refills:  0       furosemide 40 MG tablet   Commonly known as:  LASIX   This may have changed:  when to take this   Changed by:  Hugo Duarte MD         Dose:  40 mg   Take 1 tablet (40 mg) by mouth daily   Quantity:  30 tablet   Refills:  0       insulin glargine 100 UNIT/ML injection   Commonly known as:  LANTUS   This may have changed:  See the new instructions.   Changed by:  Hugo Duarte MD        16 units am, 12 units pm   Refills:  0       metFORMIN 500 MG 24 hr tablet   Commonly known as:  GLUCOPHAGE-XR   This may have changed:  when to take this   Changed by:  Hugo Duarte MD        Dose:  1000 mg   Take 2 tablets (1,000 mg) by mouth 2 times daily (with meals)   Quantity:  30 tablet   Refills:  0       TGT PSYLLIUM FIBER 0.52 G capsule   This may have changed:  when to take this   Generic drug:  psyllium   Changed by:  Hugo Duarte MD        Dose:  3 capsule   Take 3 capsules (1.56 g) by mouth daily   Quantity:  540 capsule   Refills:  0       Vitamin B-12 CR 1000 MCG Tbcr   This may have changed:    - how much to take  - when to take this   Changed by:  Hugo Duarte MD        Dose:  1 tablet   Take 1 tablet by mouth daily   Quantity:  30 tablet   Refills:  0         Stop taking these medicines if you haven't already. Please contact your care team if you have questions.     calcium carbonate 500 MG chewable tablet   Commonly known as:  TUMS   Stopped by:  Hugo Duarte MD           diltiazem 360 MG 24 hr capsule   Commonly known as:  TIAZAC   Stopped by:  Hguo Duarte MD           DOCOSAHEXAENOIC ACID PO   Stopped by:  Hugo Duarte MD           RA OMEPRAZOLE 20 MG tablet   Generic drug:  omeprazole   Stopped by:  Hugo Duarte MD                Where to get your medicines      These medications were sent to [facility] [address] [place] [id number]     Phone:  [phone]     baclofen 10 MG tablet         Some of these will need a paper prescription and others can be bought over the counter.  Ask your nurse if you have questions.     Bring a paper prescription  for each of these medications     HYDROcodone-acetaminophen 5-325 MG per tablet                Primary Care Provider Office Phone # Fax #    Hugo Duarte [phone] [address]    [facility] [address]        Equal Access to Services     SHEILALUIGI NISA : Hadii yamileth britton foreigno Soartali, waaxda luqadaha, qaybta kaalmada adeegyada, sal de luna haymireyabrielle reynoldstkestevan tillman. So [facility] [phone].    ATENCIÓN: Si habla español, tiene a lawson disposición servicios gratuitos de asistencia lingüística. Llame al [phone].    We comply with applicable federal civil rights laws and [place] laws. We do not discriminate on the basis of race, color, national origin, age, disability, sex, sexual orientation, or gender identity.            Thank you!     Thank you for choosing [facility] AND [facility]  for your care. Our goal is always to provide you with excellent care. Hearing back from our patients is one way we can continue to improve our services. Please take a few minutes to complete the written survey that you may receive in the mail after your visit with us. Thank you!             Your Updated Medication List - Protect others around you: Learn how to safely use, store and throw away your medicines at www.disposemymeds.org.          This list is accurate as of 3/27/18  4:45 PM.  Always use your most recent med list.                   Brand Name Dispense Instructions for use Diagnosis    amLODIPine 2.5 MG tablet    NORVASC    90 tablet    Take 1 tablet (2.5 mg) by mouth daily    Hypertension       ascorbic acid 500 MG tablet    VITAMIN C    30 tablet    Take 2 tablets (1,000 mg) by mouth daily        atorvastatin 20 MG tablet    LIPITOR    30 tablet    Take 1 tablet (20 mg) by mouth daily        baclofen 10 MG tablet    LIORESAL    90 tablet    Take 1 tablet (10 mg) by mouth 3 times daily    Chronic pain disorder       blood glucose monitoring lancets      Test 4 times daily         blood glucose monitoring test strip    no brand specified    200 strip    Test 4 times daily. Dispense item covered by patient's insurance. Diagnosis code E11.29    Uncontrolled diabetes with kidney complications (H)       cloNIDine 0.1 MG tablet    CATAPRES    30 tablet    Take 1 tablet (0.1 mg) by mouth At Bedtime        diltiazem 360 MG 24 hr CD capsule    CARDIZEM CD; CARTIA XT    30 capsule    Take 1 capsule (360 mg) by mouth daily        docusate sodium 100 MG capsule    COLACE    60 capsule    Take 2 capsules (200 mg) by mouth 2 times daily        estradiol 0.5 MG tablet    ESTRACE    42 tablet    Take 1 tablet (0.5 mg) by mouth daily        ferrous sulfate 325 (65 FE) MG Tbec EC tablet     30 tablet    Take 2 tablets (650 mg) by mouth daily        FLORASTOR 250 MG capsule   Generic drug:  saccharomyces boulardii     14 capsule    Take 2 capsules (500 mg) by mouth 2 times daily        furosemide 40 MG tablet    LASIX    30 tablet    Take 1 tablet (40 mg) by mouth daily        gabapentin 100 MG capsule    NEURONTIN    270 capsule    Take 1 capsule (100 mg) by mouth 3 times daily    Neuropathy       HYDROcodone-acetaminophen 5-325 MG per tablet    NORCO    120 tablet    Take 1 tablet by mouth 4 times daily    Chronic pain disorder       insulin aspart 100 UNIT/ML injection    NovoLOG PEN     Inject 4 times daily per sliding scale, 24 units max daily        insulin glargine 100 UNIT/ML injection    LANTUS     16 units am, 12 units pm        metFORMIN 500 MG 24 hr tablet    GLUCOPHAGE-XR    30 tablet    Take 2 tablets (1,000 mg) by mouth 2 times daily (with meals)        predniSONE 5 MG tablet    DELTASONE    180 tablet    Take 1 tablet (5 mg) by mouth 2 times daily    Rheumatoid arthritis, involving unspecified site, unspecified rheumatoid factor presence (H)       spironolactone 25 MG tablet    ALDACTONE    90 tablet    Take 1 tablet (25 mg) by mouth daily    Essential hypertension       TGT PSYLLIUM FIBER 0.52  G capsule   Generic drug:  psyllium     540 capsule    Take 3 capsules (1.56 g) by mouth daily        traMADol 50 MG tablet    ULTRAM    60 tablet    Take 2 tablets (100 mg) by mouth every 6 hours as needed for moderate pain    Neuropathy       Vitamin B-12 CR 1000 MCG Tbcr     30 tablet    Take 1 tablet by mouth daily

## 2018-03-27 NOTE — LETTER
March 28, 2018      Karen BOLDEN [place]  [address]  [place] [address]        Dear Karen KIMI Duncan,    Below are the results of your recent labs:    Results for orders placed or performed in visit on 03/27/18   Drug Abuse Screen Panel 13, Urine (Pain Care Package)   Result Value Ref Range    Cannabinoids (24-cfp-3-carboxy-9-THC) Negative (A) NDET^Not Detected ng/mL    Phencyclidine (Phencyclidine) Negative (A) NDET^Not Detected ng/mL    Cocaine (Benzoylecgonine) Negative (A) NDET^Not Detected ng/mL    Methamphetamine (d-Methamphetamine) Negative (A) NDET^Not Detected ng/mL    Opiates (Morphine) Negative (A) NDET^Not Detected ng/mL    Amphetamine (d-Amphetamine) Negative (A) NDET^Not Detected ng/mL    Benzodiazepines (Nordiazepam) Negative (A) NDET^Not Detected ng/mL    Tricyclic Antidepressants (Desipramine) Negative (A) NDET^Not Detected ng/mL    Methadone (Methadone) Negative (A) NDET^Not Detected ng/mL    Barbiturates (Butalbital) Negative (A) NDET^Not Detected ng/mL    Oxycodone (Oxycodone) Negative (A) NDET^Not Detected ng/mL    Propoxyphene (Norpropoxyphene) Negative (A) NDET^Not Detected ng/mL    Buprenorphine (Buprenorphine) Negative (A) NDET^Not Detected ng/mL   Hemoglobin A1c   Result Value Ref Range    Hemoglobin A1C 6.5 (H) 4.0 - 6.0 %   Comprehensive metabolic panel (BMP + Alb, Alk Phos, ALT, AST, Total. Bili, TP)   Result Value Ref Range    Sodium 137 134 - 144 mmol/L    Potassium 4.7 3.5 - 5.1 mmol/L    Chloride 98 98 - 107 mmol/L    Carbon Dioxide 26 21 - 31 mmol/L    Anion Gap 13 3 - 14 mmol/L    Glucose 195 (H) 70 - 105 mg/dL    Urea Nitrogen 41 (H) 7 - 25 mg/dL    Creatinine 0.65 0.60 - 1.20 mg/dL    GFR Estimate 90 >60 mL/min/1.7m2    GFR Estimate If Black >90 >60 mL/min/1.7m2    Calcium 9.5 8.6 - 10.3 mg/dL    Bilirubin Total 0.2 (L) 0.3 - 1.0 mg/dL    Albumin 4.1 3.5 - 5.7 g/dL    Protein Total 5.8 (L) 6.4 - 8.9 g/dL    Alkaline Phosphatase 56 34 - 104 U/L    ALT 19 7 - 52 U/L    AST 17  13 - 39 U/L   CBC with platelets   Result Value Ref Range    WBC 14.4 (H) 4.0 - 11.0 10e9/L    RBC Count 4.35 3.8 - 5.2 10e12/L    Hemoglobin 13.0 11.7 - 15.7 g/dL    Hematocrit 38.4 35.0 - 47.0 %    MCV 88 78 - 100 fl    MCH 29.9 26.5 - 33.0 pg    MCHC 33.9 31.5 - 36.5 g/dL    RDW 14.4 10.0 - 15.0 %    Platelet Count 411 150 - 450 10e9/L   Lipid Profile (Chol, Trig, HDL, LDL calc) - FASTING   Result Value Ref Range    Cholesterol 159 <200 mg/dL    Triglycerides 97 <150 mg/dL    HDL Cholesterol 63 23 - 92 mg/dL    LDL Cholesterol Calculated 77 <100 mg/dL    Non HDL Cholesterol 96 <130 mg/dL        Your recent blood tests look fine.  Congratulations on this report.  This includes your diabetes test.  We will plan to recheck this again in 3 months.    Sincerely,        Hugo Duarte MD  Internal Medicine  [facility]
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
## 2018-03-27 NOTE — NURSING NOTE
The patient the here today to discuss a couple of her medications.  Josefa Pandya LPN on 3/27/2018 at 3:56 PM

## 2018-03-28 ASSESSMENT — PATIENT HEALTH QUESTIONNAIRE - PHQ9: SUM OF ALL RESPONSES TO PHQ QUESTIONS 1-9: 0

## 2018-04-11 DIAGNOSIS — E11.9 TYPE 2 DIABETES MELLITUS WITHOUT COMPLICATION, WITH LONG-TERM CURRENT USE OF INSULIN (H): Primary | ICD-10-CM

## 2018-04-11 DIAGNOSIS — Z79.4 TYPE 2 DIABETES MELLITUS WITHOUT COMPLICATION, WITH LONG-TERM CURRENT USE OF INSULIN (H): Primary | ICD-10-CM

## 2018-04-11 NOTE — TELEPHONE ENCOUNTER
Prescription approved per [facility] Refill Protocol.  LOV: 3/27/18    Damaris Arora RN on 4/11/2018 at 11:11 AM

## 2018-04-17 DIAGNOSIS — I10 ESSENTIAL HYPERTENSION: Primary | ICD-10-CM

## 2018-04-18 ENCOUNTER — HOSPITAL ENCOUNTER (OUTPATIENT)
Dept: INFUSION THERAPY | Facility: OTHER | Age: 73
Discharge: HOME | End: 2018-04-18
Attending: INTERNAL MEDICINE | Admitting: INTERNAL MEDICINE
Payer: MEDICARE

## 2018-04-18 DIAGNOSIS — M06.9 RHEUMATOID ARTHRITIS, INVOLVING UNSPECIFIED SITE, UNSPECIFIED RHEUMATOID FACTOR PRESENCE: ICD-10-CM

## 2018-04-18 PROCEDURE — 25000128 H RX IP 250 OP 636: Performed by: INTERNAL MEDICINE

## 2018-04-18 PROCEDURE — 96523 IRRIG DRUG DELIVERY DEVICE: CPT

## 2018-04-18 RX ORDER — HEPARIN SODIUM (PORCINE) LOCK FLUSH IV SOLN 100 UNIT/ML 100 UNIT/ML
500 SOLUTION INTRAVENOUS ONCE
Status: COMPLETED | OUTPATIENT
Start: 2018-04-18 | End: 2018-04-18

## 2018-04-18 RX ORDER — HEPARIN SODIUM (PORCINE) LOCK FLUSH IV SOLN 100 UNIT/ML 100 UNIT/ML
500 SOLUTION INTRAVENOUS ONCE
Status: CANCELLED
Start: 2018-04-18 | End: 2018-04-18

## 2018-04-18 RX ADMIN — Medication 500 UNITS: at 15:40

## 2018-04-18 NOTE — PROGRESS NOTES
Infusion Nursing Note:  Karen Duncan presents today for port flush.    Patient seen by provider today: No   present during visit today: Not Applicable.    Note: patient arrived via wheelchair with spouses assist for port flush.    Intravenous Access:  Implanted Port.    Treatment Conditions:  Not Applicable.      Post Infusion Assessment:  Site patent and intact, free from redness, edema or discomfort.  No evidence of extravasations.  Access discontinued per protocol.    Discharge Plan:   Patient discharged in stable condition accompanied by: .    Katya Mendieta RN

## 2018-04-20 ENCOUNTER — TELEPHONE (OUTPATIENT)
Dept: FAMILY MEDICINE | Facility: OTHER | Age: 73
End: 2018-04-20

## 2018-04-20 DIAGNOSIS — G62.9 NEUROPATHY: ICD-10-CM

## 2018-04-20 RX ORDER — FUROSEMIDE 40 MG
TABLET ORAL
Qty: 90 TABLET | Refills: 3 | Status: SHIPPED | OUTPATIENT
Start: 2018-04-20 | End: 2019-01-01

## 2018-04-20 RX ORDER — TRAMADOL HYDROCHLORIDE 50 MG/1
100 TABLET ORAL EVERY 6 HOURS PRN
Qty: 60 TABLET | Refills: 0 | Status: SHIPPED | OUTPATIENT
Start: 2018-04-20 | End: 2018-04-24

## 2018-04-20 NOTE — TELEPHONE ENCOUNTER
Hi ,  Pt was allotted 8 tab per day, I think, per my math, each fill was only a 7.5 day supply.  It appears it was filled on 03/13/2018 and pt states she is out for a week.  Could we reconsider?  It does appear  returns on Monday.  Please advise  Nanette Ortega RN.............................4/20/2018 1:56 PM

## 2018-04-20 NOTE — TELEPHONE ENCOUNTER
In clinical absence of patient's primary, Hugo Duarte, patient is requesting that this message be sent to the Doc of the Day for consideration please.     ,  Pt is requesting a refill of Tramadol last filled 03/13/2018 #60 R3 2 tabs Q6H.  Pt states she has been out for a week.  Pt was last seen 03/27/2018.  Please review and advise or sign if appropriate.    Routing refill request to provider for review/approval because:  Drug not on the [facility] refill protocol   Nanette Ortega RN.............................4/20/2018 10:58 AM

## 2018-04-20 NOTE — TELEPHONE ENCOUNTER
Her last prescription from March 13 indicates she has 3 refills and does not need a new prescription.

## 2018-04-20 NOTE — TELEPHONE ENCOUNTER
Prescription approved per [facility] Refill Protocol.  LOV with CMP 3/27/18  /88  Bernie Gregory RN on 4/20/2018 at 3:15 PM

## 2018-04-20 NOTE — TELEPHONE ENCOUNTER
Contacted [facility] pharmacy they stated she filled her tramadol script on 3-, 3-, 3-, and 4-3-2018. She is taking the max dose of two tablets every 6 hours. She is going through her refills about every 8 days. A new order for 60 tablets has been teed up until Hugo Duarte MD can review this.  Josefa Pandya LPN on 4/20/2018 at 1:49 PM

## 2018-04-24 DIAGNOSIS — G62.9 NEUROPATHY: ICD-10-CM

## 2018-04-24 NOTE — TELEPHONE ENCOUNTER
Duplicated refill request. Tramadol just filled on 4-20-18. Anne Piña RN on 4/24/2018 at 8:34 AM

## 2018-04-27 RX ORDER — TRAMADOL HYDROCHLORIDE 50 MG/1
TABLET ORAL
Qty: 60 TABLET | Refills: 0 | Status: SHIPPED | OUTPATIENT
Start: 2018-04-27 | End: 2018-05-01

## 2018-04-28 DIAGNOSIS — E11.9 TYPE 2 DIABETES MELLITUS WITHOUT COMPLICATION, WITH LONG-TERM CURRENT USE OF INSULIN (H): Primary | ICD-10-CM

## 2018-04-28 DIAGNOSIS — Z79.4 TYPE 2 DIABETES MELLITUS WITHOUT COMPLICATION, WITH LONG-TERM CURRENT USE OF INSULIN (H): Primary | ICD-10-CM

## 2018-05-01 DIAGNOSIS — G62.9 NEUROPATHY: ICD-10-CM

## 2018-05-02 RX ORDER — METFORMIN HCL 500 MG
1000 TABLET, EXTENDED RELEASE 24 HR ORAL 2 TIMES DAILY WITH MEALS
Qty: 360 TABLET | Refills: 3 | Status: SHIPPED | OUTPATIENT
Start: 2018-05-02 | End: 2019-01-01

## 2018-05-02 NOTE — TELEPHONE ENCOUNTER
RN refill protocol failed for reason as noted below:     Patient has had a Microalbumin in the past 12 mos.     Writer notes that patient was seen by PCP on 3/27/18, and all other labs in relation to rx as requested were completed at that time. Writer will dimitry up rx as requested from pharmacy and will route rx request to PCP for his consideration/approval. Will not input lab orders as will allow PCP to dictate need for a microalbumin as other labs completed recently.    Unable to complete prescription refill per RN Medication Refill Policy. Germán Armstrong 5/2/2018 1:04 PM

## 2018-05-04 RX ORDER — TRAMADOL HYDROCHLORIDE 50 MG/1
TABLET ORAL
Qty: 120 TABLET | Refills: 3 | Status: SHIPPED | OUTPATIENT
Start: 2018-05-04 | End: 2018-07-06

## 2018-05-04 NOTE — TELEPHONE ENCOUNTER
Routing refill request to provider for review/approval because:  Drug not on the [facility] refill protocol     LOV: 3/27/18    Damaris Arora RN on 5/4/2018 at 10:54 AM

## 2018-05-15 DIAGNOSIS — K52.9 NON-SPECIFIC COLITIS: Primary | ICD-10-CM

## 2018-05-21 RX ORDER — SACCHAROMYCES BOULARDII 250 MG
CAPSULE ORAL
Qty: 120 CAPSULE | Refills: 7 | Status: SHIPPED | OUTPATIENT
Start: 2018-05-21 | End: 2019-01-01

## 2018-05-21 NOTE — TELEPHONE ENCOUNTER
This is a Refill request from: [facility]  Name of Medication and Dose:  Saccharomyces boulardii FLORASTOR 250 MG Capsule  Quantity requested:  120  Last fill date: 3/27/2018  Last Office Visit: 3/27/2018  Narcotic Agreement Signed:  GERRY  PCP:  Dr. Duarte  Associated Diagnosis: GERRY Puentes LPN Supervisor 5/21/2018   9:25 AM

## 2018-05-23 ENCOUNTER — HOSPITAL ENCOUNTER (OUTPATIENT)
Dept: INFUSION THERAPY | Facility: OTHER | Age: 73
Discharge: HOME | End: 2018-05-23
Attending: INTERNAL MEDICINE | Admitting: INTERNAL MEDICINE
Payer: MEDICARE

## 2018-05-23 DIAGNOSIS — M06.9 RHEUMATOID ARTHRITIS, INVOLVING UNSPECIFIED SITE, UNSPECIFIED RHEUMATOID FACTOR PRESENCE: ICD-10-CM

## 2018-05-23 PROCEDURE — 96523 IRRIG DRUG DELIVERY DEVICE: CPT

## 2018-05-23 PROCEDURE — 25000128 H RX IP 250 OP 636: Performed by: INTERNAL MEDICINE

## 2018-05-23 RX ORDER — HEPARIN SODIUM (PORCINE) LOCK FLUSH IV SOLN 100 UNIT/ML 100 UNIT/ML
500 SOLUTION INTRAVENOUS ONCE
Status: CANCELLED
Start: 2018-05-23 | End: 2018-05-23

## 2018-05-23 RX ORDER — HEPARIN SODIUM (PORCINE) LOCK FLUSH IV SOLN 100 UNIT/ML 100 UNIT/ML
500 SOLUTION INTRAVENOUS ONCE
Status: COMPLETED | OUTPATIENT
Start: 2018-05-23 | End: 2018-05-23

## 2018-05-23 RX ADMIN — Medication 500 UNITS: at 11:09

## 2018-05-23 NOTE — PROGRESS NOTES
Patient's port accessed, no blood return. Labs not needed today, line flushes easily and patient did not have time to stay for alteplase today, patient left via wheelchair and will call for next appointment.

## 2018-06-13 ENCOUNTER — DOCUMENTATION ONLY (OUTPATIENT)
Dept: OTHER | Facility: CLINIC | Age: 73
End: 2018-06-13

## 2018-06-13 PROBLEM — Z71.89 ADVANCED DIRECTIVES, COUNSELING/DISCUSSION: Chronic | Status: ACTIVE | Noted: 2018-06-13

## 2018-06-18 ENCOUNTER — OFFICE VISIT (OUTPATIENT)
Dept: INTERNAL MEDICINE | Facility: OTHER | Age: 73
End: 2018-06-18
Attending: INTERNAL MEDICINE
Payer: COMMERCIAL

## 2018-06-18 VITALS
HEART RATE: 76 BPM | DIASTOLIC BLOOD PRESSURE: 86 MMHG | BODY MASS INDEX: 21.26 KG/M2 | WEIGHT: 120 LBS | SYSTOLIC BLOOD PRESSURE: 138 MMHG

## 2018-06-18 DIAGNOSIS — G89.4 CHRONIC PAIN DISORDER: ICD-10-CM

## 2018-06-18 PROCEDURE — G0463 HOSPITAL OUTPT CLINIC VISIT: HCPCS

## 2018-06-18 PROCEDURE — 99213 OFFICE O/P EST LOW 20 MIN: CPT | Performed by: INTERNAL MEDICINE

## 2018-06-18 RX ORDER — HYDROCODONE BITARTRATE AND ACETAMINOPHEN 5; 325 MG/1; MG/1
1 TABLET ORAL 4 TIMES DAILY
Qty: 120 TABLET | Refills: 0 | Status: SHIPPED | OUTPATIENT
Start: 2018-06-18 | End: 2018-06-18

## 2018-06-18 RX ORDER — HYDROCODONE BITARTRATE AND ACETAMINOPHEN 5; 325 MG/1; MG/1
1 TABLET ORAL 4 TIMES DAILY
Qty: 120 TABLET | Refills: 0 | Status: SHIPPED | OUTPATIENT
Start: 2018-06-18 | End: 2018-09-19

## 2018-06-18 ASSESSMENT — ENCOUNTER SYMPTOMS
CONSTITUTIONAL NEGATIVE: 1
ENDOCRINE NEGATIVE: 1
ALLERGIC/IMMUNOLOGIC NEGATIVE: 1
EYES NEGATIVE: 1

## 2018-06-18 NOTE — MR AVS SNAPSHOT
"              After Visit Summary   2018    Karen Duncan    MRN: [id number]           Patient Information     Date Of Birth          1945        Visit Information        Provider Department      2018 10:20 AM Hugo Duarte MD [facility]        Today's Diagnoses     Chronic pain disorder           Follow-ups after your visit        Who to contact     If you have questions or need follow up information about today's clinic visit or your schedule please contact [facility] directly at [phone].  Normal or non-critical lab and imaging results will be communicated to you by Mob[employer], letter or phone within 4 business days after the clinic has received the results. If you do not hear from us within 7 days, please contact the clinic through [facility] or phone. If you have a critical or abnormal lab result, we will notify you by phone as soon as possible.  Submit refill requests through [employer] or call your pharmacy and they will forward the refill request to us. Please allow 3 business days for your refill to be completed.          Additional Information About Your Visit        MyChart Information     [employer] lets you send messages to your doctor, view your test results, renew your prescriptions, schedule appointments and more. To sign up, go to [email or web address] . Click on \"Log in\" on the left side of the screen, which will take you to the Welcome page. Then click on \"Sign up Now\" on the right side of the page.     You will be asked to enter the access code listed below, as well as some personal information. Please follow the directions to create your username and password.     Your access code is: [email or web address]  Expires: 2018 10:45 AM     Your access code will  in 90 days. If you need help or a new code, please call your [facility] clinic or [phone].        Care EveryWhere ID     This is your Care EveryWhere ID. This could be used by " other organizations to access your [facility] medical records  [id number]        Your Vitals Were     Pulse Breastfeeding? BMI (Body Mass Index)             76 No 21.26 kg/m2          Blood Pressure from Last 3 Encounters:   06/18/18 138/86   03/27/18 134/88   03/07/18 137/67    Weight from Last 3 Encounters:   06/18/18 120 lb (54.4 kg)   03/27/18 119 lb (54 kg)   01/15/18 115 lb (52.2 kg)              Today, you had the following     No orders found for display         Today's Medication Changes          These changes are accurate as of 6/18/18 10:45 AM.  If you have any questions, ask your nurse or doctor.               Start taking these medicines.        Dose/Directions    HYDROcodone-acetaminophen 5-325 MG per tablet   Commonly known as:  NORCO   Used for:  Chronic pain disorder   Started by:  Hugo Duarte MD        Dose:  1 tablet   Take 1 tablet by mouth 4 times daily   Quantity:  120 tablet   Refills:  0            Where to get your medicines      Some of these will need a paper prescription and others can be bought over the counter.  Ask your nurse if you have questions.     Bring a paper prescription for each of these medications     HYDROcodone-acetaminophen 5-325 MG per tablet               Information about OPIOIDS     PRESCRIPTION OPIOIDS: WHAT YOU NEED TO KNOW   We gave you an opioid (narcotic) pain medicine. It is important to manage your pain, but opioids are not always the best choice. You should first try all the other options your care team gave you. Take this medicine for as short a time (and as few doses) as possible.     These medicines have risks:    DO NOT drive when on new or higher doses of pain medicine. These medicines can affect your alertness and reaction times, and you could be arrested for driving under the influence (DUI). If you need to use opioids long-term, talk to your care team about driving.    DO NOT operate heave machinery    DO NOT do any other dangerous activities while  taking these medicines.     DO NOT drink any alcohol while taking these medicines.      If the opioid prescribed includes acetaminophen, DO NOT take with any other medicines that contain acetaminophen. Read all labels carefully. Look for the word  acetaminophen  or  Tylenol.  Ask your pharmacist if you have questions or are unsure.    You can get addicted to pain medicines, especially if you have a history of addiction (chemical, alcohol or substance dependence). Talk to your care team about ways to reduce this risk.    Store your pills in a secure place, locked if possible. We will not replace any lost or stolen medicine. If you don t finish your medicine, please throw away (dispose) as directed by your pharmacist. The [place] Pollution Control Agency has more information about safe disposal: https://www.pca.[facility].mn.us/living-green/managing-unwanted-medications.     All opioids tend to cause constipation. Drink plenty of water and eat foods that have a lot of fiber, such as fruits, vegetables, prune juice, apple juice and high-fiber cereal. Take a laxative (Miralax, milk of magnesia, Colace, Senna) if you don t move your bowels at least every other day.          Primary Care Provider Office Phone # Fax #    Hugo Duarte [phone] [address] [facility] [address]        Equal Access to Services     YANNA PAULA AH: Hadii aad ku hadasho Soomaali, waaxda luqadaha, qaybta kaalmada adeegyada, sal tillman. So [facility] [place] [facility] [phone].    ATENCIÓN: Si habla español, tiene a lawson disposición servicios gratuitos de asistencia lingüística. Llame al [phone].    We comply with applicable federal civil rights laws and [place] laws. We do not discriminate on the basis of race, color, national origin, age, disability, sex, sexual orientation, or gender identity.            Thank you!     Thank you for choosing [facility] AND [facility]  for your care. Our  goal is always to provide you with excellent care. Hearing back from our patients is one way we can continue to improve our services. Please take a few minutes to complete the written survey that you may receive in the mail after your visit with us. Thank you!             Your Updated Medication List - Protect others around you: Learn how to safely use, store and throw away your medicines at www.disposemymeds.org.          This list is accurate as of 6/18/18 10:45 AM.  Always use your most recent med list.                   Brand Name Dispense Instructions for use Diagnosis    amLODIPine 2.5 MG tablet    NORVASC    90 tablet    Take 1 tablet (2.5 mg) by mouth daily    Hypertension       ascorbic acid 500 MG tablet    VITAMIN C    30 tablet    Take 2 tablets (1,000 mg) by mouth daily        atorvastatin 20 MG tablet    LIPITOR    30 tablet    Take 1 tablet (20 mg) by mouth daily        baclofen 10 MG tablet    LIORESAL    90 tablet    Take 1 tablet (10 mg) by mouth 3 times daily    Chronic pain disorder       blood glucose monitoring lancets     400 each    Test 4 times daily. Dispense item covered by patient's insurance. Diagnosis code E11.29    Type 2 diabetes mellitus without complication, with long-term current use of insulin (H)       blood glucose monitoring test strip    no brand specified    200 strip    Test 4 times daily. Dispense item covered by patient's insurance. Diagnosis code E11.29    Uncontrolled diabetes with kidney complications (H)       cloNIDine 0.1 MG tablet    CATAPRES    30 tablet    Take 1 tablet (0.1 mg) by mouth At Bedtime        diltiazem 360 MG 24 hr CD capsule    CARDIZEM CD; CARTIA XT    30 capsule    Take 1 capsule (360 mg) by mouth daily        docusate sodium 100 MG capsule    COLACE    60 capsule    Take 2 capsules (200 mg) by mouth 2 times daily        estradiol 0.5 MG tablet    ESTRACE    42 tablet    Take 1 tablet (0.5 mg) by mouth daily        ferrous sulfate 325 (65 Fe) MG  Tbec EC tablet     30 tablet    Take 2 tablets (650 mg) by mouth daily        FLORASTOR 250 MG capsule   Generic drug:  saccharomyces boulardii     120 capsule    TAKE TWO CAPSULES BY MOUTH TWICE DAILY    Non-specific colitis       furosemide 40 MG tablet    LASIX    90 tablet    TAKE ONE TABLET BY MOUTH ONCE DAILY IN THE MORNING    Essential hypertension       gabapentin 100 MG capsule    NEURONTIN    270 capsule    Take 1 capsule (100 mg) by mouth 3 times daily    Neuropathy       HYDROcodone-acetaminophen 5-325 MG per tablet    NORCO    120 tablet    Take 1 tablet by mouth 4 times daily    Chronic pain disorder       insulin aspart 100 UNIT/ML injection    NovoLOG PEN     Inject 4 times daily per sliding scale, 24 units max daily        insulin glargine 100 UNIT/ML injection    LANTUS     16 units am, 12 units pm        metFORMIN 500 MG 24 hr tablet    GLUCOPHAGE-XR    360 tablet    Take 2 tablets (1,000 mg) by mouth 2 times daily (with meals)    Type 2 diabetes mellitus without complication, with long-term current use of insulin (H)       predniSONE 5 MG tablet    DELTASONE    180 tablet    Take 1 tablet (5 mg) by mouth 2 times daily    Rheumatoid arthritis, involving unspecified site, unspecified rheumatoid factor presence (H)       spironolactone 25 MG tablet    ALDACTONE    90 tablet    Take 1 tablet (25 mg) by mouth daily    Essential hypertension       TGT PSYLLIUM FIBER 0.52 g capsule   Generic drug:  psyllium     540 capsule    Take 3 capsules (1.56 g) by mouth daily        traMADol 50 MG tablet    ULTRAM    120 tablet    TAKE 2 TABLETS BY MOUTH EVERY 6 HOURS AS NEEDED FOR  MODERATE  PAIN    Neuropathy       Vitamin B-12 CR 1000 MCG Tbcr     30 tablet    Take 1 tablet by mouth daily

## 2018-06-18 NOTE — PROGRESS NOTES
Chief Complaint: Refill on pain medications.    HPI: She is here today for follow-up on her pain medications.  3 months ago when she was in we did a new narcotic contract,  website review and toxicology screen.  Everything looked fine.  She continues on for pain medications daily as well as her tramadol.  Her complete lab at her last visit looked excellent including her A1c.  She is having no other problems or concerns at this time.    Past Medical History:   Diagnosis Date     Atrial fibrillation (H)     2000     Essential (primary) hypertension     No Comments Provided     Extradural and subdural abscess, unspecified (CODE)     2016     Hyperlipidemia     No Comments Provided     Osteomyelitis (H)     2012,IV Vancomycin to resolve, L 3rd digit     Other specified abnormal findings of blood chemistry     2010,Fatty liver on US and CT.  Iron nl. Hep C neg     Pneumothorax     spondylolysis (right) at age 39 followed by spondylolysis (left) several years later     Psoas muscle abscess (H)     2016,Strep viridans     Rheumatoid arthritis (H)     2000     Type 2 diabetes mellitus without complications (H)     No Comments Provided       Past Surgical History:   Procedure Laterality Date     ARTHROPLASTY KNEE      07/10/08     ARTHROTOMY WRIST      2010,removed nodule Right wrist     BIOPSY BREAST      Left     COLONOSCOPY      2001,repeat in 10 years per patient     COLONOSCOPY      2010,sigmoid diverticulosis, no polyps, due 2020     COLONOSCOPY      2011,mild sigmoid diverticulosis, EGD biopsies     ESOPHAGOSCOPY, GASTROSCOPY, DUODENOSCOPY (EGD), COMBINED      2/2011,Normal     EXTRACAPSULAR CATARACT EXTRATION WITH INTRAOCULAR LENS IMPLANT      No Comments Provided     HYSTERECTOMY TOTAL ABDOMINAL, BILATERAL SALPINGO-OOPHORECTOMY, COMBINED      secondary to fibroids     LAMINECTOMY LUMBAR ONE LEVEL      No Comments Provided     OTHER SURGICAL HISTORY      2012,085997,PORTACAT,power port placement, Dr. Taylor      OTHER SURGICAL HISTORY      2017,[id number],MUSCLE FLAP,Left ischial ulcer     OTHER SURGICAL HISTORY      [id number],PORTACATH,hx chest tube surgery x2     OTHER SURGICAL HISTORY      2016,42559.1,CT GUIDE PERC DRAIN CATH ([place])     SPINE SURGERY      Back surgery with fusion of the lumbar discs       Allergies   Allergen Reactions     Aliskiren Cough     Gabapentin Cough     Liquid Adhesive      Other reaction(s): *Unknown  Paper tapes, fragile skin     Lisinopril Cough     Ace cough     Losartan Cough       Current Outpatient Prescriptions   Medication Sig Dispense Refill     HYDROcodone-acetaminophen (NORCO) 5-325 MG per tablet Take 1 tablet by mouth 4 times daily 120 tablet 0     amLODIPine (NORVASC) 2.5 MG tablet Take 1 tablet (2.5 mg) by mouth daily 90 tablet 2     ascorbic acid (VITAMIN C) 500 MG tablet Take 2 tablets (1,000 mg) by mouth daily 30 tablet      atorvastatin (LIPITOR) 20 MG tablet Take 1 tablet (20 mg) by mouth daily 30 tablet      baclofen (LIORESAL) 10 MG tablet Take 1 tablet (10 mg) by mouth 3 times daily 90 tablet 11     blood glucose monitoring (ACCU-CHEK MULTICLIX) lancets Test 4 times daily. Dispense item covered by patient's insurance. Diagnosis code E11.29 400 each 1     blood glucose monitoring (NO BRAND SPECIFIED) test strip Test 4 times daily. Dispense item covered by patient's insurance. Diagnosis code E11.29 200 strip 3     cloNIDine (CATAPRES) 0.1 MG tablet Take 1 tablet (0.1 mg) by mouth At Bedtime 30 tablet      Cyanocobalamin (VITAMIN B-12 CR) 1000 MCG TBCR Take 1 tablet by mouth daily 30 tablet      diltiazem (CARDIZEM CD; CARTIA XT) 360 MG 24 hr CD capsule Take 1 capsule (360 mg) by mouth daily 30 capsule      docusate sodium (COLACE) 100 MG capsule Take 2 capsules (200 mg) by mouth 2 times daily 60 capsule      estradiol (ESTRACE) 0.5 MG tablet Take 1 tablet (0.5 mg) by mouth daily 42 tablet      ferrous sulfate 325 (65 FE) MG TBEC EC tablet Take 2 tablets (650 mg) by mouth daily 30  tablet      FLORASTOR 250 MG capsule TAKE TWO CAPSULES BY MOUTH TWICE DAILY 120 capsule 7     furosemide (LASIX) 40 MG tablet TAKE ONE TABLET BY MOUTH ONCE DAILY IN THE MORNING 90 tablet 3     gabapentin (NEURONTIN) 100 MG capsule Take 1 capsule (100 mg) by mouth 3 times daily 270 capsule 3     insulin aspart (NOVOLOG PEN) 100 UNIT/ML injection Inject 4 times daily per sliding scale, 24 units max daily       insulin glargine (LANTUS) 100 UNIT/ML injection 16 units am, 12 units pm       metFORMIN (GLUCOPHAGE-XR) 500 MG 24 hr tablet Take 2 tablets (1,000 mg) by mouth 2 times daily (with meals) 360 tablet 3     predniSONE (DELTASONE) 5 MG tablet Take 1 tablet (5 mg) by mouth 2 times daily 180 tablet 1     psyllium (TGT PSYLLIUM FIBER) 0.52 G capsule Take 3 capsules (1.56 g) by mouth daily 540 capsule      spironolactone (ALDACTONE) 25 MG tablet Take 1 tablet (25 mg) by mouth daily 90 tablet 3     traMADol (ULTRAM) 50 MG tablet TAKE 2 TABLETS BY MOUTH EVERY 6 HOURS AS NEEDED FOR  MODERATE  PAIN 120 tablet 3       Review of Systems   Constitutional: Negative.    Eyes: Negative.    Endocrine: Negative.    Allergic/Immunologic: Negative.        Physical Exam   Constitutional: She appears well-developed and well-nourished. No distress.   Skin: She is not diaphoretic.   Nursing note and vitals reviewed.      Assessment:        ICD-10-CM    1. Chronic pain disorder G89.4 HYDROcodone-acetaminophen (NORCO) 5-325 MG per tablet     DISCONTINUED: HYDROcodone-acetaminophen (NORCO) 5-325 MG per tablet     DISCONTINUED: HYDROcodone-acetaminophen (NORCO) 5-325 MG per tablet       Plan: Medications were refilled for 3 months at which time she will follow-up.  She will be due for an A1c at that visit as well.  She would like to get her port removed, therefore message will be sent to surgery to potentially get that surgical appointment scheduled.  Follow-up with me sooner if there are problems.

## 2018-06-18 NOTE — NURSING NOTE
The patient is here today to get a refill on her pain medication.  Josefa Pandya LPN on 6/18/2018 at 10:22 AM

## 2018-06-18 NOTE — Clinical Note
Jazzy, this patient would like to have her port removed.  She no longer needs it.  Please schedule with her.  Thanks.

## 2018-06-19 ASSESSMENT — PATIENT HEALTH QUESTIONNAIRE - PHQ9: SUM OF ALL RESPONSES TO PHQ QUESTIONS 1-9: 0

## 2018-06-26 ENCOUNTER — ANESTHESIA EVENT (OUTPATIENT)
Dept: SURGERY | Facility: OTHER | Age: 73
End: 2018-06-26
Payer: MEDICARE

## 2018-06-27 ENCOUNTER — ANESTHESIA (OUTPATIENT)
Dept: SURGERY | Facility: OTHER | Age: 73
End: 2018-06-27
Payer: MEDICARE

## 2018-06-27 ENCOUNTER — SURGERY (OUTPATIENT)
Age: 73
End: 2018-06-27

## 2018-06-27 ENCOUNTER — HOSPITAL ENCOUNTER (OUTPATIENT)
Facility: OTHER | Age: 73
Discharge: HOME | End: 2018-06-27
Attending: SURGERY | Admitting: SURGERY
Payer: MEDICARE

## 2018-06-27 VITALS
WEIGHT: 120 LBS | SYSTOLIC BLOOD PRESSURE: 169 MMHG | HEART RATE: 95 BPM | BODY MASS INDEX: 23.56 KG/M2 | DIASTOLIC BLOOD PRESSURE: 92 MMHG | OXYGEN SATURATION: 96 % | RESPIRATION RATE: 16 BRPM | HEIGHT: 60 IN | TEMPERATURE: 97.5 F

## 2018-06-27 DIAGNOSIS — Z87.39 HISTORY OF OSTEOMYELITIS: Primary | ICD-10-CM

## 2018-06-27 DIAGNOSIS — Z95.828 PORT CATHETER IN PLACE: ICD-10-CM

## 2018-06-27 PROCEDURE — 25000125 ZZHC RX 250: Performed by: NURSE ANESTHETIST, CERTIFIED REGISTERED

## 2018-06-27 PROCEDURE — 25000128 H RX IP 250 OP 636: Performed by: ANESTHESIOLOGY

## 2018-06-27 PROCEDURE — 25000125 ZZHC RX 250: Performed by: ANESTHESIOLOGY

## 2018-06-27 PROCEDURE — 25000125 ZZHC RX 250: Performed by: SURGERY

## 2018-06-27 PROCEDURE — 40000306 ZZH STATISTIC PRE PROC ASSESS II: Performed by: SURGERY

## 2018-06-27 PROCEDURE — 71000027 ZZH RECOVERY PHASE 2 EACH 15 MINS: Performed by: SURGERY

## 2018-06-27 PROCEDURE — 99100 ANES PT EXTEME AGE<1 YR&>70: CPT | Performed by: NURSE ANESTHETIST, CERTIFIED REGISTERED

## 2018-06-27 PROCEDURE — 36590 REMOVAL TUNNELED CV CATH: CPT | Performed by: SURGERY

## 2018-06-27 PROCEDURE — 27210794 ZZH OR GENERAL SUPPLY STERILE: Performed by: SURGERY

## 2018-06-27 PROCEDURE — 25000128 H RX IP 250 OP 636: Performed by: NURSE ANESTHETIST, CERTIFIED REGISTERED

## 2018-06-27 PROCEDURE — 36590 REMOVAL TUNNELED CV CATH: CPT | Performed by: NURSE ANESTHETIST, CERTIFIED REGISTERED

## 2018-06-27 PROCEDURE — 36000050 ZZH SURGERY LEVEL 2 1ST 30 MIN: Performed by: SURGERY

## 2018-06-27 PROCEDURE — 36000052 ZZH SURGERY LEVEL 2 EA 15 ADDTL MIN: Performed by: SURGERY

## 2018-06-27 PROCEDURE — 37000009 ZZH ANESTHESIA TECHNICAL FEE, EACH ADDTL 15 MIN: Performed by: SURGERY

## 2018-06-27 PROCEDURE — 37000008 ZZH ANESTHESIA TECHNICAL FEE, 1ST 30 MIN: Performed by: SURGERY

## 2018-06-27 RX ORDER — ACETAMINOPHEN 325 MG/1
650 TABLET ORAL
Status: DISCONTINUED | OUTPATIENT
Start: 2018-06-27 | End: 2018-06-27 | Stop reason: HOSPADM

## 2018-06-27 RX ORDER — SODIUM CHLORIDE, SODIUM LACTATE, POTASSIUM CHLORIDE, CALCIUM CHLORIDE 600; 310; 30; 20 MG/100ML; MG/100ML; MG/100ML; MG/100ML
INJECTION, SOLUTION INTRAVENOUS CONTINUOUS
Status: DISCONTINUED | OUTPATIENT
Start: 2018-06-27 | End: 2018-06-27 | Stop reason: HOSPADM

## 2018-06-27 RX ORDER — HYDRALAZINE HYDROCHLORIDE 20 MG/ML
2.5-5 INJECTION INTRAMUSCULAR; INTRAVENOUS EVERY 10 MIN PRN
Status: DISCONTINUED | OUTPATIENT
Start: 2018-06-27 | End: 2018-06-27 | Stop reason: HOSPADM

## 2018-06-27 RX ORDER — NALOXONE HYDROCHLORIDE 0.4 MG/ML
.1-.4 INJECTION, SOLUTION INTRAMUSCULAR; INTRAVENOUS; SUBCUTANEOUS
Status: DISCONTINUED | OUTPATIENT
Start: 2018-06-27 | End: 2018-06-27 | Stop reason: HOSPADM

## 2018-06-27 RX ORDER — ONDANSETRON 2 MG/ML
4 INJECTION INTRAMUSCULAR; INTRAVENOUS EVERY 30 MIN PRN
Status: DISCONTINUED | OUTPATIENT
Start: 2018-06-27 | End: 2018-06-27 | Stop reason: HOSPADM

## 2018-06-27 RX ORDER — FENTANYL CITRATE 50 UG/ML
25-50 INJECTION, SOLUTION INTRAMUSCULAR; INTRAVENOUS EVERY 5 MIN PRN
Status: DISCONTINUED | OUTPATIENT
Start: 2018-06-27 | End: 2018-06-27 | Stop reason: HOSPADM

## 2018-06-27 RX ORDER — MEPERIDINE HYDROCHLORIDE 50 MG/ML
12.5 INJECTION INTRAMUSCULAR; INTRAVENOUS; SUBCUTANEOUS
Status: DISCONTINUED | OUTPATIENT
Start: 2018-06-27 | End: 2018-06-27 | Stop reason: HOSPADM

## 2018-06-27 RX ORDER — PROPOFOL 10 MG/ML
INJECTION, EMULSION INTRAVENOUS PRN
Status: DISCONTINUED | OUTPATIENT
Start: 2018-06-27 | End: 2018-06-27

## 2018-06-27 RX ORDER — PROPOFOL 10 MG/ML
INJECTION, EMULSION INTRAVENOUS CONTINUOUS PRN
Status: DISCONTINUED | OUTPATIENT
Start: 2018-06-27 | End: 2018-06-27

## 2018-06-27 RX ORDER — FENTANYL CITRATE 50 UG/ML
50 INJECTION, SOLUTION INTRAMUSCULAR; INTRAVENOUS
Status: DISCONTINUED | OUTPATIENT
Start: 2018-06-27 | End: 2018-06-27 | Stop reason: HOSPADM

## 2018-06-27 RX ORDER — ALBUTEROL SULFATE 0.83 MG/ML
2.5 SOLUTION RESPIRATORY (INHALATION) EVERY 4 HOURS PRN
Status: DISCONTINUED | OUTPATIENT
Start: 2018-06-27 | End: 2018-06-27 | Stop reason: HOSPADM

## 2018-06-27 RX ORDER — ACETAMINOPHEN 325 MG/1
650 TABLET ORAL EVERY 4 HOURS PRN
Qty: 100 TABLET | Refills: 0 | COMMUNITY
Start: 2018-06-27 | End: 2019-01-01

## 2018-06-27 RX ORDER — OXYCODONE HYDROCHLORIDE 5 MG/1
5 TABLET ORAL EVERY 4 HOURS PRN
Status: DISCONTINUED | OUTPATIENT
Start: 2018-06-27 | End: 2018-06-27 | Stop reason: HOSPADM

## 2018-06-27 RX ORDER — BUPIVACAINE HYDROCHLORIDE AND EPINEPHRINE 2.5; 5 MG/ML; UG/ML
INJECTION, SOLUTION INFILTRATION; PERINEURAL PRN
Status: DISCONTINUED | OUTPATIENT
Start: 2018-06-27 | End: 2018-06-27 | Stop reason: HOSPADM

## 2018-06-27 RX ORDER — HYDROMORPHONE HYDROCHLORIDE 1 MG/ML
.3-.5 INJECTION, SOLUTION INTRAMUSCULAR; INTRAVENOUS; SUBCUTANEOUS EVERY 10 MIN PRN
Status: DISCONTINUED | OUTPATIENT
Start: 2018-06-27 | End: 2018-06-27 | Stop reason: HOSPADM

## 2018-06-27 RX ORDER — LIDOCAINE 40 MG/G
CREAM TOPICAL
Status: DISCONTINUED | OUTPATIENT
Start: 2018-06-27 | End: 2018-06-27 | Stop reason: HOSPADM

## 2018-06-27 RX ORDER — ONDANSETRON 4 MG/1
4 TABLET, ORALLY DISINTEGRATING ORAL EVERY 30 MIN PRN
Status: DISCONTINUED | OUTPATIENT
Start: 2018-06-27 | End: 2018-06-27 | Stop reason: HOSPADM

## 2018-06-27 RX ORDER — DEXAMETHASONE SODIUM PHOSPHATE 4 MG/ML
4 INJECTION, SOLUTION INTRA-ARTICULAR; INTRALESIONAL; INTRAMUSCULAR; INTRAVENOUS; SOFT TISSUE EVERY 10 MIN PRN
Status: DISCONTINUED | OUTPATIENT
Start: 2018-06-27 | End: 2018-06-27 | Stop reason: HOSPADM

## 2018-06-27 RX ORDER — LIDOCAINE HYDROCHLORIDE 20 MG/ML
INJECTION, SOLUTION INFILTRATION; PERINEURAL PRN
Status: DISCONTINUED | OUTPATIENT
Start: 2018-06-27 | End: 2018-06-27

## 2018-06-27 RX ADMIN — LIDOCAINE HYDROCHLORIDE 40 MG: 20 INJECTION, SOLUTION INFILTRATION; PERINEURAL at 07:40

## 2018-06-27 RX ADMIN — LIDOCAINE HYDROCHLORIDE 0.1 ML: 10 INJECTION, SOLUTION EPIDURAL; INFILTRATION; INTRACAUDAL; PERINEURAL at 07:09

## 2018-06-27 RX ADMIN — SODIUM CHLORIDE, POTASSIUM CHLORIDE, SODIUM LACTATE AND CALCIUM CHLORIDE: 600; 310; 30; 20 INJECTION, SOLUTION INTRAVENOUS at 07:24

## 2018-06-27 RX ADMIN — PROPOFOL 30 MG: 10 INJECTION, EMULSION INTRAVENOUS at 07:40

## 2018-06-27 RX ADMIN — BUPIVACAINE HYDROCHLORIDE AND EPINEPHRINE BITARTRATE 5 ML: 2.5; .005 INJECTION, SOLUTION INFILTRATION; PERINEURAL at 08:03

## 2018-06-27 RX ADMIN — PROPOFOL 100 MCG/KG/MIN: 10 INJECTION, EMULSION INTRAVENOUS at 07:40

## 2018-06-27 NOTE — OP NOTE
Preoperative Diagnosis: history of osteomyelitis and difficult venous access, implanted port, non-functioning    Postoperative Diagnosis: same    Procedure planned: Port removal    Procedure performed: left subclavian port removal    Surgeon: Jazzy Taylor MD   Circulator: Genna Heredia RN  Relief Circulator: Eladia Zayas RN  Scrub Person: Corinna Garcia  2nd Scrub: Wero Burnham    Anesthesia: Monitored anesthesia care, local     Specimen: none    Estimated Blood Loss: Less than 10 ml     INDICATIONS     Please see the H&P. The patient has completed chemotherapy and wants the port removed. The risks, benefits and alternatives to removal of implanted port were discussed with the patient. We specifically discussed the risks of infection, bleeding, injury to blood vessels and lung, blood clot and the possible need for further procedures. The patient expressed understanding and questions were answered. Informed consent paperwork was completed.     DESCRIPTION OF PROCEDURE     The patient was brought to the operating room and placed in a supine position on the operating table. Appropriate monitors were attached. The patient received IV antibiotics preoperatively. After sedation was administered, the patient was positioned, prepped and draped in the standard fashion. Time out was performed confirming the patient's identity and procedure to be performed.   Local anesthetic was infiltrated in the skin and subcutaneous tissue in the area of the scar below the left clavicle. Skin incision was made sharply and carried down to the subcutaneous tissue, the scar was excised. Hemostasis was excellent. The capsule around the post was entered. The stiches securing the port were cut. A Vicryl figure of eight stitch was placed around the catheter exit tract. The catheter was withdrawn and the suture secured. Hemostasis was excellent. The port was explanted and found to be intact. The capsule was partially excised.  Hemostasis was excellent. Further local anesthetic was infiltrated for post operative pain control. Skin edges were approximated with Monocryl suture.   Sterile dressings were applied. The patient was then awakened from sedation and taken to post anesthesia recovery in stable condition. All needle, sponge and instrument counts were reported as correct at the conclusion of the case. The patient tolerated the procedure with no immediately apparent complications.

## 2018-06-27 NOTE — INTERVAL H&P NOTE
/82 (Cuff Size: Adult Regular)  Pulse 95  Temp 97.6  F (36.4  C) (Temporal)  Resp 20  Ht 1.524 m (5')  Wt 54.4 kg (120 lb)  SpO2 96%  Breastfeeding? No  BMI 23.44 kg/m2    General: no acute distress  Lungs: clear, no respiratory distress  Heart: regular  Abdomen soft and non-tender  Chest wall: left upper chest port with no tenderness, hummingbird tattoo below port incision site.

## 2018-06-27 NOTE — ANESTHESIA PREPROCEDURE EVALUATION
Anesthesia Evaluation     . Pt has had prior anesthetic.     History of anesthetic complications   - PONV and motion sickness        ROS/MED HX    ENT/Pulmonary:  - neg pulmonary ROS     Neurologic:  - neg neurologic ROS     Cardiovascular:     (+) hypertension----. : . . . :. .       METS/Exercise Tolerance:     Hematologic:  - neg hematologic  ROS       Musculoskeletal: Comment: Rheumatoid arthritis, limited rom to neck. Stiff all over she states        GI/Hepatic: Comment: Has reflux but controlled today    (+) GERD       Renal/Genitourinary:  - ROS Renal section negative       Endo:     (+) type I DM, Last HgA1c: 6.5 date: 2018 .      Psychiatric:  - neg psychiatric ROS       Infectious Disease:  - neg infectious disease ROS       Malignancy:      - no malignancy   Other:    - neg other ROS                 Physical Exam  Normal systems: pulmonary    Airway   Mallampati: III  TM distance: >3 FB  Neck ROM: limited    Dental   (+) upper dentures and lower dentures    Cardiovascular   Rhythm and rate: regular      Pulmonary                     Anesthesia Plan      History & Physical Review      ASA Status:  3 .    NPO Status:  > 4 hours    Plan for MAC with Propofol induction. Reason for MAC:  Chronic cardiopulmonary disease (G9)  PONV prophylaxis:  Dexamethasone or Solumedrol and Ondansetron (or other 5HT-3)       Postoperative Care      Consents  Anesthetic plan, risks, benefits and alternatives discussed with:  Patient and Spouse..                          .

## 2018-06-27 NOTE — IP AVS SNAPSHOT
MRN:[id number]                      After Visit Summary   6/27/2018    Karen Duncan    MRN: [id number]           Thank you!     Thank you for choosing [facility] for your care. Our goal is always to provide you with excellent care. Hearing back from our patients is one way we can continue to improve our services. Please take a few minutes to complete the written survey that you may receive in the mail after you visit with us. Thank you!        Patient Information     Date Of Birth          1945        Designated Caregiver       Most Recent Value    Caregiver    Will someone help with your care after discharge? yes    Name of designated caregiver neris      About your hospital stay     You were admitted on:  June 27, 2018 You last received care in the:  [facility]    You were discharged on:  June 27, 2018       Who to Call     For medical emergencies, please call 911.  For non-urgent questions about your medical care, please call your primary care provider or clinic, [phone]  For questions related to your surgery, please call your surgery clinic        Attending Provider     Provider Specialty    Jazzy Taylor MD Surgery       Primary Care Provider Office Phone # Fax #    Hugo Duarte [phone]      After Care Instructions     Diet Instructions       Resume pre-procedure diet            Discharge Instructions       Follow up appointment if needed.            Ice to affected area       Ice to operative site PRN            No Alcohol       For 24 hours post procedure            No driving or operating machinery        until the day after procedure            Shower       No shower for 24 hours post procedure. May shower Postoperative Day (POD)  2                  Further instructions from your care team       Procedure you had done: removal of port  Your health care provider is:  Hugo Duarte  Your surgeon is Dr. Jazzy Taylor.   Please call your  health care provider or surgeon at [phone] if:    - you feel you are getting worse or having an increase in problems    - fever greater than 101 degrees  - increasing shortness of breath or chest pain  - any signs of infection (increasing redness, swelling, tenderness, warmth, change in appearance, or  increased drainage)  - nausea (upset stomach) and vomiting and/or diarrhea that will not stop  - severe pain that is not relieved by medicine, rest or ice    Home care :  You will be discharged when you are safe to go home. Anesthesia can change judgment, reaction time and coordination for several hours after you seem back to normal. Therefore, do not operate any motorized vehicles or power tools for 24 hours after discharge.     Activity:  You should rest or do quiet activities for the rest of the day. The day after surgery you may be as active as you feel able. You may find that you require more rest than usual the first 3-4 days as your body heals.      Diet:  Eat small amounts frequently after arriving home. Avoid foods that are hard to digest such as heavy, sweet, spicy, or fried foods until you are feeling well.   Vomiting can occur after general anesthesia. If vomiting lasts more than 5-7 times after arriving home, or if you have other difficulties, call your doctor.     Other:  1. Problems urinating can happen after surgery.  Please call your physician if you have any problems.  2. Some people have constipation after surgery-you can use over the counter stool softener or laxative as needed.  3. You can use ice on the area of the incision to help with pain and swelling. Apply an ice pack wrapped in a towel to the area for 10-15 minutes once an hour.   Dressing:  Your incision was covered with Steri-strips and a bandage. The bandage can be removed and you can shower 24 hours or more after the surgery. After you shower dry your incision gently. You may apply a clean bandage if you want to. The Steri-strips  "will stay on for 5-7 days.   No soaking in a bath, hot tub, pool or lake for 5 days.      Drainage:   Bleeding or drainage should be minimal.  1. If bleeding soaks the dressings, cover with another sterile dressing.  2. If bleeding continues, apply gentle steady pressure over the incision for 5 minutes.  3. If bleeding persists or there is an increased swelling of the area, call your surgeon or go to the Emergency Room.        Pending Results     No orders found from 2018 to 2018.            Admission Information     Date & Time Provider Department Dept. Phone    2018 Jazzy Taylor MD [facility] [phone]      Your Vitals Were     Blood Pressure Pulse Temperature Respirations Height Weight    142/86 95 97.4  F (36.3  C) (Temporal) 20 1.524 m (5') 54.4 kg (120 lb)    Pulse Oximetry BMI (Body Mass Index)                96% 23.44 kg/m2          [email or web address] Information     [employer] lets you send messages to your doctor, view your test results, renew your prescriptions, schedule appointments and more. To sign up, go to [email or web address] . Click on \"Log in\" on the left side of the screen, which will take you to the Welcome page. Then click on \"Sign up Now\" on the right side of the page.     You will be asked to enter the access code listed below, as well as some personal information. Please follow the directions to create your username and password.     Your access code is: [email or web address]  Expires: 2018 10:45 AM     Your access code will  in 90 days. If you need help or a new code, please call your [facility] clinic or [phone].        Care EveryWhere ID     This is your Care EveryWhere ID. This could be used by other organizations to access your [facility] medical records  [id number]        Equal Access to Services     YANNA PAULA : Pool Ayoub, walo luqadaha, qaybta kaalsal hearn. So [facility] " [phone].    ATENCIÓN: Si tamiko vasques, tiene a lawson disposición servicios gratuitos de asistencia lingüística. Kim johnson [phone].    We comply with applicable federal civil rights laws and [place] laws. We do not discriminate on the basis of race, color, national origin, age, disability, sex, sexual orientation, or gender identity.               Review of your medicines      START taking        Dose / Directions    acetaminophen 325 MG tablet   Commonly known as:  TYLENOL   Used for:  History of osteomyelitis        Dose:  650 mg   Take 2 tablets (650 mg) by mouth every 4 hours as needed for other (mild pain)   Quantity:  100 tablet   Refills:  0         CONTINUE these medicines which have NOT CHANGED        Dose / Directions    amLODIPine 2.5 MG tablet   Commonly known as:  NORVASC   Used for:  Hypertension        Dose:  2.5 mg   Take 1 tablet (2.5 mg) by mouth daily   Quantity:  90 tablet   Refills:  2       ascorbic acid 500 MG tablet   Commonly known as:  VITAMIN C        Dose:  1000 mg   Take 2 tablets (1,000 mg) by mouth daily   Quantity:  30 tablet   Refills:  0       atorvastatin 20 MG tablet   Commonly known as:  LIPITOR        Dose:  20 mg   Take 1 tablet (20 mg) by mouth daily   Quantity:  30 tablet   Refills:  0       baclofen 10 MG tablet   Commonly known as:  LIORESAL   Used for:  Chronic pain disorder        Dose:  10 mg   Take 1 tablet (10 mg) by mouth 3 times daily   Quantity:  90 tablet   Refills:  11       blood glucose monitoring lancets   Used for:  Type 2 diabetes mellitus without complication, with long-term current use of insulin (H)        Test 4 times daily. Dispense item covered by patient's insurance. Diagnosis code E11.29   Quantity:  400 each   Refills:  1       blood glucose monitoring test strip   Commonly known as:  no brand specified   Used for:  Uncontrolled diabetes with kidney complications (H)        Test 4 times daily. Dispense item covered by patient's insurance.  Diagnosis code E11.29   Quantity:  200 strip   Refills:  3       cloNIDine 0.1 MG tablet   Commonly known as:  CATAPRES        Dose:  0.1 mg   Take 0.1 mg by mouth every evening Takes at 1 pm,   Quantity:  30 tablet   Refills:  0       diltiazem 360 MG 24 hr CD capsule   Commonly known as:  CARDIZEM CD; CARTIA XT        Dose:  360 mg   Take 1 capsule (360 mg) by mouth daily   Quantity:  30 capsule   Refills:  0       docusate sodium 100 MG capsule   Commonly known as:  COLACE        Dose:  200 mg   Take 2 capsules (200 mg) by mouth 2 times daily   Quantity:  60 capsule   Refills:  0       estradiol 0.5 MG tablet   Commonly known as:  ESTRACE        Dose:  0.5 mg   Take 1 tablet (0.5 mg) by mouth daily   Quantity:  42 tablet   Refills:  0       ferrous sulfate 325 (65 Fe) MG Tbec EC tablet        Dose:  650 mg   Take 2 tablets (650 mg) by mouth daily   Quantity:  30 tablet   Refills:  0       FLORASTOR 250 MG capsule   Used for:  Non-specific colitis   Generic drug:  saccharomyces boulardii        TAKE TWO CAPSULES BY MOUTH TWICE DAILY   Quantity:  120 capsule   Refills:  7       furosemide 40 MG tablet   Commonly known as:  LASIX   Used for:  Essential hypertension        TAKE ONE TABLET BY MOUTH ONCE DAILY IN THE MORNING   Quantity:  90 tablet   Refills:  3       gabapentin 100 MG capsule   Commonly known as:  NEURONTIN   Used for:  Neuropathy        Dose:  100 mg   Take 1 capsule (100 mg) by mouth 3 times daily   Quantity:  270 capsule   Refills:  3       HYDROcodone-acetaminophen 5-325 MG per tablet   Commonly known as:  NORCO   Used for:  Chronic pain disorder        Dose:  1 tablet   Take 1 tablet by mouth 4 times daily   Quantity:  120 tablet   Refills:  0       insulin aspart 100 UNIT/ML injection   Commonly known as:  NovoLOG PEN        Inject 4 times daily per sliding scale, 24 units max daily   Refills:  0       insulin glargine 100 UNIT/ML injection   Commonly known as:  LANTUS        16 units am, 12  units pm   Refills:  0       metFORMIN 500 MG 24 hr tablet   Commonly known as:  GLUCOPHAGE-XR   Used for:  Type 2 diabetes mellitus without complication, with long-term current use of insulin (H)        Dose:  1000 mg   Take 2 tablets (1,000 mg) by mouth 2 times daily (with meals)   Quantity:  360 tablet   Refills:  3       predniSONE 5 MG tablet   Commonly known as:  DELTASONE   Used for:  Rheumatoid arthritis, involving unspecified site, unspecified rheumatoid factor presence (H)        Dose:  5 mg   Take 1 tablet (5 mg) by mouth 2 times daily   Quantity:  180 tablet   Refills:  1       spironolactone 25 MG tablet   Commonly known as:  ALDACTONE   Used for:  Essential hypertension        Dose:  25 mg   Take 1 tablet (25 mg) by mouth daily   Quantity:  90 tablet   Refills:  3       TGT PSYLLIUM FIBER 0.52 g capsule   Generic drug:  psyllium        Dose:  3 capsule   Take 3 capsules (1.56 g) by mouth daily   Quantity:  540 capsule   Refills:  0       traMADol 50 MG tablet   Commonly known as:  ULTRAM   Used for:  Neuropathy        TAKE 2 TABLETS BY MOUTH EVERY 6 HOURS AS NEEDED FOR  MODERATE  PAIN   Quantity:  120 tablet   Refills:  3       Vitamin B-12 CR 1000 MCG Tbcr        Dose:  1 tablet   Take 1 tablet by mouth daily   Quantity:  30 tablet   Refills:  0            Where to get your medicines      Some of these will need a paper prescription and others can be bought over the counter. Ask your nurse if you have questions.     You don't need a prescription for these medications     acetaminophen 325 MG tablet                Protect others around you: Learn how to safely use, store and throw away your medicines at www.disposemymeds.org.             Medication List: This is a list of all your medications and when to take them. Check marks below indicate your daily home schedule. Keep this list as a reference.      Medications           Morning Afternoon Evening Bedtime As Needed    acetaminophen 325 MG tablet    Commonly known as:  TYLENOL   Take 2 tablets (650 mg) by mouth every 4 hours as needed for other (mild pain)                                amLODIPine 2.5 MG tablet   Commonly known as:  NORVASC   Take 1 tablet (2.5 mg) by mouth daily                                ascorbic acid 500 MG tablet   Commonly known as:  VITAMIN C   Take 2 tablets (1,000 mg) by mouth daily                                atorvastatin 20 MG tablet   Commonly known as:  LIPITOR   Take 1 tablet (20 mg) by mouth daily                                baclofen 10 MG tablet   Commonly known as:  LIORESAL   Take 1 tablet (10 mg) by mouth 3 times daily                                blood glucose monitoring lancets   Test 4 times daily. Dispense item covered by patient's insurance. Diagnosis code E11.29                                blood glucose monitoring test strip   Commonly known as:  no brand specified   Test 4 times daily. Dispense item covered by patient's insurance. Diagnosis code E11.29                                cloNIDine 0.1 MG tablet   Commonly known as:  CATAPRES   Take 0.1 mg by mouth every evening Takes at 1 pm,                                diltiazem 360 MG 24 hr CD capsule   Commonly known as:  CARDIZEM CD; CARTIA XT   Take 1 capsule (360 mg) by mouth daily                                docusate sodium 100 MG capsule   Commonly known as:  COLACE   Take 2 capsules (200 mg) by mouth 2 times daily                                estradiol 0.5 MG tablet   Commonly known as:  ESTRACE   Take 1 tablet (0.5 mg) by mouth daily                                ferrous sulfate 325 (65 Fe) MG Tbec EC tablet   Take 2 tablets (650 mg) by mouth daily                                FLORASTOR 250 MG capsule   TAKE TWO CAPSULES BY MOUTH TWICE DAILY   Generic drug:  saccharomyces boulardii                                furosemide 40 MG tablet   Commonly known as:  LASIX   TAKE ONE TABLET BY MOUTH ONCE DAILY IN THE MORNING                                 gabapentin 100 MG capsule   Commonly known as:  NEURONTIN   Take 1 capsule (100 mg) by mouth 3 times daily                                HYDROcodone-acetaminophen 5-325 MG per tablet   Commonly known as:  NORCO   Take 1 tablet by mouth 4 times daily                                insulin aspart 100 UNIT/ML injection   Commonly known as:  NovoLOG PEN   Inject 4 times daily per sliding scale, 24 units max daily                                insulin glargine 100 UNIT/ML injection   Commonly known as:  LANTUS   16 units am, 12 units pm                                metFORMIN 500 MG 24 hr tablet   Commonly known as:  GLUCOPHAGE-XR   Take 2 tablets (1,000 mg) by mouth 2 times daily (with meals)                                predniSONE 5 MG tablet   Commonly known as:  DELTASONE   Take 1 tablet (5 mg) by mouth 2 times daily                                spironolactone 25 MG tablet   Commonly known as:  ALDACTONE   Take 1 tablet (25 mg) by mouth daily                                TGT PSYLLIUM FIBER 0.52 g capsule   Take 3 capsules (1.56 g) by mouth daily   Generic drug:  psyllium                                traMADol 50 MG tablet   Commonly known as:  ULTRAM   TAKE 2 TABLETS BY MOUTH EVERY 6 HOURS AS NEEDED FOR  MODERATE  PAIN                                Vitamin B-12 CR 1000 MCG Tbcr   Take 1 tablet by mouth daily

## 2018-06-27 NOTE — ANESTHESIA POSTPROCEDURE EVALUATION
Patient: Karen Duncan    Procedure(s):  Port Removal - Wound Class: I-Clean    Diagnosis:need port removed  Diagnosis Additional Information: No value filed.    Anesthesia Type:  MAC    Note:  Anesthesia Post Evaluation    Patient location during evaluation: Phase 2  Patient participation: Able to fully participate in evaluation  Level of consciousness: awake and alert  Pain management: adequate  Airway patency: patent  Cardiovascular status: acceptable  Respiratory status: acceptable  Hydration status: acceptable  PONV: none             Last vitals:  Vitals:    06/27/18 0650 06/27/18 0810 06/27/18 0815   BP: 143/82 129/67 142/86   Pulse: 95     Resp: 20     Temp: 97.6  F (36.4  C) 97.4  F (36.3  C)    SpO2: 96% 94% 96%         Electronically Signed By: KELLIE MO CRNA  June 27, 2018  8:26 AM

## 2018-06-27 NOTE — INTERVAL H&P NOTE
History and Physical Update    The history and physical has been reviewed and the patient has been examined.  There are no changes to the patient's history or physical condition.  I discussed removal of the port with the patient. Informed consent paperwork was completed.

## 2018-06-27 NOTE — ANESTHESIA POSTPROCEDURE EVALUATION
Patient: Karen Duncan    Procedure(s):  Port Removal - Wound Class: I-Clean    Diagnosis:need port removed  Diagnosis Additional Information: No value filed.    Anesthesia Type:  MAC    Note:  Anesthesia Post Evaluation    Patient location during evaluation: Phase 2  Patient participation: Able to fully participate in evaluation  Level of consciousness: awake and alert  Pain management: adequate  Airway patency: patent  Cardiovascular status: acceptable  Respiratory status: acceptable  Hydration status: acceptable  PONV: none     Anesthetic complications: None          Last vitals:  Vitals:    06/27/18 0810 06/27/18 0815 06/27/18 0830   BP: 129/67 142/86 (!) 169/92   Pulse:      Resp: 12  16   Temp: 97.4  F (36.3  C)  97.5  F (36.4  C)   SpO2: 94% 96% 96%         Electronically Signed By: Luc Lopez DO  June 27, 2018  10:22 AM

## 2018-06-27 NOTE — DISCHARGE INSTRUCTIONS
Procedure you had done: removal of port  Your health care provider is:  Hugo Duarte  Your surgeon is Dr. Jazzy Taylor.   Please call your health care provider or surgeon at [phone] if:    - you feel you are getting worse or having an increase in problems    - fever greater than 101 degrees  - increasing shortness of breath or chest pain  - any signs of infection (increasing redness, swelling, tenderness, warmth, change in appearance, or  increased drainage)  - nausea (upset stomach) and vomiting and/or diarrhea that will not stop  - severe pain that is not relieved by medicine, rest or ice    Home care :  You will be discharged when you are safe to go home. Anesthesia can change judgment, reaction time and coordination for several hours after you seem back to normal. Therefore, do not operate any motorized vehicles or power tools for 24 hours after discharge.     Activity:  You should rest or do quiet activities for the rest of the day. The day after surgery you may be as active as you feel able. You may find that you require more rest than usual the first 3-4 days as your body heals.      Diet:  Eat small amounts frequently after arriving home. Avoid foods that are hard to digest such as heavy, sweet, spicy, or fried foods until you are feeling well.   Vomiting can occur after general anesthesia. If vomiting lasts more than 5-7 times after arriving home, or if you have other difficulties, call your doctor.     Other:  1. Problems urinating can happen after surgery.  Please call your physician if you have any problems.  2. Some people have constipation after surgery-you can use over the counter stool softener or laxative as needed.  3. You can use ice on the area of the incision to help with pain and swelling. Apply an ice pack wrapped in a towel to the area for 10-15 minutes once an hour.   Dressing:  Your incision was covered with Steri-strips and a bandage. The bandage can be removed and you can shower 24  hours or more after the surgery. After you shower dry your incision gently. You may apply a clean bandage if you want to. The Steri-strips will stay on for 5-7 days.   No soaking in a bath, hot tub, pool or lake for 5 days.      Drainage:   Bleeding or drainage should be minimal.  1. If bleeding soaks the dressings, cover with another sterile dressing.  2. If bleeding continues, apply gentle steady pressure over the incision for 5 minutes.  3. If bleeding persists or there is an increased swelling of the area, call your surgeon or go to the Emergency Room.

## 2018-06-27 NOTE — H&P (VIEW-ONLY)
Chief Complaint: Refill on pain medications.    HPI: She is here today for follow-up on her pain medications.  3 months ago when she was in we did a new narcotic contract,  website review and toxicology screen.  Everything looked fine.  She continues on for pain medications daily as well as her tramadol.  Her complete lab at her last visit looked excellent including her A1c.  She is having no other problems or concerns at this time.    Past Medical History:   Diagnosis Date     Atrial fibrillation (H)     2000     Essential (primary) hypertension     No Comments Provided     Extradural and subdural abscess, unspecified (CODE)     2016     Hyperlipidemia     No Comments Provided     Osteomyelitis (H)     2012,IV Vancomycin to resolve, L 3rd digit     Other specified abnormal findings of blood chemistry     2010,Fatty liver on US and CT.  Iron nl. Hep C neg     Pneumothorax     spondylolysis (right) at age 39 followed by spondylolysis (left) several years later     Psoas muscle abscess (H)     2016,Strep viridans     Rheumatoid arthritis (H)     2000     Type 2 diabetes mellitus without complications (H)     No Comments Provided       Past Surgical History:   Procedure Laterality Date     ARTHROPLASTY KNEE      07/10/08     ARTHROTOMY WRIST      2010,removed nodule Right wrist     BIOPSY BREAST      Left     COLONOSCOPY      2001,repeat in 10 years per patient     COLONOSCOPY      2010,sigmoid diverticulosis, no polyps, due 2020     COLONOSCOPY      2011,mild sigmoid diverticulosis, EGD biopsies     ESOPHAGOSCOPY, GASTROSCOPY, DUODENOSCOPY (EGD), COMBINED      2/2011,Normal     EXTRACAPSULAR CATARACT EXTRATION WITH INTRAOCULAR LENS IMPLANT      No Comments Provided     HYSTERECTOMY TOTAL ABDOMINAL, BILATERAL SALPINGO-OOPHORECTOMY, COMBINED      secondary to fibroids     LAMINECTOMY LUMBAR ONE LEVEL      No Comments Provided     OTHER SURGICAL HISTORY      2012,[id number],PORTACAT,power port placement, Dr. Taylor      OTHER SURGICAL HISTORY      2017,[id number],MUSCLE FLAP,Left ischial ulcer     OTHER SURGICAL HISTORY      997403,PORTACATH,hx chest tube surgery x2     OTHER SURGICAL HISTORY      2016,31364.1,CT GUIDE PERC DRAIN CATH (IA)     SPINE SURGERY      Back surgery with fusion of the lumbar discs       Allergies   Allergen Reactions     Aliskiren Cough     Gabapentin Cough     Liquid Adhesive      Other reaction(s): *Unknown  Paper tapes, fragile skin     Lisinopril Cough     Ace cough     Losartan Cough       Current Outpatient Prescriptions   Medication Sig Dispense Refill     HYDROcodone-acetaminophen (NORCO) 5-325 MG per tablet Take 1 tablet by mouth 4 times daily 120 tablet 0     amLODIPine (NORVASC) 2.5 MG tablet Take 1 tablet (2.5 mg) by mouth daily 90 tablet 2     ascorbic acid (VITAMIN C) 500 MG tablet Take 2 tablets (1,000 mg) by mouth daily 30 tablet      atorvastatin (LIPITOR) 20 MG tablet Take 1 tablet (20 mg) by mouth daily 30 tablet      baclofen (LIORESAL) 10 MG tablet Take 1 tablet (10 mg) by mouth 3 times daily 90 tablet 11     blood glucose monitoring (ACCU-CHEK MULTICLIX) lancets Test 4 times daily. Dispense item covered by patient's insurance. Diagnosis code E11.29 400 each 1     blood glucose monitoring (NO BRAND SPECIFIED) test strip Test 4 times daily. Dispense item covered by patient's insurance. Diagnosis code E11.29 200 strip 3     cloNIDine (CATAPRES) 0.1 MG tablet Take 1 tablet (0.1 mg) by mouth At Bedtime 30 tablet      Cyanocobalamin (VITAMIN B-12 CR) 1000 MCG TBCR Take 1 tablet by mouth daily 30 tablet      diltiazem (CARDIZEM CD; CARTIA XT) 360 MG 24 hr CD capsule Take 1 capsule (360 mg) by mouth daily 30 capsule      docusate sodium (COLACE) 100 MG capsule Take 2 capsules (200 mg) by mouth 2 times daily 60 capsule      estradiol (ESTRACE) 0.5 MG tablet Take 1 tablet (0.5 mg) by mouth daily 42 tablet      ferrous sulfate 325 (65 FE) MG TBEC EC tablet Take 2 tablets (650 mg) by mouth daily 30  tablet      FLORASTOR 250 MG capsule TAKE TWO CAPSULES BY MOUTH TWICE DAILY 120 capsule 7     furosemide (LASIX) 40 MG tablet TAKE ONE TABLET BY MOUTH ONCE DAILY IN THE MORNING 90 tablet 3     gabapentin (NEURONTIN) 100 MG capsule Take 1 capsule (100 mg) by mouth 3 times daily 270 capsule 3     insulin aspart (NOVOLOG PEN) 100 UNIT/ML injection Inject 4 times daily per sliding scale, 24 units max daily       insulin glargine (LANTUS) 100 UNIT/ML injection 16 units am, 12 units pm       metFORMIN (GLUCOPHAGE-XR) 500 MG 24 hr tablet Take 2 tablets (1,000 mg) by mouth 2 times daily (with meals) 360 tablet 3     predniSONE (DELTASONE) 5 MG tablet Take 1 tablet (5 mg) by mouth 2 times daily 180 tablet 1     psyllium (TGT PSYLLIUM FIBER) 0.52 G capsule Take 3 capsules (1.56 g) by mouth daily 540 capsule      spironolactone (ALDACTONE) 25 MG tablet Take 1 tablet (25 mg) by mouth daily 90 tablet 3     traMADol (ULTRAM) 50 MG tablet TAKE 2 TABLETS BY MOUTH EVERY 6 HOURS AS NEEDED FOR  MODERATE  PAIN 120 tablet 3       Review of Systems   Constitutional: Negative.    Eyes: Negative.    Endocrine: Negative.    Allergic/Immunologic: Negative.        Physical Exam   Constitutional: She appears well-developed and well-nourished. No distress.   Skin: She is not diaphoretic.   Nursing note and vitals reviewed.      Assessment:        ICD-10-CM    1. Chronic pain disorder G89.4 HYDROcodone-acetaminophen (NORCO) 5-325 MG per tablet     DISCONTINUED: HYDROcodone-acetaminophen (NORCO) 5-325 MG per tablet     DISCONTINUED: HYDROcodone-acetaminophen (NORCO) 5-325 MG per tablet       Plan: Medications were refilled for 3 months at which time she will follow-up.  She will be due for an A1c at that visit as well.  She would like to get her port removed, therefore message will be sent to surgery to potentially get that surgical appointment scheduled.  Follow-up with me sooner if there are problems.

## 2018-06-27 NOTE — ADDENDUM NOTE
Addendum  created 06/27/18 1022 by Luc Lopez DO    Anesthesia Attestations filed, Sign clinical note

## 2018-06-27 NOTE — ANESTHESIA CARE TRANSFER NOTE
Patient: Karen Duncan    Procedure(s):  Port Removal - Wound Class: I-Clean    Diagnosis: need port removed  Diagnosis Additional Information: No value filed.    Anesthesia Type:   MAC     Note:  Airway :Room Air  Patient transferred to:Phase II  Handoff Report: Identifed the Patient, Identified the Reponsible Provider, Reviewed the pertinent medical history, Discussed the surgical course, Reviewed Intra-OP anesthesia mangement and issues during anesthesia, Set expectations for post-procedure period and Allowed opportunity for questions and acknowledgement of understanding      Vitals: (Last set prior to Anesthesia Care Transfer)    CRNA VITALS  6/27/2018 0735 - 6/27/2018 0807      6/27/2018             Pulse: 77    Ht Rate: 77    SpO2: 95 %    Resp Rate (set): 10                Electronically Signed By: KELLIE MO CRNA  June 27, 2018  8:07 AM

## 2018-06-27 NOTE — IP AVS SNAPSHOT
[facility]    [address]    [place] [address]    Phone:  [phone]    Fax:  [phone]                                       After Visit Summary   6/27/2018    Karen Duncan    MRN: [id number]           After Visit Summary Signature Page     I have received my discharge instructions, and my questions have been answered. I have discussed any challenges I see with this plan with the nurse or doctor.    ..........................................................................................................................................  Patient/Patient Representative Signature      ..........................................................................................................................................  Patient Representative Print Name and Relationship to Patient    ..................................................               ................................................  Date                                            Time    ..........................................................................................................................................  Reviewed by Signature/Title    ...................................................              ..............................................  Date                                                            Time

## 2018-06-27 NOTE — OR NURSING
Pt has been discharged to home at 0850 via wheelchair accompanied by RN    Written discharge instructions were provided to patient and spouse.  Prescriptions were N/A.      Patient and adult caring for them verbalize understanding of discharge instructions including no driving until tomorrow and no longer taking narcotic pain medications - no operating mechanical equipment and no making any important decisions.They understand reason for discharge, and necessary follow-up appointments.      Minna Carrizales RN

## 2018-07-03 DIAGNOSIS — M06.9 RHEUMATOID ARTHRITIS (H): Primary | ICD-10-CM

## 2018-07-03 DIAGNOSIS — G62.9 NEUROPATHY: ICD-10-CM

## 2018-07-06 RX ORDER — TRAMADOL HYDROCHLORIDE 50 MG/1
TABLET ORAL
Qty: 120 TABLET | Refills: 0 | OUTPATIENT
Start: 2018-07-06

## 2018-07-06 RX ORDER — TRAMADOL HYDROCHLORIDE 50 MG/1
TABLET ORAL
Qty: 56 TABLET | Refills: 0 | Status: SHIPPED | OUTPATIENT
Start: 2018-07-06 | End: 2018-07-10

## 2018-07-06 NOTE — TELEPHONE ENCOUNTER
Patient called to inform she has only 2 days left and cannot wait for return of PCP. States she is taking 2 tablets every 6 hours.     Routing refill request to covering provider for approval because:  Drug not on the [facility] refill protocol     Last filled on 5/4/18 for #120 X 3 refills. Was seen on 6-18-18, has narcotic contract. Anne Piña RN on 7/6/2018 at 12:04 PM

## 2018-07-06 NOTE — TELEPHONE ENCOUNTER
She has a pain contract and needs to see Duarte.    Signed, Lewis Butterfield MD  Internal Medicine & Pediatrics

## 2018-07-10 DIAGNOSIS — G62.9 NEUROPATHY: ICD-10-CM

## 2018-07-12 RX ORDER — TRAMADOL HYDROCHLORIDE 50 MG/1
TABLET ORAL
Qty: 56 TABLET | Refills: 3 | Status: SHIPPED | OUTPATIENT
Start: 2018-07-12 | End: 2018-08-09

## 2018-07-12 NOTE — TELEPHONE ENCOUNTER
Routing refill request to provider for review/approval because:  Drug not on the [facility] refill protocol       LOV: 6/18/18    Damaris Arora RN on 7/12/2018 at 11:05 AM

## 2018-07-13 DIAGNOSIS — G62.9 NEUROPATHY: ICD-10-CM

## 2018-07-13 RX ORDER — TRAMADOL HYDROCHLORIDE 50 MG/1
TABLET ORAL
Qty: 56 TABLET | Refills: 0 | OUTPATIENT
Start: 2018-07-13

## 2018-07-13 NOTE — TELEPHONE ENCOUNTER
Called [facility] and they state they did not receive it yesterday.  Tramadol Rx refaxed to [facility] and called them and they received it and will get it refilled.    Called patient and informed patient [facility] has RX for Tramadol and they will refill    Damaris Arora RN on 7/13/2018 at 11:33 AM

## 2018-07-23 NOTE — PROGRESS NOTES
Patient Information     Patient Name  Karen Duncan MRN  [id number] Sex  Female   1945      Letter by Hugo Duarte MD at      Author:  Hugo Duarte MD Service:  (none) Author Type:  (none)    Filed:   Date of Service:   Status:  (Other)           Karen Duncan  [address]  [place] [address]          2016    Dear Ms. Duncan:    Following are the tests completed during your last clinic visit:    Results for orders placed or performed during the hospital encounter of 17      COMPLETE METABOLIC PANEL      Result  Value Ref Range    SODIUM 134 133 - 143 mmol/L    POTASSIUM 4.4 3.5 - 5.1 mmol/L    CHLORIDE 99 98 - 107 mmol/L    CO2,TOTAL 21 21 - 31 mmol/L    ANION GAP 14 5 - 18                    GLUCOSE 273 (H) 70 - 105 mg/dL    CALCIUM 9.6 8.6 - 10.3 mg/dL    BUN 39 (H) 7 - 25 mg/dL    CREATININE 0.79 0.70 - 1.30 mg/dL    BUN/CREAT RATIO           49                    GFR if African American >60 >60 ml/min/1.73m2    GFR if not African American >60 >60 ml/min/1.73m2    ALBUMIN 4.1 3.5 - 5.7 g/dL    PROTEIN,TOTAL 5.9 (L) 6.4 - 8.9 g/dL    GLOBULIN                  1.8 (L) 2.0 - 3.7 g/dL    A/G RATIO 2.3 (H) 1.0 - 2.0                    BILIRUBIN,TOTAL 0.2 (L) 0.3 - 1.0 mg/dL    ALK PHOSPHATASE 83 34 - 104 IU/L    ALT (SGPT) 30 7 - 52 IU/L    AST (SGOT) 17 13 - 39 IU/L   HEMOGLOBIN A1C MONITORING (POCT)      Result  Value Ref Range    HEMOGLOBIN A1C MONITORING (POCT) 8.5 (H) 4.0 - 6.2 %    ESTIMATED AVERAGE GLUCOSE  197 mg/dL   TRANSFERRIN      Result  Value Ref Range    TRANSFERRIN 273.1 203 - 362 mg/dL   CBC WITH AUTO DIFFERENTIAL      Result  Value Ref Range    WHITE BLOOD COUNT         15.6 (H) 4.5 - 11.0 thou/cu mm    RED BLOOD COUNT           4.56 4.00 - 5.20 mil/cu mm    HEMOGLOBIN                13.6 12.0 - 16.0 g/dL    HEMATOCRIT                41.5 33.0 - 51.0 %    MCV                       91 80 - 100 fL    MCH                       29.8 26.0 - 34.0 pg     MCHC                      32.8 32.0 - 36.0 g/dL    RDW                       17.5 (H) 11.5 - 15.5 %    PLATELET COUNT            436 140 - 440 thou/cu mm    MPV                       9.4 6.5 - 11.0 fL    NEUTROPHILS               82.3 (H) 42.0 - 72.0 %    LYMPHOCYTES               10.0 (L) 20.0 - 44.0 %    MONOCYTES                 5.9 <12.0 %    EOSINOPHILS               0.6 <8.0 %    BASOPHILS                 0.4 <3.0 %    IMMATURE GRANULOCYTES(METAS,MYELOS,PROS) 0.8 %    ABSOLUTE NEUTROPHILS      12.8 (H) 1.7 - 7.0 thou/cu mm    ABSOLUTE LYMPHOCYTES      1.6 0.9 - 2.9 thou/cu mm    ABSOLUTE MONOCYTES        0.9 (H) <0.9 thou/cu mm    ABSOLUTE EOSINOPHILS      0.1 <0.5 thou/cu mm    ABSOLUTE BASOPHILS        0.1 <0.3 thou/cu mm    ABSOLUTE IMMATURE GRANULOCYTES(METAS,MYELOS,PROS) 0.1 <=0.3 thou/cu mm     *Note: Due to a large number of results and/or encounters for the requested time period, some results have not been displayed. A complete set of results can be found in Results Review.            The biggest finding or abnormality with your blood tests is the fact that your diabetes control is not very good. Everything else is reasonably acceptable. I want you to check your blood sugars daily and record those values. I will want to look at those results when you return in 6 weeks. If you have any questions in the interim, feel free to contact me.    Sincerely,      Hugo Duarte MD  Internal Medicine  [facility]

## 2018-07-23 NOTE — PROGRESS NOTES
Patient Information     Patient Name  Karen Duncan MRN  [id number] Sex  Female   1945      Letter by Hugo Duarte MD at      Author:  Hugo Duarte MD Service:  (none) Author Type:  (none)    Filed:   Date of Service:   Status:  (Other)           Karen Duncan  [address]  [place] [address]          May 16, 2016    Dear Ms. Duncan:    Following are the tests completed during your last clinic visit:    Results for orders placed or performed during the hospital encounter of 17      COMPLETE METABOLIC PANEL      Result  Value Ref Range    SODIUM 131 (L) 133 - 143 mmol/L    POTASSIUM 4.1 3.5 - 5.1 mmol/L    CHLORIDE 94 (L) 98 - 107 mmol/L    CO2,TOTAL 24 21 - 31 mmol/L    ANION GAP 13 5 - 18                    GLUCOSE 292 (H) 70 - 105 mg/dL    CALCIUM 9.9 8.6 - 10.3 mg/dL    BUN 30 (H) 7 - 25 mg/dL    CREATININE 0.67 (L) 0.70 - 1.30 mg/dL    BUN/CREAT RATIO           45                    GFR if African American >60 >60 ml/min/1.73m2    GFR if not African American >60 >60 ml/min/1.73m2    ALBUMIN 3.3 (L) 3.5 - 5.7 g/dL    PROTEIN,TOTAL 6.1 (L) 6.4 - 8.9 g/dL    GLOBULIN                  2.8 2.0 - 3.7 g/dL    A/G RATIO 1.2 1.0 - 2.0                    BILIRUBIN,TOTAL 0.2 (L) 0.3 - 1.0 mg/dL    ALK PHOSPHATASE 60 34 - 104 IU/L    ALT (SGPT) 13 7 - 52 IU/L    AST (SGOT) 14 13 - 39 IU/L   CBC WITH AUTO DIFFERENTIAL      Result  Value Ref Range    WHITE BLOOD COUNT         17.4 (H) 4.5 - 11.0 thou/cu mm    RED BLOOD COUNT           3.85 (L) 4.00 - 5.20 mil/cu mm    HEMOGLOBIN                9.3 (L) 12.0 - 16.0 g/dL    HEMATOCRIT                30.8 (L) 33.0 - 51.0 %    MCV                       80 80 - 100 fL    MCH                       24.2 (L) 26.0 - 34.0 pg    MCHC                      30.2 (L) 32.0 - 36.0 g/dL    RDW                       22.2 (H) 11.5 - 15.5 %    PLATELET COUNT            568 (H) 140 - 440 thou/cu mm    MPV                       9.6 6.5 - 11.0 fL    NEUTROPHILS                82.6 (H) 42.0 - 72.0 %    LYMPHOCYTES               8.5 (L) 20.0 - 44.0 %    MONOCYTES                 8.0 <12.0 %    EOSINOPHILS               0.1 <8.0 %    BASOPHILS                 0.2 <3.0 %    ABSOLUTE NEUTROPHILS      14.4 (H) 1.7 - 7.0 thou/cu mm    ABSOLUTE LYMPHOCYTES      1.5 0.9 - 2.9 thou/cu mm    ABSOLUTE MONOCYTES        1.4 (H) <0.9 thou/cu mm    ABSOLUTE EOSINOPHILS      0.0 <0.5 thou/cu mm    ABSOLUTE BASOPHILS        0.0 <0.3 thou/cu mm     *Note: Due to a large number of results and/or encounters for the requested time period, some results have not been displayed. A complete set of results can be found in Results Review.            Your blood tests look stable to possibly slightly improved. This is good news. Continue with all of your current medications and treatments.    Sincerely,      Hugo Duarte MD  Internal Medicine  [facility]

## 2018-07-24 NOTE — PROGRESS NOTES
Patient Information     Patient Name  Karen Ducnan MRN  [id number] Sex  Female   1945      Letter by Hugo Duarte MD at      Author:  Hugo Duarte MD Service:  (none) Author Type:  (none)    Filed:   Date of Service:   Status:  (Other)           Karen Duncan  [address]  [place] [address]          2016    Dear Ms. Duncan:    Following are the tests completed during your last clinic visit:    Results for orders placed or performed during the hospital encounter of 17      COMPLETE METABOLIC PANEL      Result  Value Ref Range    SODIUM 132 (L) 133 - 143 mmol/L    POTASSIUM 4.3 3.5 - 5.1 mmol/L    CHLORIDE 97 (L) 98 - 107 mmol/L    CO2,TOTAL 24 21 - 31 mmol/L    ANION GAP 11 5 - 18                    GLUCOSE 139 (H) 70 - 105 mg/dL    CALCIUM 9.5 8.6 - 10.3 mg/dL    BUN 33 (H) 7 - 25 mg/dL    CREATININE 0.76 0.70 - 1.30 mg/dL    BUN/CREAT RATIO           43                    GFR if African American >60 >60 ml/min/1.73m2    GFR if not African American >60 >60 ml/min/1.73m2    ALBUMIN 3.6 3.5 - 5.7 g/dL    PROTEIN,TOTAL 6.2 (L) 6.4 - 8.9 g/dL    GLOBULIN                  2.6 2.0 - 3.7 g/dL    A/G RATIO 1.4 1.0 - 2.0                    BILIRUBIN,TOTAL 0.2 (L) 0.3 - 1.0 mg/dL    ALK PHOSPHATASE 59 34 - 104 IU/L    ALT (SGPT) 20 7 - 52 IU/L    AST (SGOT) 17 13 - 39 IU/L   HEMOGLOBIN A1C MONITORING (POCT)      Result  Value Ref Range    HEMOGLOBIN A1C MONITORING (POCT) 7.1 (H) 4.0 - 6.2 %    ESTIMATED AVERAGE GLUCOSE  157 mg/dL   CBC WITH AUTO DIFFERENTIAL      Result  Value Ref Range    WHITE BLOOD COUNT         11.6 (H) 4.5 - 11.0 thou/cu mm    RED BLOOD COUNT           3.94 (L) 4.00 - 5.20 mil/cu mm    HEMOGLOBIN                9.5 (L) 12.0 - 16.0 g/dL    HEMATOCRIT                31.7 (L) 33.0 - 51.0 %    MCV                       81 80 - 100 fL    MCH                       24.1 (L) 26.0 - 34.0 pg    MCHC                      29.9 (L) 32.0 - 36.0 g/dL    RDW                        17.5 (H) 11.5 - 15.5 %    PLATELET COUNT            679 (H) 140 - 440 thou/cu mm    MPV                       5.8 (L) 6.5 - 11.0 fL    NEUTROPHILS               65.6 42.0 - 72.0 %    LYMPHOCYTES               27.4 20.0 - 44.0 %    MONOCYTES                 5.9 <12.0 %    EOSINOPHILS               0.2 <8.0 %    BASOPHILS                 0.9 <3.0 %    ABSOLUTE NEUTROPHILS      7.6 (H) 1.7 - 7.0 thou/cu mm    ABSOLUTE LYMPHOCYTES      3.2 (H) 0.9 - 2.9 thou/cu mm    ABSOLUTE MONOCYTES        0.7 <0.9 thou/cu mm    ABSOLUTE EOSINOPHILS      0.0 <0.5 thou/cu mm    ABSOLUTE BASOPHILS        0.1 <0.3 thou/cu mm     *Note: Due to a large number of results and/or encounters for the requested time period, some results have not been displayed. A complete set of results can be found in Results Review.            Your blood tests really look quite good. Congratulations on this report and keep up the good work. If you have any questions about your results, feel free to contact me.    Sincerely,      Hugo Duarte MD  Internal Medicine  [facility]

## 2018-08-09 DIAGNOSIS — G62.9 NEUROPATHY: ICD-10-CM

## 2018-08-13 RX ORDER — TRAMADOL HYDROCHLORIDE 50 MG/1
TABLET ORAL
Qty: 56 TABLET | Refills: 3 | Status: SHIPPED | OUTPATIENT
Start: 2018-08-13 | End: 2018-09-05

## 2018-08-13 NOTE — TELEPHONE ENCOUNTER
Patient calling and states that she ran out of her Tramadol and did not have any through the weekend.    Per system Tramadol was refilled on 7/12/18 #56 x 3 refills to [facility].    Patient states she has used all her refills.    Patient states she takes 2 tablet every six hour on schedule.    Will route to Hugo Duarte for review and consideration of higher quantity.    Damaris Arora RN on 8/13/2018 at 12:00 PM

## 2018-08-14 DIAGNOSIS — M06.9 RHEUMATOID ARTHRITIS, INVOLVING UNSPECIFIED SITE, UNSPECIFIED RHEUMATOID FACTOR PRESENCE: ICD-10-CM

## 2018-08-14 RX ORDER — PREDNISONE 5 MG/1
TABLET ORAL
Qty: 180 TABLET | Refills: 1 | Status: SHIPPED | OUTPATIENT
Start: 2018-08-14 | End: 2019-01-01

## 2018-08-21 DIAGNOSIS — E78.5 HYPERLIPIDEMIA, UNSPECIFIED HYPERLIPIDEMIA TYPE: Primary | ICD-10-CM

## 2018-08-23 DIAGNOSIS — E11.9 TYPE 2 DIABETES MELLITUS WITHOUT COMPLICATION, UNSPECIFIED LONG TERM INSULIN USE STATUS: Primary | ICD-10-CM

## 2018-08-23 RX ORDER — ATORVASTATIN CALCIUM 20 MG/1
TABLET, FILM COATED ORAL
Qty: 90 TABLET | Refills: 1 | Status: SHIPPED | OUTPATIENT
Start: 2018-08-23 | End: 2019-01-01

## 2018-08-23 NOTE — TELEPHONE ENCOUNTER
Prescription approved per [facility] Refill Protocol.  LOV: 6/18/18  Last labs: 3/27/18    Damaris Arora RN on 8/23/2018 at 8:48 AM

## 2018-08-27 NOTE — TELEPHONE ENCOUNTER
Last OV 6/18/18 with PCP. Prescription approved per [facility] Refill Protocol. Refill authorized for 100 days and 3 refill at this time.     Ina Harris RN on 8/27/2018 at 8:39 AM

## 2018-08-28 ENCOUNTER — OFFICE VISIT (OUTPATIENT)
Dept: SURGERY | Facility: OTHER | Age: 73
End: 2018-08-28
Attending: SURGERY
Payer: MEDICARE

## 2018-08-28 VITALS — DIASTOLIC BLOOD PRESSURE: 84 MMHG | SYSTOLIC BLOOD PRESSURE: 148 MMHG

## 2018-08-28 DIAGNOSIS — L89.311: Primary | ICD-10-CM

## 2018-08-28 PROCEDURE — G0463 HOSPITAL OUTPT CLINIC VISIT: HCPCS | Mod: 25

## 2018-08-28 PROCEDURE — 99212 OFFICE O/P EST SF 10 MIN: CPT | Performed by: SURGERY

## 2018-08-28 PROCEDURE — G0463 HOSPITAL OUTPT CLINIC VISIT: HCPCS

## 2018-08-28 NOTE — MR AVS SNAPSHOT
"              After Visit Summary   8/28/2018    Karen Duncan    MRN: [id number]           Patient Information     Date Of Birth          1945        Visit Information        Provider Department      8/28/2018 9:50 AM Jazzy Taylor MD; [facility] 536 SURGERY [facility]        Today's Diagnoses     Right ischial pressure sore, stage 1    -  1       Follow-ups after your visit        Follow-up notes from your care team     Return in about 2 weeks (around 9/11/2018), or if symptoms worsen or fail to improve.      Your next 10 appointments already scheduled     Sep 11, 2018 10:20 AM CDT   Return Visit with Jazzy Taylor MD   [facility] ([facility])    [address]  [place] [address] [phone]              Who to contact     If you have questions or need follow up information about today's clinic visit or your schedule please contact [facility] directly at [phone].  Normal or non-critical lab and imaging results will be communicated to you by Xen[employer]art, letter or phone within 4 business days after the clinic has received the results. If you do not hear from us within 7 days, please contact the clinic through [employer] or phone. If you have a critical or abnormal lab result, we will notify you by phone as soon as possible.  Submit refill requests through [employer] or call your pharmacy and they will forward the refill request to us. Please allow 3 business days for your refill to be completed.          Additional Information About Your Visit        Xenoporthart Information     [employer] lets you send messages to your doctor, view your test results, renew your prescriptions, schedule appointments and more. To sign up, go to [email or web address] . Click on \"Log in\" on the left side of the screen, which will take you to the Welcome page. Then click on \"Sign up Now\" on the right side of the page.     You will be asked to " enter the access code listed below, as well as some personal information. Please follow the directions to create your username and password.     Your access code is: [email or web address]  Expires: 2018 10:45 AM     Your access code will  in 90 days. If you need help or a new code, please call your [place] clinic or [phone].        Care EveryWhere ID     This is your Care EveryWhere ID. This could be used by other organizations to access your [place] medical records  [id number]        Your Vitals Were     Breastfeeding?                   No            Blood Pressure from Last 3 Encounters:   18 148/84   18 (!) 169/92   18 138/86    Weight from Last 3 Encounters:   18 120 lb (54.4 kg)   18 120 lb (54.4 kg)   18 119 lb (54 kg)              Today, you had the following     No orders found for display         Today's Medication Changes          These changes are accurate as of 18 11:59 PM.  If you have any questions, ask your nurse or doctor.               These medicines have changed or have updated prescriptions.        Dose/Directions    ONETOUCH VERIO IQ test strip   This may have changed:  See the new instructions.   Used for:  Uncontrolled diabetes with kidney complications (H)   Generic drug:  blood glucose monitoring   Changed by:  Hugo Duarte MD        USE  STRIP TO CHECK GLUCOSE 4 TIMES DAILY   Quantity:  200 strip   Refills:  3            Where to get your medicines      These medications were sent to [facility] [address] [place] [address]     Phone:  [phone]     ONETOUCH VERIO IQ test strip                Primary Care Provider Office Phone # Fax #    Hugo Duarte [phone]       [address] [facility] [address]        Equal Access to Services     YANNA PAULA AH: Pool pickens Sojasmin, waaxda luqadaha, qaybta kaalsal hearn  annamarie carrmarquita wagner'aan ah. So [facility] [phone].    ATENCIÓN: Si tamiko vasques, tiene a lawson disposición servicios gratuitos de asistencia lingüística. Kim johnson [phone].    We comply with applicable federal civil rights laws and [place] laws. We do not discriminate on the basis of race, color, national origin, age, disability, sex, sexual orientation, or gender identity.            Thank you!     Thank you for choosing [facility]  for your care. Our goal is always to provide you with excellent care. Hearing back from our patients is one way we can continue to improve our services. Please take a few minutes to complete the written survey that you may receive in the mail after your visit with us. Thank you!             Your Updated Medication List - Protect others around you: Learn how to safely use, store and throw away your medicines at www.disposemymeds.org.          This list is accurate as of 8/28/18 11:59 PM.  Always use your most recent med list.                   Brand Name Dispense Instructions for use Diagnosis    acetaminophen 325 MG tablet    TYLENOL    100 tablet    Take 2 tablets (650 mg) by mouth every 4 hours as needed for other (mild pain)    History of osteomyelitis       amLODIPine 2.5 MG tablet    NORVASC    90 tablet    Take 1 tablet (2.5 mg) by mouth daily    Hypertension       ascorbic acid 500 MG tablet    VITAMIN C    30 tablet    Take 2 tablets (1,000 mg) by mouth daily        atorvastatin 20 MG tablet    LIPITOR    90 tablet    TAKE ONE TABLET BY MOUTH AT BEDTIME    Hyperlipidemia, unspecified hyperlipidemia type       baclofen 10 MG tablet    LIORESAL    90 tablet    Take 1 tablet (10 mg) by mouth 3 times daily    Chronic pain disorder       blood glucose monitoring lancets     400 each    Test 4 times daily. Dispense item covered by patient's insurance. Diagnosis code E11.29    Type 2 diabetes mellitus without complication, with long-term current use of  insulin (H)       cloNIDine 0.1 MG tablet    CATAPRES    30 tablet    Take 0.1 mg by mouth every evening Takes at 1 pm,        diltiazem 360 MG 24 hr CD capsule    CARDIZEM CD; CARTIA XT    30 capsule    Take 1 capsule (360 mg) by mouth daily        docusate sodium 100 MG capsule    COLACE    60 capsule    Take 2 capsules (200 mg) by mouth 2 times daily        estradiol 0.5 MG tablet    ESTRACE    42 tablet    Take 1 tablet (0.5 mg) by mouth daily        ferrous sulfate 325 (65 Fe) MG Tbec EC tablet     30 tablet    Take 2 tablets (650 mg) by mouth daily        FLORASTOR 250 MG capsule   Generic drug:  saccharomyces boulardii     120 capsule    TAKE TWO CAPSULES BY MOUTH TWICE DAILY    Non-specific colitis       furosemide 40 MG tablet    LASIX    90 tablet    TAKE ONE TABLET BY MOUTH ONCE DAILY IN THE MORNING    Essential hypertension       gabapentin 100 MG capsule    NEURONTIN    270 capsule    Take 1 capsule (100 mg) by mouth 3 times daily    Neuropathy       HYDROcodone-acetaminophen 5-325 MG per tablet    NORCO    120 tablet    Take 1 tablet by mouth 4 times daily    Chronic pain disorder       insulin aspart 100 UNIT/ML injection    NovoLOG PEN     Inject 4 times daily per sliding scale, 24 units max daily        insulin glargine 100 UNIT/ML injection    LANTUS     16 units am, 12 units pm        metFORMIN 500 MG 24 hr tablet    GLUCOPHAGE-XR    360 tablet    Take 2 tablets (1,000 mg) by mouth 2 times daily (with meals)    Type 2 diabetes mellitus without complication, with long-term current use of insulin (H)       NOVOFINE 30 30G X 8 MM   Generic drug:  insulin pen needle     300 each    USE 5 TIMES DAILY    Type 2 diabetes mellitus without complication, unspecified long term insulin use status (H)       ONETOUCH VERIO IQ test strip   Generic drug:  blood glucose monitoring     200 strip    USE  STRIP TO CHECK GLUCOSE 4 TIMES DAILY    Uncontrolled diabetes with kidney complications (H)       predniSONE 5 MG  tablet    DELTASONE    180 tablet    TAKE 1 TABLET BY MOUTH TWICE DAILY    Rheumatoid arthritis, involving unspecified site, unspecified rheumatoid factor presence (H)       spironolactone 25 MG tablet    ALDACTONE    90 tablet    Take 1 tablet (25 mg) by mouth daily    Essential hypertension       TGT PSYLLIUM FIBER 0.52 g capsule   Generic drug:  psyllium     540 capsule    Take 3 capsules (1.56 g) by mouth daily        traMADol 50 MG tablet    ULTRAM    56 tablet    TAKE 2 TABLETS BY MOUTH EVERY 6 HOURS AS NEEDED FOR MODERATE PAIN    Neuropathy       Vitamin B-12 CR 1000 MCG Tbcr     30 tablet    Take 1 tablet by mouth daily

## 2018-08-28 NOTE — NURSING NOTE
Chief Complaint   Patient presents with     Lesion Removal     from buttocks       Initial There were no vitals taken for this visit. Estimated body mass index is 23.44 kg/(m^2) as calculated from the following:    Height as of 6/27/18: 5' (1.524 m).    Weight as of 6/27/18: 120 lb (54.4 kg).  Medication Reconciliation: complete    Daisha Hunter LPN

## 2018-08-30 RX ORDER — BLOOD SUGAR DIAGNOSTIC
STRIP MISCELLANEOUS
Qty: 200 STRIP | Refills: 3 | Status: SHIPPED | OUTPATIENT
Start: 2018-08-30 | End: 2019-01-01

## 2018-08-30 NOTE — TELEPHONE ENCOUNTER
Prescription approved per [facility] Refill Protocol.  Anne Piña RN on 8/30/2018 at 2:06 PM

## 2018-09-03 PROBLEM — L89.311: Status: ACTIVE | Noted: 2018-09-03

## 2018-09-03 PROBLEM — L89.304: Status: RESOLVED | Noted: 2017-05-29 | Resolved: 2018-09-03

## 2018-09-03 NOTE — PROGRESS NOTES
Patient presents for possible lesion removal. She had a previous skin cancer removed from her back and is concerned that this is similar. She has noted a sore spot that is a bit rough on her bottom on the right side. No drainage and no break in skin. No fever. She has noted that it is worse if she sits on it. She is trying to sit more on the left cheek to help. She has a history of significant pressure ulcer. That has healed.   /84 (BP Location: Left arm, Patient Position: Sitting, Cuff Size: Adult Regular)  Breastfeeding? No  General: elderly patient in no acute distress  Skin:stage one pressure ulcer right ischial tuberosity. No skin break. No coccyx or left ischial ulcer.   A: stage on pressure ulcer right ischial tuberosity-POA  Plan: duoderm applied for cushioning. Discussed with patient the importance of pressure off loading, good nutrition and good skin care. She will follow up with me in 2 weeks.

## 2018-09-04 ENCOUNTER — TELEPHONE (OUTPATIENT)
Dept: INTERNAL MEDICINE | Facility: OTHER | Age: 73
End: 2018-09-04

## 2018-09-04 DIAGNOSIS — E11.9 DIABETES MELLITUS, TYPE II (H): Primary | ICD-10-CM

## 2018-09-04 RX ORDER — LANCETS
EACH MISCELLANEOUS
Qty: 400 EACH | Refills: 2 | Status: SHIPPED | OUTPATIENT
Start: 2018-09-04 | End: 2019-01-01

## 2018-09-04 RX ORDER — LANCING DEVICE/LANCETS
KIT MISCELLANEOUS
Qty: 1 EACH | Refills: 0 | Status: SHIPPED | OUTPATIENT
Start: 2018-09-04 | End: 2019-01-01

## 2018-09-04 NOTE — TELEPHONE ENCOUNTER
Prescription approved per [facility] Refill Protocol.  Change with insurance requiring fastclix brand lancets/device. Anne Piña RN on 9/4/2018 at 10:30 AM

## 2018-09-04 NOTE — TELEPHONE ENCOUNTER
Rx Accu-check MLTICLIX MIS is no longer made, need a new rx for the FASTCLIX lancets and lancet device.

## 2018-09-05 DIAGNOSIS — G62.9 NEUROPATHY: ICD-10-CM

## 2018-09-07 NOTE — TELEPHONE ENCOUNTER
Dr. Duarte,  Patient is requesting medication below. Writer called patient to see if she had enough medication to last her until Monday when PCP returns. Patient states she does.    TRAMADOL HCL 50MG TAB  Last Written Prescription Date:  08/13/18  Last Fill Quantity: 56,   # refills: 3  Last Office Visit: 06/18/18  Future Office visit:    Next 5 appointments (look out 90 days)     Sep 19, 2018 10:40 AM CDT   SHORT with Hugo Duarte MD   [facility] ([facility])    [address]  [place] [address]   [phone]               Routing refill request to provider for review/approval because:  Drug not on the G, P or [facility] refill protocol or controlled substance. Please review, fill, and sign as appropriate. Thank you!  Shandra Benson, RN on 9/7/2018 at 3:05 PM

## 2018-09-10 RX ORDER — TRAMADOL HYDROCHLORIDE 50 MG/1
TABLET ORAL
Qty: 56 TABLET | Refills: 3 | Status: SHIPPED | OUTPATIENT
Start: 2018-09-10 | End: 2018-10-03

## 2018-09-19 ENCOUNTER — OFFICE VISIT (OUTPATIENT)
Dept: INTERNAL MEDICINE | Facility: OTHER | Age: 73
End: 2018-09-19
Attending: INTERNAL MEDICINE
Payer: COMMERCIAL

## 2018-09-19 VITALS
SYSTOLIC BLOOD PRESSURE: 138 MMHG | BODY MASS INDEX: 23.83 KG/M2 | HEART RATE: 84 BPM | WEIGHT: 122 LBS | DIASTOLIC BLOOD PRESSURE: 84 MMHG

## 2018-09-19 DIAGNOSIS — M06.9 RHEUMATOID ARTHRITIS, INVOLVING UNSPECIFIED SITE, UNSPECIFIED RHEUMATOID FACTOR PRESENCE: ICD-10-CM

## 2018-09-19 DIAGNOSIS — E11.9 TYPE 2 DIABETES MELLITUS WITHOUT COMPLICATION, UNSPECIFIED LONG TERM INSULIN USE STATUS: Primary | ICD-10-CM

## 2018-09-19 DIAGNOSIS — Z02.89 PAIN MEDICATION AGREEMENT: ICD-10-CM

## 2018-09-19 DIAGNOSIS — G89.4 CHRONIC PAIN DISORDER: ICD-10-CM

## 2018-09-19 LAB — HBA1C MFR BLD: 7.3 % (ref 4–6)

## 2018-09-19 PROCEDURE — 36415 COLL VENOUS BLD VENIPUNCTURE: CPT | Performed by: INTERNAL MEDICINE

## 2018-09-19 PROCEDURE — 99214 OFFICE O/P EST MOD 30 MIN: CPT | Performed by: INTERNAL MEDICINE

## 2018-09-19 PROCEDURE — G0463 HOSPITAL OUTPT CLINIC VISIT: HCPCS

## 2018-09-19 PROCEDURE — 83036 HEMOGLOBIN GLYCOSYLATED A1C: CPT | Performed by: INTERNAL MEDICINE

## 2018-09-19 RX ORDER — HYDROCODONE BITARTRATE AND ACETAMINOPHEN 5; 325 MG/1; MG/1
1 TABLET ORAL 4 TIMES DAILY
Qty: 120 TABLET | Refills: 0 | Status: SHIPPED | OUTPATIENT
Start: 2018-09-19 | End: 2018-01-01

## 2018-09-19 RX ORDER — HYDROCODONE BITARTRATE AND ACETAMINOPHEN 5; 325 MG/1; MG/1
1 TABLET ORAL 4 TIMES DAILY
Qty: 120 TABLET | Refills: 0 | Status: SHIPPED | OUTPATIENT
Start: 2018-09-19 | End: 2018-09-19

## 2018-09-19 ASSESSMENT — ENCOUNTER SYMPTOMS
ALLERGIC/IMMUNOLOGIC NEGATIVE: 1
HEMATOLOGIC/LYMPHATIC NEGATIVE: 1
ENDOCRINE NEGATIVE: 1
CONSTITUTIONAL NEGATIVE: 1
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
## 2018-09-19 NOTE — MR AVS SNAPSHOT
After Visit Summary   9/19/2018    Karen Duncan    MRN: [id number]           Patient Information     Date Of Birth          1945        Visit Information        Provider Department      9/19/2018 10:40 AM Hugo uDarte MD [facility]        Today's Diagnoses     Type 2 diabetes mellitus without complication, unspecified long term insulin use status (H)    -  1    Chronic pain disorder        Pain medication agreement        Rheumatoid arthritis, involving unspecified site, unspecified rheumatoid factor presence (H)           Follow-ups after your visit        Future tests that were ordered for you today     Open Future Orders        Priority Expected Expires Ordered    Hemoglobin A1c Routine  9/19/2019 9/19/2018            Who to contact     If you have questions or need follow up information about today's clinic visit or your schedule please contact [facility] AND [facility] directly at [phone].  Normal or non-critical lab and imaging results will be communicated to you by MyChart, letter or phone within 4 business days after the clinic has received the results. If you do not hear from us within 7 days, please contact the clinic through MyChart or phone. If you have a critical or abnormal lab result, we will notify you by phone as soon as possible.  Submit refill requests through [employer] or call your pharmacy and they will forward the refill request to us. Please allow 3 business days for your refill to be completed.          Additional Information About Your Visit        Care EveryWhere ID     This is your Care EveryWhere ID. This could be used by other organizations to access your [place] medical records  [id number]        Your Vitals Were     Pulse Breastfeeding? BMI (Body Mass Index)             84 No 23.83 kg/m2          Blood Pressure from Last 3 Encounters:   09/19/18 138/84   08/28/18 148/84   06/27/18 (!) 169/92    Weight from Last 3 Encounters:    09/19/18 122 lb (55.3 kg)   06/27/18 120 lb (54.4 kg)   06/18/18 120 lb (54.4 kg)                 Today's Medication Changes          These changes are accurate as of 9/19/18 10:54 AM.  If you have any questions, ask your nurse or doctor.               Start taking these medicines.        Dose/Directions    HYDROcodone-acetaminophen 5-325 MG per tablet   Commonly known as:  NORCO   Used for:  Chronic pain disorder   Started by:  Hugo Duarte MD        Dose:  1 tablet   Take 1 tablet by mouth 4 times daily   Quantity:  120 tablet   Refills:  0            Where to get your medicines      Some of these will need a paper prescription and others can be bought over the counter.  Ask your nurse if you have questions.     Bring a paper prescription for each of these medications     HYDROcodone-acetaminophen 5-325 MG per tablet               Information about OPIOIDS     PRESCRIPTION OPIOIDS: WHAT YOU NEED TO KNOW   We gave you an opioid (narcotic) pain medicine. It is important to manage your pain, but opioids are not always the best choice. You should first try all the other options your care team gave you. Take this medicine for as short a time (and as few doses) as possible.    Some activities can increase your pain, such as bandage changes or therapy sessions. It may help to take your pain medicine 30 to 60 minutes before these activities. Reduce your stress by getting enough sleep, working on hobbies you enjoy and practicing relaxation or meditation. Talk to your care team about ways to manage your pain beyond prescription opioids.    These medicines have risks:    DO NOT drive when on new or higher doses of pain medicine. These medicines can affect your alertness and reaction times, and you could be arrested for driving under the influence (DUI). If you need to use opioids long-term, talk to your care team about driving.    DO NOT operate heavy machinery    DO NOT do any other dangerous activities while taking  these medicines.    DO NOT drink any alcohol while taking these medicines.     If the opioid prescribed includes acetaminophen, DO NOT take with any other medicines that contain acetaminophen. Read all labels carefully. Look for the word  acetaminophen  or  Tylenol.  Ask your pharmacist if you have questions or are unsure.    You can get addicted to pain medicines, especially if you have a history of addiction (chemical, alcohol or substance dependence). Talk to your care team about ways to reduce this risk.    All opioids tend to cause constipation. Drink plenty of water and eat foods that have a lot of fiber, such as fruits, vegetables, prune juice, apple juice and high-fiber cereal. Take a laxative (Miralax, milk of magnesia, Colace, Senna) if you don t move your bowels at least every other day. Other side effects include upset stomach, sleepiness, dizziness, throwing up, tolerance (needing more of the medicine to have the same effect), physical dependence and slowed breathing.    Store your pills in a secure place, locked if possible. We will not replace any lost or stolen medicine. If you don t finish your medicine, please throw away (dispose) as directed by your pharmacist. The [place] [employer]ution [employer] has more information about safe disposal: [email or web address]-[email or web address]         Primary Care Provider Office Phone # Fax #    Hugo Duarte [phone] [address] [facility] [address]        Equal Access to Services     YANNA PAULA : Hadii yamileth carrasquilloo Sojasmin, waaxda luqadaha, qaybta kaalmada aylinyada, sal tillman. So [facility] [phone].    ATENCIÓN: Si habla español, tiene a lawson disposición servicios gratuitos de asistencia lingüística. Llame al [phone].    We comply with applicable federal civil rights laws and [place] laws. We do not discriminate on the basis of race, color,  national origin, age, disability, sex, sexual orientation, or gender identity.            Thank you!     Thank you for choosing [facility] [place]  for your care. Our goal is always to provide you with excellent care. Hearing back from our patients is one way we can continue to improve our services. Please take a few minutes to complete the written survey that you may receive in the mail after your visit with us. Thank you!             Your Updated Medication List - Protect others around you: Learn how to safely use, store and throw away your medicines at www.disposemymeds.org.          This list is accurate as of 9/19/18 10:54 AM.  Always use your most recent med list.                   Brand Name Dispense Instructions for use Diagnosis    acetaminophen 325 MG tablet    TYLENOL    100 tablet    Take 2 tablets (650 mg) by mouth every 4 hours as needed for other (mild pain)    History of osteomyelitis       amLODIPine 2.5 MG tablet    NORVASC    90 tablet    Take 1 tablet (2.5 mg) by mouth daily    Hypertension       ascorbic acid 500 MG tablet    VITAMIN C    30 tablet    Take 2 tablets (1,000 mg) by mouth daily        atorvastatin 20 MG tablet    LIPITOR    90 tablet    TAKE ONE TABLET BY MOUTH AT BEDTIME    Hyperlipidemia, unspecified hyperlipidemia type       baclofen 10 MG tablet    LIORESAL    90 tablet    Take 1 tablet (10 mg) by mouth 3 times daily    Chronic pain disorder       blood glucose lancing device     1 each    Device to be used with lancets to check glucose four times daily. Dispense as covered by patient's insurance.    Diabetes mellitus, type II (H)       * blood glucose monitoring lancets     400 each    Test 4 times daily. Dispense item covered by patient's insurance. Diagnosis code E11.29    Type 2 diabetes mellitus without complication, with long-term current use of insulin (H)       * blood glucose monitoring lancets     400 each    Use to test blood sugar four times daily. DX  code E11.9. Dispense as covered by patient's insurance    Diabetes mellitus, type II (H)       cloNIDine 0.1 MG tablet    CATAPRES    30 tablet    Take 0.1 mg by mouth every evening Takes at 1 pm,        diltiazem 360 MG 24 hr CD capsule    CARDIZEM CD; CARTIA XT    30 capsule    Take 1 capsule (360 mg) by mouth daily        docusate sodium 100 MG capsule    COLACE    60 capsule    Take 2 capsules (200 mg) by mouth 2 times daily        estradiol 0.5 MG tablet    ESTRACE    42 tablet    Take 1 tablet (0.5 mg) by mouth daily        ferrous sulfate 325 (65 Fe) MG Tbec EC tablet     30 tablet    Take 2 tablets (650 mg) by mouth daily        FLORASTOR 250 MG capsule   Generic drug:  saccharomyces boulardii     120 capsule    TAKE TWO CAPSULES BY MOUTH TWICE DAILY    Non-specific colitis       furosemide 40 MG tablet    LASIX    90 tablet    TAKE ONE TABLET BY MOUTH ONCE DAILY IN THE MORNING    Essential hypertension       gabapentin 100 MG capsule    NEURONTIN    270 capsule    Take 1 capsule (100 mg) by mouth 3 times daily    Neuropathy       HYDROcodone-acetaminophen 5-325 MG per tablet    NORCO    120 tablet    Take 1 tablet by mouth 4 times daily    Chronic pain disorder       insulin aspart 100 UNIT/ML injection    NovoLOG PEN     Inject 4 times daily per sliding scale, 24 units max daily        insulin glargine 100 UNIT/ML injection    LANTUS     16 units am, 12 units pm        metFORMIN 500 MG 24 hr tablet    GLUCOPHAGE-XR    360 tablet    Take 2 tablets (1,000 mg) by mouth 2 times daily (with meals)    Type 2 diabetes mellitus without complication, with long-term current use of insulin (H)       NOVOFINE 30 30G X 8 MM   Generic drug:  insulin pen needle     300 each    USE 5 TIMES DAILY    Type 2 diabetes mellitus without complication, unspecified long term insulin use status (H)       ONETOUCH VERIO IQ test strip   Generic drug:  blood glucose monitoring     200 strip    USE  STRIP TO CHECK GLUCOSE 4 TIMES DAILY     Uncontrolled diabetes with kidney complications (H)       predniSONE 5 MG tablet    DELTASONE    180 tablet    TAKE 1 TABLET BY MOUTH TWICE DAILY    Rheumatoid arthritis, involving unspecified site, unspecified rheumatoid factor presence (H)       spironolactone 25 MG tablet    ALDACTONE    90 tablet    Take 1 tablet (25 mg) by mouth daily    Essential hypertension       TGT PSYLLIUM FIBER 0.52 g capsule   Generic drug:  psyllium     540 capsule    Take 3 capsules (1.56 g) by mouth daily        traMADol 50 MG tablet    ULTRAM    56 tablet    TAKE 2 TABLETS BY MOUTH EVERY 6 HOURS AS NEEDED FOR MODERATE PAIN    Neuropathy       Vitamin B-12 CR 1000 MCG Tbcr     30 tablet    Take 1 tablet by mouth daily        * Notice:  This list has 2 medication(s) that are the same as other medications prescribed for you. Read the directions carefully, and ask your doctor or other care provider to review them with you.

## 2018-09-19 NOTE — NURSING NOTE
The patient is here today to get a refill on her pain medication.  Josefa Pandya LPN on 9/19/2018 at 10:40 AM  Chief Complaint   Patient presents with     Recheck Medication       Initial /84 (BP Location: Right arm, Patient Position: Sitting, Cuff Size: Adult Regular)  Pulse 84  Wt 122 lb (55.3 kg)  Breastfeeding? No  BMI 23.83 kg/m2 Estimated body mass index is 23.83 kg/(m^2) as calculated from the following:    Height as of 6/27/18: 5' (1.524 m).    Weight as of this encounter: 122 lb (55.3 kg).  Medication Reconciliation: incomplete    Josefa Pandya LPN

## 2018-09-20 ASSESSMENT — PATIENT HEALTH QUESTIONNAIRE - PHQ9: SUM OF ALL RESPONSES TO PHQ QUESTIONS 1-9: 0

## 2018-10-02 DIAGNOSIS — E28.39 ESTROGEN DEFICIENCY: Primary | ICD-10-CM

## 2018-10-03 DIAGNOSIS — G62.9 NEUROPATHY: ICD-10-CM

## 2018-10-05 RX ORDER — ESTRADIOL 0.5 MG/1
TABLET ORAL
Qty: 90 TABLET | Refills: 1 | Status: SHIPPED | OUTPATIENT
Start: 2018-10-05 | End: 2019-01-01

## 2018-10-05 NOTE — TELEPHONE ENCOUNTER
Routing refill request to provider for review/approval because:  Medication is reported/historical    LOV: 9/19/18    Damaris Arora RN on 10/5/2018 at 8:25 AM

## 2018-10-08 RX ORDER — TRAMADOL HYDROCHLORIDE 50 MG/1
TABLET ORAL
Qty: 56 TABLET | Refills: 3 | Status: SHIPPED | OUTPATIENT
Start: 2018-10-08 | End: 2018-01-01

## 2018-10-09 ENCOUNTER — ALLIED HEALTH/NURSE VISIT (OUTPATIENT)
Dept: FAMILY MEDICINE | Facility: OTHER | Age: 73
End: 2018-10-09
Attending: INTERNAL MEDICINE
Payer: MEDICARE

## 2018-10-09 DIAGNOSIS — Z23 NEED FOR PROPHYLACTIC VACCINATION AND INOCULATION AGAINST INFLUENZA: Primary | ICD-10-CM

## 2018-10-09 PROCEDURE — 90662 IIV NO PRSV INCREASED AG IM: CPT

## 2018-10-09 PROCEDURE — G0008 ADMIN INFLUENZA VIRUS VAC: HCPCS

## 2018-10-09 NOTE — MR AVS SNAPSHOT
After Visit Summary   10/9/2018    Karen Duncan    MRN: [id number]           Patient Information     Date Of Birth          1945        Visit Information        Provider Department      10/9/2018 10:35 AM GH FLU [facility]        Today's Diagnoses     Need for prophylactic vaccination and inoculation against influenza    -  1       Follow-ups after your visit        Who to contact     If you have questions or need follow up information about today's clinic visit or your schedule please contact [facility] directly at [phone].  Normal or non-critical lab and imaging results will be communicated to you by MyChart, letter or phone within 4 business days after the clinic has received the results. If you do not hear from us within 7 days, please contact the clinic through MyChart or phone. If you have a critical or abnormal lab result, we will notify you by phone as soon as possible.  Submit refill requests through [employer] or call your pharmacy and they will forward the refill request to us. Please allow 3 business days for your refill to be completed.          Additional Information About Your Visit        Care EveryWhere ID     This is your Care EveryWhere ID. This could be used by other organizations to access your [place] medical records  [id number]         Blood Pressure from Last 3 Encounters:   09/19/18 138/84   08/28/18 148/84   06/27/18 (!) 169/92    Weight from Last 3 Encounters:   09/19/18 122 lb (55.3 kg)   06/27/18 120 lb (54.4 kg)   06/18/18 120 lb (54.4 kg)              We Performed the Following     HC FLU VACCINE, INCREASED ANTIGEN, PRESV FREE        Primary Care Provider Office Phone # Fax #    Hugo Duarte [phone] [address]  [facility] [address]        Equal Access to Services     [facility] Center[facility]ATELYNN AH: Pool Ayoub, ankush cummings, sal lopez  aylin reynoldstkestevan wagner'aabrielle ah. So [facility] [phone].    ATENCIÓN: Si tamiko vasques, tiene a lawson disposición servicios gratuitos de asistencia lingüística. Kim johnson [phone].    We comply with applicable federal civil rights laws and [place] laws. We do not discriminate on the basis of race, color, national origin, age, disability, sex, sexual orientation, or gender identity.            Thank you!     Thank you for choosing [facility] AND [facility]  for your care. Our goal is always to provide you with excellent care. Hearing back from our patients is one way we can continue to improve our services. Please take a few minutes to complete the written survey that you may receive in the mail after your visit with us. Thank you!             Your Updated Medication List - Protect others around you: Learn how to safely use, store and throw away your medicines at www.disposemymeds.org.          This list is accurate as of 10/9/18 10:42 AM.  Always use your most recent med list.                   Brand Name Dispense Instructions for use Diagnosis    acetaminophen 325 MG tablet    TYLENOL    100 tablet    Take 2 tablets (650 mg) by mouth every 4 hours as needed for other (mild pain)    History of osteomyelitis       amLODIPine 2.5 MG tablet    NORVASC    90 tablet    Take 1 tablet (2.5 mg) by mouth daily    Hypertension       ascorbic acid 500 MG tablet    VITAMIN C    30 tablet    Take 2 tablets (1,000 mg) by mouth daily        atorvastatin 20 MG tablet    LIPITOR    90 tablet    TAKE ONE TABLET BY MOUTH AT BEDTIME    Hyperlipidemia, unspecified hyperlipidemia type       baclofen 10 MG tablet    LIORESAL    90 tablet    Take 1 tablet (10 mg) by mouth 3 times daily    Chronic pain disorder       blood glucose lancing device     1 each    Device to be used with lancets to check glucose four times daily. Dispense as covered by patient's insurance.    Diabetes mellitus, type II (H)       * blood glucose monitoring lancets      400 each    Test 4 times daily. Dispense item covered by patient's insurance. Diagnosis code E11.29    Type 2 diabetes mellitus without complication, with long-term current use of insulin (H)       * blood glucose monitoring lancets     400 each    Use to test blood sugar four times daily. DX code E11.9. Dispense as covered by patient's insurance    Diabetes mellitus, type II (H)       cloNIDine 0.1 MG tablet    CATAPRES    30 tablet    Take 0.1 mg by mouth every evening Takes at 1 pm,        diltiazem 360 MG 24 hr CD capsule    CARDIZEM CD; CARTIA XT    30 capsule    Take 1 capsule (360 mg) by mouth daily        docusate sodium 100 MG capsule    COLACE    60 capsule    Take 2 capsules (200 mg) by mouth 2 times daily        estradiol 0.5 MG tablet    ESTRACE    90 tablet    TAKE ONE TABLET BY MOUTH ONCE DAILY    Estrogen deficiency       ferrous sulfate 325 (65 Fe) MG Tbec EC tablet     30 tablet    Take 2 tablets (650 mg) by mouth daily        FLORASTOR 250 MG capsule   Generic drug:  saccharomyces boulardii     120 capsule    TAKE TWO CAPSULES BY MOUTH TWICE DAILY    Non-specific colitis       furosemide 40 MG tablet    LASIX    90 tablet    TAKE ONE TABLET BY MOUTH ONCE DAILY IN THE MORNING    Essential hypertension       gabapentin 100 MG capsule    NEURONTIN    270 capsule    Take 1 capsule (100 mg) by mouth 3 times daily    Neuropathy       HYDROcodone-acetaminophen 5-325 MG per tablet    NORCO    120 tablet    Take 1 tablet by mouth 4 times daily    Chronic pain disorder       insulin aspart 100 UNIT/ML injection    NovoLOG PEN     Inject 4 times daily per sliding scale, 24 units max daily        insulin glargine 100 UNIT/ML injection    LANTUS     16 units am, 12 units pm        metFORMIN 500 MG 24 hr tablet    GLUCOPHAGE-XR    360 tablet    Take 2 tablets (1,000 mg) by mouth 2 times daily (with meals)    Type 2 diabetes mellitus without complication, with long-term current use of insulin (H)        NOVOFINE 30 30G X 8 MM   Generic drug:  insulin pen needle     300 each    USE 5 TIMES DAILY    Type 2 diabetes mellitus without complication, unspecified long term insulin use status       ONETOUCH VERIO IQ test strip   Generic drug:  blood glucose monitoring     200 strip    USE  STRIP TO CHECK GLUCOSE 4 TIMES DAILY    Uncontrolled diabetes with kidney complications (H)       predniSONE 5 MG tablet    DELTASONE    180 tablet    TAKE 1 TABLET BY MOUTH TWICE DAILY    Rheumatoid arthritis, involving unspecified site, unspecified rheumatoid factor presence (H)       spironolactone 25 MG tablet    ALDACTONE    90 tablet    Take 1 tablet (25 mg) by mouth daily    Essential hypertension       TGT PSYLLIUM FIBER 0.52 g capsule   Generic drug:  psyllium     540 capsule    Take 3 capsules (1.56 g) by mouth daily        traMADol 50 MG tablet    ULTRAM    56 tablet    TAKE 2 TABLETS BY MOUTH EVERY 6 HOURS AS NEEDED FOR  MODERATE  PAIN    Neuropathy       Vitamin B-12 CR 1000 MCG Tbcr     30 tablet    Take 1 tablet by mouth daily        * Notice:  This list has 2 medication(s) that are the same as other medications prescribed for you. Read the directions carefully, and ask your doctor or other care provider to review them with you.

## 2018-10-09 NOTE — PROGRESS NOTES

## 2018-10-23 NOTE — TELEPHONE ENCOUNTER
Contacted the patient and gave her the information below.  Josefa Pandya LPN on 10/23/2018 at 11:30 AM

## 2018-10-23 NOTE — TELEPHONE ENCOUNTER
Contacted the patient she reports she is having burning/ shooting pain down her right buttocks to her right knee. She has taken double her baclofen and gabapentin but it isn't helping. She was suppose to come in tomorrow to see Hugo Duarte MD but is not able to since her  is in the hospital. She is wondering what Hugo Duarte MD would suggest she do.  Josefa Pandya LPN on 10/23/2018 at 11:02 AM

## 2018-11-02 NOTE — TELEPHONE ENCOUNTER
Chart review shows that Rx as requested was last filled by PCP as noted below on 10/8/18:    Medication Detail      Disp Refills Start End GISELE   traMADol (ULTRAM) 50 MG tablet 56 tablet 3 10/8/2018  No   Sig: TAKE 2 TABLETS BY MOUTH EVERY 6 HOURS AS NEEDED FOR  MODERATE  PAIN   Class: Local Print   Order: [id number]   Outpatient Morphine Equivalent Daily Dose (MEDD)   10/8/18 and after   Unknown   Order Name Dose Route Frequency Maximum MEDD    traMADol (ULTRAM) 50 MG tablet      Unknown   Total Potential Daily Morphine Equivalence Unknown   An error was encountered while attempting to calculate the morphine equivalent daily dose for at least one order.    Calculation Information    traMADol (ULTRAM) 50 MG tablet   Not enough information to calculate morphine equivalent daily dose.             Printout Tracking   External Result Report   Pharmacy   [facility] [address] [place] [address].     Rx as noted above is sufficient for a 28 day supply. Refill would be due on 11/6/18. Patient with upcoming appointment scheduled with PCP for 11/13/18 and Rx as requested is not on RN refill protocol. Writer will dimitry up and route Rx request to PCP for his consideration/approval upon his return to the office on 11/5/18.    Unable to complete prescription refill per RN Medication Refill Policy. Germán Armstrong 11/2/2018 4:16 PM

## 2018-11-13 NOTE — NURSING NOTE
The patient is here today to be seen for right leg and buttocks pain.  Josefa Pandya LPN on 11/13/2018 at 11:19 AM  Chief Complaint   Patient presents with     Musculoskeletal Problem     right leg and buttocks        Initial /82 (BP Location: Right arm, Patient Position: Sitting, Cuff Size: Adult Regular)  Pulse 80  Wt 122 lb (55.3 kg)  Breastfeeding? No  BMI 23.83 kg/m2 Estimated body mass index is 23.83 kg/(m^2) as calculated from the following:    Height as of 6/27/18: 5' (1.524 m).    Weight as of this encounter: 122 lb (55.3 kg).  Medication Reconciliation: incomplete    Josefa Pandya LPN

## 2018-11-13 NOTE — PROGRESS NOTES
Chief Complaint: Sciatica.    HPI: She is in today with a complaint of right-sided low back pain with right-sided leg pain.  The pain is shooting from her posterior buttock area down to the knee.  It does not go any further distally.  She describes it as a sharp and burning kind of pain.  She has to walk with her legs stiff otherwise it is extremely sore when she sits down again.  This has been going on for over a month.  She has a history of significant lumbar disease and has had surgery in the past.  Her last MRI was 5 years ago.  She feels as though this is definitely related to the back and not from a hip problem or similar.    Past Medical History:   Diagnosis Date     Atrial fibrillation (H)     2000     Essential (primary) hypertension     No Comments Provided     Extradural and subdural abscess, unspecified (CODE)     2016     Hyperlipidemia     No Comments Provided     Osteomyelitis (H)     2012,IV Vancomycin to resolve, L 3rd digit     Other specified abnormal findings of blood chemistry     2010,Fatty liver on US and CT.  Iron nl. Hep C neg     Pneumothorax     spondylolysis (right) at age 39 followed by spondylolysis (left) several years later     Psoas muscle abscess (H)     2016,Strep viridans     Rheumatoid arthritis (H)     2000     Type 2 diabetes mellitus without complications (H)     No Comments Provided       Past Surgical History:   Procedure Laterality Date     ARTHROPLASTY KNEE      07/10/08     ARTHROTOMY WRIST      2010,removed nodule Right wrist     BIOPSY BREAST      Left     COLONOSCOPY      2001,repeat in 10 years per patient     COLONOSCOPY      2010,sigmoid diverticulosis, no polyps, due 2020     COLONOSCOPY      2011,mild sigmoid diverticulosis, EGD biopsies     ESOPHAGOSCOPY, GASTROSCOPY, DUODENOSCOPY (EGD), COMBINED      2/2011,Normal     EXTRACAPSULAR CATARACT EXTRATION WITH INTRAOCULAR LENS IMPLANT      No Comments Provided     HYSTERECTOMY TOTAL ABDOMINAL, BILATERAL  SALPINGO-OOPHORECTOMY, COMBINED      secondary to fibroids     LAMINECTOMY LUMBAR ONE LEVEL      No Comments Provided     OTHER SURGICAL HISTORY      2012,207367,PORTACATH,power port placement, Dr. Taylor     OTHER SURGICAL HISTORY      2017,[id number],MUSCLE FLAP,Left ischial ulcer     OTHER SURGICAL HISTORY      207367,PORTACATH,hx chest tube surgery x2     OTHER SURGICAL HISTORY      2016,99630.1,CT GUIDE PERC DRAIN CATH (IA)     REMOVE PORT VASCULAR ACCESS N/A 6/27/2018    Procedure: REMOVE PORT VASCULAR ACCESS;  Port Removal;  Surgeon: Jazzy Taylor MD;  Location: [facility] OR     SPINE SURGERY      Back surgery with fusion of the lumbar discs       Allergies   Allergen Reactions     Aliskiren Cough     Gabapentin Cough     Liquid Adhesive      Other reaction(s): *Unknown  Paper tapes, fragile skin     Lisinopril Cough     Ace cough     Losartan Cough       Current Outpatient Prescriptions   Medication Sig Dispense Refill     acetaminophen (TYLENOL) 325 MG tablet Take 2 tablets (650 mg) by mouth every 4 hours as needed for other (mild pain) 100 tablet 0     amLODIPine (NORVASC) 2.5 MG tablet Take 1 tablet (2.5 mg) by mouth daily 90 tablet 2     ascorbic acid (VITAMIN C) 500 MG tablet Take 2 tablets (1,000 mg) by mouth daily 30 tablet      atorvastatin (LIPITOR) 20 MG tablet TAKE ONE TABLET BY MOUTH AT BEDTIME 90 tablet 1     baclofen (LIORESAL) 10 MG tablet Take 1 tablet (10 mg) by mouth 3 times daily 90 tablet 11     blood glucose (ACCU-CHEK FASTCLIX) lancing device Device to be used with lancets to check glucose four times daily. Dispense as covered by patient's insurance. 1 each 0     blood glucose monitoring (ACCU-CHEK FASTCLIX) lancets Use to test blood sugar four times daily. DX code E11.9. Dispense as covered by patient's insurance 400 each 2     blood glucose monitoring (ACCU-CHEK MULTICLIX) lancets Test 4 times daily. Dispense item covered by patient's insurance. Diagnosis code E11.29 400 each 1     cloNIDine  (CATAPRES) 0.1 MG tablet Take 0.1 mg by mouth every evening Takes at 1 pm, 30 tablet      Cyanocobalamin (VITAMIN B-12 CR) 1000 MCG TBCR Take 1 tablet by mouth daily 30 tablet      diltiazem (CARDIZEM CD; CARTIA XT) 360 MG 24 hr CD capsule Take 1 capsule (360 mg) by mouth daily 30 capsule      docusate sodium (COLACE) 100 MG capsule Take 2 capsules (200 mg) by mouth 2 times daily 60 capsule      estradiol (ESTRACE) 0.5 MG tablet TAKE ONE TABLET BY MOUTH ONCE DAILY 90 tablet 1     ferrous sulfate 325 (65 FE) MG TBEC EC tablet Take 2 tablets (650 mg) by mouth daily 30 tablet      FLORASTOR 250 MG capsule TAKE TWO CAPSULES BY MOUTH TWICE DAILY 120 capsule 7     furosemide (LASIX) 40 MG tablet TAKE ONE TABLET BY MOUTH ONCE DAILY IN THE MORNING 90 tablet 3     gabapentin (NEURONTIN) 100 MG capsule Take 1 capsule (100 mg) by mouth 3 times daily 270 capsule 3     HYDROcodone-acetaminophen (NORCO) 5-325 MG per tablet Take 1 tablet by mouth 4 times daily 120 tablet 0     insulin aspart (NOVOLOG PEN) 100 UNIT/ML injection Inject 4 times daily per sliding scale, 24 units max daily       insulin glargine (LANTUS) 100 UNIT/ML injection 16 units am, 12 units pm       metFORMIN (GLUCOPHAGE-XR) 500 MG 24 hr tablet Take 2 tablets (1,000 mg) by mouth 2 times daily (with meals) 360 tablet 3     NOVOFINE 30 30G X 8 MM insulin pen needle USE 5 TIMES DAILY 300 each 3     ONETOUCH VERIO IQ test strip USE  STRIP TO CHECK GLUCOSE 4 TIMES DAILY 200 strip 3     predniSONE (DELTASONE) 5 MG tablet TAKE 1 TABLET BY MOUTH TWICE DAILY 180 tablet 1     psyllium (TGT PSYLLIUM FIBER) 0.52 G capsule Take 3 capsules (1.56 g) by mouth daily 540 capsule      spironolactone (ALDACTONE) 25 MG tablet Take 1 tablet (25 mg) by mouth daily 90 tablet 3     traMADol (ULTRAM) 50 MG tablet TAKE 2 TABLETS BY MOUTH EVERY 6 HOURS AS NEEDED FOR  MODERATE  PAIN 112 tablet 0       Review of Systems   Constitutional: Negative.    Eyes: Negative.    Endocrine: Negative.     Allergic/Immunologic: Negative.        Physical Exam   Constitutional: She appears well-developed and well-nourished. No distress.   In wheelchair   Skin: She is not diaphoretic.   Nursing note and vitals reviewed.      Assessment:        ICD-10-CM    1. Chronic right-sided low back pain with right-sided sciatica M54.41 MR Lumbar Spine w/o Contrast    G89.29        Plan: This patient is having presumed sciatic symptoms in her right leg.  She does not want to have surgery but she needs to have something done.  We will get an MRI and I will let her know the results.  It would be nice if she had something that would be amenable to injection.  Otherwise consider therapy or chiropractor.  Last resort would be to have her referred to a spine specialist.

## 2018-11-13 NOTE — MR AVS SNAPSHOT
After Visit Summary   11/13/2018    Karen Duncan    MRN: [id number]           Patient Information     Date Of Birth          1945        Visit Information        Provider Department      11/13/2018 11:20 AM Hugo Duarte MD [facility]        Today's Diagnoses     Chronic right-sided low back pain with right-sided sciatica    -  1       Follow-ups after your visit        Future tests that were ordered for you today     Open Future Orders        Priority Expected Expires Ordered    MR Lumbar Spine w/o Contrast Routine  11/13/2019 11/13/2018            Who to contact     If you have questions or need follow up information about today's clinic visit or your schedule please contact [facility] AND [facility] directly at [phone].  Normal or non-critical lab and imaging results will be communicated to you by MyChart, letter or phone within 4 business days after the clinic has received the results. If you do not hear from us within 7 days, please contact the clinic through MyChart or phone. If you have a critical or abnormal lab result, we will notify you by phone as soon as possible.  Submit refill requests through [employer] or call your pharmacy and they will forward the refill request to us. Please allow 3 business days for your refill to be completed.          Additional Information About Your Visit        Care EveryWhere ID     This is your Care EveryWhere ID. This could be used by other organizations to access your [facility] medical records  [id number]        Your Vitals Were     Pulse Breastfeeding? BMI (Body Mass Index)             80 No 23.83 kg/m2          Blood Pressure from Last 3 Encounters:   11/13/18 126/82   09/19/18 138/84   08/28/18 148/84    Weight from Last 3 Encounters:   11/13/18 122 lb (55.3 kg)   09/19/18 122 lb (55.3 kg)   06/27/18 120 lb (54.4 kg)               Primary Care Provider Office Phone # Fax #    Hugo Duarte [phone]  [phone]       [address]  [place] [address]        Equal Access to Services     SHEILALUIGI NISA : Hadii aad ku hadtrusade Amenaali, wadorada faviolacalha, qajulitota kabetitoda lillytaylor, sal de luna darwinbrielle carrmarquita gailtkestevan tillman. So [facility] [phone].    ATENCIÓN: Si habla español, tiene a lawson disposición servicios gratuitos de asistencia lingüística. Llame al [phone].    We comply with applicable federal civil rights laws and [place] laws. We do not discriminate on the basis of race, color, national origin, age, disability, sex, sexual orientation, or gender identity.            Thank you!     Thank you for choosing [facility] AND [facility]  for your care. Our goal is always to provide you with excellent care. Hearing back from our patients is one way we can continue to improve our services. Please take a few minutes to complete the written survey that you may receive in the mail after your visit with us. Thank you!             Your Updated Medication List - Protect others around you: Learn how to safely use, store and throw away your medicines at www.disposemymeds.org.          This list is accurate as of 11/13/18 11:31 AM.  Always use your most recent med list.                   Brand Name Dispense Instructions for use Diagnosis    acetaminophen 325 MG tablet    TYLENOL    100 tablet    Take 2 tablets (650 mg) by mouth every 4 hours as needed for other (mild pain)    History of osteomyelitis       amLODIPine 2.5 MG tablet    NORVASC    90 tablet    Take 1 tablet (2.5 mg) by mouth daily    Hypertension       ascorbic acid 500 MG tablet    VITAMIN C    30 tablet    Take 2 tablets (1,000 mg) by mouth daily        atorvastatin 20 MG tablet    LIPITOR    90 tablet    TAKE ONE TABLET BY MOUTH AT BEDTIME    Hyperlipidemia, unspecified hyperlipidemia type       baclofen 10 MG tablet    LIORESAL    90 tablet    Take 1 tablet (10 mg) by mouth 3 times daily    Chronic pain disorder       blood glucose  lancing device     1 each    Device to be used with lancets to check glucose four times daily. Dispense as covered by patient's insurance.    Diabetes mellitus, type II (H)       * blood glucose monitoring lancets     400 each    Test 4 times daily. Dispense item covered by patient's insurance. Diagnosis code E11.29    Type 2 diabetes mellitus without complication, with long-term current use of insulin (H)       * blood glucose monitoring lancets     400 each    Use to test blood sugar four times daily. DX code E11.9. Dispense as covered by patient's insurance    Diabetes mellitus, type II (H)       cloNIDine 0.1 MG tablet    CATAPRES    30 tablet    Take 0.1 mg by mouth every evening Takes at 1 pm,        diltiazem 360 MG 24 hr CD capsule    CARDIZEM CD; CARTIA XT    30 capsule    Take 1 capsule (360 mg) by mouth daily        docusate sodium 100 MG capsule    COLACE    60 capsule    Take 2 capsules (200 mg) by mouth 2 times daily        estradiol 0.5 MG tablet    ESTRACE    90 tablet    TAKE ONE TABLET BY MOUTH ONCE DAILY    Estrogen deficiency       ferrous sulfate 325 (65 Fe) MG Tbec EC tablet     30 tablet    Take 2 tablets (650 mg) by mouth daily        FLORASTOR 250 MG capsule   Generic drug:  saccharomyces boulardii     120 capsule    TAKE TWO CAPSULES BY MOUTH TWICE DAILY    Non-specific colitis       furosemide 40 MG tablet    LASIX    90 tablet    TAKE ONE TABLET BY MOUTH ONCE DAILY IN THE MORNING    Essential hypertension       gabapentin 100 MG capsule    NEURONTIN    270 capsule    Take 1 capsule (100 mg) by mouth 3 times daily    Neuropathy       HYDROcodone-acetaminophen 5-325 MG per tablet    NORCO    120 tablet    Take 1 tablet by mouth 4 times daily    Chronic pain disorder       insulin aspart 100 UNIT/ML injection    NovoLOG PEN     Inject 4 times daily per sliding scale, 24 units max daily        insulin glargine 100 UNIT/ML injection    LANTUS     16 units am, 12 units pm        metFORMIN 500  MG 24 hr tablet    GLUCOPHAGE-XR    360 tablet    Take 2 tablets (1,000 mg) by mouth 2 times daily (with meals)    Type 2 diabetes mellitus without complication, with long-term current use of insulin (H)       NOVOFINE 30 30G X 8 MM   Generic drug:  insulin pen needle     300 each    USE 5 TIMES DAILY    Type 2 diabetes mellitus without complication, unspecified long term insulin use status       ONETOUCH VERIO IQ test strip   Generic drug:  blood glucose monitoring     200 strip    USE  STRIP TO CHECK GLUCOSE 4 TIMES DAILY    Uncontrolled diabetes with kidney complications (H)       predniSONE 5 MG tablet    DELTASONE    180 tablet    TAKE 1 TABLET BY MOUTH TWICE DAILY    Rheumatoid arthritis, involving unspecified site, unspecified rheumatoid factor presence (H)       spironolactone 25 MG tablet    ALDACTONE    90 tablet    Take 1 tablet (25 mg) by mouth daily    Essential hypertension       TGT PSYLLIUM FIBER 0.52 g capsule   Generic drug:  psyllium     540 capsule    Take 3 capsules (1.56 g) by mouth daily        traMADol 50 MG tablet    ULTRAM    112 tablet    TAKE 2 TABLETS BY MOUTH EVERY 6 HOURS AS NEEDED FOR  MODERATE  PAIN    Neuropathy       Vitamin B-12 CR 1000 MCG Tbcr     30 tablet    Take 1 tablet by mouth daily        * Notice:  This list has 2 medication(s) that are the same as other medications prescribed for you. Read the directions carefully, and ask your doctor or other care provider to review them with you.

## 2018-11-15 NOTE — TELEPHONE ENCOUNTER
Prescription approved per [facility] [place].  LOV: 11/13/18  Per OV on 9/19/18 patient to return in 6 months for complete evaluation.    Daamris Arora RN on 11/15/2018 at 7:58 AM

## 2018-11-20 NOTE — TELEPHONE ENCOUNTER
Tramadol      Last Written Prescription Date:  11/5/18  Last Fill Quantity: 112,   # refills: 0  Last Office Visit: 11/13/18  Future Office visit:       Routing refill request to provider for review/approval because:  Drug not on the [facility], [facility] or [facility] refill protocol or controlled substance    Damaris Arora RN on 11/20/2018 at 11:18 AM

## 2018-11-28 NOTE — TELEPHONE ENCOUNTER
Last OV 11/13/18 with PCP. DILTIAZEM 360MG/24HR CAP approved per [facility] Refill Protocol. Refill authorized for 90 days and 3 refills at this time.     Ina Harris RN on 11/28/2018 at 12:47 PM

## 2018-12-19 NOTE — PROGRESS NOTES
Chief Complaint: Refill on pain medications.    HPI: She comes in today for refill on her Norco.  Her dose is the same.  She is dependent on this medication and does not feel that she can lower the dose.  It does provide reasonable control of her pain without side effect.  She is having more trouble with peripheral neuropathy, however.  She wonders what can be done for that.    Past Medical History:   Diagnosis Date     Atrial fibrillation (H)     2000     Essential (primary) hypertension     No Comments Provided     Extradural and subdural abscess, unspecified (CODE)     2016     Hyperlipidemia     No Comments Provided     Osteomyelitis (H)     2012,IV Vancomycin to resolve, L 3rd digit     Other specified abnormal findings of blood chemistry     2010,Fatty liver on US and CT.  Iron nl. Hep C neg     Pneumothorax     spondylolysis (right) at age 39 followed by spondylolysis (left) several years later     Psoas muscle abscess (H)     2016,Strep viridans     Rheumatoid arthritis (H)     2000     Type 2 diabetes mellitus without complications (H)     No Comments Provided       Past Surgical History:   Procedure Laterality Date     ARTHROPLASTY KNEE      07/10/08     ARTHROTOMY WRIST      2010,removed nodule Right wrist     BIOPSY BREAST      Left     COLONOSCOPY      2001,repeat in 10 years per patient     COLONOSCOPY      2010,sigmoid diverticulosis, no polyps, due 2020     COLONOSCOPY      2011,mild sigmoid diverticulosis, EGD biopsies     ESOPHAGOSCOPY, GASTROSCOPY, DUODENOSCOPY (EGD), COMBINED      2/2011,Normal     EXTRACAPSULAR CATARACT EXTRATION WITH INTRAOCULAR LENS IMPLANT      No Comments Provided     HYSTERECTOMY TOTAL ABDOMINAL, BILATERAL SALPINGO-OOPHORECTOMY, COMBINED      secondary to fibroids     LAMINECTOMY LUMBAR ONE LEVEL      No Comments Provided     OTHER SURGICAL HISTORY      2012,260833,PORTACATH,power port placement, Dr. Taylor     OTHER SURGICAL HISTORY      2017,[id number],MUSCLE FLAP,Left  ischial ulcer     OTHER SURGICAL HISTORY      [id number],PORTACATH,hx chest tube surgery x2     OTHER SURGICAL HISTORY      2016,67865.1,CT GUIDE PERC DRAIN CATH (IA)     REMOVE PORT VASCULAR ACCESS N/A 6/27/2018    Procedure: REMOVE PORT VASCULAR ACCESS;  Port Removal;  Surgeon: Jazzy Taylor MD;  Location: [facility] OR     SPINE SURGERY      Back surgery with fusion of the lumbar discs       Allergies   Allergen Reactions     Aliskiren Cough     Gabapentin Cough     Liquid Adhesive      Other reaction(s): *Unknown  Paper tapes, fragile skin     Lisinopril Cough     Ace cough     Losartan Cough       Current Outpatient Medications   Medication Sig Dispense Refill     acetaminophen (TYLENOL) 325 MG tablet Take 2 tablets (650 mg) by mouth every 4 hours as needed for other (mild pain) 100 tablet 0     amLODIPine (NORVASC) 2.5 MG tablet TAKE 1 TABLET BY MOUTH ONCE DAILY 90 tablet 0     ascorbic acid (VITAMIN C) 500 MG tablet Take 2 tablets (1,000 mg) by mouth daily 30 tablet      atorvastatin (LIPITOR) 20 MG tablet TAKE ONE TABLET BY MOUTH AT BEDTIME 90 tablet 1     baclofen (LIORESAL) 10 MG tablet Take 1 tablet (10 mg) by mouth 3 times daily 90 tablet 11     blood glucose (ACCU-CHEK FASTCLIX) lancing device Device to be used with lancets to check glucose four times daily. Dispense as covered by patient's insurance. 1 each 0     blood glucose monitoring (ACCU-CHEK FASTCLIX) lancets Use to test blood sugar four times daily. DX code E11.9. Dispense as covered by patient's insurance 400 each 2     blood glucose monitoring (ACCU-CHEK MULTICLIX) lancets Test 4 times daily. Dispense item covered by patient's insurance. Diagnosis code E11.29 400 each 1     cloNIDine (CATAPRES) 0.1 MG tablet TAKE ONE TABLET BY MOUTH ONCE DAILY 90 tablet 0     Cyanocobalamin (VITAMIN B-12 CR) 1000 MCG TBCR Take 1 tablet by mouth daily 30 tablet      diltiazem (CARDIZEM CD; CARTIA XT) 360 MG 24 hr CD capsule Take 1 capsule (360 mg) by mouth daily 30 capsule       diltiazem (TIAZAC) 360 MG 24 hr capsule TAKE ONE CAPSULE BY MOUTH ONCE DAILY 90 capsule 3     docusate sodium (COLACE) 100 MG capsule Take 2 capsules (200 mg) by mouth 2 times daily 60 capsule      estradiol (ESTRACE) 0.5 MG tablet TAKE ONE TABLET BY MOUTH ONCE DAILY 90 tablet 1     ferrous sulfate 325 (65 FE) MG TBEC EC tablet Take 2 tablets (650 mg) by mouth daily 30 tablet      FLORASTOR 250 MG capsule TAKE TWO CAPSULES BY MOUTH TWICE DAILY 120 capsule 7     furosemide (LASIX) 40 MG tablet TAKE ONE TABLET BY MOUTH ONCE DAILY IN THE MORNING 90 tablet 3     gabapentin (NEURONTIN) 100 MG capsule 2-3  capsule 3     HYDROcodone-acetaminophen (NORCO) 5-325 MG tablet Take 1 tablet by mouth 4 times daily 120 tablet 0     insulin aspart (NOVOLOG PEN) 100 UNIT/ML injection Inject 4 times daily per sliding scale, 24 units max daily       insulin glargine (LANTUS) 100 UNIT/ML injection 16 units am, 12 units pm       metFORMIN (GLUCOPHAGE-XR) 500 MG 24 hr tablet Take 2 tablets (1,000 mg) by mouth 2 times daily (with meals) 360 tablet 3     NOVOFINE 30 30G X 8 MM insulin pen needle USE 5 TIMES DAILY 300 each 3     ONETOUCH VERIO IQ test strip USE  STRIP TO CHECK GLUCOSE 4 TIMES DAILY 200 strip 3     predniSONE (DELTASONE) 5 MG tablet TAKE 1 TABLET BY MOUTH TWICE DAILY 180 tablet 1     psyllium (TGT PSYLLIUM FIBER) 0.52 G capsule Take 3 capsules (1.56 g) by mouth daily 540 capsule      spironolactone (ALDACTONE) 25 MG tablet Take 1 tablet (25 mg) by mouth daily 90 tablet 3     traMADol (ULTRAM) 50 MG tablet TAKE 2 TABLETS BY MOUTH EVERY 6 HOURS AS NEEDED FOR MODERATE PAIN 112 tablet 3       Review of Systems   Constitutional: Negative.    Eyes: Negative.    Endocrine: Negative.    Allergic/Immunologic: Negative.        Physical Exam   Constitutional: She appears well-developed and well-nourished. No distress.   Skin: She is not diaphoretic.   Nursing note and vitals reviewed.      Assessment:        ICD-10-CM    1.  Chronic pain disorder G89.4 HYDROcodone-acetaminophen (NORCO) 5-325 MG tablet     DISCONTINUED: HYDROcodone-acetaminophen (NORCO) 5-325 MG tablet     DISCONTINUED: HYDROcodone-acetaminophen (NORCO) 5-325 MG tablet   2. Neuropathy G62.9 gabapentin (NEURONTIN) 100 MG capsule       Plan: Pain medications were refilled for 3 months.  She will try increasing the gabapentin from 100 mg 3 times daily up to 300 mg 3 times daily as tolerated and needed.  We can certainly provide a new prescription for 300 mg gabapentin dosing, she will let us know.  She will follow-up in 3 months at which time she will be due for a complete evaluation as well as a renewal of her narcotic contract, etc.

## 2018-12-19 NOTE — NURSING NOTE
The patient is here today to get a refill on her pain medications.  Josefa Pandya on 12/19/2018 at 10:57 AM  Chief Complaint   Patient presents with     Recheck Medication       Initial /76 (BP Location: Right arm, Patient Position: Sitting, Cuff Size: Adult Regular)   Pulse 72   Resp 18   Ht 1.524 m (5')   Wt 55.3 kg (122 lb)   Breastfeeding? No   BMI 23.83 kg/m   Estimated body mass index is 23.83 kg/m  as calculated from the following:    Height as of this encounter: 1.524 m (5').    Weight as of this encounter: 55.3 kg (122 lb).  Medication Reconciliation: complete    Josefa Pandya

## 2019-01-01 ENCOUNTER — TELEPHONE (OUTPATIENT)
Dept: INTERNAL MEDICINE | Facility: OTHER | Age: 74
End: 2019-01-01

## 2019-01-01 ENCOUNTER — OFFICE VISIT (OUTPATIENT)
Dept: ORTHOPEDICS | Facility: OTHER | Age: 74
End: 2019-01-01
Attending: ORTHOPAEDIC SURGERY
Payer: MEDICARE

## 2019-01-01 ENCOUNTER — APPOINTMENT (OUTPATIENT)
Dept: ULTRASOUND IMAGING | Facility: OTHER | Age: 74
DRG: 377 | End: 2019-01-01
Attending: INTERNAL MEDICINE
Payer: MEDICARE

## 2019-01-01 ENCOUNTER — HOSPITAL ENCOUNTER (OUTPATIENT)
Dept: GENERAL RADIOLOGY | Facility: OTHER | Age: 74
Discharge: HOME | End: 2019-09-17
Attending: INTERNAL MEDICINE | Admitting: INTERNAL MEDICINE
Payer: MEDICARE

## 2019-01-01 ENCOUNTER — TRANSFERRED RECORDS (OUTPATIENT)
Dept: HEALTH INFORMATION MANAGEMENT | Facility: OTHER | Age: 74
End: 2019-01-01

## 2019-01-01 ENCOUNTER — APPOINTMENT (OUTPATIENT)
Dept: CT IMAGING | Facility: OTHER | Age: 74
DRG: 377 | End: 2019-01-01
Attending: INTERNAL MEDICINE
Payer: MEDICARE

## 2019-01-01 ENCOUNTER — ANESTHESIA EVENT (OUTPATIENT)
Dept: SURGERY | Facility: OTHER | Age: 74
DRG: 377 | End: 2019-01-01
Payer: MEDICARE

## 2019-01-01 ENCOUNTER — HOSPITAL ENCOUNTER (OUTPATIENT)
Dept: MAMMOGRAPHY | Facility: OTHER | Age: 74
End: 2019-03-28
Attending: INTERNAL MEDICINE
Payer: MEDICARE

## 2019-01-01 ENCOUNTER — OFFICE VISIT (OUTPATIENT)
Dept: INTERNAL MEDICINE | Facility: OTHER | Age: 74
End: 2019-01-01
Attending: INTERNAL MEDICINE
Payer: MEDICARE

## 2019-01-01 ENCOUNTER — HOSPITAL ENCOUNTER (INPATIENT)
Facility: OTHER | Age: 74
LOS: 4 days | Discharge: HOME HEALTH | DRG: 377 | End: 2019-09-12
Attending: EMERGENCY MEDICINE | Admitting: INTERNAL MEDICINE
Payer: MEDICARE

## 2019-01-01 ENCOUNTER — OFFICE VISIT (OUTPATIENT)
Dept: INTERNAL MEDICINE | Facility: OTHER | Age: 74
End: 2019-01-01
Attending: INTERNAL MEDICINE
Payer: COMMERCIAL

## 2019-01-01 ENCOUNTER — APPOINTMENT (OUTPATIENT)
Dept: SPEECH THERAPY | Facility: OTHER | Age: 74
DRG: 377 | End: 2019-01-01
Attending: FAMILY MEDICINE
Payer: MEDICARE

## 2019-01-01 ENCOUNTER — HOSPITAL ENCOUNTER (OUTPATIENT)
Dept: GENERAL RADIOLOGY | Facility: OTHER | Age: 74
Discharge: HOME | End: 2019-07-29
Attending: ORTHOPAEDIC SURGERY | Admitting: ORTHOPAEDIC SURGERY
Payer: MEDICARE

## 2019-01-01 ENCOUNTER — APPOINTMENT (OUTPATIENT)
Dept: GENERAL RADIOLOGY | Facility: OTHER | Age: 74
DRG: 377 | End: 2019-01-01
Attending: EMERGENCY MEDICINE
Payer: MEDICARE

## 2019-01-01 ENCOUNTER — HOSPITAL ENCOUNTER (OUTPATIENT)
Dept: GENERAL RADIOLOGY | Facility: OTHER | Age: 74
Discharge: HOME | End: 2019-06-10
Attending: ORTHOPAEDIC SURGERY | Admitting: ORTHOPAEDIC SURGERY
Payer: MEDICARE

## 2019-01-01 ENCOUNTER — APPOINTMENT (OUTPATIENT)
Dept: PHYSICAL THERAPY | Facility: OTHER | Age: 74
DRG: 377 | End: 2019-01-01
Attending: INTERNAL MEDICINE
Payer: MEDICARE

## 2019-01-01 ENCOUNTER — APPOINTMENT (OUTPATIENT)
Dept: GENERAL RADIOLOGY | Facility: OTHER | Age: 74
DRG: 377 | End: 2019-01-01
Attending: FAMILY MEDICINE
Payer: MEDICARE

## 2019-01-01 ENCOUNTER — MEDICAL CORRESPONDENCE (OUTPATIENT)
Dept: HEALTH INFORMATION MANAGEMENT | Facility: OTHER | Age: 74
End: 2019-01-01

## 2019-01-01 ENCOUNTER — APPOINTMENT (OUTPATIENT)
Dept: SPEECH THERAPY | Facility: OTHER | Age: 74
DRG: 377 | End: 2019-01-01
Payer: MEDICARE

## 2019-01-01 ENCOUNTER — HOSPITAL ENCOUNTER (OUTPATIENT)
Dept: GENERAL RADIOLOGY | Facility: OTHER | Age: 74
Discharge: HOME | End: 2019-05-13
Attending: ORTHOPAEDIC SURGERY | Admitting: ORTHOPAEDIC SURGERY
Payer: MEDICARE

## 2019-01-01 ENCOUNTER — APPOINTMENT (OUTPATIENT)
Dept: OCCUPATIONAL THERAPY | Facility: OTHER | Age: 74
DRG: 377 | End: 2019-01-01
Attending: INTERNAL MEDICINE
Payer: MEDICARE

## 2019-01-01 ENCOUNTER — HOSPITAL ENCOUNTER (OUTPATIENT)
Dept: GENERAL RADIOLOGY | Facility: OTHER | Age: 74
Discharge: HOME | End: 2019-07-01
Attending: ORTHOPAEDIC SURGERY | Admitting: ORTHOPAEDIC SURGERY
Payer: MEDICARE

## 2019-01-01 ENCOUNTER — HOSPITAL ENCOUNTER (OUTPATIENT)
Dept: BONE DENSITY | Facility: OTHER | Age: 74
Discharge: HOME | End: 2019-04-03
Attending: INTERNAL MEDICINE | Admitting: INTERNAL MEDICINE
Payer: MEDICARE

## 2019-01-01 ENCOUNTER — HOSPITAL ENCOUNTER (INPATIENT)
Facility: OTHER | Age: 74
LOS: 3 days | Discharge: ANOTHER HOSPITAL | DRG: 377 | End: 2019-10-09
Attending: EMERGENCY MEDICINE | Admitting: INTERNAL MEDICINE
Payer: MEDICARE

## 2019-01-01 ENCOUNTER — APPOINTMENT (OUTPATIENT)
Dept: GENERAL RADIOLOGY | Facility: OTHER | Age: 74
DRG: 377 | End: 2019-01-01
Attending: RADIOLOGY
Payer: MEDICARE

## 2019-01-01 ENCOUNTER — ANESTHESIA (OUTPATIENT)
Dept: SURGERY | Facility: OTHER | Age: 74
DRG: 377 | End: 2019-01-01
Payer: MEDICARE

## 2019-01-01 VITALS
TEMPERATURE: 96.8 F | TEMPERATURE: 98.8 F | WEIGHT: 123 LBS | DIASTOLIC BLOOD PRESSURE: 84 MMHG | DIASTOLIC BLOOD PRESSURE: 82 MMHG | WEIGHT: 122 LBS | SYSTOLIC BLOOD PRESSURE: 134 MMHG | HEART RATE: 68 BPM | BODY MASS INDEX: 24.02 KG/M2 | SYSTOLIC BLOOD PRESSURE: 122 MMHG | RESPIRATION RATE: 18 BRPM | BODY MASS INDEX: 23.83 KG/M2 | HEART RATE: 68 BPM | RESPIRATION RATE: 18 BRPM

## 2019-01-01 VITALS
BODY MASS INDEX: 24.22 KG/M2 | SYSTOLIC BLOOD PRESSURE: 136 MMHG | DIASTOLIC BLOOD PRESSURE: 82 MMHG | RESPIRATION RATE: 18 BRPM | HEART RATE: 72 BPM | TEMPERATURE: 97.3 F | WEIGHT: 124 LBS

## 2019-01-01 VITALS
TEMPERATURE: 98.5 F | BODY MASS INDEX: 24.62 KG/M2 | HEART RATE: 80 BPM | OXYGEN SATURATION: 96 % | RESPIRATION RATE: 18 BRPM | DIASTOLIC BLOOD PRESSURE: 82 MMHG | HEIGHT: 60 IN | SYSTOLIC BLOOD PRESSURE: 144 MMHG | WEIGHT: 125.4 LBS

## 2019-01-01 VITALS
WEIGHT: 125 LBS | HEART RATE: 68 BPM | TEMPERATURE: 98.2 F | BODY MASS INDEX: 24.41 KG/M2 | SYSTOLIC BLOOD PRESSURE: 128 MMHG | RESPIRATION RATE: 16 BRPM | DIASTOLIC BLOOD PRESSURE: 82 MMHG

## 2019-01-01 VITALS
TEMPERATURE: 98 F | RESPIRATION RATE: 18 BRPM | SYSTOLIC BLOOD PRESSURE: 132 MMHG | BODY MASS INDEX: 24.41 KG/M2 | DIASTOLIC BLOOD PRESSURE: 84 MMHG | WEIGHT: 125 LBS | HEART RATE: 76 BPM

## 2019-01-01 VITALS
OXYGEN SATURATION: 97 % | HEART RATE: 94 BPM | HEIGHT: 60 IN | RESPIRATION RATE: 20 BRPM | SYSTOLIC BLOOD PRESSURE: 158 MMHG | WEIGHT: 113.76 LBS | BODY MASS INDEX: 22.33 KG/M2 | TEMPERATURE: 98.6 F | DIASTOLIC BLOOD PRESSURE: 78 MMHG

## 2019-01-01 VITALS
SYSTOLIC BLOOD PRESSURE: 122 MMHG | BODY MASS INDEX: 24.41 KG/M2 | RESPIRATION RATE: 18 BRPM | TEMPERATURE: 98.6 F | DIASTOLIC BLOOD PRESSURE: 78 MMHG | HEART RATE: 76 BPM | WEIGHT: 125 LBS

## 2019-01-01 VITALS
TEMPERATURE: 97.2 F | WEIGHT: 125 LBS | HEIGHT: 60 IN | SYSTOLIC BLOOD PRESSURE: 138 MMHG | RESPIRATION RATE: 18 BRPM | BODY MASS INDEX: 24.54 KG/M2 | HEART RATE: 76 BPM | DIASTOLIC BLOOD PRESSURE: 88 MMHG

## 2019-01-01 DIAGNOSIS — E11.29 TYPE 2 DIABETES MELLITUS WITH OTHER DIABETIC KIDNEY COMPLICATION, WITH LONG-TERM CURRENT USE OF INSULIN (H): Primary | ICD-10-CM

## 2019-01-01 DIAGNOSIS — G89.4 CHRONIC PAIN DISORDER: ICD-10-CM

## 2019-01-01 DIAGNOSIS — S82.002D CLOSED NONDISPLACED FRACTURE OF LEFT PATELLA WITH ROUTINE HEALING, UNSPECIFIED FRACTURE MORPHOLOGY, SUBSEQUENT ENCOUNTER: Primary | ICD-10-CM

## 2019-01-01 DIAGNOSIS — E11.9 TYPE 2 DIABETES MELLITUS WITHOUT COMPLICATION, WITH LONG-TERM CURRENT USE OF INSULIN (H): ICD-10-CM

## 2019-01-01 DIAGNOSIS — I10 ESSENTIAL HYPERTENSION, BENIGN: ICD-10-CM

## 2019-01-01 DIAGNOSIS — D64.9 ANEMIA, UNSPECIFIED TYPE: ICD-10-CM

## 2019-01-01 DIAGNOSIS — K52.9 NON-SPECIFIC COLITIS: ICD-10-CM

## 2019-01-01 DIAGNOSIS — Z79.82 ENCOUNTER FOR LONG-TERM (CURRENT) USE OF ASPIRIN: ICD-10-CM

## 2019-01-01 DIAGNOSIS — Z87.891 PERSONAL HISTORY OF NICOTINE DEPENDENCE: ICD-10-CM

## 2019-01-01 DIAGNOSIS — M06.9 RHEUMATOID ARTHRITIS, INVOLVING UNSPECIFIED SITE, UNSPECIFIED RHEUMATOID FACTOR PRESENCE: ICD-10-CM

## 2019-01-01 DIAGNOSIS — E28.39 ESTROGEN DEFICIENCY: ICD-10-CM

## 2019-01-01 DIAGNOSIS — K29.70 GASTRITIS, PRESENCE OF BLEEDING UNSPECIFIED, UNSPECIFIED CHRONICITY, UNSPECIFIED GASTRITIS TYPE: Primary | ICD-10-CM

## 2019-01-01 DIAGNOSIS — M25.562 ACUTE PAIN OF LEFT KNEE: Primary | ICD-10-CM

## 2019-01-01 DIAGNOSIS — Z00.00 ROUTINE GENERAL MEDICAL EXAMINATION AT A HEALTH CARE FACILITY: Primary | ICD-10-CM

## 2019-01-01 DIAGNOSIS — R19.7 DIARRHEA OF PRESUMED INFECTIOUS ORIGIN: ICD-10-CM

## 2019-01-01 DIAGNOSIS — G62.9 NEUROPATHY: ICD-10-CM

## 2019-01-01 DIAGNOSIS — I10 ESSENTIAL (PRIMARY) HYPERTENSION: ICD-10-CM

## 2019-01-01 DIAGNOSIS — K29.70 GASTRITIS, PRESENCE OF BLEEDING UNSPECIFIED, UNSPECIFIED CHRONICITY, UNSPECIFIED GASTRITIS TYPE: ICD-10-CM

## 2019-01-01 DIAGNOSIS — M25.562 ACUTE PAIN OF LEFT KNEE: ICD-10-CM

## 2019-01-01 DIAGNOSIS — Z79.4 TYPE 2 DIABETES MELLITUS WITH OTHER DIABETIC KIDNEY COMPLICATION, WITH LONG-TERM CURRENT USE OF INSULIN (H): Primary | ICD-10-CM

## 2019-01-01 DIAGNOSIS — Z79.899 OTHER LONG TERM (CURRENT) DRUG THERAPY: ICD-10-CM

## 2019-01-01 DIAGNOSIS — E78.5 HYPERLIPIDEMIA, UNSPECIFIED HYPERLIPIDEMIA TYPE: ICD-10-CM

## 2019-01-01 DIAGNOSIS — J18.9 PNEUMONIA OF RIGHT LOWER LOBE DUE TO INFECTIOUS ORGANISM: ICD-10-CM

## 2019-01-01 DIAGNOSIS — I10 HYPERTENSION: ICD-10-CM

## 2019-01-01 DIAGNOSIS — Z02.89 PAIN MEDICATION AGREEMENT: ICD-10-CM

## 2019-01-01 DIAGNOSIS — D72.829 LEUKOCYTOSIS, UNSPECIFIED TYPE: ICD-10-CM

## 2019-01-01 DIAGNOSIS — S82.002D CLOSED NONDISPLACED FRACTURE OF LEFT PATELLA WITH ROUTINE HEALING, UNSPECIFIED FRACTURE MORPHOLOGY, SUBSEQUENT ENCOUNTER: ICD-10-CM

## 2019-01-01 DIAGNOSIS — Z12.31 VISIT FOR SCREENING MAMMOGRAM: ICD-10-CM

## 2019-01-01 DIAGNOSIS — Z87.891 PERSONAL HISTORY OF TOBACCO USE, PRESENTING HAZARDS TO HEALTH: ICD-10-CM

## 2019-01-01 DIAGNOSIS — Z79.4 TYPE 2 DIABETES MELLITUS WITH OTHER DIABETIC KIDNEY COMPLICATION, WITH LONG-TERM CURRENT USE OF INSULIN (H): ICD-10-CM

## 2019-01-01 DIAGNOSIS — J18.9 PNEUMONIA OF RIGHT LOWER LOBE DUE TO INFECTIOUS ORGANISM: Primary | ICD-10-CM

## 2019-01-01 DIAGNOSIS — Z79.4 TYPE 2 DIABETES MELLITUS WITHOUT COMPLICATION, WITH LONG-TERM CURRENT USE OF INSULIN (H): ICD-10-CM

## 2019-01-01 DIAGNOSIS — Z79.899 ENCOUNTER FOR LONG-TERM (CURRENT) USE OF OTHER MEDICATIONS: ICD-10-CM

## 2019-01-01 DIAGNOSIS — J18.9 PNEUMONIA DUE TO INFECTIOUS ORGANISM, UNSPECIFIED LATERALITY, UNSPECIFIED PART OF LUNG: Primary | ICD-10-CM

## 2019-01-01 DIAGNOSIS — I10 ESSENTIAL HYPERTENSION: ICD-10-CM

## 2019-01-01 DIAGNOSIS — K92.2 GASTROINTESTINAL HEMORRHAGE, UNSPECIFIED GASTROINTESTINAL HEMORRHAGE TYPE: ICD-10-CM

## 2019-01-01 DIAGNOSIS — J18.9 PNEUMONIA, UNSPECIFIED ORGANISM: ICD-10-CM

## 2019-01-01 DIAGNOSIS — M06.9 RHEUMATOID ARTHRITIS, INVOLVING UNSPECIFIED SITE, UNSPECIFIED RHEUMATOID FACTOR PRESENCE: Primary | ICD-10-CM

## 2019-01-01 DIAGNOSIS — E11.9 TYPE 2 DIABETES MELLITUS WITHOUT COMPLICATION (H): ICD-10-CM

## 2019-01-01 DIAGNOSIS — D64.9 ANEMIA, UNSPECIFIED TYPE: Primary | ICD-10-CM

## 2019-01-01 DIAGNOSIS — F19.20 CORTICOSTEROID DEPENDENCE (H): ICD-10-CM

## 2019-01-01 DIAGNOSIS — E11.29 TYPE 2 DIABETES MELLITUS WITH OTHER DIABETIC KIDNEY COMPLICATION, WITH LONG-TERM CURRENT USE OF INSULIN (H): ICD-10-CM

## 2019-01-01 DIAGNOSIS — E11.9 DIABETES MELLITUS, TYPE II (H): ICD-10-CM

## 2019-01-01 LAB
ABO + RH BLD: NORMAL
ALBUMIN SERPL-MCNC: 3.2 G/DL (ref 3.5–5.7)
ALBUMIN SERPL-MCNC: 3.4 G/DL (ref 3.5–5.7)
ALBUMIN SERPL-MCNC: 4.2 G/DL (ref 3.5–5.7)
ALBUMIN UR-MCNC: NEGATIVE MG/DL
ALP SERPL-CCNC: 125 U/L (ref 34–104)
ALP SERPL-CCNC: 59 U/L (ref 34–104)
ALP SERPL-CCNC: 83 U/L (ref 34–104)
ALT SERPL W P-5'-P-CCNC: 13 U/L (ref 7–52)
ALT SERPL W P-5'-P-CCNC: 16 U/L (ref 7–52)
ALT SERPL W P-5'-P-CCNC: 30 U/L (ref 7–52)
ALT SERPL-CCNC: 18 IU/L (ref 6–31)
ANION GAP SERPL CALCULATED.3IONS-SCNC: 12 MMOL/L (ref 3–14)
ANION GAP SERPL CALCULATED.3IONS-SCNC: 12 MMOL/L (ref 3–14)
ANION GAP SERPL CALCULATED.3IONS-SCNC: 13 MMOL/L (ref 3–14)
ANION GAP SERPL CALCULATED.3IONS-SCNC: 14 MMOL/L (ref 3–14)
ANION GAP SERPL CALCULATED.3IONS-SCNC: 17 MMOL/L (ref 3–14)
ANION GAP SERPL CALCULATED.3IONS-SCNC: 8 MMOL/L (ref 3–14)
APPEARANCE FLD: NORMAL
APPEARANCE UR: CLEAR
APTT PPP: 25 SEC (ref 22–37)
AST SERPL W P-5'-P-CCNC: 14 U/L (ref 13–39)
AST SERPL W P-5'-P-CCNC: 21 U/L (ref 13–39)
AST SERPL W P-5'-P-CCNC: 21 U/L (ref 13–39)
AST SERPL-CCNC: 25 IU/L (ref 10–40)
BACTERIA SPEC CULT: ABNORMAL
BACTERIA SPEC CULT: NO GROWTH
BACTERIA SPEC CULT: NORMAL
BASOPHILS # BLD AUTO: 0.1 10E9/L (ref 0–0.2)
BASOPHILS # BLD AUTO: 0.1 10E9/L (ref 0–0.2)
BASOPHILS NFR BLD AUTO: 0.3 %
BASOPHILS NFR BLD AUTO: 0.5 %
BILIRUB SERPL-MCNC: 0.3 MG/DL (ref 0.3–1)
BILIRUB SERPL-MCNC: 0.3 MG/DL (ref 0.3–1)
BILIRUB SERPL-MCNC: 0.4 MG/DL (ref 0.3–1)
BILIRUB UR QL STRIP: NEGATIVE
BLD GP AB SCN SERPL QL: NORMAL
BLD PROD TYP BPU: NORMAL
BLD UNIT ID BPU: 0
BLOOD BANK CMNT PATIENT-IMP: NORMAL
BLOOD PRODUCT CODE: NORMAL
BPU ID: NORMAL
BUN SERPL-MCNC: 21 MG/DL (ref 7–25)
BUN SERPL-MCNC: 37 MG/DL (ref 7–25)
BUN SERPL-MCNC: 46 MG/DL (ref 7–25)
BUN SERPL-MCNC: 48 MG/DL (ref 7–25)
BUN SERPL-MCNC: 51 MG/DL (ref 7–25)
BUN SERPL-MCNC: 55 MG/DL (ref 7–25)
BUN SERPL-MCNC: 56 MG/DL (ref 7–25)
BUN SERPL-MCNC: 62 MG/DL (ref 7–25)
C DIFF TOX B STL QL: NEGATIVE
CALCIUM SERPL-MCNC: 10 MG/DL (ref 8.6–10.3)
CALCIUM SERPL-MCNC: 7.5 MG/DL (ref 8.6–10.3)
CALCIUM SERPL-MCNC: 7.7 MG/DL (ref 8.6–10.3)
CALCIUM SERPL-MCNC: 7.9 MG/DL (ref 8.6–10.3)
CALCIUM SERPL-MCNC: 8.2 MG/DL (ref 8.6–10.3)
CALCIUM SERPL-MCNC: 8.3 MG/DL (ref 8.6–10.3)
CALCIUM SERPL-MCNC: 8.4 MG/DL (ref 8.6–10.3)
CALCIUM SERPL-MCNC: 8.9 MG/DL (ref 8.6–10.3)
CHLORIDE SERPL-SCNC: 100 MMOL/L (ref 98–107)
CHLORIDE SERPL-SCNC: 101 MMOL/L (ref 98–107)
CHLORIDE SERPL-SCNC: 103 MMOL/L (ref 98–107)
CHLORIDE SERPL-SCNC: 107 MMOL/L (ref 98–107)
CHLORIDE SERPL-SCNC: 112 MMOL/L (ref 98–107)
CHLORIDE SERPL-SCNC: 99 MMOL/L (ref 98–107)
CHOLEST SERPL-MCNC: 174 MG/DL
CO2 SERPL-SCNC: 20 MMOL/L (ref 21–31)
CO2 SERPL-SCNC: 21 MMOL/L (ref 21–31)
CO2 SERPL-SCNC: 22 MMOL/L (ref 21–31)
CO2 SERPL-SCNC: 22 MMOL/L (ref 21–31)
CO2 SERPL-SCNC: 24 MMOL/L (ref 21–31)
CO2 SERPL-SCNC: 25 MMOL/L (ref 21–31)
CO2 SERPL-SCNC: 25 MMOL/L (ref 21–31)
CO2 SERPL-SCNC: 27 MMOL/L (ref 21–31)
COLOR FLD: NORMAL
COLOR UR AUTO: YELLOW
CREAT SERPL-MCNC: 0.53 MG/DL (ref 0.6–1.2)
CREAT SERPL-MCNC: 0.63 MG/DL (ref 0.6–1.2)
CREAT SERPL-MCNC: 0.71 MG/DL (ref 0.6–1.2)
CREAT SERPL-MCNC: 0.78 MG/DL (ref 0.6–1.2)
CREAT SERPL-MCNC: 0.84 MG/DL (ref 0.6–1.2)
CREAT SERPL-MCNC: 0.87 MG/DL (ref 0.4–1)
CREAT SERPL-MCNC: 0.88 MG/DL (ref 0.6–1.2)
CREAT SERPL-MCNC: 0.89 MG/DL (ref 0.6–1.2)
CREAT SERPL-MCNC: 1 MG/DL (ref 0.6–1.2)
CRP SERPL-MCNC: 0.6 MG/L
DIFFERENTIAL METHOD BLD: ABNORMAL
DIFFERENTIAL METHOD BLD: ABNORMAL
EOSINOPHIL # BLD AUTO: 0 10E9/L (ref 0–0.7)
EOSINOPHIL # BLD AUTO: 0.1 10E9/L (ref 0–0.7)
EOSINOPHIL NFR BLD AUTO: 0.2 %
EOSINOPHIL NFR BLD AUTO: 0.5 %
ERYTHROCYTE [DISTWIDTH] IN BLOOD BY AUTOMATED COUNT: 13.5 % (ref 10–15)
ERYTHROCYTE [DISTWIDTH] IN BLOOD BY AUTOMATED COUNT: 13.6 % (ref 10–15)
ERYTHROCYTE [DISTWIDTH] IN BLOOD BY AUTOMATED COUNT: 15 % (ref 10–15)
ERYTHROCYTE [DISTWIDTH] IN BLOOD BY AUTOMATED COUNT: 18.5 % (ref 10–15)
ERYTHROCYTE [DISTWIDTH] IN BLOOD BY AUTOMATED COUNT: 18.6 % (ref 10–15)
ERYTHROCYTE [DISTWIDTH] IN BLOOD BY AUTOMATED COUNT: 18.8 % (ref 10–15)
ERYTHROCYTE [DISTWIDTH] IN BLOOD BY AUTOMATED COUNT: 19.5 % (ref 10–15)
ERYTHROCYTE [DISTWIDTH] IN BLOOD BY AUTOMATED COUNT: 20.4 % (ref 10–15)
ERYTHROCYTE [DISTWIDTH] IN BLOOD BY AUTOMATED COUNT: 20.5 % (ref 10–15)
ERYTHROCYTE [DISTWIDTH] IN BLOOD BY AUTOMATED COUNT: 20.7 % (ref 10–15)
ERYTHROCYTE [DISTWIDTH] IN BLOOD BY AUTOMATED COUNT: 20.8 % (ref 10–15)
ERYTHROCYTE [DISTWIDTH] IN BLOOD BY AUTOMATED COUNT: 22.1 % (ref 10–15)
ERYTHROCYTE [DISTWIDTH] IN BLOOD BY AUTOMATED COUNT: 22.5 % (ref 10–15)
ERYTHROCYTE [DISTWIDTH] IN BLOOD BY AUTOMATED COUNT: 23.1 % (ref 10–15)
ERYTHROCYTE [DISTWIDTH] IN BLOOD BY AUTOMATED COUNT: 23.6 % (ref 10–15)
ERYTHROCYTE [SEDIMENTATION RATE] IN BLOOD BY WESTERGREN METHOD: 10 MM/H (ref 1–15)
GFR SERPL CREATININE-BSD FRML MDRD: 54 ML/MIN/{1.73_M2}
GFR SERPL CREATININE-BSD FRML MDRD: 62 ML/MIN/{1.73_M2}
GFR SERPL CREATININE-BSD FRML MDRD: 63 ML/MIN/{1.73_M2}
GFR SERPL CREATININE-BSD FRML MDRD: 66 ML/MIN/{1.73_M2}
GFR SERPL CREATININE-BSD FRML MDRD: 72 ML/MIN/{1.73_M2}
GFR SERPL CREATININE-BSD FRML MDRD: 81 ML/MIN/{1.73_M2}
GFR SERPL CREATININE-BSD FRML MDRD: >90 ML/MIN/{1.73_M2}
GFR SERPL CREATININE-BSD FRML MDRD: >90 ML/MIN/{1.73_M2}
GLUCOSE FLD-MCNC: 207 MG/DL
GLUCOSE SERPL-MCNC: 142 MG/DL (ref 70–105)
GLUCOSE SERPL-MCNC: 144 MG/DL (ref 70–105)
GLUCOSE SERPL-MCNC: 157 MG/DL (ref 70–105)
GLUCOSE SERPL-MCNC: 166 MG/DL (ref 70–105)
GLUCOSE SERPL-MCNC: 175 MG/DL (ref 70–105)
GLUCOSE SERPL-MCNC: 179 MG/DL (ref 70–105)
GLUCOSE SERPL-MCNC: 210 MG/DL (ref 70–99)
GLUCOSE SERPL-MCNC: 233 MG/DL (ref 70–105)
GLUCOSE SERPL-MCNC: 386 MG/DL (ref 70–105)
GLUCOSE UR STRIP-MCNC: NEGATIVE MG/DL
GRAM STN SPEC: ABNORMAL
GRAM STN SPEC: NORMAL
H PYLORI AG STL QL IA: NORMAL
HBA1C MFR BLD: 6.1 % (ref 4–6)
HBA1C MFR BLD: 7 % (ref 4–6)
HCT VFR BLD AUTO: 17.4 % (ref 35–47)
HCT VFR BLD AUTO: 21.6 % (ref 35–47)
HCT VFR BLD AUTO: 22.1 % (ref 35–47)
HCT VFR BLD AUTO: 22.2 % (ref 35–47)
HCT VFR BLD AUTO: 23.7 % (ref 35–47)
HCT VFR BLD AUTO: 25.8 % (ref 35–47)
HCT VFR BLD AUTO: 27 % (ref 35–47)
HCT VFR BLD AUTO: 27.2 % (ref 35–47)
HCT VFR BLD AUTO: 27.3 % (ref 35–47)
HCT VFR BLD AUTO: 32.3 % (ref 35–47)
HCT VFR BLD AUTO: 33.8 % (ref 35–47)
HCT VFR BLD AUTO: 33.9 % (ref 35–47)
HCT VFR BLD AUTO: 35.2 % (ref 35–47)
HCT VFR BLD AUTO: 42.8 % (ref 35–47)
HCT VFR BLD AUTO: 44.7 % (ref 35–47)
HDLC SERPL-MCNC: 60 MG/DL (ref 23–92)
HEMOCCULT STL QL: POSITIVE
HGB BLD-MCNC: 10.1 G/DL (ref 11.7–15.7)
HGB BLD-MCNC: 10.1 G/DL (ref 11.7–15.7)
HGB BLD-MCNC: 10.7 G/DL (ref 11.7–15.7)
HGB BLD-MCNC: 11 G/DL (ref 11.7–15.7)
HGB BLD-MCNC: 11.5 G/DL (ref 11.7–15.7)
HGB BLD-MCNC: 14.1 G/DL (ref 11.7–15.7)
HGB BLD-MCNC: 15 G/DL (ref 11.7–15.7)
HGB BLD-MCNC: 5.4 G/DL (ref 11.7–15.7)
HGB BLD-MCNC: 6.5 G/DL (ref 11.7–15.7)
HGB BLD-MCNC: 6.7 G/DL (ref 11.7–15.7)
HGB BLD-MCNC: 6.8 G/DL (ref 11.7–15.7)
HGB BLD-MCNC: 7.1 G/DL (ref 11.7–15.7)
HGB BLD-MCNC: 7.3 G/DL (ref 11.7–15.7)
HGB BLD-MCNC: 7.4 G/DL (ref 11.7–15.7)
HGB BLD-MCNC: 7.4 G/DL (ref 11.7–15.7)
HGB BLD-MCNC: 7.6 G/DL (ref 11.7–15.7)
HGB BLD-MCNC: 7.6 G/DL (ref 11.7–15.7)
HGB BLD-MCNC: 8.3 G/DL (ref 11.7–15.7)
HGB BLD-MCNC: 8.4 G/DL (ref 11.7–15.7)
HGB BLD-MCNC: 8.6 G/DL (ref 11.7–15.7)
HGB BLD-MCNC: 8.7 G/DL (ref 11.7–15.7)
HGB BLD-MCNC: 8.7 G/DL (ref 11.7–15.7)
HGB BLD-MCNC: 8.9 G/DL (ref 11.7–15.7)
HGB BLD-MCNC: 9 G/DL (ref 11.7–15.7)
HGB BLD-MCNC: 9.6 G/DL (ref 11.7–15.7)
HGB UR QL STRIP: NEGATIVE
IMM GRANULOCYTES # BLD: 0.4 10E9/L (ref 0–0.4)
IMM GRANULOCYTES # BLD: 0.9 10E9/L (ref 0–0.4)
IMM GRANULOCYTES NFR BLD: 2.2 %
IMM GRANULOCYTES NFR BLD: 4.2 %
INR PPP: 1.07 (ref 0–1.3)
INR PPP: 1.3 (ref 0.9–1.1)
IRON SATN MFR SERPL: 20 % (ref 20–55)
IRON SATN MFR SERPL: 28 % (ref 20–55)
IRON SERPL-MCNC: 73 UG/DL (ref 50–212)
IRON SERPL-MCNC: 94 UG/DL (ref 50–212)
KETONES UR STRIP-MCNC: NEGATIVE MG/DL
L PNEUMO1 AG UR QL IA: NORMAL
LAB SCANNED RESULT: NORMAL
LDH FLD L TO P-CCNC: 476 U/L
LDH SERPL L TO P-CCNC: 540 U/L (ref 140–271)
LDLC SERPL CALC-MCNC: 74 MG/DL
LEUKOCYTE ESTERASE UR QL STRIP: NEGATIVE
LYMPHOCYTES # BLD AUTO: 0.8 10E9/L (ref 0.8–5.3)
LYMPHOCYTES # BLD AUTO: 1.1 10E9/L (ref 0.8–5.3)
LYMPHOCYTES NFR BLD AUTO: 4.8 %
LYMPHOCYTES NFR BLD AUTO: 5.1 %
MAGNESIUM SERPL-MCNC: 1.7 MG/DL (ref 1.9–2.7)
MAGNESIUM SERPL-MCNC: 1.8 MG/DL (ref 1.9–2.7)
MAGNESIUM SERPL-MCNC: 2 MG/DL (ref 1.9–2.7)
MAGNESIUM SERPL-MCNC: 2.1 MG/DL (ref 1.9–2.7)
MAGNESIUM SERPL-MCNC: 2.2 MG/DL (ref 1.9–2.7)
MCH RBC QN AUTO: 28.8 PG (ref 26.5–33)
MCH RBC QN AUTO: 29 PG (ref 26.5–33)
MCH RBC QN AUTO: 29.1 PG (ref 26.5–33)
MCH RBC QN AUTO: 29.2 PG (ref 26.5–33)
MCH RBC QN AUTO: 29.4 PG (ref 26.5–33)
MCH RBC QN AUTO: 29.6 PG (ref 26.5–33)
MCH RBC QN AUTO: 30.3 PG (ref 26.5–33)
MCH RBC QN AUTO: 30.3 PG (ref 26.5–33)
MCH RBC QN AUTO: 30.5 PG (ref 26.5–33)
MCH RBC QN AUTO: 30.6 PG (ref 26.5–33)
MCH RBC QN AUTO: 30.7 PG (ref 26.5–33)
MCH RBC QN AUTO: 31.2 PG (ref 26.5–33)
MCH RBC QN AUTO: 31.3 PG (ref 26.5–33)
MCH RBC QN AUTO: 31.4 PG (ref 26.5–33)
MCH RBC QN AUTO: 31.5 PG (ref 26.5–33)
MCHC RBC AUTO-ENTMCNC: 30.2 G/DL (ref 31.5–36.5)
MCHC RBC AUTO-ENTMCNC: 30.8 G/DL (ref 31.5–36.5)
MCHC RBC AUTO-ENTMCNC: 31 G/DL (ref 31.5–36.5)
MCHC RBC AUTO-ENTMCNC: 31.3 G/DL (ref 31.5–36.5)
MCHC RBC AUTO-ENTMCNC: 31.5 G/DL (ref 31.5–36.5)
MCHC RBC AUTO-ENTMCNC: 31.7 G/DL (ref 31.5–36.5)
MCHC RBC AUTO-ENTMCNC: 32.1 G/DL (ref 31.5–36.5)
MCHC RBC AUTO-ENTMCNC: 32.2 G/DL (ref 31.5–36.5)
MCHC RBC AUTO-ENTMCNC: 32.4 G/DL (ref 31.5–36.5)
MCHC RBC AUTO-ENTMCNC: 32.6 G/DL (ref 31.5–36.5)
MCHC RBC AUTO-ENTMCNC: 32.6 G/DL (ref 31.5–36.5)
MCHC RBC AUTO-ENTMCNC: 32.7 G/DL (ref 31.5–36.5)
MCHC RBC AUTO-ENTMCNC: 32.9 G/DL (ref 31.5–36.5)
MCHC RBC AUTO-ENTMCNC: 33.1 G/DL (ref 31.5–36.5)
MCHC RBC AUTO-ENTMCNC: 33.6 G/DL (ref 31.5–36.5)
MCV RBC AUTO: 100 FL (ref 78–100)
MCV RBC AUTO: 101 FL (ref 78–100)
MCV RBC AUTO: 90 FL (ref 78–100)
MCV RBC AUTO: 90 FL (ref 78–100)
MCV RBC AUTO: 91 FL (ref 78–100)
MCV RBC AUTO: 92 FL (ref 78–100)
MCV RBC AUTO: 93 FL (ref 78–100)
MCV RBC AUTO: 93 FL (ref 78–100)
MCV RBC AUTO: 94 FL (ref 78–100)
MCV RBC AUTO: 95 FL (ref 78–100)
MCV RBC AUTO: 96 FL (ref 78–100)
MCV RBC AUTO: 98 FL (ref 78–100)
MCV RBC AUTO: 98 FL (ref 78–100)
MONOCYTES # BLD AUTO: 1.8 10E9/L (ref 0–1.3)
MONOCYTES # BLD AUTO: 1.8 10E9/L (ref 0–1.3)
MONOCYTES NFR BLD AUTO: 10.3 %
MONOCYTES NFR BLD AUTO: 8.1 %
MONOS+MACROS NFR FLD MANUAL: 78 %
NEUTROPHILS # BLD AUTO: 14.2 10E9/L (ref 1.6–8.3)
NEUTROPHILS # BLD AUTO: 18 10E9/L (ref 1.6–8.3)
NEUTROPHILS NFR BLD AUTO: 81.7 %
NEUTROPHILS NFR BLD AUTO: 82.1 %
NEUTS BAND NFR FLD MANUAL: 22 %
NITRATE UR QL: NEGATIVE
NONHDLC SERPL-MCNC: 114 MG/DL
NT-PROBNP SERPL-MCNC: 150 PG/ML (ref 0–100)
NT-PROBNP SERPL-MCNC: 70 PG/ML (ref 0–100)
NUM BPU REQUESTED: 2
NUM BPU REQUESTED: 4
NUM BPU REQUESTED: 4
PAIN DRUG SCR UR W RPTD MEDS: NORMAL
PH UR STRIP: 6.5 PH (ref 5–9)
PLATELET # BLD AUTO: 120 10E9/L (ref 150–450)
PLATELET # BLD AUTO: 126 10E9/L (ref 150–450)
PLATELET # BLD AUTO: 145 10E9/L (ref 150–450)
PLATELET # BLD AUTO: 172 10E9/L (ref 150–450)
PLATELET # BLD AUTO: 184 10E9/L (ref 150–450)
PLATELET # BLD AUTO: 187 10E9/L (ref 150–450)
PLATELET # BLD AUTO: 221 10E9/L (ref 150–450)
PLATELET # BLD AUTO: 252 10E9/L (ref 150–450)
PLATELET # BLD AUTO: 266 10E9/L (ref 150–450)
PLATELET # BLD AUTO: 289 10E9/L (ref 150–450)
PLATELET # BLD AUTO: 372 10E9/L (ref 150–450)
PLATELET # BLD AUTO: 381 10E9/L (ref 150–450)
PLATELET # BLD AUTO: 411 10E9/L (ref 150–450)
PLATELET # BLD AUTO: 417 10E9/L (ref 150–450)
PLATELET # BLD AUTO: 469 10E9/L (ref 150–450)
POTASSIUM SERPL-SCNC: 3.6 MMOL/L (ref 3.5–5.1)
POTASSIUM SERPL-SCNC: 3.7 MMOL/L (ref 3.5–5.1)
POTASSIUM SERPL-SCNC: 4 MMOL/L (ref 3.5–5.1)
POTASSIUM SERPL-SCNC: 4.1 MEQ/L (ref 3.4–5.1)
POTASSIUM SERPL-SCNC: 4.1 MMOL/L (ref 3.5–5.1)
POTASSIUM SERPL-SCNC: 4.2 MMOL/L (ref 3.5–5.1)
POTASSIUM SERPL-SCNC: 4.2 MMOL/L (ref 3.5–5.1)
POTASSIUM SERPL-SCNC: 4.4 MMOL/L (ref 3.5–5.1)
POTASSIUM SERPL-SCNC: 4.5 MMOL/L (ref 3.5–5.1)
PROT FLD-MCNC: 3.1 G/DL
PROT SERPL-MCNC: 4.9 G/DL (ref 6.4–8.9)
PROT SERPL-MCNC: 5.4 G/DL (ref 6.4–8.9)
PROT SERPL-MCNC: 5.7 G/DL (ref 6.4–8.9)
PROT SERPL-MCNC: 7.1 G/DL (ref 6.4–8.9)
RBC # BLD AUTO: 1.72 10E12/L (ref 3.8–5.2)
RBC # BLD AUTO: 2.16 10E12/L (ref 3.8–5.2)
RBC # BLD AUTO: 2.26 10E12/L (ref 3.8–5.2)
RBC # BLD AUTO: 2.41 10E12/L (ref 3.8–5.2)
RBC # BLD AUTO: 2.43 10E12/L (ref 3.8–5.2)
RBC # BLD AUTO: 2.84 10E12/L (ref 3.8–5.2)
RBC # BLD AUTO: 2.85 10E12/L (ref 3.8–5.2)
RBC # BLD AUTO: 2.86 10E12/L (ref 3.8–5.2)
RBC # BLD AUTO: 2.88 10E12/L (ref 3.8–5.2)
RBC # BLD AUTO: 3.51 10E12/L (ref 3.8–5.2)
RBC # BLD AUTO: 3.69 10E12/L (ref 3.8–5.2)
RBC # BLD AUTO: 3.78 10E12/L (ref 3.8–5.2)
RBC # BLD AUTO: 3.79 10E12/L (ref 3.8–5.2)
RBC # BLD AUTO: 4.62 10E12/L (ref 3.8–5.2)
RBC # BLD AUTO: 4.88 10E12/L (ref 3.8–5.2)
RBC # FLD: NORMAL /UL
RETICS # AUTO: 180 10E9/L (ref 25–95)
RETICS/RBC NFR AUTO: 4.8 % (ref 0.5–2)
S PNEUM AG SPEC QL: NORMAL
S PNEUM AG SPEC QL: NORMAL
SODIUM SERPL-SCNC: 133 MMOL/L (ref 134–144)
SODIUM SERPL-SCNC: 136 MMOL/L (ref 134–144)
SODIUM SERPL-SCNC: 137 MMOL/L (ref 134–144)
SODIUM SERPL-SCNC: 137 MMOL/L (ref 134–144)
SODIUM SERPL-SCNC: 138 MMOL/L (ref 134–144)
SODIUM SERPL-SCNC: 138 MMOL/L (ref 134–144)
SODIUM SERPL-SCNC: 140 MMOL/L (ref 134–144)
SODIUM SERPL-SCNC: 141 MMOL/L (ref 134–144)
SOURCE: NORMAL
SP GR UR STRIP: 1.01 (ref 1–1.03)
SPECIMEN EXP DATE BLD: NORMAL
SPECIMEN SOURCE FLD: NORMAL
SPECIMEN SOURCE: ABNORMAL
SPECIMEN SOURCE: ABNORMAL
SPECIMEN SOURCE: NORMAL
TIBC SERPL-MCNC: 340.2 UG/DL (ref 245–400)
TIBC SERPL-MCNC: 371 UG/DL (ref 245–400)
TRANSFUSION STATUS PATIENT QL: NORMAL
TRIGL SERPL-MCNC: 199 MG/DL
TROPONIN I SERPL-MCNC: 33 PG/ML
TROPONIN I SERPL-MCNC: <0.03 UG/L (ref 0–0.03)
TROPONIN I SERPL-MCNC: <0.03 UG/L (ref 0–0.03)
TSH SERPL DL<=0.05 MIU/L-ACNC: 1.26 IU/ML (ref 0.34–5.6)
UIBC (UNSATURATED): 246.2 MG/DL
UIBC (UNSATURATED): 298 MG/DL
UROBILINOGEN UR STRIP-ACNC: 0.2 EU/DL (ref 0.2–1)
WBC # BLD AUTO: 12.1 10E9/L (ref 4–11)
WBC # BLD AUTO: 13 10E9/L (ref 4–11)
WBC # BLD AUTO: 14.2 10E9/L (ref 4–11)
WBC # BLD AUTO: 14.4 10E9/L (ref 4–11)
WBC # BLD AUTO: 15.7 10E9/L (ref 4–11)
WBC # BLD AUTO: 15.7 10E9/L (ref 4–11)
WBC # BLD AUTO: 16.5 10E9/L (ref 4–11)
WBC # BLD AUTO: 17.3 10E9/L (ref 4–11)
WBC # BLD AUTO: 17.4 10E9/L (ref 4–11)
WBC # BLD AUTO: 17.8 10E9/L (ref 4–11)
WBC # BLD AUTO: 18.6 10E9/L (ref 4–11)
WBC # BLD AUTO: 18.8 10E9/L (ref 4–11)
WBC # BLD AUTO: 19.1 10E9/L (ref 4–11)
WBC # BLD AUTO: 21.8 10E9/L (ref 4–11)
WBC # BLD AUTO: 21.9 10E9/L (ref 4–11)
WBC # FLD AUTO: 874 /UL

## 2019-01-01 PROCEDURE — 25000132 ZZH RX MED GY IP 250 OP 250 PS 637: Performed by: FAMILY MEDICINE

## 2019-01-01 PROCEDURE — 25000128 H RX IP 250 OP 636: Performed by: INTERNAL MEDICINE

## 2019-01-01 PROCEDURE — 25000132 ZZH RX MED GY IP 250 OP 250 PS 637: Performed by: INTERNAL MEDICINE

## 2019-01-01 PROCEDURE — C9113 INJ PANTOPRAZOLE SODIUM, VIA: HCPCS | Performed by: FAMILY MEDICINE

## 2019-01-01 PROCEDURE — 85027 COMPLETE CBC AUTOMATED: CPT | Performed by: INTERNAL MEDICINE

## 2019-01-01 PROCEDURE — 89051 BODY FLUID CELL COUNT: CPT | Performed by: INTERNAL MEDICINE

## 2019-01-01 PROCEDURE — P9016 RBC LEUKOCYTES REDUCED: HCPCS | Performed by: EMERGENCY MEDICINE

## 2019-01-01 PROCEDURE — 25000125 ZZHC RX 250: Performed by: INTERNAL MEDICINE

## 2019-01-01 PROCEDURE — 83735 ASSAY OF MAGNESIUM: CPT | Performed by: EMERGENCY MEDICINE

## 2019-01-01 PROCEDURE — 25000128 H RX IP 250 OP 636: Performed by: FAMILY MEDICINE

## 2019-01-01 PROCEDURE — 25000125 ZZHC RX 250: Performed by: SURGERY

## 2019-01-01 PROCEDURE — 87070 CULTURE OTHR SPECIMN AEROBIC: CPT | Performed by: INTERNAL MEDICINE

## 2019-01-01 PROCEDURE — 99232 SBSQ HOSP IP/OBS MODERATE 35: CPT | Performed by: FAMILY MEDICINE

## 2019-01-01 PROCEDURE — C9113 INJ PANTOPRAZOLE SODIUM, VIA: HCPCS | Performed by: INTERNAL MEDICINE

## 2019-01-01 PROCEDURE — 87205 SMEAR GRAM STAIN: CPT | Performed by: INTERNAL MEDICINE

## 2019-01-01 PROCEDURE — 90686 IIV4 VACC NO PRSV 0.5 ML IM: CPT

## 2019-01-01 PROCEDURE — 71045 X-RAY EXAM CHEST 1 VIEW: CPT

## 2019-01-01 PROCEDURE — 99213 OFFICE O/P EST LOW 20 MIN: CPT | Performed by: INTERNAL MEDICINE

## 2019-01-01 PROCEDURE — 25000128 H RX IP 250 OP 636: Performed by: NURSE ANESTHETIST, CERTIFIED REGISTERED

## 2019-01-01 PROCEDURE — 94640 AIRWAY INHALATION TREATMENT: CPT

## 2019-01-01 PROCEDURE — 99223 1ST HOSP IP/OBS HIGH 75: CPT | Mod: AI | Performed by: INTERNAL MEDICINE

## 2019-01-01 PROCEDURE — 80307 DRUG TEST PRSMV CHEM ANLYZR: CPT | Performed by: INTERNAL MEDICINE

## 2019-01-01 PROCEDURE — 12000000 ZZH R&B MED SURG/OB

## 2019-01-01 PROCEDURE — 25000131 ZZH RX MED GY IP 250 OP 636 PS 637: Performed by: INTERNAL MEDICINE

## 2019-01-01 PROCEDURE — 86140 C-REACTIVE PROTEIN: CPT | Performed by: INTERNAL MEDICINE

## 2019-01-01 PROCEDURE — 36415 COLL VENOUS BLD VENIPUNCTURE: CPT | Performed by: INTERNAL MEDICINE

## 2019-01-01 PROCEDURE — G0463 HOSPITAL OUTPT CLINIC VISIT: HCPCS

## 2019-01-01 PROCEDURE — 81003 URINALYSIS AUTO W/O SCOPE: CPT | Mod: XU | Performed by: EMERGENCY MEDICINE

## 2019-01-01 PROCEDURE — 99100 ANES PT EXTEME AGE<1 YR&>70: CPT | Performed by: NURSE ANESTHETIST, CERTIFIED REGISTERED

## 2019-01-01 PROCEDURE — 80053 COMPREHEN METABOLIC PANEL: CPT | Performed by: EMERGENCY MEDICINE

## 2019-01-01 PROCEDURE — 0DBH8ZX EXCISION OF CECUM, VIA NATURAL OR ARTIFICIAL OPENING ENDOSCOPIC, DIAGNOSTIC: ICD-10-PCS | Performed by: SURGERY

## 2019-01-01 PROCEDURE — 87899 AGENT NOS ASSAY W/OPTIC: CPT | Performed by: INTERNAL MEDICINE

## 2019-01-01 PROCEDURE — 80048 BASIC METABOLIC PNL TOTAL CA: CPT | Performed by: FAMILY MEDICINE

## 2019-01-01 PROCEDURE — 85027 COMPLETE CBC AUTOMATED: CPT | Mod: ZL | Performed by: INTERNAL MEDICINE

## 2019-01-01 PROCEDURE — 99239 HOSP IP/OBS DSCHRG MGMT >30: CPT | Performed by: FAMILY MEDICINE

## 2019-01-01 PROCEDURE — 43235 EGD DIAGNOSTIC BRUSH WASH: CPT | Mod: 51 | Performed by: SURGERY

## 2019-01-01 PROCEDURE — 85025 COMPLETE CBC W/AUTO DIFF WBC: CPT | Performed by: EMERGENCY MEDICINE

## 2019-01-01 PROCEDURE — 84484 ASSAY OF TROPONIN QUANT: CPT | Performed by: EMERGENCY MEDICINE

## 2019-01-01 PROCEDURE — 71046 X-RAY EXAM CHEST 2 VIEWS: CPT

## 2019-01-01 PROCEDURE — 87102 FUNGUS ISOLATION CULTURE: CPT | Performed by: INTERNAL MEDICINE

## 2019-01-01 PROCEDURE — 85018 HEMOGLOBIN: CPT | Performed by: INTERNAL MEDICINE

## 2019-01-01 PROCEDURE — G0008 ADMIN INFLUENZA VIRUS VAC: HCPCS

## 2019-01-01 PROCEDURE — 77080 DXA BONE DENSITY AXIAL: CPT

## 2019-01-01 PROCEDURE — 99285 EMERGENCY DEPT VISIT HI MDM: CPT | Mod: Z6 | Performed by: EMERGENCY MEDICINE

## 2019-01-01 PROCEDURE — 80061 LIPID PANEL: CPT | Performed by: INTERNAL MEDICINE

## 2019-01-01 PROCEDURE — 25500064 ZZH RX 255 OP 636: Performed by: INTERNAL MEDICINE

## 2019-01-01 PROCEDURE — 85730 THROMBOPLASTIN TIME PARTIAL: CPT | Performed by: EMERGENCY MEDICINE

## 2019-01-01 PROCEDURE — 86850 RBC ANTIBODY SCREEN: CPT | Performed by: EMERGENCY MEDICINE

## 2019-01-01 PROCEDURE — 71046 X-RAY EXAM CHEST 2 VIEWS: CPT | Mod: TC

## 2019-01-01 PROCEDURE — 80048 BASIC METABOLIC PNL TOTAL CA: CPT | Performed by: INTERNAL MEDICINE

## 2019-01-01 PROCEDURE — 25000132 ZZH RX MED GY IP 250 OP 250 PS 637: Performed by: EMERGENCY MEDICINE

## 2019-01-01 PROCEDURE — 85045 AUTOMATED RETICULOCYTE COUNT: CPT

## 2019-01-01 PROCEDURE — 73560 X-RAY EXAM OF KNEE 1 OR 2: CPT | Mod: LT

## 2019-01-01 PROCEDURE — 36415 COLL VENOUS BLD VENIPUNCTURE: CPT | Mod: ZL | Performed by: INTERNAL MEDICINE

## 2019-01-01 PROCEDURE — 74230 X-RAY XM SWLNG FUNCJ C+: CPT

## 2019-01-01 PROCEDURE — 86920 COMPATIBILITY TEST SPIN: CPT | Performed by: EMERGENCY MEDICINE

## 2019-01-01 PROCEDURE — 40000275 ZZH STATISTIC RCP TIME EA 10 MIN

## 2019-01-01 PROCEDURE — 87338 HPYLORI STOOL AG IA: CPT | Performed by: INTERNAL MEDICINE

## 2019-01-01 PROCEDURE — 87040 BLOOD CULTURE FOR BACTERIA: CPT | Performed by: INTERNAL MEDICINE

## 2019-01-01 PROCEDURE — 94640 AIRWAY INHALATION TREATMENT: CPT | Mod: 76

## 2019-01-01 PROCEDURE — G0463 HOSPITAL OUTPT CLINIC VISIT: HCPCS | Mod: 25

## 2019-01-01 PROCEDURE — 99214 OFFICE O/P EST MOD 30 MIN: CPT | Performed by: INTERNAL MEDICINE

## 2019-01-01 PROCEDURE — 85652 RBC SED RATE AUTOMATED: CPT | Performed by: INTERNAL MEDICINE

## 2019-01-01 PROCEDURE — 25000128 H RX IP 250 OP 636: Performed by: EMERGENCY MEDICINE

## 2019-01-01 PROCEDURE — 97530 THERAPEUTIC ACTIVITIES: CPT | Mod: GO | Performed by: OCCUPATIONAL THERAPIST

## 2019-01-01 PROCEDURE — 40000986 XR CHEST 2 VW

## 2019-01-01 PROCEDURE — 0W993ZX DRAINAGE OF RIGHT PLEURAL CAVITY, PERCUTANEOUS APPROACH, DIAGNOSTIC: ICD-10-PCS | Performed by: RADIOLOGY

## 2019-01-01 PROCEDURE — G0180 MD CERTIFICATION HHA PATIENT: HCPCS | Performed by: INTERNAL MEDICINE

## 2019-01-01 PROCEDURE — 83615 LACTATE (LD) (LDH) ENZYME: CPT | Performed by: INTERNAL MEDICINE

## 2019-01-01 PROCEDURE — 85018 HEMOGLOBIN: CPT | Performed by: FAMILY MEDICINE

## 2019-01-01 PROCEDURE — 84484 ASSAY OF TROPONIN QUANT: CPT | Mod: 91 | Performed by: EMERGENCY MEDICINE

## 2019-01-01 PROCEDURE — 93005 ELECTROCARDIOGRAM TRACING: CPT | Performed by: EMERGENCY MEDICINE

## 2019-01-01 PROCEDURE — 45385 COLONOSCOPY W/LESION REMOVAL: CPT | Performed by: SURGERY

## 2019-01-01 PROCEDURE — 36415 COLL VENOUS BLD VENIPUNCTURE: CPT | Performed by: EMERGENCY MEDICINE

## 2019-01-01 PROCEDURE — 84443 ASSAY THYROID STIM HORMONE: CPT | Performed by: INTERNAL MEDICINE

## 2019-01-01 PROCEDURE — 25800030 ZZH RX IP 258 OP 636: Performed by: NURSE ANESTHETIST, CERTIFIED REGISTERED

## 2019-01-01 PROCEDURE — 85027 COMPLETE CBC AUTOMATED: CPT | Performed by: FAMILY MEDICINE

## 2019-01-01 PROCEDURE — 25800030 ZZH RX IP 258 OP 636: Performed by: INTERNAL MEDICINE

## 2019-01-01 PROCEDURE — G0463 HOSPITAL OUTPT CLINIC VISIT: HCPCS | Performed by: INTERNAL MEDICINE

## 2019-01-01 PROCEDURE — 99285 EMERGENCY DEPT VISIT HI MDM: CPT | Mod: 25 | Performed by: EMERGENCY MEDICINE

## 2019-01-01 PROCEDURE — 25000132 ZZH RX MED GY IP 250 OP 250 PS 637: Performed by: SURGERY

## 2019-01-01 PROCEDURE — 25000131 ZZH RX MED GY IP 250 OP 636 PS 637: Performed by: FAMILY MEDICINE

## 2019-01-01 PROCEDURE — 99233 SBSQ HOSP IP/OBS HIGH 50: CPT | Performed by: FAMILY MEDICINE

## 2019-01-01 PROCEDURE — 32555 ASPIRATE PLEURA W/ IMAGING: CPT

## 2019-01-01 PROCEDURE — 83540 ASSAY OF IRON: CPT | Mod: ZL | Performed by: INTERNAL MEDICINE

## 2019-01-01 PROCEDURE — 36415 COLL VENOUS BLD VENIPUNCTURE: CPT | Performed by: NURSE ANESTHETIST, CERTIFIED REGISTERED

## 2019-01-01 PROCEDURE — 45380 COLONOSCOPY AND BIOPSY: CPT | Performed by: SURGERY

## 2019-01-01 PROCEDURE — 87493 C DIFF AMPLIFIED PROBE: CPT | Mod: ZL | Performed by: INTERNAL MEDICINE

## 2019-01-01 PROCEDURE — 36415 COLL VENOUS BLD VENIPUNCTURE: CPT | Performed by: FAMILY MEDICINE

## 2019-01-01 PROCEDURE — 30233N1 TRANSFUSION OF NONAUTOLOGOUS RED BLOOD CELLS INTO PERIPHERAL VEIN, PERCUTANEOUS APPROACH: ICD-10-PCS | Performed by: INTERNAL MEDICINE

## 2019-01-01 PROCEDURE — C9113 INJ PANTOPRAZOLE SODIUM, VIA: HCPCS | Performed by: EMERGENCY MEDICINE

## 2019-01-01 PROCEDURE — 85027 COMPLETE CBC AUTOMATED: CPT | Performed by: NURSE ANESTHETIST, CERTIFIED REGISTERED

## 2019-01-01 PROCEDURE — 0DJ08ZZ INSPECTION OF UPPER INTESTINAL TRACT, VIA NATURAL OR ARTIFICIAL OPENING ENDOSCOPIC: ICD-10-PCS | Performed by: SURGERY

## 2019-01-01 PROCEDURE — 83036 HEMOGLOBIN GLYCOSYLATED A1C: CPT | Performed by: INTERNAL MEDICINE

## 2019-01-01 PROCEDURE — 83540 ASSAY OF IRON: CPT | Performed by: EMERGENCY MEDICINE

## 2019-01-01 PROCEDURE — 74177 CT ABD & PELVIS W/CONTRAST: CPT

## 2019-01-01 PROCEDURE — 86900 BLOOD TYPING SEROLOGIC ABO: CPT | Performed by: EMERGENCY MEDICINE

## 2019-01-01 PROCEDURE — 43235 EGD DIAGNOSTIC BRUSH WASH: CPT | Performed by: SURGERY

## 2019-01-01 PROCEDURE — 83036 HEMOGLOBIN GLYCOSYLATED A1C: CPT | Mod: ZL | Performed by: INTERNAL MEDICINE

## 2019-01-01 PROCEDURE — 97161 PT EVAL LOW COMPLEX 20 MIN: CPT | Mod: GP

## 2019-01-01 PROCEDURE — 25800030 ZZH RX IP 258 OP 636: Performed by: FAMILY MEDICINE

## 2019-01-01 PROCEDURE — 87385 HISTOPLASMA CAPSUL AG IA: CPT | Performed by: INTERNAL MEDICINE

## 2019-01-01 PROCEDURE — 85045 AUTOMATED RETICULOCYTE COUNT: CPT | Mod: ZL | Performed by: INTERNAL MEDICINE

## 2019-01-01 PROCEDURE — 86901 BLOOD TYPING SEROLOGIC RH(D): CPT | Performed by: INTERNAL MEDICINE

## 2019-01-01 PROCEDURE — 0DBL8ZX EXCISION OF TRANSVERSE COLON, VIA NATURAL OR ARTIFICIAL OPENING ENDOSCOPIC, DIAGNOSTIC: ICD-10-PCS | Performed by: SURGERY

## 2019-01-01 PROCEDURE — 97535 SELF CARE MNGMENT TRAINING: CPT | Mod: GO | Performed by: OCCUPATIONAL THERAPIST

## 2019-01-01 PROCEDURE — 97530 THERAPEUTIC ACTIVITIES: CPT | Mod: GP

## 2019-01-01 PROCEDURE — 96374 THER/PROPH/DIAG INJ IV PUSH: CPT | Mod: XU | Performed by: EMERGENCY MEDICINE

## 2019-01-01 PROCEDURE — 77063 BREAST TOMOSYNTHESIS BI: CPT

## 2019-01-01 PROCEDURE — 83735 ASSAY OF MAGNESIUM: CPT | Performed by: INTERNAL MEDICINE

## 2019-01-01 PROCEDURE — 86920 COMPATIBILITY TEST SPIN: CPT | Mod: 91 | Performed by: EMERGENCY MEDICINE

## 2019-01-01 PROCEDURE — 86850 RBC ANTIBODY SCREEN: CPT | Performed by: INTERNAL MEDICINE

## 2019-01-01 PROCEDURE — 82945 GLUCOSE OTHER FLUID: CPT | Performed by: INTERNAL MEDICINE

## 2019-01-01 PROCEDURE — 86900 BLOOD TYPING SEROLOGIC ABO: CPT | Performed by: INTERNAL MEDICINE

## 2019-01-01 PROCEDURE — 97165 OT EVAL LOW COMPLEX 30 MIN: CPT | Mod: GO | Performed by: OCCUPATIONAL THERAPIST

## 2019-01-01 PROCEDURE — 83735 ASSAY OF MAGNESIUM: CPT | Performed by: FAMILY MEDICINE

## 2019-01-01 PROCEDURE — 99140 ANES COMP EMERGENCY COND: CPT | Performed by: NURSE ANESTHETIST, CERTIFIED REGISTERED

## 2019-01-01 PROCEDURE — 99222 1ST HOSP IP/OBS MODERATE 55: CPT | Mod: 25 | Performed by: SURGERY

## 2019-01-01 PROCEDURE — 84157 ASSAY OF PROTEIN OTHER: CPT | Performed by: INTERNAL MEDICINE

## 2019-01-01 PROCEDURE — 84132 ASSAY OF SERUM POTASSIUM: CPT | Performed by: FAMILY MEDICINE

## 2019-01-01 PROCEDURE — 84155 ASSAY OF PROTEIN SERUM: CPT | Performed by: INTERNAL MEDICINE

## 2019-01-01 PROCEDURE — 83550 IRON BINDING TEST: CPT | Performed by: EMERGENCY MEDICINE

## 2019-01-01 PROCEDURE — 30233N1 TRANSFUSION OF NONAUTOLOGOUS RED BLOOD CELLS INTO PERIPHERAL VEIN, PERCUTANEOUS APPROACH: ICD-10-PCS | Performed by: EMERGENCY MEDICINE

## 2019-01-01 PROCEDURE — 97116 GAIT TRAINING THERAPY: CPT | Mod: GP

## 2019-01-01 PROCEDURE — 0DBK8ZX EXCISION OF ASCENDING COLON, VIA NATURAL OR ARTIFICIAL OPENING ENDOSCOPIC, DIAGNOSTIC: ICD-10-PCS | Performed by: SURGERY

## 2019-01-01 PROCEDURE — 36416 COLLJ CAPILLARY BLOOD SPEC: CPT | Performed by: FAMILY MEDICINE

## 2019-01-01 PROCEDURE — 86901 BLOOD TYPING SEROLOGIC RH(D): CPT | Performed by: EMERGENCY MEDICINE

## 2019-01-01 PROCEDURE — G0463 HOSPITAL OUTPT CLINIC VISIT: HCPCS | Mod: 25 | Performed by: INTERNAL MEDICINE

## 2019-01-01 PROCEDURE — 83880 ASSAY OF NATRIURETIC PEPTIDE: CPT | Performed by: EMERGENCY MEDICINE

## 2019-01-01 PROCEDURE — P9016 RBC LEUKOCYTES REDUCED: HCPCS | Performed by: INTERNAL MEDICINE

## 2019-01-01 PROCEDURE — 40000010 ZZH STATISTIC ANES STAT CODE-CRNA PER MINUTE: Performed by: SURGERY

## 2019-01-01 PROCEDURE — 25000125 ZZHC RX 250: Performed by: FAMILY MEDICINE

## 2019-01-01 PROCEDURE — 99215 OFFICE O/P EST HI 40 MIN: CPT | Performed by: INTERNAL MEDICINE

## 2019-01-01 PROCEDURE — 40000986 XR KNEE LT 1/2 VW: Mod: LT

## 2019-01-01 PROCEDURE — 36430 TRANSFUSION BLD/BLD COMPNT: CPT | Performed by: EMERGENCY MEDICINE

## 2019-01-01 PROCEDURE — 25000125 ZZHC RX 250: Performed by: NURSE ANESTHETIST, CERTIFIED REGISTERED

## 2019-01-01 PROCEDURE — 99495 TRANSJ CARE MGMT MOD F2F 14D: CPT | Performed by: INTERNAL MEDICINE

## 2019-01-01 PROCEDURE — 85610 PROTHROMBIN TIME: CPT | Performed by: EMERGENCY MEDICINE

## 2019-01-01 PROCEDURE — 83550 IRON BINDING TEST: CPT | Mod: ZL | Performed by: INTERNAL MEDICINE

## 2019-01-01 PROCEDURE — 96365 THER/PROPH/DIAG IV INF INIT: CPT | Performed by: EMERGENCY MEDICINE

## 2019-01-01 PROCEDURE — 80053 COMPREHEN METABOLIC PANEL: CPT | Performed by: INTERNAL MEDICINE

## 2019-01-01 PROCEDURE — 82272 OCCULT BLD FECES 1-3 TESTS: CPT | Performed by: EMERGENCY MEDICINE

## 2019-01-01 PROCEDURE — 81003 URINALYSIS AUTO W/O SCOPE: CPT | Performed by: EMERGENCY MEDICINE

## 2019-01-01 PROCEDURE — 86920 COMPATIBILITY TEST SPIN: CPT | Performed by: INTERNAL MEDICINE

## 2019-01-01 PROCEDURE — 84132 ASSAY OF SERUM POTASSIUM: CPT | Performed by: INTERNAL MEDICINE

## 2019-01-01 PROCEDURE — 71260 CT THORAX DX C+: CPT

## 2019-01-01 PROCEDURE — 88305 TISSUE EXAM BY PATHOLOGIST: CPT

## 2019-01-01 PROCEDURE — 93010 ELECTROCARDIOGRAM REPORT: CPT | Performed by: INTERNAL MEDICINE

## 2019-01-01 PROCEDURE — 85025 COMPLETE CBC W/AUTO DIFF WBC: CPT

## 2019-01-01 PROCEDURE — 87075 CULTR BACTERIA EXCEPT BLOOD: CPT | Performed by: INTERNAL MEDICINE

## 2019-01-01 PROCEDURE — 87449 NOS EACH ORGANISM AG IA: CPT | Performed by: INTERNAL MEDICINE

## 2019-01-01 PROCEDURE — 92611 MOTION FLUOROSCOPY/SWALLOW: CPT | Mod: GN

## 2019-01-01 PROCEDURE — 73560 X-RAY EXAM OF KNEE 1 OR 2: CPT | Mod: RT

## 2019-01-01 PROCEDURE — 45385 COLONOSCOPY W/LESION REMOVAL: CPT | Performed by: NURSE ANESTHETIST, CERTIFIED REGISTERED

## 2019-01-01 RX ORDER — SUCRALFATE 1 G/1
1 TABLET ORAL
Status: DISCONTINUED | OUTPATIENT
Start: 2019-01-01 | End: 2019-01-01 | Stop reason: HOSPADM

## 2019-01-01 RX ORDER — SPIRONOLACTONE 25 MG/1
TABLET ORAL
Qty: 90 TABLET | Refills: 3 | Status: SHIPPED | OUTPATIENT
Start: 2019-01-01

## 2019-01-01 RX ORDER — SODIUM CHLORIDE, SODIUM LACTATE, POTASSIUM CHLORIDE, CALCIUM CHLORIDE 600; 310; 30; 20 MG/100ML; MG/100ML; MG/100ML; MG/100ML
INJECTION, SOLUTION INTRAVENOUS CONTINUOUS PRN
Status: DISCONTINUED | OUTPATIENT
Start: 2019-01-01 | End: 2019-01-01

## 2019-01-01 RX ORDER — FUROSEMIDE 40 MG
40 TABLET ORAL DAILY
Status: DISCONTINUED | OUTPATIENT
Start: 2019-01-01 | End: 2019-01-01 | Stop reason: HOSPADM

## 2019-01-01 RX ORDER — LEVOFLOXACIN 5 MG/ML
750 INJECTION, SOLUTION INTRAVENOUS EVERY 24 HOURS
Status: DISCONTINUED | OUTPATIENT
Start: 2019-01-01 | End: 2019-01-01 | Stop reason: DRUGHIGH

## 2019-01-01 RX ORDER — SODIUM CHLORIDE 9 MG/ML
INJECTION, SOLUTION INTRAVENOUS CONTINUOUS
Status: DISCONTINUED | OUTPATIENT
Start: 2019-01-01 | End: 2019-01-01 | Stop reason: HOSPADM

## 2019-01-01 RX ORDER — CEFUROXIME AXETIL 250 MG/1
250 TABLET ORAL EVERY 12 HOURS SCHEDULED
Status: DISCONTINUED | OUTPATIENT
Start: 2019-01-01 | End: 2019-01-01 | Stop reason: HOSPADM

## 2019-01-01 RX ORDER — NICOTINE POLACRILEX 4 MG
15-30 LOZENGE BUCCAL
Status: DISCONTINUED | OUTPATIENT
Start: 2019-01-01 | End: 2019-01-01 | Stop reason: HOSPADM

## 2019-01-01 RX ORDER — HYDROCODONE BITARTRATE AND ACETAMINOPHEN 5; 325 MG/1; MG/1
1 TABLET ORAL 4 TIMES DAILY
Status: DISCONTINUED | OUTPATIENT
Start: 2019-01-01 | End: 2019-01-01

## 2019-01-01 RX ORDER — PROCHLORPERAZINE 25 MG
12.5 SUPPOSITORY, RECTAL RECTAL EVERY 12 HOURS PRN
Status: DISCONTINUED | OUTPATIENT
Start: 2019-01-01 | End: 2019-01-01 | Stop reason: HOSPADM

## 2019-01-01 RX ORDER — TRAMADOL HYDROCHLORIDE 50 MG/1
TABLET ORAL
Qty: 112 TABLET | Refills: 3 | Status: SHIPPED | OUTPATIENT
Start: 2019-01-01 | End: 2019-01-01

## 2019-01-01 RX ORDER — INSULIN ASPART 100 [IU]/ML
INJECTION, SOLUTION INTRAVENOUS; SUBCUTANEOUS
Qty: 15 ML | Refills: 3 | Status: SHIPPED | OUTPATIENT
Start: 2019-01-01

## 2019-01-01 RX ORDER — CLONIDINE HYDROCHLORIDE 0.1 MG/1
0.1 TABLET ORAL DAILY
Status: DISCONTINUED | OUTPATIENT
Start: 2019-01-01 | End: 2019-01-01 | Stop reason: HOSPADM

## 2019-01-01 RX ORDER — NALOXONE HYDROCHLORIDE 0.4 MG/ML
.1-.4 INJECTION, SOLUTION INTRAMUSCULAR; INTRAVENOUS; SUBCUTANEOUS
Status: DISCONTINUED | OUTPATIENT
Start: 2019-01-01 | End: 2019-01-01 | Stop reason: HOSPADM

## 2019-01-01 RX ORDER — TRAMADOL HYDROCHLORIDE 50 MG/1
50 TABLET ORAL EVERY 6 HOURS PRN
Status: DISCONTINUED | OUTPATIENT
Start: 2019-01-01 | End: 2019-01-01 | Stop reason: HOSPADM

## 2019-01-01 RX ORDER — PROCHLORPERAZINE MALEATE 5 MG
5 TABLET ORAL EVERY 6 HOURS PRN
Status: DISCONTINUED | OUTPATIENT
Start: 2019-01-01 | End: 2019-01-01 | Stop reason: HOSPADM

## 2019-01-01 RX ORDER — BARIUM SULFATE 400 MG/ML
30 SUSPENSION ORAL ONCE
Status: COMPLETED | OUTPATIENT
Start: 2019-01-01 | End: 2019-01-01

## 2019-01-01 RX ORDER — ONDANSETRON 4 MG/1
4 TABLET, ORALLY DISINTEGRATING ORAL EVERY 6 HOURS PRN
Status: DISCONTINUED | OUTPATIENT
Start: 2019-01-01 | End: 2019-01-01 | Stop reason: HOSPADM

## 2019-01-01 RX ORDER — ESTRADIOL 0.5 MG/1
TABLET ORAL
Qty: 90 TABLET | Refills: 2 | Status: SHIPPED | OUTPATIENT
Start: 2019-01-01

## 2019-01-01 RX ORDER — POTASSIUM CHLORIDE 7.45 MG/ML
10 INJECTION INTRAVENOUS
Status: DISCONTINUED | OUTPATIENT
Start: 2019-01-01 | End: 2019-01-01 | Stop reason: HOSPADM

## 2019-01-01 RX ORDER — PREDNISONE 5 MG/1
5 TABLET ORAL 2 TIMES DAILY WITH MEALS
Status: DISCONTINUED | OUTPATIENT
Start: 2019-01-01 | End: 2019-01-01 | Stop reason: HOSPADM

## 2019-01-01 RX ORDER — MAGNESIUM SULFATE HEPTAHYDRATE 40 MG/ML
4 INJECTION, SOLUTION INTRAVENOUS EVERY 4 HOURS PRN
Status: DISCONTINUED | OUTPATIENT
Start: 2019-01-01 | End: 2019-01-01 | Stop reason: HOSPADM

## 2019-01-01 RX ORDER — INSULIN GLARGINE 100 [IU]/ML
7 INJECTION, SOLUTION SUBCUTANEOUS
Status: DISCONTINUED | OUTPATIENT
Start: 2019-01-01 | End: 2019-01-01 | Stop reason: HOSPADM

## 2019-01-01 RX ORDER — AMLODIPINE BESYLATE 2.5 MG/1
TABLET ORAL
Qty: 90 TABLET | Refills: 2 | Status: SHIPPED | OUTPATIENT
Start: 2019-01-01

## 2019-01-01 RX ORDER — GABAPENTIN 100 MG/1
CAPSULE ORAL
Qty: 270 CAPSULE | Refills: 3 | Status: SHIPPED | OUTPATIENT
Start: 2019-01-01 | End: 2019-01-01

## 2019-01-01 RX ORDER — GABAPENTIN 600 MG/1
600 TABLET ORAL 3 TIMES DAILY
Qty: 90 TABLET | Refills: 11 | Status: SHIPPED | OUTPATIENT
Start: 2019-01-01

## 2019-01-01 RX ORDER — IPRATROPIUM BROMIDE AND ALBUTEROL SULFATE 2.5; .5 MG/3ML; MG/3ML
3 SOLUTION RESPIRATORY (INHALATION) EVERY 4 HOURS PRN
Status: DISCONTINUED | OUTPATIENT
Start: 2019-01-01 | End: 2019-01-01 | Stop reason: HOSPADM

## 2019-01-01 RX ORDER — ONDANSETRON 2 MG/ML
4 INJECTION INTRAMUSCULAR; INTRAVENOUS EVERY 30 MIN PRN
Status: DISCONTINUED | OUTPATIENT
Start: 2019-01-01 | End: 2019-01-01 | Stop reason: HOSPADM

## 2019-01-01 RX ORDER — HYDROCODONE BITARTRATE AND ACETAMINOPHEN 5; 325 MG/1; MG/1
1 TABLET ORAL 4 TIMES DAILY
Qty: 120 TABLET | Refills: 0 | Status: SHIPPED | OUTPATIENT
Start: 2019-01-01

## 2019-01-01 RX ORDER — SACCHAROMYCES BOULARDII 250 MG
CAPSULE ORAL
Qty: 120 CAPSULE | Refills: 11 | Status: SHIPPED | OUTPATIENT
Start: 2019-01-01

## 2019-01-01 RX ORDER — CEFUROXIME AXETIL 250 MG/1
250 TABLET ORAL EVERY 12 HOURS
Qty: 10 TABLET | Refills: 0 | Status: SHIPPED | OUTPATIENT
Start: 2019-01-01 | End: 2019-01-01

## 2019-01-01 RX ORDER — METFORMIN HCL 500 MG
TABLET, EXTENDED RELEASE 24 HR ORAL
Qty: 360 TABLET | Refills: 3 | Status: SHIPPED | OUTPATIENT
Start: 2019-01-01

## 2019-01-01 RX ORDER — ONDANSETRON 4 MG/1
4 TABLET, ORALLY DISINTEGRATING ORAL EVERY 30 MIN PRN
Status: DISCONTINUED | OUTPATIENT
Start: 2019-01-01 | End: 2019-01-01 | Stop reason: HOSPADM

## 2019-01-01 RX ORDER — LEVOFLOXACIN 5 MG/ML
750 INJECTION, SOLUTION INTRAVENOUS
Status: DISCONTINUED | OUTPATIENT
Start: 2019-01-01 | End: 2019-01-01 | Stop reason: HOSPADM

## 2019-01-01 RX ORDER — DEXTROSE MONOHYDRATE 25 G/50ML
25-50 INJECTION, SOLUTION INTRAVENOUS
Status: DISCONTINUED | OUTPATIENT
Start: 2019-01-01 | End: 2019-01-01 | Stop reason: HOSPADM

## 2019-01-01 RX ORDER — GABAPENTIN 600 MG/1
600 TABLET ORAL 3 TIMES DAILY
Status: DISCONTINUED | OUTPATIENT
Start: 2019-01-01 | End: 2019-01-01 | Stop reason: HOSPADM

## 2019-01-01 RX ORDER — LIDOCAINE 40 MG/G
CREAM TOPICAL
Status: DISCONTINUED | OUTPATIENT
Start: 2019-01-01 | End: 2019-01-01 | Stop reason: HOSPADM

## 2019-01-01 RX ORDER — BACLOFEN 10 MG/1
10 TABLET ORAL 3 TIMES DAILY
Status: DISCONTINUED | OUTPATIENT
Start: 2019-01-01 | End: 2019-01-01 | Stop reason: HOSPADM

## 2019-01-01 RX ORDER — HYDROCODONE BITARTRATE AND ACETAMINOPHEN 5; 325 MG/1; MG/1
1 TABLET ORAL 4 TIMES DAILY
Qty: 120 TABLET | Refills: 0 | Status: SHIPPED | OUTPATIENT
Start: 2019-01-01 | End: 2019-01-01

## 2019-01-01 RX ORDER — TRAMADOL HYDROCHLORIDE 50 MG/1
TABLET ORAL
Qty: 112 TABLET | Refills: 3 | Status: SHIPPED | OUTPATIENT
Start: 2019-01-01

## 2019-01-01 RX ORDER — FENTANYL CITRATE 50 UG/ML
25-50 INJECTION, SOLUTION INTRAMUSCULAR; INTRAVENOUS
Status: DISCONTINUED | OUTPATIENT
Start: 2019-01-01 | End: 2019-01-01 | Stop reason: HOSPADM

## 2019-01-01 RX ORDER — PREDNISONE 5 MG/1
5 TABLET ORAL 2 TIMES DAILY
Status: DISCONTINUED | OUTPATIENT
Start: 2019-01-01 | End: 2019-01-01 | Stop reason: HOSPADM

## 2019-01-01 RX ORDER — ATORVASTATIN CALCIUM 20 MG/1
20 TABLET, FILM COATED ORAL AT BEDTIME
Status: DISCONTINUED | OUTPATIENT
Start: 2019-01-01 | End: 2019-01-01 | Stop reason: HOSPADM

## 2019-01-01 RX ORDER — FUROSEMIDE 40 MG
TABLET ORAL
Qty: 90 TABLET | Refills: 2 | Status: SHIPPED | OUTPATIENT
Start: 2019-01-01

## 2019-01-01 RX ORDER — SIMETHICONE 40MG/0.6ML
SUSPENSION, DROPS(FINAL DOSAGE FORM)(ML) ORAL PRN
Status: DISCONTINUED | OUTPATIENT
Start: 2019-01-01 | End: 2019-01-01 | Stop reason: HOSPADM

## 2019-01-01 RX ORDER — POTASSIUM CHLORIDE 1500 MG/1
20-40 TABLET, EXTENDED RELEASE ORAL
Status: DISCONTINUED | OUTPATIENT
Start: 2019-01-01 | End: 2019-01-01 | Stop reason: HOSPADM

## 2019-01-01 RX ORDER — TRAMADOL HYDROCHLORIDE 50 MG/1
TABLET ORAL
Qty: 112 TABLET | Refills: 5 | Status: SHIPPED | OUTPATIENT
Start: 2019-01-01 | End: 2019-01-01

## 2019-01-01 RX ORDER — ONDANSETRON 2 MG/ML
4 INJECTION INTRAMUSCULAR; INTRAVENOUS EVERY 6 HOURS PRN
Status: DISCONTINUED | OUTPATIENT
Start: 2019-01-01 | End: 2019-01-01 | Stop reason: HOSPADM

## 2019-01-01 RX ORDER — HYDROCODONE BITARTRATE AND ACETAMINOPHEN 5; 325 MG/1; MG/1
1-2 TABLET ORAL EVERY 4 HOURS PRN
Status: DISCONTINUED | OUTPATIENT
Start: 2019-01-01 | End: 2019-01-01 | Stop reason: HOSPADM

## 2019-01-01 RX ORDER — HYDROCODONE BITARTRATE AND ACETAMINOPHEN 5; 325 MG/1; MG/1
1 TABLET ORAL 4 TIMES DAILY
Qty: 120 TABLET | Refills: 0 | Status: CANCELLED | OUTPATIENT
Start: 2019-01-01

## 2019-01-01 RX ORDER — BLOOD SUGAR DIAGNOSTIC
STRIP MISCELLANEOUS
Qty: 400 STRIP | Refills: 0 | Status: SHIPPED | OUTPATIENT
Start: 2019-01-01 | End: 2019-01-01

## 2019-01-01 RX ORDER — INSULIN GLARGINE 100 [IU]/ML
7 INJECTION, SOLUTION SUBCUTANEOUS
Status: DISCONTINUED | OUTPATIENT
Start: 2019-01-01 | End: 2019-01-01

## 2019-01-01 RX ORDER — INSULIN ASPART 100 [IU]/ML
INJECTION, SOLUTION INTRAVENOUS; SUBCUTANEOUS
Qty: 15 ML | Refills: 3 | Status: ON HOLD | OUTPATIENT
Start: 2019-01-01 | End: 2019-01-01

## 2019-01-01 RX ORDER — CEFTRIAXONE SODIUM 1 G/50ML
1 INJECTION, SOLUTION INTRAVENOUS EVERY 24 HOURS
Status: DISCONTINUED | OUTPATIENT
Start: 2019-01-01 | End: 2019-01-01

## 2019-01-01 RX ORDER — CALCIUM CARBONATE 500 MG/1
500 TABLET, CHEWABLE ORAL DAILY PRN
Status: DISCONTINUED | OUTPATIENT
Start: 2019-01-01 | End: 2019-01-01 | Stop reason: HOSPADM

## 2019-01-01 RX ORDER — SPIRONOLACTONE 25 MG/1
25 TABLET ORAL DAILY
Status: DISCONTINUED | OUTPATIENT
Start: 2019-01-01 | End: 2019-01-01 | Stop reason: HOSPADM

## 2019-01-01 RX ORDER — IPRATROPIUM BROMIDE AND ALBUTEROL SULFATE 2.5; .5 MG/3ML; MG/3ML
3 SOLUTION RESPIRATORY (INHALATION)
Status: DISCONTINUED | OUTPATIENT
Start: 2019-01-01 | End: 2019-01-01

## 2019-01-01 RX ORDER — LANCETS
EACH MISCELLANEOUS
Qty: 400 EACH | Refills: 1 | Status: SHIPPED | OUTPATIENT
Start: 2019-01-01

## 2019-01-01 RX ORDER — ACETAMINOPHEN 325 MG/1
650 TABLET ORAL EVERY 4 HOURS PRN
Status: DISCONTINUED | OUTPATIENT
Start: 2019-01-01 | End: 2019-01-01 | Stop reason: HOSPADM

## 2019-01-01 RX ORDER — PROPOFOL 10 MG/ML
INJECTION, EMULSION INTRAVENOUS PRN
Status: DISCONTINUED | OUTPATIENT
Start: 2019-01-01 | End: 2019-01-01

## 2019-01-01 RX ORDER — BLOOD SUGAR DIAGNOSTIC
STRIP MISCELLANEOUS
Qty: 400 STRIP | Refills: 2 | Status: SHIPPED | OUTPATIENT
Start: 2019-01-01

## 2019-01-01 RX ORDER — SACCHAROMYCES BOULARDII 250 MG
250 CAPSULE ORAL 2 TIMES DAILY
Status: DISCONTINUED | OUTPATIENT
Start: 2019-01-01 | End: 2019-01-01 | Stop reason: HOSPADM

## 2019-01-01 RX ORDER — PREDNISONE 5 MG/1
TABLET ORAL
Qty: 180 TABLET | Refills: 1 | Status: SHIPPED | OUTPATIENT
Start: 2019-01-01 | End: 2019-01-01

## 2019-01-01 RX ORDER — FENTANYL CITRATE 50 UG/ML
50 INJECTION, SOLUTION INTRAMUSCULAR; INTRAVENOUS ONCE
Status: DISCONTINUED | OUTPATIENT
Start: 2019-01-01 | End: 2019-01-01 | Stop reason: HOSPADM

## 2019-01-01 RX ORDER — AZITHROMYCIN 250 MG/1
250 TABLET, FILM COATED ORAL DAILY
Status: COMPLETED | OUTPATIENT
Start: 2019-01-01 | End: 2019-01-01

## 2019-01-01 RX ORDER — LIDOCAINE HYDROCHLORIDE 20 MG/ML
SOLUTION OROPHARYNGEAL PRN
Status: DISCONTINUED | OUTPATIENT
Start: 2019-01-01 | End: 2019-01-01

## 2019-01-01 RX ORDER — FUROSEMIDE 20 MG
20 TABLET ORAL DAILY
Status: DISCONTINUED | OUTPATIENT
Start: 2019-01-01 | End: 2019-01-01 | Stop reason: HOSPADM

## 2019-01-01 RX ORDER — TRAMADOL HYDROCHLORIDE 50 MG/1
TABLET ORAL
Qty: 112 TABLET | Refills: 3 | Status: CANCELLED | OUTPATIENT
Start: 2019-01-01

## 2019-01-01 RX ORDER — TRAMADOL HYDROCHLORIDE 50 MG/1
100 TABLET ORAL 4 TIMES DAILY
Qty: 112 TABLET | Refills: 5 | COMMUNITY
Start: 2019-01-01 | End: 2019-01-01

## 2019-01-01 RX ORDER — PNV NO.95/FERROUS FUM/FOLIC AC 28MG-0.8MG
TABLET ORAL
Qty: 180 TABLET | Refills: 0 | Status: SHIPPED | OUTPATIENT
Start: 2019-01-01 | End: 2019-01-01

## 2019-01-01 RX ORDER — PROPOFOL 10 MG/ML
INJECTION, EMULSION INTRAVENOUS CONTINUOUS PRN
Status: DISCONTINUED | OUTPATIENT
Start: 2019-01-01 | End: 2019-01-01

## 2019-01-01 RX ORDER — AZITHROMYCIN 500 MG/5ML
500 INJECTION, POWDER, LYOPHILIZED, FOR SOLUTION INTRAVENOUS ONCE
Status: DISCONTINUED | OUTPATIENT
Start: 2019-01-01 | End: 2019-01-01

## 2019-01-01 RX ORDER — HYDROMORPHONE HYDROCHLORIDE 1 MG/ML
0.2 INJECTION, SOLUTION INTRAMUSCULAR; INTRAVENOUS; SUBCUTANEOUS
Status: DISCONTINUED | OUTPATIENT
Start: 2019-01-01 | End: 2019-01-01 | Stop reason: HOSPADM

## 2019-01-01 RX ORDER — HYDROCODONE BITARTRATE AND ACETAMINOPHEN 5; 325 MG/1; MG/1
1 TABLET ORAL 4 TIMES DAILY
Status: DISCONTINUED | OUTPATIENT
Start: 2019-01-01 | End: 2019-01-01 | Stop reason: HOSPADM

## 2019-01-01 RX ORDER — CLONIDINE HYDROCHLORIDE 0.1 MG/1
TABLET ORAL
Qty: 90 TABLET | Refills: 1 | Status: SHIPPED | OUTPATIENT
Start: 2019-01-01

## 2019-01-01 RX ORDER — DILTIAZEM HYDROCHLORIDE 180 MG/1
360 CAPSULE, COATED, EXTENDED RELEASE ORAL EVERY EVENING
Status: DISCONTINUED | OUTPATIENT
Start: 2019-01-01 | End: 2019-01-01 | Stop reason: HOSPADM

## 2019-01-01 RX ORDER — INSULIN GLARGINE 100 [IU]/ML
9 INJECTION, SOLUTION SUBCUTANEOUS 2 TIMES DAILY
Status: DISCONTINUED | OUTPATIENT
Start: 2019-01-01 | End: 2019-01-01 | Stop reason: HOSPADM

## 2019-01-01 RX ORDER — HYDROCODONE BITARTRATE AND ACETAMINOPHEN 5; 325 MG/1; MG/1
1 TABLET ORAL ONCE
Status: COMPLETED | OUTPATIENT
Start: 2019-01-01 | End: 2019-01-01

## 2019-01-01 RX ORDER — GABAPENTIN 100 MG/1
300 CAPSULE ORAL 3 TIMES DAILY
Qty: 270 CAPSULE | Refills: 3 | Status: SHIPPED | OUTPATIENT
Start: 2019-01-01 | End: 2019-01-01

## 2019-01-01 RX ORDER — PNV NO.95/FERROUS FUM/FOLIC AC 28MG-0.8MG
TABLET ORAL
Qty: 180 TABLET | Refills: 1 | Status: SHIPPED | OUTPATIENT
Start: 2019-01-01

## 2019-01-01 RX ORDER — LEVOFLOXACIN 5 MG/ML
750 INJECTION, SOLUTION INTRAVENOUS ONCE
Status: COMPLETED | OUTPATIENT
Start: 2019-01-01 | End: 2019-01-01

## 2019-01-01 RX ORDER — AMLODIPINE BESYLATE 2.5 MG/1
TABLET ORAL
Qty: 90 TABLET | Refills: 0 | Status: SHIPPED | OUTPATIENT
Start: 2019-01-01 | End: 2019-01-01

## 2019-01-01 RX ORDER — HEPARIN SODIUM (PORCINE) LOCK FLUSH IV SOLN 100 UNIT/ML 100 UNIT/ML
500 SOLUTION INTRAVENOUS ONCE
Status: CANCELLED
Start: 2019-01-01

## 2019-01-01 RX ORDER — FAMOTIDINE 20 MG/1
20 TABLET, FILM COATED ORAL 2 TIMES DAILY
Qty: 60 TABLET | Refills: 0 | Status: SHIPPED | OUTPATIENT
Start: 2019-01-01 | End: 2019-01-01

## 2019-01-01 RX ORDER — ASPIRIN 81 MG/1
324 TABLET, CHEWABLE ORAL ONCE
Status: DISCONTINUED | OUTPATIENT
Start: 2019-01-01 | End: 2019-01-01

## 2019-01-01 RX ORDER — POLYETHYLENE GLYCOL 3350, SODIUM CHLORIDE, SODIUM BICARBONATE, POTASSIUM CHLORIDE 420; 11.2; 5.72; 1.48 G/4L; G/4L; G/4L; G/4L
4000 POWDER, FOR SOLUTION ORAL ONCE
Status: COMPLETED | OUTPATIENT
Start: 2019-01-01 | End: 2019-01-01

## 2019-01-01 RX ORDER — MEPERIDINE HYDROCHLORIDE 50 MG/ML
12.5 INJECTION INTRAMUSCULAR; INTRAVENOUS; SUBCUTANEOUS
Status: DISCONTINUED | OUTPATIENT
Start: 2019-01-01 | End: 2019-01-01 | Stop reason: HOSPADM

## 2019-01-01 RX ORDER — FAMOTIDINE 20 MG/1
TABLET, FILM COATED ORAL
Qty: 180 TABLET | Refills: 3 | Status: SHIPPED | OUTPATIENT
Start: 2019-01-01

## 2019-01-01 RX ORDER — PANTOPRAZOLE SODIUM 40 MG/1
40 TABLET, DELAYED RELEASE ORAL DAILY
Qty: 30 TABLET | Refills: 1 | Status: SHIPPED | OUTPATIENT
Start: 2019-01-01

## 2019-01-01 RX ORDER — CLONIDINE HYDROCHLORIDE 0.1 MG/1
TABLET ORAL
Qty: 90 TABLET | Refills: 0 | Status: SHIPPED | OUTPATIENT
Start: 2019-01-01 | End: 2019-01-01

## 2019-01-01 RX ORDER — SUCRALFATE ORAL 1 G/10ML
1 SUSPENSION ORAL
Status: DISCONTINUED | OUTPATIENT
Start: 2019-01-01 | End: 2019-01-01

## 2019-01-01 RX ORDER — ATORVASTATIN CALCIUM 20 MG/1
TABLET, FILM COATED ORAL
Qty: 90 TABLET | Refills: 3 | Status: SHIPPED | OUTPATIENT
Start: 2019-01-01

## 2019-01-01 RX ORDER — PREDNISONE 5 MG/1
TABLET ORAL
Qty: 180 TABLET | Refills: 3 | Status: SHIPPED | OUTPATIENT
Start: 2019-01-01

## 2019-01-01 RX ORDER — LIDOCAINE HYDROCHLORIDE 20 MG/ML
INJECTION, SOLUTION INFILTRATION; PERINEURAL PRN
Status: DISCONTINUED | OUTPATIENT
Start: 2019-01-01 | End: 2019-01-01

## 2019-01-01 RX ORDER — DIPHENHYDRAMINE HCL 50 MG
50 CAPSULE ORAL
Status: DISCONTINUED | OUTPATIENT
Start: 2019-01-01 | End: 2019-01-01 | Stop reason: HOSPADM

## 2019-01-01 RX ORDER — BACLOFEN 10 MG/1
TABLET ORAL
Qty: 90 TABLET | Refills: 3 | Status: SHIPPED | OUTPATIENT
Start: 2019-01-01 | End: 2019-01-01

## 2019-01-01 RX ORDER — SODIUM CHLORIDE 9 MG/ML
INJECTION, SOLUTION INTRAVENOUS CONTINUOUS
Status: DISCONTINUED | OUTPATIENT
Start: 2019-01-01 | End: 2019-01-01

## 2019-01-01 RX ORDER — IODIXANOL 320 MG/ML
100 INJECTION, SOLUTION INTRAVASCULAR ONCE
Status: COMPLETED | OUTPATIENT
Start: 2019-01-01 | End: 2019-01-01

## 2019-01-01 RX ORDER — BISACODYL 5 MG
10 TABLET, DELAYED RELEASE (ENTERIC COATED) ORAL ONCE
Status: COMPLETED | OUTPATIENT
Start: 2019-01-01 | End: 2019-01-01

## 2019-01-01 RX ORDER — BACLOFEN 10 MG/1
TABLET ORAL
Qty: 90 TABLET | Refills: 3 | Status: SHIPPED | OUTPATIENT
Start: 2019-01-01

## 2019-01-01 RX ORDER — ATORVASTATIN CALCIUM 20 MG/1
TABLET, FILM COATED ORAL
Qty: 90 TABLET | Refills: 0 | Status: SHIPPED | OUTPATIENT
Start: 2019-01-01 | End: 2019-01-01

## 2019-01-01 RX ORDER — ASPIRIN 81 MG/1
324 TABLET, CHEWABLE ORAL ONCE
Status: COMPLETED | OUTPATIENT
Start: 2019-01-01 | End: 2019-01-01

## 2019-01-01 RX ADMIN — DILTIAZEM HYDROCHLORIDE 360 MG: 180 CAPSULE, COATED, EXTENDED RELEASE ORAL at 20:16

## 2019-01-01 RX ADMIN — FUROSEMIDE 20 MG: 20 TABLET ORAL at 11:28

## 2019-01-01 RX ADMIN — INSULIN GLARGINE 9 UNITS: 100 INJECTION, SOLUTION SUBCUTANEOUS at 09:24

## 2019-01-01 RX ADMIN — INSULIN ASPART 1 UNITS: 100 INJECTION, SOLUTION INTRAVENOUS; SUBCUTANEOUS at 08:28

## 2019-01-01 RX ADMIN — INSULIN ASPART 5 UNITS: 100 INJECTION, SOLUTION INTRAVENOUS; SUBCUTANEOUS at 13:16

## 2019-01-01 RX ADMIN — BACLOFEN 10 MG: 10 TABLET ORAL at 22:08

## 2019-01-01 RX ADMIN — PANTOPRAZOLE SODIUM 40 MG: 40 INJECTION, POWDER, FOR SOLUTION INTRAVENOUS at 11:59

## 2019-01-01 RX ADMIN — HYDROCODONE BITARTRATE AND ACETAMINOPHEN 2 TABLET: 5; 325 TABLET ORAL at 12:23

## 2019-01-01 RX ADMIN — BACLOFEN 10 MG: 10 TABLET ORAL at 22:38

## 2019-01-01 RX ADMIN — POLYETHYLENE GLYCOL 3350, SODIUM CHLORIDE, SODIUM BICARBONATE AND POTASSIUM CHLORIDE 4000 ML: KIT ORAL at 12:58

## 2019-01-01 RX ADMIN — BACLOFEN 10 MG: 10 TABLET ORAL at 13:41

## 2019-01-01 RX ADMIN — FAMOTIDINE 20 MG: 10 INJECTION, SOLUTION INTRAVENOUS at 17:54

## 2019-01-01 RX ADMIN — ATORVASTATIN CALCIUM 20 MG: 20 TABLET, FILM COATED ORAL at 22:08

## 2019-01-01 RX ADMIN — TRAMADOL HYDROCHLORIDE 50 MG: 50 TABLET, FILM COATED ORAL at 18:57

## 2019-01-01 RX ADMIN — INSULIN ASPART 3 UNITS: 100 INJECTION, SOLUTION INTRAVENOUS; SUBCUTANEOUS at 17:56

## 2019-01-01 RX ADMIN — PREDNISONE 5 MG: 5 TABLET ORAL at 11:23

## 2019-01-01 RX ADMIN — GABAPENTIN 600 MG: 600 TABLET, FILM COATED ORAL at 21:37

## 2019-01-01 RX ADMIN — Medication 250 MG: at 10:49

## 2019-01-01 RX ADMIN — LEVOFLOXACIN 750 MG: 5 INJECTION, SOLUTION INTRAVENOUS at 13:57

## 2019-01-01 RX ADMIN — HYDROCODONE BITARTRATE AND ACETAMINOPHEN 1 TABLET: 5; 325 TABLET ORAL at 21:37

## 2019-01-01 RX ADMIN — SUCRALFATE 1 G: 1 SUSPENSION ORAL at 08:29

## 2019-01-01 RX ADMIN — LIDOCAINE HYDROCHLORIDE 5 ML: 20 SOLUTION ORAL; TOPICAL at 07:20

## 2019-01-01 RX ADMIN — PROPOFOL 30 MG: 10 INJECTION, EMULSION INTRAVENOUS at 07:47

## 2019-01-01 RX ADMIN — INSULIN ASPART 5 UNITS: 100 INJECTION, SOLUTION INTRAVENOUS; SUBCUTANEOUS at 12:33

## 2019-01-01 RX ADMIN — AZITHROMYCIN 250 MG: 250 TABLET, FILM COATED ORAL at 11:23

## 2019-01-01 RX ADMIN — BACLOFEN 10 MG: 10 TABLET ORAL at 21:38

## 2019-01-01 RX ADMIN — DILTIAZEM HYDROCHLORIDE 90 MG: 60 TABLET, FILM COATED ORAL at 06:18

## 2019-01-01 RX ADMIN — Medication 250 MG: at 22:38

## 2019-01-01 RX ADMIN — SODIUM CHLORIDE, POTASSIUM CHLORIDE, SODIUM LACTATE AND CALCIUM CHLORIDE: 600; 310; 30; 20 INJECTION, SOLUTION INTRAVENOUS at 07:47

## 2019-01-01 RX ADMIN — HYDROCODONE BITARTRATE AND ACETAMINOPHEN 1 TABLET: 5; 325 TABLET ORAL at 14:57

## 2019-01-01 RX ADMIN — FAMOTIDINE 20 MG: 10 INJECTION INTRAVENOUS at 09:22

## 2019-01-01 RX ADMIN — SUCRALFATE 1 G: 1 SUSPENSION ORAL at 21:37

## 2019-01-01 RX ADMIN — SPIRONOLACTONE 25 MG: 25 TABLET ORAL at 10:49

## 2019-01-01 RX ADMIN — SODIUM CHLORIDE: 9 INJECTION, SOLUTION INTRAVENOUS at 01:31

## 2019-01-01 RX ADMIN — DILTIAZEM HYDROCHLORIDE 90 MG: 60 TABLET, FILM COATED ORAL at 11:36

## 2019-01-01 RX ADMIN — DILTIAZEM HYDROCHLORIDE 90 MG: 60 TABLET, FILM COATED ORAL at 00:17

## 2019-01-01 RX ADMIN — BACLOFEN 10 MG: 10 TABLET ORAL at 13:14

## 2019-01-01 RX ADMIN — ESTRADIOL 0.5 MG: 1 TABLET ORAL at 10:51

## 2019-01-01 RX ADMIN — PREDNISONE 5 MG: 5 TABLET ORAL at 22:40

## 2019-01-01 RX ADMIN — SODIUM CHLORIDE: 9 INJECTION, SOLUTION INTRAVENOUS at 03:31

## 2019-01-01 RX ADMIN — SODIUM CHLORIDE: 9 INJECTION, SOLUTION INTRAVENOUS at 22:23

## 2019-01-01 RX ADMIN — HYDROCODONE BITARTRATE AND ACETAMINOPHEN 1 TABLET: 5; 325 TABLET ORAL at 07:55

## 2019-01-01 RX ADMIN — FAMOTIDINE 20 MG: 10 INJECTION INTRAVENOUS at 22:42

## 2019-01-01 RX ADMIN — HYDROMORPHONE HYDROCHLORIDE 0.2 MG: 1 INJECTION, SOLUTION INTRAMUSCULAR; INTRAVENOUS; SUBCUTANEOUS at 11:18

## 2019-01-01 RX ADMIN — BACLOFEN 10 MG: 10 TABLET ORAL at 14:58

## 2019-01-01 RX ADMIN — TRAMADOL HYDROCHLORIDE 50 MG: 50 TABLET, FILM COATED ORAL at 20:42

## 2019-01-01 RX ADMIN — INSULIN GLARGINE 7 UNITS: 100 INJECTION, SOLUTION SUBCUTANEOUS at 08:00

## 2019-01-01 RX ADMIN — FUROSEMIDE 20 MG: 20 TABLET ORAL at 09:21

## 2019-01-01 RX ADMIN — HYDROCODONE BITARTRATE AND ACETAMINOPHEN 1 TABLET: 5; 325 TABLET ORAL at 03:12

## 2019-01-01 RX ADMIN — BACLOFEN 10 MG: 10 TABLET ORAL at 05:33

## 2019-01-01 RX ADMIN — IPRATROPIUM BROMIDE AND ALBUTEROL SULFATE 3 ML: .5; 3 SOLUTION RESPIRATORY (INHALATION) at 14:59

## 2019-01-01 RX ADMIN — DILTIAZEM HYDROCHLORIDE 360 MG: 180 CAPSULE, COATED, EXTENDED RELEASE ORAL at 21:38

## 2019-01-01 RX ADMIN — HYDROCODONE BITARTRATE AND ACETAMINOPHEN 2 TABLET: 5; 325 TABLET ORAL at 14:59

## 2019-01-01 RX ADMIN — INSULIN GLARGINE 7 UNITS: 100 INJECTION, SOLUTION SUBCUTANEOUS at 07:49

## 2019-01-01 RX ADMIN — GABAPENTIN 600 MG: 600 TABLET, FILM COATED ORAL at 22:08

## 2019-01-01 RX ADMIN — HYDROCODONE BITARTRATE AND ACETAMINOPHEN 1 TABLET: 5; 325 TABLET ORAL at 22:38

## 2019-01-01 RX ADMIN — BISACODYL 10 MG: 5 TABLET, COATED ORAL at 08:52

## 2019-01-01 RX ADMIN — LEVOFLOXACIN 750 MG: 5 INJECTION, SOLUTION INTRAVENOUS at 13:02

## 2019-01-01 RX ADMIN — IPRATROPIUM BROMIDE AND ALBUTEROL SULFATE 3 ML: .5; 3 SOLUTION RESPIRATORY (INHALATION) at 15:18

## 2019-01-01 RX ADMIN — HYDROCODONE BITARTRATE AND ACETAMINOPHEN 2 TABLET: 5; 325 TABLET ORAL at 08:29

## 2019-01-01 RX ADMIN — IPRATROPIUM BROMIDE AND ALBUTEROL SULFATE 3 ML: .5; 3 SOLUTION RESPIRATORY (INHALATION) at 07:18

## 2019-01-01 RX ADMIN — HYDROCODONE BITARTRATE AND ACETAMINOPHEN 2 TABLET: 5; 325 TABLET ORAL at 18:13

## 2019-01-01 RX ADMIN — DIPHENHYDRAMINE HYDROCHLORIDE 50 MG: 50 CAPSULE ORAL at 02:28

## 2019-01-01 RX ADMIN — HYDROMORPHONE HYDROCHLORIDE 0.2 MG: 1 INJECTION, SOLUTION INTRAMUSCULAR; INTRAVENOUS; SUBCUTANEOUS at 06:16

## 2019-01-01 RX ADMIN — IPRATROPIUM BROMIDE AND ALBUTEROL SULFATE 3 ML: .5; 3 SOLUTION RESPIRATORY (INHALATION) at 06:21

## 2019-01-01 RX ADMIN — TRAMADOL HYDROCHLORIDE 50 MG: 50 TABLET, FILM COATED ORAL at 10:49

## 2019-01-01 RX ADMIN — CLONIDINE HYDROCHLORIDE 0.1 MG: 0.1 TABLET ORAL at 09:33

## 2019-01-01 RX ADMIN — HYDROCODONE BITARTRATE AND ACETAMINOPHEN 1 TABLET: 5; 325 TABLET ORAL at 10:23

## 2019-01-01 RX ADMIN — FAMOTIDINE 20 MG: 10 INJECTION INTRAVENOUS at 22:11

## 2019-01-01 RX ADMIN — FAMOTIDINE 20 MG: 10 INJECTION INTRAVENOUS at 09:33

## 2019-01-01 RX ADMIN — TRAMADOL HYDROCHLORIDE 50 MG: 50 TABLET, FILM COATED ORAL at 01:53

## 2019-01-01 RX ADMIN — GABAPENTIN 600 MG: 600 TABLET, FILM COATED ORAL at 14:11

## 2019-01-01 RX ADMIN — PREDNISONE 5 MG: 5 TABLET ORAL at 19:18

## 2019-01-01 RX ADMIN — HYDROMORPHONE HYDROCHLORIDE 0.2 MG: 1 INJECTION, SOLUTION INTRAMUSCULAR; INTRAVENOUS; SUBCUTANEOUS at 05:41

## 2019-01-01 RX ADMIN — FAMOTIDINE 20 MG: 10 INJECTION INTRAVENOUS at 22:32

## 2019-01-01 RX ADMIN — GABAPENTIN 600 MG: 600 TABLET, FILM COATED ORAL at 14:58

## 2019-01-01 RX ADMIN — INSULIN GLARGINE 7 UNITS: 100 INJECTION, SOLUTION SUBCUTANEOUS at 08:34

## 2019-01-01 RX ADMIN — GABAPENTIN 600 MG: 600 TABLET, FILM COATED ORAL at 07:55

## 2019-01-01 RX ADMIN — PREDNISONE 5 MG: 5 TABLET ORAL at 08:24

## 2019-01-01 RX ADMIN — ACETAMINOPHEN 650 MG: 325 TABLET, FILM COATED ORAL at 06:16

## 2019-01-01 RX ADMIN — BACLOFEN 10 MG: 10 TABLET ORAL at 14:11

## 2019-01-01 RX ADMIN — FAMOTIDINE 20 MG: 10 INJECTION, SOLUTION INTRAVENOUS at 21:47

## 2019-01-01 RX ADMIN — DILTIAZEM HYDROCHLORIDE 90 MG: 60 TABLET, FILM COATED ORAL at 17:45

## 2019-01-01 RX ADMIN — PREDNISONE 5 MG: 5 TABLET ORAL at 08:29

## 2019-01-01 RX ADMIN — HYDROMORPHONE HYDROCHLORIDE 0.2 MG: 1 INJECTION, SOLUTION INTRAMUSCULAR; INTRAVENOUS; SUBCUTANEOUS at 04:08

## 2019-01-01 RX ADMIN — PROPOFOL 20 MG: 10 INJECTION, EMULSION INTRAVENOUS at 08:07

## 2019-01-01 RX ADMIN — BACLOFEN 10 MG: 10 TABLET ORAL at 22:40

## 2019-01-01 RX ADMIN — GABAPENTIN 600 MG: 600 TABLET, FILM COATED ORAL at 13:14

## 2019-01-01 RX ADMIN — DILTIAZEM HYDROCHLORIDE 90 MG: 60 TABLET, FILM COATED ORAL at 17:13

## 2019-01-01 RX ADMIN — PANTOPRAZOLE SODIUM 40 MG: 40 INJECTION, POWDER, LYOPHILIZED, FOR SOLUTION INTRAVENOUS at 21:21

## 2019-01-01 RX ADMIN — DILTIAZEM HYDROCHLORIDE 90 MG: 60 TABLET, FILM COATED ORAL at 05:43

## 2019-01-01 RX ADMIN — TRAMADOL HYDROCHLORIDE 50 MG: 50 TABLET, FILM COATED ORAL at 01:19

## 2019-01-01 RX ADMIN — HYDROCODONE BITARTRATE AND ACETAMINOPHEN 2 TABLET: 5; 325 TABLET ORAL at 12:47

## 2019-01-01 RX ADMIN — FAMOTIDINE 20 MG: 10 INJECTION INTRAVENOUS at 11:22

## 2019-01-01 RX ADMIN — ACETAMINOPHEN 650 MG: 325 TABLET, FILM COATED ORAL at 20:08

## 2019-01-01 RX ADMIN — CLONIDINE HYDROCHLORIDE 0.1 MG: 0.1 TABLET ORAL at 09:23

## 2019-01-01 RX ADMIN — INSULIN ASPART 3 UNITS: 100 INJECTION, SOLUTION INTRAVENOUS; SUBCUTANEOUS at 12:05

## 2019-01-01 RX ADMIN — GABAPENTIN 600 MG: 600 TABLET, FILM COATED ORAL at 19:38

## 2019-01-01 RX ADMIN — CLONIDINE HYDROCHLORIDE 0.1 MG: 0.1 TABLET ORAL at 19:38

## 2019-01-01 RX ADMIN — ACETAMINOPHEN 650 MG: 325 TABLET, FILM COATED ORAL at 19:55

## 2019-01-01 RX ADMIN — LIDOCAINE HYDROCHLORIDE 15 ML: 20 SOLUTION ORAL; TOPICAL at 07:15

## 2019-01-01 RX ADMIN — HYDROCODONE BITARTRATE AND ACETAMINOPHEN 1 TABLET: 5; 325 TABLET ORAL at 22:40

## 2019-01-01 RX ADMIN — PANTOPRAZOLE SODIUM 40 MG: 40 INJECTION, POWDER, FOR SOLUTION INTRAVENOUS at 09:40

## 2019-01-01 RX ADMIN — GABAPENTIN 600 MG: 600 TABLET, FILM COATED ORAL at 07:22

## 2019-01-01 RX ADMIN — TRAMADOL HYDROCHLORIDE 50 MG: 50 TABLET, FILM COATED ORAL at 11:13

## 2019-01-01 RX ADMIN — BACLOFEN 10 MG: 10 TABLET ORAL at 21:22

## 2019-01-01 RX ADMIN — BACLOFEN 10 MG: 10 TABLET ORAL at 13:26

## 2019-01-01 RX ADMIN — SUCRALFATE 1 G: 1 SUSPENSION ORAL at 11:39

## 2019-01-01 RX ADMIN — IPRATROPIUM BROMIDE AND ALBUTEROL SULFATE 3 ML: .5; 3 SOLUTION RESPIRATORY (INHALATION) at 10:49

## 2019-01-01 RX ADMIN — DILTIAZEM HYDROCHLORIDE 360 MG: 180 CAPSULE, COATED, EXTENDED RELEASE ORAL at 22:39

## 2019-01-01 RX ADMIN — IPRATROPIUM BROMIDE AND ALBUTEROL SULFATE 3 ML: .5; 3 SOLUTION RESPIRATORY (INHALATION) at 18:41

## 2019-01-01 RX ADMIN — PREDNISONE 5 MG: 5 TABLET ORAL at 09:16

## 2019-01-01 RX ADMIN — SODIUM CHLORIDE: 9 INJECTION, SOLUTION INTRAVENOUS at 12:58

## 2019-01-01 RX ADMIN — PROPOFOL 20 MG: 10 INJECTION, EMULSION INTRAVENOUS at 07:50

## 2019-01-01 RX ADMIN — ESTRADIOL 0.5 MG: 1 TABLET ORAL at 09:39

## 2019-01-01 RX ADMIN — GABAPENTIN 600 MG: 600 TABLET, FILM COATED ORAL at 13:41

## 2019-01-01 RX ADMIN — CLONIDINE HYDROCHLORIDE 0.1 MG: 0.1 TABLET ORAL at 09:16

## 2019-01-01 RX ADMIN — HYDROCODONE BITARTRATE AND ACETAMINOPHEN 1 TABLET: 5; 325 TABLET ORAL at 03:06

## 2019-01-01 RX ADMIN — BACLOFEN 10 MG: 10 TABLET ORAL at 05:31

## 2019-01-01 RX ADMIN — HYDROCODONE BITARTRATE AND ACETAMINOPHEN 2 TABLET: 5; 325 TABLET ORAL at 17:28

## 2019-01-01 RX ADMIN — PREDNISONE 5 MG: 5 TABLET ORAL at 07:39

## 2019-01-01 RX ADMIN — Medication 250 MG: at 22:08

## 2019-01-01 RX ADMIN — PROPOFOL 20 MG: 10 INJECTION, EMULSION INTRAVENOUS at 08:11

## 2019-01-01 RX ADMIN — HYDROCODONE BITARTRATE AND ACETAMINOPHEN 1 TABLET: 5; 325 TABLET ORAL at 22:08

## 2019-01-01 RX ADMIN — FUROSEMIDE 40 MG: 40 TABLET ORAL at 19:15

## 2019-01-01 RX ADMIN — SPIRONOLACTONE 25 MG: 25 TABLET ORAL at 09:38

## 2019-01-01 RX ADMIN — PANTOPRAZOLE SODIUM 40 MG: 40 INJECTION, POWDER, FOR SOLUTION INTRAVENOUS at 21:38

## 2019-01-01 RX ADMIN — DIPHENHYDRAMINE HYDROCHLORIDE 50 MG: 50 CAPSULE ORAL at 22:38

## 2019-01-01 RX ADMIN — TRAMADOL HYDROCHLORIDE 50 MG: 50 TABLET, FILM COATED ORAL at 02:55

## 2019-01-01 RX ADMIN — INSULIN ASPART 2 UNITS: 100 INJECTION, SOLUTION INTRAVENOUS; SUBCUTANEOUS at 17:18

## 2019-01-01 RX ADMIN — FAMOTIDINE 20 MG: 10 INJECTION INTRAVENOUS at 09:16

## 2019-01-01 RX ADMIN — PANTOPRAZOLE SODIUM 40 MG: 40 INJECTION, POWDER, LYOPHILIZED, FOR SOLUTION INTRAVENOUS at 09:22

## 2019-01-01 RX ADMIN — SPIRONOLACTONE 25 MG: 25 TABLET ORAL at 09:23

## 2019-01-01 RX ADMIN — PANTOPRAZOLE SODIUM 40 MG: 40 INJECTION, POWDER, LYOPHILIZED, FOR SOLUTION INTRAVENOUS at 09:16

## 2019-01-01 RX ADMIN — INSULIN GLARGINE 7 UNITS: 100 INJECTION, SOLUTION SUBCUTANEOUS at 08:29

## 2019-01-01 RX ADMIN — PANTOPRAZOLE SODIUM 40 MG: 40 INJECTION, POWDER, FOR SOLUTION INTRAVENOUS at 22:09

## 2019-01-01 RX ADMIN — LIDOCAINE HYDROCHLORIDE 40 MG: 20 INJECTION, SOLUTION INFILTRATION; PERINEURAL at 07:47

## 2019-01-01 RX ADMIN — CLONIDINE HYDROCHLORIDE 0.1 MG: 0.1 TABLET ORAL at 10:49

## 2019-01-01 RX ADMIN — SUCRALFATE 1 G: 1 SUSPENSION ORAL at 22:08

## 2019-01-01 RX ADMIN — FENTANYL CITRATE 50 MCG: 50 INJECTION, SOLUTION INTRAMUSCULAR; INTRAVENOUS at 07:15

## 2019-01-01 RX ADMIN — HYDROCODONE BITARTRATE AND ACETAMINOPHEN 1 TABLET: 5; 325 TABLET ORAL at 22:11

## 2019-01-01 RX ADMIN — SUCRALFATE 1 G: 1 TABLET ORAL at 13:41

## 2019-01-01 RX ADMIN — GABAPENTIN 600 MG: 600 TABLET, FILM COATED ORAL at 05:31

## 2019-01-01 RX ADMIN — ASPIRIN 81 MG 324 MG: 81 TABLET ORAL at 12:47

## 2019-01-01 RX ADMIN — INSULIN ASPART 2 UNITS: 100 INJECTION, SOLUTION INTRAVENOUS; SUBCUTANEOUS at 17:44

## 2019-01-01 RX ADMIN — INSULIN ASPART 3 UNITS: 100 INJECTION, SOLUTION INTRAVENOUS; SUBCUTANEOUS at 17:29

## 2019-01-01 RX ADMIN — PROPOFOL 100 MCG/KG/MIN: 10 INJECTION, EMULSION INTRAVENOUS at 07:47

## 2019-01-01 RX ADMIN — PANTOPRAZOLE SODIUM 40 MG: 40 INJECTION, POWDER, LYOPHILIZED, FOR SOLUTION INTRAVENOUS at 11:37

## 2019-01-01 RX ADMIN — INSULIN ASPART 2 UNITS: 100 INJECTION, SOLUTION INTRAVENOUS; SUBCUTANEOUS at 18:16

## 2019-01-01 RX ADMIN — INSULIN ASPART 3 UNITS: 100 INJECTION, SOLUTION INTRAVENOUS; SUBCUTANEOUS at 11:42

## 2019-01-01 RX ADMIN — BACLOFEN 10 MG: 10 TABLET ORAL at 14:54

## 2019-01-01 RX ADMIN — INSULIN ASPART 1 UNITS: 100 INJECTION, SOLUTION INTRAVENOUS; SUBCUTANEOUS at 11:43

## 2019-01-01 RX ADMIN — Medication 250 MG: at 09:38

## 2019-01-01 RX ADMIN — TRAMADOL HYDROCHLORIDE 50 MG: 50 TABLET, FILM COATED ORAL at 03:23

## 2019-01-01 RX ADMIN — SODIUM CHLORIDE: 9 INJECTION, SOLUTION INTRAVENOUS at 08:21

## 2019-01-01 RX ADMIN — PANTOPRAZOLE SODIUM 40 MG: 40 INJECTION, POWDER, FOR SOLUTION INTRAVENOUS at 09:21

## 2019-01-01 RX ADMIN — HYDROCODONE BITARTRATE AND ACETAMINOPHEN 2 TABLET: 5; 325 TABLET ORAL at 08:23

## 2019-01-01 RX ADMIN — INSULIN ASPART 4 UNITS: 100 INJECTION, SOLUTION INTRAVENOUS; SUBCUTANEOUS at 16:28

## 2019-01-01 RX ADMIN — AZITHROMYCIN 250 MG: 250 TABLET, FILM COATED ORAL at 09:22

## 2019-01-01 RX ADMIN — PREDNISONE 5 MG: 5 TABLET ORAL at 17:55

## 2019-01-01 RX ADMIN — CEFTRIAXONE SODIUM 1 G: 1 INJECTION, SOLUTION INTRAVENOUS at 20:10

## 2019-01-01 RX ADMIN — FENTANYL CITRATE 25 MCG: 50 INJECTION, SOLUTION INTRAMUSCULAR; INTRAVENOUS at 10:06

## 2019-01-01 RX ADMIN — PREDNISONE 5 MG: 5 TABLET ORAL at 18:14

## 2019-01-01 RX ADMIN — HYDROCODONE BITARTRATE AND ACETAMINOPHEN 1 TABLET: 5; 325 TABLET ORAL at 07:41

## 2019-01-01 RX ADMIN — BACLOFEN 10 MG: 10 TABLET ORAL at 06:16

## 2019-01-01 RX ADMIN — TRAMADOL HYDROCHLORIDE 50 MG: 50 TABLET, FILM COATED ORAL at 02:33

## 2019-01-01 RX ADMIN — INSULIN ASPART 2 UNITS: 100 INJECTION, SOLUTION INTRAVENOUS; SUBCUTANEOUS at 08:06

## 2019-01-01 RX ADMIN — CLONIDINE HYDROCHLORIDE 0.1 MG: 0.1 TABLET ORAL at 11:23

## 2019-01-01 RX ADMIN — GABAPENTIN 600 MG: 600 TABLET, FILM COATED ORAL at 14:54

## 2019-01-01 RX ADMIN — HYDROCODONE BITARTRATE AND ACETAMINOPHEN 1 TABLET: 5; 325 TABLET ORAL at 16:28

## 2019-01-01 RX ADMIN — TRAMADOL HYDROCHLORIDE 50 MG: 50 TABLET, FILM COATED ORAL at 18:02

## 2019-01-01 RX ADMIN — INSULIN ASPART 2 UNITS: 100 INJECTION, SOLUTION INTRAVENOUS; SUBCUTANEOUS at 08:31

## 2019-01-01 RX ADMIN — SODIUM CHLORIDE 250 ML: 9 INJECTION, SOLUTION INTRAVENOUS at 13:03

## 2019-01-01 RX ADMIN — FUROSEMIDE 40 MG: 40 TABLET ORAL at 09:39

## 2019-01-01 RX ADMIN — BARIUM SULFATE 15 ML: 400 SUSPENSION ORAL at 12:15

## 2019-01-01 RX ADMIN — FAMOTIDINE 20 MG: 10 INJECTION, SOLUTION INTRAVENOUS at 09:22

## 2019-01-01 RX ADMIN — INSULIN ASPART 2 UNITS: 100 INJECTION, SOLUTION INTRAVENOUS; SUBCUTANEOUS at 07:42

## 2019-01-01 RX ADMIN — ESTRADIOL 0.5 MG: 1 TABLET ORAL at 09:22

## 2019-01-01 RX ADMIN — AZITHROMYCIN 250 MG: 250 TABLET, FILM COATED ORAL at 09:16

## 2019-01-01 RX ADMIN — GABAPENTIN 600 MG: 600 TABLET, FILM COATED ORAL at 06:16

## 2019-01-01 RX ADMIN — SUCRALFATE 1 G: 1 SUSPENSION ORAL at 10:49

## 2019-01-01 RX ADMIN — FUROSEMIDE 40 MG: 40 TABLET ORAL at 09:22

## 2019-01-01 RX ADMIN — HYDROCODONE BITARTRATE AND ACETAMINOPHEN 2 TABLET: 5; 325 TABLET ORAL at 03:10

## 2019-01-01 RX ADMIN — BACLOFEN 10 MG: 10 TABLET ORAL at 06:19

## 2019-01-01 RX ADMIN — SUCRALFATE 1 G: 1 SUSPENSION ORAL at 07:44

## 2019-01-01 RX ADMIN — CALCIUM CARBONATE 500 MG: 500 TABLET ORAL at 10:25

## 2019-01-01 RX ADMIN — Medication 250 MG: at 21:38

## 2019-01-01 RX ADMIN — SUCRALFATE 1 G: 1 SUSPENSION ORAL at 17:55

## 2019-01-01 RX ADMIN — FUROSEMIDE 40 MG: 40 TABLET ORAL at 10:49

## 2019-01-01 RX ADMIN — ATORVASTATIN CALCIUM 20 MG: 20 TABLET, FILM COATED ORAL at 21:45

## 2019-01-01 RX ADMIN — PANTOPRAZOLE SODIUM 40 MG: 40 INJECTION, POWDER, LYOPHILIZED, FOR SOLUTION INTRAVENOUS at 22:41

## 2019-01-01 RX ADMIN — PREDNISONE 5 MG: 5 TABLET ORAL at 22:38

## 2019-01-01 RX ADMIN — IPRATROPIUM BROMIDE AND ALBUTEROL SULFATE 3 ML: .5; 3 SOLUTION RESPIRATORY (INHALATION) at 11:17

## 2019-01-01 RX ADMIN — CEFTRIAXONE SODIUM 1 G: 1 INJECTION, SOLUTION INTRAVENOUS at 20:27

## 2019-01-01 RX ADMIN — INSULIN GLARGINE 7 UNITS: 100 INJECTION, SOLUTION SUBCUTANEOUS at 07:41

## 2019-01-01 RX ADMIN — SUCRALFATE 1 G: 1 SUSPENSION ORAL at 18:15

## 2019-01-01 RX ADMIN — HYDROCODONE BITARTRATE AND ACETAMINOPHEN 2 TABLET: 5; 325 TABLET ORAL at 07:39

## 2019-01-01 RX ADMIN — HYDROCODONE BITARTRATE AND ACETAMINOPHEN 1 TABLET: 5; 325 TABLET ORAL at 07:23

## 2019-01-01 RX ADMIN — ATORVASTATIN CALCIUM 20 MG: 20 TABLET, FILM COATED ORAL at 22:38

## 2019-01-01 RX ADMIN — GABAPENTIN 600 MG: 600 TABLET, FILM COATED ORAL at 11:23

## 2019-01-01 RX ADMIN — BACLOFEN 10 MG: 10 TABLET ORAL at 22:11

## 2019-01-01 RX ADMIN — PREDNISONE 5 MG: 5 TABLET ORAL at 21:22

## 2019-01-01 RX ADMIN — CEFUROXIME AXETIL 250 MG: 250 TABLET ORAL at 09:22

## 2019-01-01 RX ADMIN — TRAMADOL HYDROCHLORIDE 50 MG: 50 TABLET, FILM COATED ORAL at 19:16

## 2019-01-01 RX ADMIN — Medication 250 MG: at 09:22

## 2019-01-01 RX ADMIN — TRAMADOL HYDROCHLORIDE 50 MG: 50 TABLET, FILM COATED ORAL at 07:23

## 2019-01-01 RX ADMIN — HYDROCODONE BITARTRATE AND ACETAMINOPHEN 1 TABLET: 5; 325 TABLET ORAL at 21:22

## 2019-01-01 RX ADMIN — SUCRALFATE 1 G: 1 TABLET ORAL at 17:28

## 2019-01-01 RX ADMIN — SODIUM CHLORIDE 250 ML: 9 INJECTION, SOLUTION INTRAVENOUS at 16:51

## 2019-01-01 RX ADMIN — CEFTRIAXONE SODIUM 1 G: 1 INJECTION, SOLUTION INTRAVENOUS at 20:41

## 2019-01-01 RX ADMIN — IODIXANOL 100 ML: 320 INJECTION, SOLUTION INTRAVASCULAR at 19:34

## 2019-01-01 RX ADMIN — GABAPENTIN 600 MG: 600 TABLET, FILM COATED ORAL at 06:09

## 2019-01-01 RX ADMIN — PANTOPRAZOLE SODIUM 40 MG: 40 INJECTION, POWDER, LYOPHILIZED, FOR SOLUTION INTRAVENOUS at 11:22

## 2019-01-01 RX ADMIN — GABAPENTIN 600 MG: 600 TABLET, FILM COATED ORAL at 07:41

## 2019-01-01 RX ADMIN — INSULIN GLARGINE 9 UNITS: 100 INJECTION, SOLUTION SUBCUTANEOUS at 21:46

## 2019-01-01 RX ADMIN — ATORVASTATIN CALCIUM 20 MG: 20 TABLET, FILM COATED ORAL at 22:11

## 2019-01-01 RX ADMIN — IPRATROPIUM BROMIDE AND ALBUTEROL SULFATE 3 ML: .5; 3 SOLUTION RESPIRATORY (INHALATION) at 18:29

## 2019-01-01 RX ADMIN — PREDNISONE 5 MG: 5 TABLET ORAL at 09:22

## 2019-01-01 RX ADMIN — CLONIDINE HYDROCHLORIDE 0.1 MG: 0.1 TABLET ORAL at 09:22

## 2019-01-01 RX ADMIN — ATORVASTATIN CALCIUM 20 MG: 20 TABLET, FILM COATED ORAL at 22:40

## 2019-01-01 RX ADMIN — HYDROCODONE BITARTRATE AND ACETAMINOPHEN 1 TABLET: 5; 325 TABLET ORAL at 12:00

## 2019-01-01 RX ADMIN — GABAPENTIN 600 MG: 600 TABLET, FILM COATED ORAL at 22:38

## 2019-01-01 RX ADMIN — IPRATROPIUM BROMIDE AND ALBUTEROL SULFATE 3 ML: .5; 3 SOLUTION RESPIRATORY (INHALATION) at 06:32

## 2019-01-01 RX ADMIN — PANTOPRAZOLE SODIUM 40 MG: 40 INJECTION, POWDER, FOR SOLUTION INTRAVENOUS at 22:40

## 2019-01-01 RX ADMIN — SUCRALFATE 1 G: 1 SUSPENSION ORAL at 22:39

## 2019-01-01 RX ADMIN — SODIUM CHLORIDE 250 ML: 9 INJECTION, SOLUTION INTRAVENOUS at 13:44

## 2019-01-01 RX ADMIN — ACETAMINOPHEN 650 MG: 325 TABLET, FILM COATED ORAL at 02:31

## 2019-01-01 RX ADMIN — HYDROCODONE BITARTRATE AND ACETAMINOPHEN 2 TABLET: 5; 325 TABLET ORAL at 22:37

## 2019-01-01 RX ADMIN — INSULIN ASPART 5 UNITS: 100 INJECTION, SOLUTION INTRAVENOUS; SUBCUTANEOUS at 19:36

## 2019-01-01 RX ADMIN — GABAPENTIN 600 MG: 600 TABLET, FILM COATED ORAL at 20:27

## 2019-01-01 RX ADMIN — FENTANYL CITRATE 25 MCG: 50 INJECTION, SOLUTION INTRAMUSCULAR; INTRAVENOUS at 09:39

## 2019-01-01 RX ADMIN — BACLOFEN 10 MG: 10 TABLET ORAL at 05:43

## 2019-01-01 RX ADMIN — PREDNISONE 5 MG: 5 TABLET ORAL at 22:11

## 2019-01-01 RX ADMIN — HYDROCODONE BITARTRATE AND ACETAMINOPHEN 1 TABLET: 5; 325 TABLET ORAL at 17:13

## 2019-01-01 RX ADMIN — SODIUM CHLORIDE 250 ML: 9 INJECTION, SOLUTION INTRAVENOUS at 02:42

## 2019-01-01 RX ADMIN — PANTOPRAZOLE SODIUM 40 MG: 40 INJECTION, POWDER, FOR SOLUTION INTRAVENOUS at 10:51

## 2019-01-01 RX ADMIN — FAMOTIDINE 20 MG: 10 INJECTION INTRAVENOUS at 21:21

## 2019-01-01 RX ADMIN — GABAPENTIN 600 MG: 600 TABLET, FILM COATED ORAL at 20:32

## 2019-01-01 RX ADMIN — DILTIAZEM HYDROCHLORIDE 90 MG: 60 TABLET, FILM COATED ORAL at 00:28

## 2019-01-01 RX ADMIN — PREDNISONE 5 MG: 5 TABLET ORAL at 09:33

## 2019-01-01 RX ADMIN — PREDNISONE 5 MG: 5 TABLET ORAL at 17:28

## 2019-01-01 RX ADMIN — ATORVASTATIN CALCIUM 20 MG: 20 TABLET, FILM COATED ORAL at 21:22

## 2019-01-01 RX ADMIN — AZITHROMYCIN MONOHYDRATE 500 MG: 500 INJECTION, POWDER, LYOPHILIZED, FOR SOLUTION INTRAVENOUS at 22:21

## 2019-01-01 RX ADMIN — IPRATROPIUM BROMIDE AND ALBUTEROL SULFATE 3 ML: .5; 3 SOLUTION RESPIRATORY (INHALATION) at 19:12

## 2019-01-01 RX ADMIN — BACLOFEN 10 MG: 10 TABLET ORAL at 06:09

## 2019-01-01 RX ADMIN — INSULIN ASPART 1 UNITS: 100 INJECTION, SOLUTION INTRAVENOUS; SUBCUTANEOUS at 07:53

## 2019-01-01 RX ADMIN — TRAMADOL HYDROCHLORIDE 50 MG: 50 TABLET, FILM COATED ORAL at 01:27

## 2019-01-01 RX ADMIN — HYDROCODONE BITARTRATE AND ACETAMINOPHEN 1 TABLET: 5; 325 TABLET ORAL at 11:38

## 2019-01-01 RX ADMIN — CLONIDINE HYDROCHLORIDE 0.1 MG: 0.1 TABLET ORAL at 09:39

## 2019-01-01 RX ADMIN — INSULIN ASPART 2 UNITS: 100 INJECTION, SOLUTION INTRAVENOUS; SUBCUTANEOUS at 08:18

## 2019-01-01 RX ADMIN — CEFTRIAXONE SODIUM 1 G: 1 INJECTION, SOLUTION INTRAVENOUS at 20:32

## 2019-01-01 RX ADMIN — HYDROCODONE BITARTRATE AND ACETAMINOPHEN 1 TABLET: 5; 325 TABLET ORAL at 15:21

## 2019-01-01 RX ADMIN — SUCRALFATE 1 G: 1 SUSPENSION ORAL at 08:25

## 2019-01-01 RX ADMIN — SODIUM CHLORIDE: 9 INJECTION, SOLUTION INTRAVENOUS at 13:13

## 2019-01-01 RX ADMIN — AZITHROMYCIN 250 MG: 250 TABLET, FILM COATED ORAL at 09:33

## 2019-01-01 RX ADMIN — IPRATROPIUM BROMIDE AND ALBUTEROL SULFATE 3 ML: .5; 3 SOLUTION RESPIRATORY (INHALATION) at 11:41

## 2019-01-01 RX ADMIN — GABAPENTIN 600 MG: 600 TABLET, FILM COATED ORAL at 20:42

## 2019-01-01 RX ADMIN — PANTOPRAZOLE SODIUM 40 MG: 40 INJECTION, POWDER, LYOPHILIZED, FOR SOLUTION INTRAVENOUS at 22:11

## 2019-01-01 RX ADMIN — SODIUM CHLORIDE: 9 INJECTION, SOLUTION INTRAVENOUS at 02:32

## 2019-01-01 SDOH — HEALTH STABILITY: MENTAL HEALTH: HOW OFTEN DO YOU HAVE A DRINK CONTAINING ALCOHOL?: NEVER

## 2019-01-01 ASSESSMENT — ENCOUNTER SYMPTOMS
ALLERGIC/IMMUNOLOGIC NEGATIVE: 1
EYE PAIN: 0
SEIZURES: 0
CHILLS: 0
FLANK PAIN: 0
ENDOCRINE NEGATIVE: 1
FEVER: 0
HEMATOLOGIC/LYMPHATIC NEGATIVE: 1
CONSTITUTIONAL NEGATIVE: 1
FACIAL SWELLING: 0
HALLUCINATIONS: 0
ENDOCRINE NEGATIVE: 1
SHORTNESS OF BREATH: 1
NUMBNESS: 0
SHORTNESS OF BREATH: 0
CONSTITUTIONAL NEGATIVE: 1
HEMATOLOGIC/LYMPHATIC NEGATIVE: 1
EYE REDNESS: 0
FATIGUE: 0
ALLERGIC/IMMUNOLOGIC NEGATIVE: 1
BACK PAIN: 0
EYES NEGATIVE: 1
CHEST TIGHTNESS: 0
BRUISES/BLEEDS EASILY: 0
ADENOPATHY: 0
NECK STIFFNESS: 0
HEMATURIA: 0
TREMORS: 0
DIARRHEA: 0
FREQUENCY: 0
ABDOMINAL PAIN: 0
HEADACHES: 0
LIGHT-HEADEDNESS: 0
ENDOCRINE NEGATIVE: 1
CONSTIPATION: 0
NAUSEA: 0
RHINORRHEA: 0
JOINT SWELLING: 0
BLOOD IN STOOL: 0
ENDOCRINE NEGATIVE: 1
CONSTITUTIONAL NEGATIVE: 1
CHILLS: 0
SLEEP DISTURBANCE: 0
NECK PAIN: 0
HEMATOLOGIC/LYMPHATIC NEGATIVE: 1
ALLERGIC/IMMUNOLOGIC NEGATIVE: 1
ENDOCRINE NEGATIVE: 1
COUGH: 0
AGITATION: 0
DIAPHORESIS: 0
SINUS PRESSURE: 0
VOMITING: 0
DYSURIA: 0
CONFUSION: 0
AGITATION: 0
DIZZINESS: 0
ENDOCRINE NEGATIVE: 1
ABDOMINAL DISTENTION: 0
DIFFICULTY URINATING: 0
HEMATOLOGIC/LYMPHATIC NEGATIVE: 1
DYSRHYTHMIAS: 1
ALLERGIC/IMMUNOLOGIC NEGATIVE: 1
ALLERGIC/IMMUNOLOGIC NEGATIVE: 1
WEAKNESS: 0
ARTHRALGIAS: 0
SORE THROAT: 0
CONSTITUTIONAL NEGATIVE: 1
CONSTITUTIONAL NEGATIVE: 1
DYSURIA: 0
ALLERGIC/IMMUNOLOGIC NEGATIVE: 1
CHEST TIGHTNESS: 1
CONSTITUTIONAL NEGATIVE: 1
PALPITATIONS: 0
NAUSEA: 0
EYES NEGATIVE: 1
SINUS PAIN: 0
COUGH: 1
FEVER: 0
WHEEZING: 0
VOMITING: 0
TROUBLE SWALLOWING: 0

## 2019-01-01 ASSESSMENT — ACTIVITIES OF DAILY LIVING (ADL)
ADLS_ACUITY_SCORE: 18
ADLS_ACUITY_SCORE: 20
ADLS_ACUITY_SCORE: 20
ADLS_ACUITY_SCORE: 25
ADLS_ACUITY_SCORE: 20
ADLS_ACUITY_SCORE: 21
ADLS_ACUITY_SCORE: 18
ADLS_ACUITY_SCORE: 20
ADLS_ACUITY_SCORE: 25
ADLS_ACUITY_SCORE: 21
ADLS_ACUITY_SCORE: 21
ADLS_ACUITY_SCORE: 24
ADLS_ACUITY_SCORE: 20
ADLS_ACUITY_SCORE: 20
ADLS_ACUITY_SCORE: 24
ADLS_ACUITY_SCORE: 20
ADLS_ACUITY_SCORE: 24
ADLS_ACUITY_SCORE: 24
ADLS_ACUITY_SCORE: 20
ADLS_ACUITY_SCORE: 24
ADLS_ACUITY_SCORE: 24
ADLS_ACUITY_SCORE: 17
ADLS_ACUITY_SCORE: 17
ADLS_ACUITY_SCORE: 21
ADLS_ACUITY_SCORE: 19
ADLS_ACUITY_SCORE: 18
ADLS_ACUITY_SCORE: 24
ADLS_ACUITY_SCORE: 24
ADLS_ACUITY_SCORE: 19
ADLS_ACUITY_SCORE: 18
ADLS_ACUITY_SCORE: 20
ADLS_ACUITY_SCORE: 18
ADLS_ACUITY_SCORE: 18
ADLS_ACUITY_SCORE: 20
ADLS_ACUITY_SCORE: 18
ADLS_ACUITY_SCORE: 24
ADLS_ACUITY_SCORE: 18
ADLS_ACUITY_SCORE: 18
ADLS_ACUITY_SCORE: 19
ADLS_ACUITY_SCORE: 24

## 2019-01-01 ASSESSMENT — PAIN SCALES - GENERAL: PAINLEVEL: EXTREME PAIN (8)

## 2019-01-01 ASSESSMENT — MIFFLIN-ST. JEOR
SCORE: 993.5
SCORE: 942.5
SCORE: 993.5
SCORE: 995.31
SCORE: 976.71
SCORE: 959.93
SCORE: 993.5
SCORE: 998.5

## 2019-01-01 ASSESSMENT — PATIENT HEALTH QUESTIONNAIRE - PHQ9
SUM OF ALL RESPONSES TO PHQ QUESTIONS 1-9: 0

## 2019-01-09 NOTE — TELEPHONE ENCOUNTER
Routing refill request to provider for review/approval because:  Drug not on the [facility] refill protocol     LOV: 12/19/18  She will try increasing the gabapentin from 100 mg 3 times daily up to 300 mg 3 times daily as tolerated and needed.   Damaris Arora RN on 1/9/2019 at 12:35 PM

## 2019-01-11 NOTE — TELEPHONE ENCOUNTER
Routing refill request to provider for review/approval because:  Drug not on the [facility] refill protocol     LOV: 12/19/18  Damaris Arora RN on 1/11/2019 at 11:28 AM

## 2019-01-14 NOTE — TELEPHONE ENCOUNTER
Routing refill request to provider for review/approval because:  Drug not on the [facility] refill protocol   LOV: 12/19/18  Damaris Arora RN on 1/14/2019 at 10:24 AM

## 2019-01-25 NOTE — TELEPHONE ENCOUNTER
Routing refill request to provider for review/approval because:  Medication is reported/historical  LOV: 12/19/18  insulin glargine (LANTUS SOLOSTAR) 100 unit/mL (3 mL) pen    Indications: Type 2 diabetes mellitus with other diabetic kidney complication, with long-term current use of insulin (HC) 16 units am, 14 units pm       Damaris Arora RN on 1/25/2019 at 10:52 AM

## 2019-01-29 NOTE — TELEPHONE ENCOUNTER
Routing refill request to provider for review/approval because:  Medication is reported/historical  Labs not current : micro albumin  LOV: 12/19/18    Damaris Arora RN on 1/29/2019 at 10:26 AM

## 2019-02-13 NOTE — TELEPHONE ENCOUNTER
Routing refill request to provider for review/approval because:  Medication is reported/historical    LOV: 12/19/18   Damaris Arora RN on 2/13/2019 at 2:15 PM                                        Prescription approved per [facility] Refill Protocol.  LOV; 12/19/18  Patient to follow up in 3 months for complete evaluation.    Damaris Arora RN on 2/13/2019 at 2:11 PM

## 2019-02-21 NOTE — TELEPHONE ENCOUNTER
Prescription approved per [facility] Refill Protocol.  LOV: 12/19/18 patient to follow up in 3 months for complete evaluation    Damaris Arora RN on 2/21/2019 at 3:57 PM

## 2019-03-06 NOTE — TELEPHONE ENCOUNTER
Routing refill request to provider for review/approval because:  Drug not on the [facility] refill protocol     LOV 12-19-19.  Last filled on 8-14-18 for #180 X 1 refill. Anne Piña RN on 3/6/2019 at 1:11 PM

## 2019-03-08 NOTE — TELEPHONE ENCOUNTER
Prescription approved per [facility] Refill Protocol.  LOV: 12/19/18 patient to follow up in 3 months.    Patient has appointment scheduled for 3/19/19  Damaris Arora RN on 3/8/2019 at 2:55 PM

## 2019-03-13 NOTE — TELEPHONE ENCOUNTER
Routing refill request to provider for review/approval because:  Drug not on the [facility] refill protocol     LOV; 12/19/18  Patient noted to have appointment on 3/19/19    Damaris Arora RN on 3/13/2019 at 2:23 PM

## 2019-03-19 PROBLEM — L89.311: Status: RESOLVED | Noted: 2018-09-03 | Resolved: 2019-01-01

## 2019-03-19 NOTE — LETTER
March 19, 2019      Karen BOLDEN [place]  [address] [address] [address]  [place] [address]        Dear Karen Duncan,    Below are the results of your recent labs:    Results for orders placed or performed in visit on 03/19/19   CBC W PLT No Diff   Result Value Ref Range    WBC 21.8 (H) 4.0 - 11.0 10e9/L    RBC Count 4.88 3.8 - 5.2 10e12/L    Hemoglobin 15.0 11.7 - 15.7 g/dL    Hematocrit 44.7 35.0 - 47.0 %    MCV 92 78 - 100 fl    MCH 30.7 26.5 - 33.0 pg    MCHC 33.6 31.5 - 36.5 g/dL    RDW 13.5 10.0 - 15.0 %    Platelet Count 411 150 - 450 10e9/L   TSH   Result Value Ref Range    Thyrotropin 1.26 0.34 - 5.60 IU/mL   Hemoglobin A1c   Result Value Ref Range    Hemoglobin A1C 7.0 (H) 4.0 - 6.0 %   Lipid Panel   Result Value Ref Range    Cholesterol 174 <200 mg/dL    Triglycerides 199 (H) <150 mg/dL    HDL Cholesterol 60 23 - 92 mg/dL    LDL Cholesterol Calculated 74 <100 mg/dL    Non HDL Cholesterol 114 <130 mg/dL   Comprehensive Metabolic Panel   Result Value Ref Range    Sodium 138 134 - 144 mmol/L    Potassium 4.5 3.5 - 5.1 mmol/L    Chloride 100 98 - 107 mmol/L    Carbon Dioxide 24 21 - 31 mmol/L    Anion Gap 14 3 - 14 mmol/L    Glucose 144 (H) 70 - 105 mg/dL    Urea Nitrogen 37 (H) 7 - 25 mg/dL    Creatinine 0.88 0.60 - 1.20 mg/dL    GFR Estimate 63 >60 mL/min/[1.73_m2]    GFR Estimate If Black 76 >60 mL/min/[1.73_m2]    Calcium 10.0 8.6 - 10.3 mg/dL    Bilirubin Total 0.3 0.3 - 1.0 mg/dL    Albumin 4.2 3.5 - 5.7 g/dL    Protein Total 7.1 6.4 - 8.9 g/dL    Alkaline Phosphatase 59 34 - 104 U/L    ALT 30 7 - 52 U/L    AST 21 13 - 39 U/L        Overall, your blood tests look fine.  You do have an elevation in your white blood count.  I am not sure what to make of this, certainly being on prednisone can cause your white blood count to be elevated as can an infection but I do not think either of those are the reason for this.  I want you to return so that we can do some further testing on this and decide whether  this is something to pursue or not.  If you have questions in the interim, feel free to contact me otherwise we will review everything when you return.    Sincerely,        Hugo Duarte MD  Internal Medicine  [facility]

## 2019-03-19 NOTE — NURSING NOTE
The patient is here today to be seen for refills on her medications.  Josefa Pandya LPN on 3/19/2019 at 10:56 AM  Chief Complaint   Patient presents with     Recheck Medication       Initial /84 (BP Location: Right arm, Patient Position: Sitting, Cuff Size: Adult Regular)   Pulse 68   Temp 96.8  F (36  C) (Tympanic)   Resp 18   Wt 55.3 kg (122 lb)   Breastfeeding? No   BMI 23.83 kg/m   Estimated body mass index is 23.83 kg/m  as calculated from the following:    Height as of 12/19/18: 1.524 m (5').    Weight as of this encounter: 55.3 kg (122 lb).  Medication Reconciliation: complete    Josefa Pandya LPN
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
## 2019-03-27 NOTE — TELEPHONE ENCOUNTER
Prescription approved per [facility] Refill Protocol.  LOV; 3/19/19  Damaris Arora RN on 3/27/2019 at 11:24 AM

## 2019-03-29 NOTE — TELEPHONE ENCOUNTER
The patient was contacted and given the information below. She stated she will see him Monday and was given a appointment.  Josefa Pandya LPN on 3/29/2019 at 9:40 AM

## 2019-03-29 NOTE — TELEPHONE ENCOUNTER
MAF-Pt requesting work in appt for increased wbc from previous visit-now is having severe knee pain. Please call. Thank you.   Radha Banda
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
## 2019-04-01 NOTE — NURSING NOTE
The patient is here today to be seen for left knee pain and swelling.  Josefa Pandya LPN on 4/1/2019 at 11:19 AM  Chief Complaint   Patient presents with     Knee Pain     left       Initial /78 (BP Location: Right arm, Patient Position: Sitting, Cuff Size: Adult Regular)   Pulse 76   Temp 98.6  F (37  C) (Tympanic)   Resp 18   Wt 56.7 kg (125 lb)   Breastfeeding? No   BMI 24.41 kg/m   Estimated body mass index is 24.41 kg/m  as calculated from the following:    Height as of 12/19/18: 1.524 m (5').    Weight as of this encounter: 56.7 kg (125 lb).  Medication Reconciliation: complete    Josefa Pandya LPN

## 2019-04-04 NOTE — TELEPHONE ENCOUNTER
Prescription [place] per [facility] [place].  LOV: 3/19/19    Damaris Arora RN on 4/4/2019 at 11:31 AM

## 2019-04-08 NOTE — TELEPHONE ENCOUNTER
[facility] pharmacy was calling about the usage of the patients tramadol. She reports on march 14th they filled 112 tablets for a 14 day supply, on march 26th they filled annother 112 tablets for a 14 day supply and now they are requesting another refill today of 112 tablets. They want to know since she is on  Scheduled narcotics if they should release the refill today on the tramadol and if the patient is taking the two tablets every six hours for pain as needed. They would also like to know if the daily supply should be different or the directions stay the same.  Josefa Pandya, LPN on 4/8/2019 at 1:18 PM

## 2019-04-08 NOTE — TELEPHONE ENCOUNTER
Contacted the pharmacy and let them know the information below.  Josefa Pandya LPN on 4/8/2019 at 1:27 PM

## 2019-04-12 NOTE — TELEPHONE ENCOUNTER
"Requested Prescriptions   Pending Prescriptions Disp Refills     estradiol (ESTRACE) 0.5 MG tablet [Pharmacy Med Name: ESTRADIOL 0.5MG     TAB] 90 tablet 1     Sig: TAKE 1 TABLET BY MOUTH ONCE DAILY       Hormone Replacement Therapy Passed - 4/9/2019  8:46 AM        Passed - Blood pressure under 140/90 in past 12 months     BP Readings from Last 3 Encounters:   04/01/19 122/78   03/19/19 122/84   12/19/18 124/76                 Passed - Recent (12 mo) or future (30 days) visit within the authorizing provider's specialty- PX 3/19/19     Patient had office visit in the last 12 months or has a visit in the next 30 days with authorizing provider or within the authorizing provider's specialty.  See \"Patient Info\" tab in inbasket, or \"Choose Columns\" in Meds & Orders section of the refill encounter.              Passed - Patient has mammogram in past 2 years on file if age 50-75 3/28/19        Passed - Medication is active on med list        Passed - Patient is 18 years of age or older        Passed - No active pregnancy on record        Passed - No positive pregnancy test on record in past 12 months        Pres[place] per [facility] [place].  Bernie Gregory RN on 4/12/2019 at 8:17 AM    "

## 2019-04-19 NOTE — TELEPHONE ENCOUNTER
Filled 03/27/19 #500 x 1. Pharmacy alerted. Unable to complete prescription refill per RNMedication Refill Policy.................... Pam Guerrero ....................  4/19/2019   2:49 PM    NOVOFINE 30 30G X 8 MM insulin pen needle 500 each 1 3/27/2019  No   Sig: USE 5 TIMES DAILY   Sent to pharmacy as: NOVOFINE 30 30G X 8 MM insulin pen needle   Class: E-Prescribe   Order: [id number]   E-Prescribing Status: Receipt confirmed by pharmacy (3/27/2019 11:25 AM CDT)   Printout Tracking     External Result Report   Pharmacy     [id number] - [place] - [address]

## 2019-04-30 NOTE — TELEPHONE ENCOUNTER
Prescription approved per [facility] Refill Protocol.  LOV 4-1-19 with labs completed and reviewed by PCP. Anne Piña RN on 4/30/2019 at 10:47 AM

## 2019-05-09 NOTE — TELEPHONE ENCOUNTER
Routing refill request to provider for review/approval because:  Drug not on the [facility] refill protocol     LOV: 4/1/19  Damaris Arora RN on 5/9/2019 at 8:46 AM

## 2019-05-16 NOTE — PROGRESS NOTES
VITALS:   Height:   60  Weight:   122  Pulse rate:  60  Blood Pressure:  134 / 70    CHIEF COMPLAINT: Left Knee Pain    PROBLEMS:  FH STROKE (ICD-V17.1) (PXC01-F84.3)  RHEUMATOID ARTHRITIS (ICD-714.0) (LUN21-O63.9)  HYPERTENSION (ICD-401.9) (OBJ06-T24)  DIABETES, TYPE 2 (ICD-250.00) (DTP17-H77.9)    PATIENT REPORTED MEDICATIONS:  TRAMADOL HCL 50 MG ORAL TABLET (TRAMADOL HCL) ; Route: ORAL  SPIRONOLACTONE 25 MG ORAL TABLET (SPIRONOLACTONE) ; Route: ORAL  FLORASTOR 250 MG ORAL CAPSULE (SACCHAROMYCES BOULARDII) ; Route: ORAL  PSYLLIUM FIBER 0.52 GM ORAL CAPSULE (PSYLLIUM) ; Route: ORAL  PREDNISONE 5 MG ORAL TABLET (PREDNISONE) ; Route: ORAL  METFORMIN  MG ORAL TABLET (METFORMIN HCL) ; Route: ORAL  LANTUS SOLOSTAR 100 UNIT/ML SUBCUTANEOUS SOLUTION PEN-INJECTOR (INSULIN GLARGINE) ; Route: SUBCUTANEOUS  NOVOLOG FLEXPEN 100 UNIT/ML SUBCUTANEOUS SOLUTION PEN-INJECTOR (INSULIN ASPART) ; Route: SUBCUTANEOUS  HYDROCODONE-ACETAMINOPHEN 5-325 MG ORAL TABLET (HYDROCODONE-ACETAMINOPHEN) ; Route: ORAL  GABAPENTIN 100 MG ORAL CAPSULE (GABAPENTIN) ; Route: ORAL  FUROSEMIDE 40 MG ORAL TABLET (FUROSEMIDE) ; Route: ORAL  FERROUS SULFATE 325 (65 FE) MG ORAL TABLET (FERROUS SULFATE) ; Route: ORAL  ESTRADIOL 0.5 MG ORAL TABLET (ESTRADIOL) ; Route: ORAL  DOCUSATE SODIUM 100 MG ORAL CAPSULE (DOCUSATE SODIUM) ; Route: ORAL  DILTIAZEM HCL ER BEADS 360 MG ORAL CAPSULE EXTENDED RELEASE 24 HOUR (DILTIAZEM HCL ER BEADS) ; Route: ORAL  VITAMIN B-12 ER 1000 MCG ORAL TABLET EXTENDED RELEASE (CYANOCOBALAMIN) ; Route: ORAL  CLONIDINE HCL 0.1 MG ORAL TABLET (CLONIDINE HCL) ; Route: ORAL  BACLOFEN 10 MG ORAL TABLET (BACLOFEN) ; Route: ORAL  ATORVASTATIN CALCIUM 20 MG ORAL TABLET (ATORVASTATIN CALCIUM) ; Route: ORAL  ASCORBIC ACID 500 MG ORAL TABLET (ASCORBIC ACID) ; Route: ORAL  AMLODIPINE BESYLATE 2.5 MG ORAL TABLET (AMLODIPINE BESYLATE) ; Route: ORAL    PATIENT REPORTED ALLERGIES:  * LISINOPRIL (CriticalReaction: No reaction details noted)  *  LOSARTAN (CriticalReaction: No reaction details noted)  * PAPER TAPE (CriticalReaction: No reaction details noted)  * TEKTURNA (CriticalReaction: No reaction details noted)  ADHESIVE TAPE (CriticalReaction: No reaction details noted)  GABAPENTIN (GABAPENTIN) (SevereReaction: Unassessed)    RISK FACTORS:  Tobacco use:   former smoker  Passive smoke exposure: No  Alcohol Use:   No    HISTORY OF PRESENT ILLNESS:    Karen Duncan is a 74-year-old female with a one month history of left knee pain.  It bothers her with standing and walking.  It is aching and dull, and can throb if she stands on it quite a bit.  Nothing really makes it worse.  Nothing really makes it better other than rest.  She had a left total knee done in 2008 in [place] and a right total knee done in the past as well.  She is retired.  She walks with a walker usually.  No other history of trauma.    A complete review of systems is negative other than the history of present illness and past medical history.     The patient's health history form dated 05/13/2019 was reviewed and signed.  Past medical history, surgical history, social history, family history, and review of systems noted.    PAST MEDICAL HISTORY:    Arthritis  Atrial Fibrillation  Diabetes Type 2  Hypertension  Rheumatoid Arthritis  Blood Transfusion  Skin Cancer    PAST ORTHOPEDIC SURGICAL HISTORY:    Back Surgery  Left Total Knee Arthroplasty in 2008 in [place]  Right TKA    PAST SURGICAL HISTORY:    Breast Biopsy  Total Abdominal Hysterectomy  Cataracts    FAMILY HISTORY:    Father:  High Blood Pressure, Aneurysm  Mother:  High Blood Pressure, Stroke  Siblings:  Cancer, High Blood Pressure, Hepatitis    SOCIAL HISTORY:   Occupation:  Retired  Marital Status:    Alcohol Use:  No  Tobacco Use:  Former  Secondhand Smoke Exposure:  No  History of HIV:  No  History of Hepatitis:  No    REVIEW OF SYSTEMS:  Joint or Muscle pain: Yes  Stiffness:  Yes  Swelling:  Yes  Difficulty  in walking: Yes    PHYSICAL EXAMINATION:    She is a 74-year-old female with full active range of motion of her left knee in extension.  She is stable to varus and valgus stress with the mechanical click of a total knee arthroplasty.  She has a negative Lachman's.  She is tender to palpation over the patella.    X-RAY:  X-ray examination of the left knee taken today shows a mildly displaced fracture of the patella.  This appears to be a fracture of the patella within the sleeve given that it is only mildly displaced.  It also appears to be only the medial portion of the patella as well and not so much lateral.    ASSESSMENT:    This is a 74-year-old female with a patellar sleeve fracture.    PLAN:   We are going to go ahead and place her into a knee immobilizer for the next six weeks or so.  Hopefully her knee does not stiffen up too much.  She can take this off and do some gentle range of motion, which was discussed with her.  I would like to see her back in three weeks with an x-ray.    X-RAYS NEXT VISIT:  Left knee.    Dictated by Sebastien Abebe MD  cc:  MD Dr. Andrae Clemente at [facility]    D:  05/13/2019  T:  05/16/2019    Typed and/or reviewed and corrected by signing  below, and sent to the Physician for final review and signature.    This report was created using voice recording software and computer-generated templates. Although every effort has been made to review for and eliminate errors, some errors may still occur.         Electronically signed by Jamir Mayroga - Shaggy on 05/16/2019 at 8:09 AM

## 2019-05-16 NOTE — TELEPHONE ENCOUNTER
Prescription approved per [facility] Refill Protocol.  LOV: 4/1/19  Damaris Arora RN on 5/16/2019 at 11:05 AM

## 2019-05-21 NOTE — TELEPHONE ENCOUNTER
"Requested Prescriptions   Pending Prescriptions Disp Refills     amLODIPine (NORVASC) 2.5 MG tablet [Pharmacy Med Name: AMLODIPINE 2.5MG TAB] 90 tablet 0     Sig: TAKE 1 TABLET BY MOUTH ONCE DAILY       Calcium Channel Blockers Protocol  Passed - 5/21/2019 10:00 AM        Passed - Blood pressure under 140/90 in past 12 months     BP Readings from Last 3 Encounters:   04/01/19 122/78   03/19/19 122/84   12/19/18 124/76                 Passed - Recent (12 mo) or future (30 days) visit within the authorizing provider's specialty     Patient had office visit in the last 12 months or has a visit in the next 30 days with authorizing provider or within the authorizing provider's specialty.  See \"Patient Info\" tab in inbasket, or \"Choose Columns\" in Meds & Orders section of the refill encounter.              Passed - Medication is active on med list        Passed - Patient is age 18 or older        Passed - No active pregnancy on record        Passed - Normal serum creatinine on file in past 12 months     Recent Labs   Lab Test 03/19/19  1130   CR 0.88             Passed - No positive pregnancy test in past 12 months      LOV 4/1/2019 with medications reviewed.  Prescription refilled per RN Medication RefillPolicy.................... Kayta Mendieta ....................  5/21/2019   10:51 AM        "

## 2019-06-10 NOTE — TELEPHONE ENCOUNTER
[facility] sent Rx request for the following:      TRAMADOL 50MG TAB  Sig: TAKE 2 TABLETS BY MOUTH EVERY 6 HOURS AS NEEDED FOR  MODERATE  PAIN  Last Prescription Date:   3/13/19 (End: 3/19/19)  Last Fill Qty/Refills:         112, R-5    Last Office Visit:              4/1/19 - no change  Future Office visit:           None.  Routing refill request to provider for review/approval because:  Drug not on the [facility] refill protocol or controlled substance    Unable to complete prescription refill per RN Medication Refill Policy. Anne Torres RN .............. 6/11/2019  9:11 AM

## 2019-06-11 NOTE — TELEPHONE ENCOUNTER
"Requested Prescriptions   Pending Prescriptions Disp Refills     ONETOUCH VERIO IQ test strip [Pharmacy Med Name: ONETOUCH VERIO LUPE] 400 strip 0     Sig: USE 1 STRIP TO CHECK GLUCOSE 4 TIMES DAILY       Diabetic Supplies Protocol Passed - 6/10/2019  5:30 AM        Passed - Medication is active on med list        Passed - Patient is 18 years of age or older        Passed - Recent (6 mo) or future (30 days) visit within the authorizing provider's specialty     Patient had office visit in the last 6 months or has a visit in the next 30 days with authorizing provider.  See \"Patient Info\" tab in inbasket, or \"Choose Columns\" in Meds & Orders section of the refill encounter.            LOV 04/01/19  Prescription approved per [facility] Refill Protocol.    "

## 2019-06-13 NOTE — PROGRESS NOTES
CHIEF COMPLAINT: Left Patellar Fracture Recheck    PROBLEMS:  FH STROKE (ICD-V17.1) (BPI33-Y41.3)  RHEUMATOID ARTHRITIS (ICD-714.0) (VLY36-F16.9)  HYPERTENSION (ICD-401.9) (DIV26-J24)  DIABETES, TYPE 2 (ICD-250.00) (IVE51-E58.9)    PATIENT REPORTED MEDICATIONS:  TRAMADOL HCL 50 MG ORAL TABLET (TRAMADOL HCL) ; Route: ORAL  SPIRONOLACTONE 25 MG ORAL TABLET (SPIRONOLACTONE) ; Route: ORAL  FLORASTOR 250 MG ORAL CAPSULE (SACCHAROMYCES BOULARDII) ; Route: ORAL  PSYLLIUM FIBER 0.52 GM ORAL CAPSULE (PSYLLIUM) ; Route: ORAL  PREDNISONE 5 MG ORAL TABLET (PREDNISONE) ; Route: ORAL  METFORMIN  MG ORAL TABLET (METFORMIN HCL) ; Route: ORAL  LANTUS SOLOSTAR 100 UNIT/ML SUBCUTANEOUS SOLUTION PEN-INJECTOR (INSULIN GLARGINE) ; Route: SUBCUTANEOUS  NOVOLOG FLEXPEN 100 UNIT/ML SUBCUTANEOUS SOLUTION PEN-INJECTOR (INSULIN ASPART) ; Route: SUBCUTANEOUS  HYDROCODONE-ACETAMINOPHEN 5-325 MG ORAL TABLET (HYDROCODONE-ACETAMINOPHEN) ; Route: ORAL  GABAPENTIN 100 MG ORAL CAPSULE (GABAPENTIN) ; Route: ORAL  FUROSEMIDE 40 MG ORAL TABLET (FUROSEMIDE) ; Route: ORAL  FERROUS SULFATE 325 (65 FE) MG ORAL TABLET (FERROUS SULFATE) ; Route: ORAL  ESTRADIOL 0.5 MG ORAL TABLET (ESTRADIOL) ; Route: ORAL  DOCUSATE SODIUM 100 MG ORAL CAPSULE (DOCUSATE SODIUM) ; Route: ORAL  DILTIAZEM HCL ER BEADS 360 MG ORAL CAPSULE EXTENDED RELEASE 24 HOUR (DILTIAZEM HCL ER BEADS) ; Route: ORAL  VITAMIN B-12 ER 1000 MCG ORAL TABLET EXTENDED RELEASE (CYANOCOBALAMIN) ; Route: ORAL  CLONIDINE HCL 0.1 MG ORAL TABLET (CLONIDINE HCL) ; Route: ORAL  BACLOFEN 10 MG ORAL TABLET (BACLOFEN) ; Route: ORAL  ATORVASTATIN CALCIUM 20 MG ORAL TABLET (ATORVASTATIN CALCIUM) ; Route: ORAL  ASCORBIC ACID 500 MG ORAL TABLET (ASCORBIC ACID) ; Route: ORAL  AMLODIPINE BESYLATE 2.5 MG ORAL TABLET (AMLODIPINE BESYLATE) ; Route: ORAL    PATIENT REPORTED ALLERGIES:  * LISINOPRIL (CriticalReaction: No reaction details noted)  * LOSARTAN (CriticalReaction: No reaction details noted)  * PAPER TAPE  (CriticalReaction: No reaction details noted)  * TEKTURNA (CriticalReaction: No reaction details noted)  ADHESIVE TAPE (CriticalReaction: No reaction details noted)  GABAPENTIN (GABAPENTIN) (SevereReaction: Unassessed)      HISTORY OF PRESENT ILLNESS:    This is a follow up on a left patella fracture.  She has a left total knee in place.  She had a patella sleeve fracture that was largely nondisplaced, superior pole of the patella and medial.  She had ongoing pain with this.  We put her in a knee immobilizer.  She is not in the knee immobilizer today because it bothers her from time-to-time, and she has difficulty getting in and out of the car with it.  She left it at home, but she usually has it on when she is at home.  She is now three weeks out from this and doing well.  It is not nearly as painful as it was previously.    PAST MEDICAL HISTORY:    Arthritis  Atrial Fibrillation  Diabetes Type 2  Hypertension  Rheumatoid Arthritis  Blood Transfusion  Skin Cancer    PAST ORTHOPEDIC SURGICAL HISTORY:    Back Surgery  Left Total Knee Arthroplasty in 2008 in [place]  Right TKA    PAST SURGICAL HISTORY:    Breast Biopsy  Total Abdominal Hysterectomy  Cataracts    FAMILY HISTORY:    Father:  High Blood Pressure, Aneurysm  Mother:  High Blood Pressure, Stroke  Siblings:  Cancer, High Blood Pressure, Hepatitis    SOCIAL HISTORY:   Occupation:  Retired  Marital Status:    Alcohol Use:  No  Tobacco Use:  Former  Secondhand Smoke Exposure:  No  History of HIV:  No  History of Hepatitis:  No    X-RAY:  X-ray examination today shows no significant change in the position of the fracture.    ASSESSMENT:    I think this is going to do just fine treated nonoperatively.    PLAN:   We are going to see her back in approximately three weeks with a repeat x-ray.    X-RAYS NEXT VISIT:  Repeat x-ray, left knee.    Dictated by Sebastien Abebe MD  cc:  MD Dr. Andrae Clemente at [facility]    D:  06/10/2019  T:   06/12/2019    Typed and/or reviewed and corrected by signing  below, and sent to the Physician for final review and signature.    This report was created using voice recording software and computer-generated templates. Although every effort has been made to review for and eliminate errors, some errors may still occur.         Electronically signed by Jamir Mayorga -  on 06/13/2019 at 1:25 PM    Electronically signed by Sebastien Abebe MD on 06/13/2019 at 1:43 PM

## 2019-06-14 NOTE — TELEPHONE ENCOUNTER
Tramadol refilled on 6/11/19 #112x 3 refills  Called patient and informed her that Tramadol was refilled to [facility]  Patient states she did get refill    Damaris Arora RN on 6/14/2019 at 11:54 AM

## 2019-06-19 NOTE — NURSING NOTE
The patient is here today to be seen for for a refill on her medications.  Josefa Pandya LPN on 6/19/2019 at 10:52 AM  Chief Complaint   Patient presents with     Recheck Medication       Initial There were no vitals taken for this visit. Estimated body mass index is 24.41 kg/m  as calculated from the following:    Height as of 12/19/18: 1.524 m (5').    Weight as of 4/1/19: 56.7 kg (125 lb).  Medication Reconciliation: complete    Josefa aPndya LPN

## 2019-06-19 NOTE — PROGRESS NOTES
Chief Complaint: Refill on pain medications.    HPI: She is in today to get a refill on her pain medications.  In March she had a new contract done, etc.  She uses the pain medications regularly and if she ever misses a dose she suffers quite a bit.  She does not believe that she would be able to get off of these.  I talked to her about the new legislation and the fact that she would need to be coming in monthly starting in August.  I also reviewed with her the feeling of tramadol.  Since I last saw her, she has been treated for a patellar fracture.  This is improving.    Past Medical History:   Diagnosis Date     Atrial fibrillation (H)     2000     Chronic pain      Essential (primary) hypertension     No Comments Provided     Extradural and subdural abscess, unspecified (CODE)     2016     Hyperlipidemia     No Comments Provided     NAFLD (nonalcoholic fatty liver disease)     2010,Fatty liver on US and CT.  Iron nl. Hep C neg     Osteomyelitis (H)     2012,IV Vancomycin to resolve, L 3rd digit     Pneumothorax     spondylolysis (right) at age 39 followed by spondylolysis (left) several years later     Psoas muscle abscess (H)     2016,Strep viridans     Rheumatoid arthritis (H)     2000     Type 2 diabetes mellitus without complications (H)     No Comments Provided       Past Surgical History:   Procedure Laterality Date     ARTHROPLASTY KNEE Bilateral      ARTHROTOMY WRIST Left 2010    Rheumatoid nodule     AS MUSCLE-SKIN FLAP,TRUNK  2017    Left ischial ulcer     BIOPSY BREAST Left      COLONOSCOPY      2001,repeat in 10 years per patient     COLONOSCOPY  02/18/2010 2010,sigmoid diverticulosis, no polyps, due 2020     COLONOSCOPY  01/01/2011 2011,mild sigmoid diverticulosis, EGD biopsies     ESOPHAGOSCOPY, GASTROSCOPY, DUODENOSCOPY (EGD), COMBINED      2/2011,Normal     EXTRACAPSULAR CATARACT EXTRATION WITH INTRAOCULAR LENS IMPLANT Bilateral      HYSTERECTOMY TOTAL ABDOMINAL, BILATERAL  SALPINGO-OOPHORECTOMY, COMBINED      secondary to fibroids     LAMINECTOMY LUMBAR ONE LEVEL      Multiple     REMOVE PORT VASCULAR ACCESS N/A 6/27/2018    Procedure: REMOVE PORT VASCULAR ACCESS;  Port Removal;  Surgeon: Jazzy Taylor MD;  Location: [facility] OR       Allergies   Allergen Reactions     Aliskiren Cough     Gabapentin Cough     Liquid Adhesive      Other reaction(s): *Unknown  Paper tapes, fragile skin     Lisinopril Cough     Ace cough     Losartan Cough       Current Outpatient Medications   Medication Sig Dispense Refill     amLODIPine (NORVASC) 2.5 MG tablet TAKE 1 TABLET BY MOUTH ONCE DAILY 90 tablet 2     ascorbic acid (VITAMIN C) 500 MG tablet Take 2 tablets (1,000 mg) by mouth daily 30 tablet      atorvastatin (LIPITOR) 20 MG tablet TAKE 1 TABLET BY MOUTH AT BEDTIME 90 tablet 3     baclofen (LIORESAL) 10 MG tablet TAKE 1 TABLET BY MOUTH THREE TIMES DAILY 90 tablet 3     blood glucose monitoring (ACCU-CHEK FASTCLIX) lancets Use to test blood sugar four times daily. DX code E11.9. Dispense as covered by patient's insurance 400 each 2     cloNIDine (CATAPRES) 0.1 MG tablet TAKE 1 TABLET BY MOUTH ONCE DAILY 90 tablet 1     Cyanocobalamin (VITAMIN B-12 CR) 1000 MCG TBCR Take 1 tablet by mouth daily 30 tablet      diltiazem (TIAZAC) 360 MG 24 hr capsule TAKE ONE CAPSULE BY MOUTH ONCE DAILY 90 capsule 3     docusate sodium (COLACE) 100 MG capsule Take 2 capsules (200 mg) by mouth 2 times daily 60 capsule      estradiol (ESTRACE) 0.5 MG tablet TAKE 1 TABLET BY MOUTH ONCE DAILY 90 tablet 2     Ferrous Sulfate (IRON) 325 (65 Fe) MG tablet TAKE 1 TABLET BY MOUTH TWICE DAILY 180 tablet 1     FLORASTOR 250 MG capsule TAKE 2 CAPSULES BY MOUTH TWICE DAILY 120 capsule 11     furosemide (LASIX) 40 MG tablet TAKE 1 TABLET BY MOUTH ONCE DAILY IN THE MORNING 90 tablet 2     gabapentin (NEURONTIN) 100 MG capsule TAKE 3 CAPSULES BY MOUTH THREE TIMES DAILY 270 capsule 3     HYDROcodone-acetaminophen (NORCO) 5-325 MG tablet  Take 1 tablet by mouth 4 times daily 120 tablet 0     insulin glargine (LANTUS SOLOSTAR PEN) 100 UNIT/ML pen INJECT 16 UNITS SUBCUTANEOUSLY IN THE MORNING AND 12 IN THE EVENING 30 mL 3     metFORMIN (GLUCOPHAGE-XR) 500 MG 24 hr tablet TAKE 2 TABLETS BY MOUTH TWICE DAILY WITH MEALS 360 tablet 3     NOVOFINE 30 30G X 8 MM insulin pen needle USE 5 TIMES DAILY 500 each 1     NOVOLOG FLEXPEN 100 UNIT/ML soln INJECT 4 TIMES DAILY PER SLIDING SCALE. 24 UNITS MAX DAILY 15 mL 3     ONETOUCH VERIO IQ test strip USE 1 STRIP TO CHECK GLUCOSE 4 TIMES DAILY 400 strip 2     predniSONE (DELTASONE) 5 MG tablet TAKE 1 TABLET BY MOUTH TWICE DAILY 180 tablet 1     psyllium (TGT PSYLLIUM FIBER) 0.52 G capsule Take 3 capsules (1.56 g) by mouth daily 540 capsule      spironolactone (ALDACTONE) 25 MG tablet TAKE 1 TABLET BY MOUTH ONCE DAILY 90 tablet 3     traMADol (ULTRAM) 50 MG tablet TAKE 2 TABLETS BY MOUTH EVERY 6 HOURS AS NEEDED FOR  MODERATE  PAIN 112 tablet 3       Review of Systems   Constitutional: Negative.    Eyes: Negative.    Endocrine: Negative.    Allergic/Immunologic: Negative.        Physical Exam   Constitutional: She appears well-developed and well-nourished. No distress.   Skin: She is not diaphoretic.   Nursing note and vitals reviewed.      Assessment:        ICD-10-CM    1. Chronic pain disorder G89.4 HYDROcodone-acetaminophen (NORCO) 5-325 MG tablet     DISCONTINUED: HYDROcodone-acetaminophen (NORCO) 5-325 MG tablet       Plan: Her pain medications were refilled for 2 months and then she will have to start monthly follow-ups thereafter.  The next time she comes in, I am going to repeat a CBC and possibly peripheral smear as well as A1c.

## 2019-07-05 NOTE — PROGRESS NOTES
CHIEF COMPLAINT: Left Patellar Fracture Recheck    PROBLEMS:  FH STROKE (ICD-V17.1) (FLE55-O05.3)  RHEUMATOID ARTHRITIS (ICD-714.0) (QMZ46-I17.9)  HYPERTENSION (ICD-401.9) (IOZ52-R01)  DIABETES, TYPE 2 (ICD-250.00) (ZIU53-S61.9)    PATIENT REPORTED MEDICATIONS:  TRAMADOL HCL 50 MG ORAL TABLET (TRAMADOL HCL) ; Route: ORAL  SPIRONOLACTONE 25 MG ORAL TABLET (SPIRONOLACTONE) ; Route: ORAL  FLORASTOR 250 MG ORAL CAPSULE (SACCHAROMYCES BOULARDII) ; Route: ORAL  PSYLLIUM FIBER 0.52 GM ORAL CAPSULE (PSYLLIUM) ; Route: ORAL  PREDNISONE 5 MG ORAL TABLET (PREDNISONE) ; Route: ORAL  METFORMIN  MG ORAL TABLET (METFORMIN HCL) ; Route: ORAL  LANTUS SOLOSTAR 100 UNIT/ML SUBCUTANEOUS SOLUTION PEN-INJECTOR (INSULIN GLARGINE) ; Route: SUBCUTANEOUS  NOVOLOG FLEXPEN 100 UNIT/ML SUBCUTANEOUS SOLUTION PEN-INJECTOR (INSULIN ASPART) ; Route: SUBCUTANEOUS  HYDROCODONE-ACETAMINOPHEN 5-325 MG ORAL TABLET (HYDROCODONE-ACETAMINOPHEN) ; Route: ORAL  GABAPENTIN 100 MG ORAL CAPSULE (GABAPENTIN) ; Route: ORAL  FUROSEMIDE 40 MG ORAL TABLET (FUROSEMIDE) ; Route: ORAL  FERROUS SULFATE 325 (65 FE) MG ORAL TABLET (FERROUS SULFATE) ; Route: ORAL  ESTRADIOL 0.5 MG ORAL TABLET (ESTRADIOL) ; Route: ORAL  DOCUSATE SODIUM 100 MG ORAL CAPSULE (DOCUSATE SODIUM) ; Route: ORAL  DILTIAZEM HCL ER BEADS 360 MG ORAL CAPSULE EXTENDED RELEASE 24 HOUR (DILTIAZEM HCL ER BEADS) ; Route: ORAL  VITAMIN B-12 ER 1000 MCG ORAL TABLET EXTENDED RELEASE (CYANOCOBALAMIN) ; Route: ORAL  CLONIDINE HCL 0.1 MG ORAL TABLET (CLONIDINE HCL) ; Route: ORAL  BACLOFEN 10 MG ORAL TABLET (BACLOFEN) ; Route: ORAL  ATORVASTATIN CALCIUM 20 MG ORAL TABLET (ATORVASTATIN CALCIUM) ; Route: ORAL  ASCORBIC ACID 500 MG ORAL TABLET (ASCORBIC ACID) ; Route: ORAL  AMLODIPINE BESYLATE 2.5 MG ORAL TABLET (AMLODIPINE BESYLATE) ; Route: ORAL    PATIENT REPORTED ALLERGIES:  * LISINOPRIL (CriticalReaction: No reaction details noted)  * LOSARTAN (CriticalReaction: No reaction details noted)  * PAPER TAPE  (CriticalReaction: No reaction details noted)  * TEKTURNA (CriticalReaction: No reaction details noted)  ADHESIVE TAPE (CriticalReaction: No reaction details noted)  GABAPENTIN (GABAPENTIN) (SevereReaction: Unassessed)      HISTORY OF PRESENT ILLNESS:    She is about two and a half months status post patellar fracture of the left knee, status post left total knee arthroplasty done in 2008 in [place], [place].  She is still complaining of some pain in the knee, but is able to extend it out without difficulty and is able to bend it without difficulty to at least 90 degrees of flexion.  She does have her knee immobilizer in place.    PAST MEDICAL HISTORY:    Arthritis  Atrial Fibrillation  Diabetes Type 2  Hypertension  Rheumatoid Arthritis  Blood Transfusion  Skin Cancer    PAST ORTHOPEDIC SURGICAL HISTORY:    Back Surgery  Left Total Knee Arthroplasty in 2008 in [place]  Right TKA    PAST SURGICAL HISTORY:    Breast Biopsy  Total Abdominal Hysterectomy  Cataracts    FAMILY HISTORY:    Father:  High Blood Pressure, Aneurysm  Mother:  High Blood Pressure, Stroke  Siblings:  Cancer, High Blood Pressure, Hepatitis    SOCIAL HISTORY:   Occupation:  Retired  Marital Status:    Alcohol Use:  No  Tobacco Use:  Former  Secondhand Smoke Exposure:  No  History of HIV:  No  History of Hepatitis:  No    PHYSICAL EXAMINATION:    On examination today she can extend her knee, lacking only about five to ten degrees.  She can bend it completely to 90 degrees without difficulty.  She is stable to varus and valgus stress.    X-RAY:  X-ray examination shows no significant displacement of the patella fracture from previous.    ASSESSMENT:    This is a 74-year-old female now two and a half months status post left patella fracture following a total knee arthroplasty.  She is doing just fine at this time.    PLAN:   We are going to get her into some physical therapy and try and strengthen this fracture up a little bit more.   I do believe that she needs home care secondary to her  not being able to bring her to her appointments.  I would recommend her for home care for physical therapy only at this point.    Dictated by Sebastien Abebe MD  cc:  MD Dr. Andrae Clemente at [facility]    D:  07/01/2019  T:  07/05/2019    Typed and/or reviewed and corrected by signing  below, and sent to the Physician for final review and signature.    This report was created using voice recording software and computer-generated templates. Although every effort has been made to review for and eliminate errors, some errors may still occur.         Electronically signed by Jamir Mayorga -  on 07/05/2019 at 1:29 PM

## 2019-07-08 NOTE — TELEPHONE ENCOUNTER
URGENT: Response needed within 24 hours    This timeframe is established by CMS as  best practice  for the delivery of home health care. The home health clinician may need to contact you again if this timeframe is not met.      S:    Medication reconciliation discrepancies and/or drug interactions/contraindications have been identified.  Home Care s drug regime review has revealed significant medication issues.    B:    You are being contacted for orders related to medication issues.     A:     Medication Interactions   Norvasc/Lipitor   Clonidine/Cardizem   Cardizem/Lantus   Cardizem/Novolog   Estradiol/prednisone   Iron/calcium   Prednisone/calcium   Spironolactone/potassium    This patient is also taking the following medications   Potassium 99mg every day   Fish Oil 660mg - 2 capsules every day   Calcium 600mg  Twice a day   Vitamin d 1000 units twice daily   Magnesium 400 mg every day    Benadryl 25 mg - 2 tabs at bedtime   Hemp oil 10mg - 5 drops twice a day   Guaifenesin 400 mg - three times per day as needed.     Patient is also taking medications differently than listed on medication sheet.    Iron - 2 tablets twice daily   Lantus 8 units in the evening instead of 12.   Please update Epic medication list    R:    Please evaluate this information and indicate below whether or not changes are required. A copy of the patient's drug interaction/contraindications report is available upon request.     Thank you for your time. Please call with questions or concerns. [phone]  Jazzy Hawkins RN on 7/8/2019 at 3:38 PM

## 2019-07-09 NOTE — TELEPHONE ENCOUNTER
"Request for Home Care Physical Therapy orders as follows:    PT eval and treat date:  7/8/19    Continuation frequency =  2 x week x _4___ weeks              Effective date = 7/8/19    Interventions include:    Therapeutic Exercise  Gait Training  Gait/Balance/Dysfunction  Home Exercise Program  Home Safety Assessment  Manual Therapy/ROM    Acknowledging this order with an \"OK\" will suffice. Thank you for your time.  Please call if you have any questions or concerns.    Therapist: Ada Wolf, PT  "

## 2019-07-09 NOTE — TELEPHONE ENCOUNTER
Request for Home Care Skilled Nursing orders as follows:    SN eval and treat date:     Continuation frequency =  ___1____ X week X ____1___ weeks             Effective date=7/8/19    Code status =     Interventions include:    Assessment    Patient/caregiver education    Salena Le RN

## 2019-07-12 NOTE — TELEPHONE ENCOUNTER
"Request for Home Care Occupational Therapy orders as follows:    OT eval and treat date:      Continuation frequency =  1 x week x __4__ weeks               Effective date = 7/15/19    Interventions include:    Therapeutic Exercise  ADL training  Adaptive equipment  Home exercise program  Home safety assessment                    Acknowledging this order with an \"OK\" will suffice. Thank you for your time.  Please call if you have any questions or concerns.    For therapist: Ada Wolf PT  "

## 2019-07-16 NOTE — TELEPHONE ENCOUNTER
"Request for Home Care Occupational Therapy orders as follows:    OT eval and treat date:  7/16/19    Continuation frequency =  2 x week x __4__ weeks               Effective date = 7/16/19    Interventions include:    Therapeutic Exercise  ADL training  Adaptive equipment  Home safety assessment                      Acknowledging this order with an \"OK\" will suffice. Thank you for your time.  Please call if you have any questions or concerns.    For therapist: Karen Martinez OT  "

## 2019-07-17 NOTE — TELEPHONE ENCOUNTER
Routing refill request to provider for review/approval because:  Drug not on the [facility] refill protocol     LOV: 9/19/18    Damaris Arora RN on 10/8/2018 at 9:03 AM           fall

## 2019-07-18 NOTE — TELEPHONE ENCOUNTER
Requested Prescriptions   Pending Prescriptions Disp Refills     baclofen (LIORESAL) 10 MG tablet [Pharmacy Med Name: BACLOFEN 10MG       TAB] 90 tablet 3     Sig: TAKE 1 TABLET BY MOUTH THREE TIMES DAILY       There is no refill protocol information for this order              Last Written Prescription Date:  03/13/19  Last Fill Quantity: 90,   # refills: 3  Last Office Visit: 06/19/19  Future Office visit:    Next 5 appointments (look out 90 days)    Jul 29, 2019 11:00 AM CDT  Return Visit with Sebastien Abebe MD  [facility] ([facility]) [address]  [place] [address]  [phone]           Routing refill request to provider for review/approval because:  Drug not on the [facility], [facility] or [facility] refill protocol or controlled substance

## 2019-07-26 NOTE — NURSING NOTE
The patient is here today to be seen for a refill on her pain medication.  Josefa Pandya LPN on 7/26/2019 at 10:57 AM  Chief Complaint   Patient presents with     Recheck Medication       Initial /82 (BP Location: Right arm, Patient Position: Sitting, Cuff Size: Adult Regular)   Pulse 72   Temp 97.3  F (36.3  C) (Tympanic)   Resp 18   Wt 56.2 kg (124 lb)   Breastfeeding? No   BMI 24.22 kg/m   Estimated body mass index is 24.22 kg/m  as calculated from the following:    Height as of 12/19/18: 1.524 m (5').    Weight as of this encounter: 56.2 kg (124 lb).  Medication Reconciliation: complete    Josefa Pandya LPN

## 2019-07-26 NOTE — PROGRESS NOTES
Chief Complaint: Refill on pain medications.    HPI: She is in today with a complaint of a chronic pain syndrome.  She has rheumatoid arthritis.  She is on tramadol as well as Norco 4 times daily.  Last narcotic contract was done in March.  She tells me that she has started on some CBD oil and with that her pain is improved.  She is thinking about weaning off of her pain medications as able.  I encouraged her to do so and suggested that she start with the hydrocodone.    She had some questions about her insulin today and whether there were cheaper generic alternatives available.  I encouraged her to discuss this with her pharmacist.  She would like to increase her gabapentin dose.  I think this would help for her pain and she is looking for ways to improve her neuropathy.    Past Medical History:   Diagnosis Date     Atrial fibrillation (H)     2000     Chronic pain      Essential (primary) hypertension     No Comments Provided     Extradural and subdural abscess, unspecified (CODE)     2016     Hyperlipidemia     No Comments Provided     NAFLD (nonalcoholic fatty liver disease)     2010,Fatty liver on US and CT.  Iron nl. Hep C neg     Osteomyelitis (H)     2012,IV Vancomycin to resolve, L 3rd digit     Pneumothorax     spondylolysis (right) at age 39 followed by spondylolysis (left) several years later     Psoas muscle abscess (H)     2016,Strep viridans     Rheumatoid arthritis (H)     2000     Type 2 diabetes mellitus without complications (H)     No Comments Provided       Past Surgical History:   Procedure Laterality Date     ARTHROPLASTY KNEE Bilateral      ARTHROTOMY WRIST Left 2010    Rheumatoid nodule     AS MUSCLE-SKIN FLAP,TRUNK  2017    Left ischial ulcer     BIOPSY BREAST Left      COLONOSCOPY      2001,repeat in 10 years per patient     COLONOSCOPY  02/18/2010 2010,sigmoid diverticulosis, no polyps, due 2020     COLONOSCOPY  01/01/2011 2011,mild sigmoid diverticulosis, EGD biopsies      ESOPHAGOSCOPY, GASTROSCOPY, DUODENOSCOPY (EGD), COMBINED      2/2011,Normal     EXTRACAPSULAR CATARACT EXTRATION WITH INTRAOCULAR LENS IMPLANT Bilateral      HYSTERECTOMY TOTAL ABDOMINAL, BILATERAL SALPINGO-OOPHORECTOMY, COMBINED      secondary to fibroids     LAMINECTOMY LUMBAR ONE LEVEL      Multiple     REMOVE PORT VASCULAR ACCESS N/A 6/27/2018    Procedure: REMOVE PORT VASCULAR ACCESS;  Port Removal;  Surgeon: Jazzy Taylor MD;  Location: [facility]       Allergies   Allergen Reactions     Aliskiren Cough     Gabapentin Cough     Liquid Adhesive      Other reaction(s): *Unknown  Paper tapes, fragile skin     Lisinopril Cough     Ace cough     Losartan Cough       Current Outpatient Medications   Medication Sig Dispense Refill     amLODIPine (NORVASC) 2.5 MG tablet TAKE 1 TABLET BY MOUTH ONCE DAILY 90 tablet 2     ascorbic acid (VITAMIN C) 500 MG tablet Take 2 tablets (1,000 mg) by mouth daily 30 tablet      atorvastatin (LIPITOR) 20 MG tablet TAKE 1 TABLET BY MOUTH AT BEDTIME 90 tablet 3     baclofen (LIORESAL) 10 MG tablet TAKE 1 TABLET BY MOUTH THREE TIMES DAILY 90 tablet 3     blood glucose monitoring (ACCU-CHEK FASTCLIX) lancets Use to test blood sugar four times daily. DX code E11.9. Dispense as covered by patient's insurance 400 each 2     cloNIDine (CATAPRES) 0.1 MG tablet TAKE 1 TABLET BY MOUTH ONCE DAILY 90 tablet 1     Cyanocobalamin (VITAMIN B-12 CR) 1000 MCG TBCR Take 1 tablet by mouth daily 30 tablet      diltiazem (TIAZAC) 360 MG 24 hr capsule TAKE ONE CAPSULE BY MOUTH ONCE DAILY 90 capsule 3     docusate sodium (COLACE) 100 MG capsule Take 2 capsules (200 mg) by mouth 2 times daily 60 capsule      estradiol (ESTRACE) 0.5 MG tablet TAKE 1 TABLET BY MOUTH ONCE DAILY 90 tablet 2     Ferrous Sulfate (IRON) 325 (65 Fe) MG tablet TAKE 1 TABLET BY MOUTH TWICE DAILY 180 tablet 1     FLORASTOR 250 MG capsule TAKE 2 CAPSULES BY MOUTH TWICE DAILY 120 capsule 11     furosemide (LASIX) 40 MG tablet TAKE 1 TABLET BY  MOUTH ONCE DAILY IN THE MORNING 90 tablet 2     gabapentin (NEURONTIN) 600 MG tablet Take 1 tablet (600 mg) by mouth 3 times daily 90 tablet 11     HYDROcodone-acetaminophen (NORCO) 5-325 MG tablet Take 1 tablet by mouth 4 times daily 120 tablet 0     insulin glargine (LANTUS SOLOSTAR PEN) 100 UNIT/ML pen INJECT 16 UNITS SUBCUTANEOUSLY IN THE MORNING AND 12 IN THE EVENING 30 mL 3     metFORMIN (GLUCOPHAGE-XR) 500 MG 24 hr tablet TAKE 2 TABLETS BY MOUTH TWICE DAILY WITH MEALS 360 tablet 3     NOVOFINE 30 30G X 8 MM insulin pen needle USE 5 TIMES DAILY 500 each 1     NOVOLOG FLEXPEN 100 UNIT/ML soln INJECT 4 TIMES DAILY PER SLIDING SCALE. 24 UNITS MAX DAILY 15 mL 3     ONETOUCH VERIO IQ test strip USE 1 STRIP TO CHECK GLUCOSE 4 TIMES DAILY 400 strip 2     predniSONE (DELTASONE) 5 MG tablet TAKE 1 TABLET BY MOUTH TWICE DAILY 180 tablet 1     psyllium (TGT PSYLLIUM FIBER) 0.52 G capsule Take 3 capsules (1.56 g) by mouth daily 540 capsule      spironolactone (ALDACTONE) 25 MG tablet TAKE 1 TABLET BY MOUTH ONCE DAILY 90 tablet 3     traMADol (ULTRAM) 50 MG tablet TAKE 2 TABLETS BY MOUTH EVERY 6 HOURS AS NEEDED FOR  MODERATE  PAIN 112 tablet 3       Review of Systems   Constitutional: Negative.    Eyes: Negative.    Endocrine: Negative.    Allergic/Immunologic: Negative.        Physical Exam   Constitutional: She appears well-developed and well-nourished. No distress.   In wheelchair, no distress   Skin: She is not diaphoretic.   Nursing note and vitals reviewed.      Assessment:        ICD-10-CM    1. Chronic pain disorder G89.4 HYDROcodone-acetaminophen (NORCO) 5-325 MG tablet   2. Neuropathy G62.9 gabapentin (NEURONTIN) 600 MG tablet       Plan: Her pain medication was refilled for a month at which time she will follow-up.  Gabapentin was increased from 300 mg 3 times daily to 600 mg 3 times daily.  She will gradually increase to this dose.  Tramadol will continue without change.  When she rechecks in 30 days, we will  plan on doing lab work including CBC, A1c and possible peripheral smear at a minimum.

## 2019-07-31 NOTE — TELEPHONE ENCOUNTER
"Request for Home Care Physical Therapy orders as follows:    PT eval and treat date:      Continuation frequency =  2 x week x _4___ weeks              Effective date = 8/5/19    Interventions include:    Therapeutic Exercise  Gait Training  Gait/Balance/Dysfunction  Home Exercise Program  Home Safety Assessment      Acknowledging this order with an \"OK\" will suffice. Thank you for your time.  Please call if you have any questions or concerns.    Therapist: Ada Wolf PT  "
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
## 2019-08-09 NOTE — TELEPHONE ENCOUNTER
Routing refill request to provider for review/approval because:  Drug not on the [facility] refill protocol     LOV: 7/26/19  Damaris Arora RN on 8/9/2019 at 8:26 AM

## 2019-08-15 NOTE — TELEPHONE ENCOUNTER
"Requested Prescriptions   Pending Prescriptions Disp Refills     NOVOFINE 30 30G X 8 MM insulin pen needle [Pharmacy Med Name: NOVOFINE 30 30G X 8 MM insulin penneedle] 500 each 1     Sig: USE 5 TIMES DAILY       Diabetic Supplies Protocol Passed - 8/15/2019 10:19 AM        Passed - Medication is active on med list        Passed - Patient is 18 years of age or older        Passed - Recent (6 mo) or future (30 days) visit within the authorizing provider's specialty     Patient had office visit in the last 6 months or has a visit in the next 30 days with authorizing provider.  See \"Patient Info\" tab in inbasket, or \"Choose Columns\" in Meds & Orders section of the refill encounter.            LOV-07/26/2019  Future Office visit:    Next 5 appointments (look out 90 days)    Aug 23, 2019 11:00 AM CDT  SHORT with Hugo Duarte MD  [facility] ([facility]) [address]  [place] [address]  [phone]         Prescription refilled per RN Medication RefillPolicy.................... Pam Guerrero ....................  8/15/2019   10:30 AM        "

## 2019-08-23 NOTE — NURSING NOTE
The patient is here today to be seen for a refill on her medications.  Josefa Pandya LPN on 8/23/2019 at 10:56 AM  Chief Complaint   Patient presents with     Recheck Medication       Initial /82 (BP Location: Right arm, Patient Position: Sitting, Cuff Size: Adult Regular)   Pulse 68   Temp 98.8  F (37.1  C) (Tympanic)   Resp 18   Wt 55.8 kg (123 lb)   Breastfeeding? No   BMI 24.02 kg/m   Estimated body mass index is 24.02 kg/m  as calculated from the following:    Height as of 12/19/18: 1.524 m (5').    Weight as of this encounter: 55.8 kg (123 lb).  Medication Reconciliation: complete    Josefa Pandya LPN
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
## 2019-08-23 NOTE — LETTER
August 26, 2019      Karen Duncan  [address]  [place] [address]        Dear Karen,    Below are the results of your recent labs:    Results for orders placed or performed in visit on 08/23/19   Iron Binding Panel   Result Value Ref Range    Iron 73 50 - 212 ug/dL    UIBC (Unsaturated) 298.00 mg/dL    Iron Binding Capacity 371.00 245.00 - 400.00 ug/dL    Iron Saturation 20 20 - 55 %   Reticulocytes   Result Value Ref Range    % Retic 4.8 (H) 0.5 - 2.0 %    Absolute Retic 180.0 (H) 25 - 95 10e9/L   Hemoglobin A1c   Result Value Ref Range    Hemoglobin A1C 6.1 (H) 4.0 - 6.0 %   CBC W PLT No Diff   Result Value Ref Range    WBC 16.5 (H) 4.0 - 11.0 10e9/L    RBC Count 3.79 (L) 3.8 - 5.2 10e12/L    Hemoglobin 11.5 (L) 11.7 - 15.7 g/dL    Hematocrit 35.2 35.0 - 47.0 %    MCV 93 78 - 100 fl    MCH 30.3 26.5 - 33.0 pg    MCHC 32.7 31.5 - 36.5 g/dL    RDW 15.0 10.0 - 15.0 %    Platelet Count 381 150 - 450 10e9/L        Your blood tests show that your white blood count is still somewhat high and you are still slightly anemic.  The evaluation of your blood under the microscope did not reveal any abnormalities other than that, however.  This is something that we will just continue to monitor every 6 months or so going forward.  Your diabetes testing looks excellent.    Sincerely,        Hugo Duarte MD  Internal Medicine  [facility]

## 2019-08-26 NOTE — TELEPHONE ENCOUNTER
"Requested Prescriptions   Pending Prescriptions Disp Refills     blood glucose monitoring (ACCU-CHEK FASTCLIX) lancets 400 each 2     Sig: Use to test blood sugar four times daily. DX code E11.9. Dispense as covered by patient's insurance       Diabetic Supplies Protocol Passed - 8/22/2019 11:42 AM        Passed - Medication is active on med list        Passed - Patient is 18 years of age or older        Passed - Recent (6 mo) or future (30 days) visit within the authorizing provider's specialty     Patient had office visit in the last 6 months or has a visit in the next 30 days with authorizing provider.  See \"Patient Info\" tab in inbasket, or \"Choose Columns\" in Meds & Orders section of the refill encounter.            lov 08/23/19  "

## 2019-09-08 PROBLEM — D72.829 LEUKOCYTOSIS: Status: ACTIVE | Noted: 2019-01-01

## 2019-09-08 NOTE — ED NOTES
MD notified of spouse stating inability to care for wife and goes into to room to discuss with him.

## 2019-09-08 NOTE — ED NOTES
Tolerated first unit of blood, no sign of reaction. MSP will start the second unit. Report given. Ni Cohen RN on 9/8/2019 at 3:59 PM

## 2019-09-08 NOTE — ED NOTES
First unit of blood started, no signs of reaction within first 15 minutes. LS clear. See flowsheet. Ni Cohen RN on 9/8/2019 at 2:37 PM

## 2019-09-08 NOTE — ED TRIAGE NOTES
Patient brought in from home by EMS for increased weakness and chest pain that has been ongoing for  last week. Patient started having shortness of breath with chest pain this morning.  Patient has history of RA and chronic pain.   Patient was given 0.4 SL nitroglycerin which decreased chest pain.  PateintBP 122/67  HR NSR 90's Blood sugar 264.  Sats 95% on room air.

## 2019-09-08 NOTE — H&P
[facility]    History and Physical  Hospitalist       Date of Admission:  9/8/2019    Assessment & Plan   Karen Duncan is a 74 year old female who presents with symptomatic anemia and acute versus subacute GI bleed.    Principal Problem:    Anemia    Assessment: Acute versus subacute GI bleed, no hemoptysis or hematuria, significant decrease in blood counts in the last 2 weeks with progressive symptoms over the last 1 week.  After discussion with general surgery will monitor closely over the next 24 hours with plan for both upper and lower endoscopy.    Plan: - Transfuse 2 units PRBCs   - trend hemoglobins   - IV Pepcid   - appreciate general surgery consult   - clears today in the event of more emergent endoscopic needs   - TSH within normal limits    Leukocytosis  Assessment: Slight increase over the last 2 weeks, peripheral smear without evidence of myelodysplastic features.  Chest x-ray with clear right lower lobe infiltrate with significant cough without production.  Will benefit from empiric pneumonia treatment given her chronic immunosuppression with serial imaging to ensure resolution of findings.  Plan: - Trend daily   - iv ceftri/azithromycin   - obtain sputum and blood cultures   - urine strep/legionella     Diabetes mellitus, type II (H)    Assessment: Well-controlled with last hemoglobin A1c of 6.1 and may be falsely low due to occult blood loss.    Plan: - Decrease home Lantus of 14 units a.m. and 5 units p.m. to 7 units every morning   - ISS AC at bedtime, hold metformin      Hypertension    Assessment: Hemodynamically stable    Plan: - Continue home clonidine   - hold other home regimen and restart pending clinical course      Rheumatoid arthritis (H)    Assessment: Chronic pain and steroid-dependent    Plan: - Continue home prednisone and chronic pain regiment    FEN: clears diet, normal saline at 75mL/hr, mg/k replacement protocol  PPX: Pepcid, SCD's     Code Status: No  Valerie Ledesma    Primary Care Physician   LEO GIVENS    Chief Complaint   dyspnea    History is obtained from the patient and chart review.    History of Present Illness   Karen uDncan is a 74 year old female who presents with symptomatic anemia.  Patient at baseline has difficulty with ambulation and uses a walker given her chronic pain from severe RA.  On 8/23 she was evaluated by PCP for anemia noted to have hemoglobin of 11.5 and increased reticulocytosis without evidence of myelodysplastic features on peripheral smear.  Over the last 1 week she is felt progressively more short of breath and weak now barely able to ambulate to the bathroom having progressive shortness of breath and has developed constant dull pressure in her chest for the last 1 week.    Today has been brought her to the ER because she was so weak and he could no longer care for her, hemoglobin was noted to be 6.8, she was transfused 2 units PRBCs and subsequently admitted for further management.    Past Medical History    I have reviewed this patient's medical history and updated it with pertinent information if needed.   Past Medical History:   Diagnosis Date     Atrial fibrillation (H)     2000     Chronic pain      Essential (primary) hypertension     No Comments Provided     Extradural and subdural abscess, unspecified (CODE)     2016     Hyperlipidemia     No Comments Provided     NAFLD (nonalcoholic fatty liver disease)     2010,Fatty liver on US and CT.  Iron nl. Hep C neg     Osteomyelitis (H)     2012,IV Vancomycin to resolve, L 3rd digit     Pneumothorax     spondylolysis (right) at age 39 followed by spondylolysis (left) several years later     Psoas muscle abscess (H)     2016,Strep viridans     Rheumatoid arthritis (H)     2000     Type 2 diabetes mellitus without complications (H)     No Comments Provided       Past Surgical History   I have reviewed this patient's surgical history and updated it with pertinent  information if needed.  Past Surgical History:   Procedure Laterality Date     ARTHROPLASTY KNEE Bilateral      ARTHROTOMY WRIST Left 2010    Rheumatoid nodule     AS MUSCLE-SKIN FLAP,TRUNK  2017    Left ischial ulcer     BIOPSY BREAST Left      COLONOSCOPY      2001,repeat in 10 years per patient     COLONOSCOPY  02/18/2010 2010,sigmoid diverticulosis, no polyps, due 2020     COLONOSCOPY  01/01/2011 2011,mild sigmoid diverticulosis, EGD biopsies     ESOPHAGOSCOPY, GASTROSCOPY, DUODENOSCOPY (EGD), COMBINED      2/2011,Normal     EXTRACAPSULAR CATARACT EXTRATION WITH INTRAOCULAR LENS IMPLANT Bilateral      HYSTERECTOMY TOTAL ABDOMINAL, BILATERAL SALPINGO-OOPHORECTOMY, COMBINED      secondary to fibroids     LAMINECTOMY LUMBAR ONE LEVEL      Multiple     REMOVE PORT VASCULAR ACCESS N/A 6/27/2018    Procedure: REMOVE PORT VASCULAR ACCESS;  Port Removal;  Surgeon: Jazzy Taylor MD;  Location:  OR       Prior to Admission Medications   Prior to Admission Medications   Prescriptions Last Dose Informant Patient Reported? Taking?   Cyanocobalamin (VITAMIN B-12 CR) 1000 MCG TBCR 9/8/2019 at Unknown time  Yes Yes   Sig: Take 1 tablet by mouth daily   FLORASTOR 250 MG capsule 9/8/2019 at Unknown time  No Yes   Sig: TAKE 2 CAPSULES BY MOUTH TWICE DAILY   Ferrous Sulfate (IRON) 325 (65 Fe) MG tablet 9/8/2019 at Unknown time  No Yes   Sig: TAKE 1 TABLET BY MOUTH TWICE DAILY   HYDROcodone-acetaminophen (NORCO) 5-325 MG tablet 9/8/2019 at Unknown time  No Yes   Sig: Take 1 tablet by mouth 4 times daily   NOVOFINE 30 30G X 8 MM insulin pen needle Unknown at Unknown time  No No   Sig: USE 5 TIMES DAILY   NOVOLOG FLEXPEN 100 UNIT/ML soln 9/8/2019 at Unknown time  No Yes   Sig: INJECT 4 TIMES DAILY PER SLIDING SCALE. 24 UNITS MAX DAILY   ONETOUCH VERIO IQ test strip Unknown at Unknown time  No No   Sig: USE 1 STRIP TO CHECK GLUCOSE 4 TIMES DAILY   amLODIPine (NORVASC) 2.5 MG tablet 9/7/2019 at Unknown time  No Yes   Sig: TAKE 1  TABLET BY MOUTH ONCE DAILY   ascorbic acid (VITAMIN C) 500 MG tablet 9/7/2019 at Unknown time  Yes Yes   Sig: Take 2 tablets (1,000 mg) by mouth daily   atorvastatin (LIPITOR) 20 MG tablet 9/7/2019 at Unknown time  No Yes   Sig: TAKE 1 TABLET BY MOUTH AT BEDTIME   baclofen (LIORESAL) 10 MG tablet 9/8/2019 at Unknown time  No Yes   Sig: TAKE 1 TABLET BY MOUTH THREE TIMES DAILY   blood glucose monitoring (ACCU-CHEK FASTCLIX) lancets Unknown at Unknown time  No No   Sig: Use to test blood sugar four times daily. DX code E11.9. Dispense as covered by patient's insurance   cloNIDine (CATAPRES) 0.1 MG tablet 9/7/2019 at Unknown time  No Yes   Sig: TAKE 1 TABLET BY MOUTH ONCE DAILY   diltiazem (TIAZAC) 360 MG 24 hr capsule 9/7/2019 at Unknown time  No Yes   Sig: TAKE ONE CAPSULE BY MOUTH ONCE DAILY   docusate sodium (COLACE) 100 MG capsule 9/8/2019 at Unknown time  Yes Yes   Sig: Take 2 capsules (200 mg) by mouth 2 times daily   estradiol (ESTRACE) 0.5 MG tablet 9/8/2019 at Unknown time  No Yes   Sig: TAKE 1 TABLET BY MOUTH ONCE DAILY   furosemide (LASIX) 40 MG tablet 9/8/2019 at Unknown time  No Yes   Sig: TAKE 1 TABLET BY MOUTH ONCE DAILY IN THE MORNING   gabapentin (NEURONTIN) 600 MG tablet 9/8/2019 at Unknown time  No Yes   Sig: Take 1 tablet (600 mg) by mouth 3 times daily   insulin glargine (LANTUS SOLOSTAR) 100 UNIT/ML pen 9/8/2019 at Unknown time  Yes Yes   Sig: INJECT 14 UNITS SUBCUTANEOUSLY IN THE MORNING AND 5 IN THE EVENING   metFORMIN (GLUCOPHAGE-XR) 500 MG 24 hr tablet 9/8/2019 at Unknown time  No Yes   Sig: TAKE 2 TABLETS BY MOUTH TWICE DAILY WITH MEALS   predniSONE (DELTASONE) 5 MG tablet 9/8/2019 at Unknown time  No Yes   Sig: TAKE 1 TABLET BY MOUTH TWICE DAILY   psyllium (TGT PSYLLIUM FIBER) 0.52 G capsule 9/8/2019 at Unknown time  Yes Yes   Sig: Take 3 capsules (1.56 g) by mouth daily   spironolactone (ALDACTONE) 25 MG tablet 9/8/2019 at Unknown time  No Yes   Sig: TAKE 1 TABLET BY MOUTH ONCE DAILY    traMADol (ULTRAM) 50 MG tablet 9/7/2019 at Unknown time  No Yes   Sig: TAKE 2 TABLETS BY MOUTH EVERY 6 HOURS AS NEEDED FOR  MODERATE  PAIN.      Facility-Administered Medications: None     Allergies   Allergies   Allergen Reactions     Aliskiren Cough     Gabapentin Cough     Liquid Adhesive      Other reaction(s): *Unknown  Paper tapes, fragile skin     Lisinopril Cough     Ace cough     Losartan Cough       Social History   I have reviewed this patient's social history and updated it with pertinent information if needed. Karen Duncan  reports that she quit smoking about 45 years ago. Her smoking use included cigarettes. She has a 75.00 pack-year smoking history. She has never used smokeless tobacco. She reports that she does not drink alcohol or use drugs.    Family History   I have reviewed this patient's family history and updated it with pertinent information if needed.   Family History   Problem Relation Age of Onset     Other - See Comments Father         brain aneurysm.     Diabetes Father      Other - See Comments Mother         Stroke/Hx thrombosis     Cancer Brother         Throat     Other - See Comments Brother         Agent orange     Pulmonary Embolism Brother      Breast Cancer Maternal Grandmother         Cancer-breast     Cancer Sister         Cervical     Chronic Obstructive Pulmonary Disease Sister      Other - See Comments Other         Several other family members including cousins who have had brain aneurysms.     Diabetes Son         Diabetes     Diabetes Son         Diabetes     Diabetes Daughter         Diabetes     Other - See Comments Other         8 Siblings     Cancer Maternal Aunt         Cancer,Uterine       Review of Systems     REVIEW OF SYSTEMS:    Constitutional: Poor energy and appetite, no recent sick contacts, no fevers.  Eyes: no changes in vision  Ears, nose, mouth, throat, and face: no mouth sores, dysphagia, or odynophagia  Respiratory: no shortness of breath at  rest, chronic nonproductive cough, no wheezing, no aspiration symptoms.  Cardiovascular: no chest pain, palpitations, orthopnea, increased lower extremity edema, or syncope.   Gastrointestinal: no constipation, diarrhea, nausea, vomiting or abdominal pain.  Genitourinary: no dysuria, hematuria, urgency or frequency.   Hematologic/lymphatic: no unintentional weight loss or night sweats.  Musculoskeletal: no falls.   Neurological: no new weakness, tingling, numbness.   Endocrine: blood sugars have been well controlled.     Physical Exam   Temp: 98.2  F (36.8  C) Temp src: Tympanic BP: 113/71 Pulse: 104 Heart Rate: 116 Resp: 20 SpO2: 96 % O2 Device: None (Room air)    Vital Signs with Ranges  Temp:  [98.2  F (36.8  C)] 98.2  F (36.8  C)  Pulse:  [] 104  Heart Rate:  [] 116  Resp:  [11-22] 20  BP: (106-134)/(55-71) 113/71  SpO2:  [90 %-97 %] 96 %  121 lbs 4.8 oz    Exam:  GENERAL: Talkative, in no apparent distress.  Head: normocephalic and atraumatic  Eyes: anicteric and non-injected sclera  Nose: no rhinorrhea or epistaxis.   Throat: moist mucous membranes with no active oral lesions.  NECK: Supple, jugular venous distension not present.  CARDIOVASCULAR: regular rate and rhythm, no murmurs, rubs, or gallops. Normal S1/S2. No lower extremity edema.   RESPIRATORY: clear to auscultation bilaterally, no wheezes, no crackles.   GI: soft, non-tender, non-distended, normoactive bowel sounds.  MUSCULOSKELETAL: warm and well perfused, 2+ dorsalis pedis pulses.    SKIN: Diffuse pallor with thin skin, multiple chronic appearing ecchymoses on her shins.  NEUROLOGY: AAOx3, follows commands, speech and language without focal deficits.        Data   Data reviewed today:  I personally reviewed no images or EKG's today.  Recent Labs   Lab 09/08/19  1355 09/08/19  1053   WBC  --  17.3*   HGB  --  6.8*   MCV  --  100   PLT  --  289   INR  --  1.07   NA  --  138   POTASSIUM  --  3.7   CHLORIDE  --  103   CO2  --  22   BUN  --   62*   CR  --  0.78   ANIONGAP  --  13   MOLLY  --  8.4*   GLC  --  166*   ALBUMIN  --  3.2*   PROTTOTAL  --  4.9*   BILITOTAL  --  0.3   ALKPHOS  --  83   ALT  --  13   AST  --  14   TROPI <0.030 <0.030       Recent Results (from the past 24 hour(s))   XR Chest 2 Views    Narrative    EXAM:   XR Chest, 2 Views     EXAM DATE/TIME:   9/8/2019 11:09 AM     CLINICAL HISTORY:   74 years old, female; Angina; Additional info: Cp     TECHNIQUE:   Imaging protocol: XR of the chest   Views: 2 views.     COMPARISON:   CR XR CHEST 2 VIEWS PA AND LATERAL 5/27/2017 6:51 PM     FINDINGS:   Tubes, catheters and devices: Left Port-A-Cath has been removed.   Lungs: There is right basilar airspace opacity compatible with pneumonia.   Pleural space: There is small right pleural effusion.   Heart/Mediastinum: There is hiatus hernia.   Bones/joints: Degenerative changes are noted in right shoulder. Metallic plate   and screws are noted in thoracolumbar spine.       Impression    IMPRESSION:   Right basilar airspace opacity and pleural effusion.     THIS DOCUMENT HAS BEEN ELECTRONICALLY SIGNED BY INOCENCIA GRAMAJO MD

## 2019-09-08 NOTE — PROGRESS NOTES
Pt is AxO x4, VSS, afebrile. Pt LS clear,equal bilat. Pt reports SOB with activity. O2 94% on RA. Pt left hand has 1+ edema. Pt states the edema started 2 days ago. Pt reports pain 5/10 in hands, buttock, requesting Gabapentin. Pt encouraged to shift weight while sitting in chair. Pillow placed under pt buttock per pt request. Pt requesting to be upright in chair. Pt tolerated 2 unit of blood. Denies any SOB, afebrile. Will continue to monitor.

## 2019-09-08 NOTE — ED PROVIDER NOTES
[facility]  Emergency Department Visit Note    Chest Pain and Shortness of Breath      History of Present Illness     Karen Duncan is a 74 year old female presenting with chest pain, SOB, and weakness. These symptoms started approximately 4 weeks prior to arrival and the symptoms have worsened over the past several days.  The patient is now too weak to take care of herself and rosemary says he can not help her around.  She states that when she tries to get out of bed she just feels too weak and dizzy. The pain is characterized as heaviness across her chest, does not radiate, and is not associated with fevers, cough, nausea, vomiting, diaphoresis, change with respiration.  911 was called because her  cannot get her out of bed and bring her to the hospital.  In route they did give her nitroglycerin which she says took away the heaviness across her chest.  The patient has not had similar symptoms in the past. No falls or other recent injuries.  Review of systems is also positive for daily headaches that are new.  She feels lightheaded with standing or any movement but she has not had any syncope or blackouts.  She has had no recent falls    Medications:  Prior to Admission medications    Medication Sig Last Dose Taking? Auth Provider   amLODIPine (NORVASC) 2.5 MG tablet TAKE 1 TABLET BY MOUTH ONCE DAILY 9/7/2019 at Unknown time Yes Hugo Duarte MD   ascorbic acid (VITAMIN C) 500 MG tablet Take 2 tablets (1,000 mg) by mouth daily 9/7/2019 at Unknown time Yes Hugo Duarte MD   atorvastatin (LIPITOR) 20 MG tablet TAKE 1 TABLET BY MOUTH AT BEDTIME 9/7/2019 at Unknown time Yes Hugo Duarte MD   baclofen (LIORESAL) 10 MG tablet TAKE 1 TABLET BY MOUTH THREE TIMES DAILY 9/8/2019 at Unknown time Yes Hugo Duarte MD   cloNIDine (CATAPRES) 0.1 MG tablet TAKE 1 TABLET BY MOUTH ONCE DAILY 9/7/2019 at Unknown time Yes Hugo Duarte MD   Cyanocobalamin (VITAMIN B-12 CR) 1000 MCG TBCR  Take 1 tablet by mouth daily 9/8/2019 at Unknown time Yes Hugo Duarte MD   diltiazem (TIAZAC) 360 MG 24 hr capsule TAKE ONE CAPSULE BY MOUTH ONCE DAILY 9/7/2019 at Unknown time Yes Hugo Duarte MD   docusate sodium (COLACE) 100 MG capsule Take 2 capsules (200 mg) by mouth 2 times daily 9/8/2019 at Unknown time Yes Hugo Duarte MD   estradiol (ESTRACE) 0.5 MG tablet TAKE 1 TABLET BY MOUTH ONCE DAILY 9/8/2019 at Unknown time Yes Hugo Duarte MD   Ferrous Sulfate (IRON) 325 (65 Fe) MG tablet TAKE 1 TABLET BY MOUTH TWICE DAILY 9/8/2019 at Unknown time Yes Hugo Duarte MD   FLORASTOR 250 MG capsule TAKE 2 CAPSULES BY MOUTH TWICE DAILY 9/8/2019 at Unknown time Yes Hugo Duarte MD   furosemide (LASIX) 40 MG tablet TAKE 1 TABLET BY MOUTH ONCE DAILY IN THE MORNING 9/8/2019 at Unknown time Yes Hugo Duarte MD   gabapentin (NEURONTIN) 600 MG tablet Take 1 tablet (600 mg) by mouth 3 times daily 9/8/2019 at Unknown time Yes Hugo Duarte MD   HYDROcodone-acetaminophen (NORCO) 5-325 MG tablet Take 1 tablet by mouth 4 times daily 9/8/2019 at Unknown time Yes Hugo Duarte MD   insulin glargine (LANTUS SOLOSTAR) 100 UNIT/ML pen INJECT 14 UNITS SUBCUTANEOUSLY IN THE MORNING AND 5 IN THE EVENING 9/8/2019 at Unknown time Yes Hugo Duarte MD   metFORMIN (GLUCOPHAGE-XR) 500 MG 24 hr tablet TAKE 2 TABLETS BY MOUTH TWICE DAILY WITH MEALS 9/8/2019 at Unknown time Yes Hugo Duarte MD   NOVOLOG FLEXPEN 100 UNIT/ML soln INJECT 4 TIMES DAILY PER SLIDING SCALE. 24 UNITS MAX DAILY 9/8/2019 at Unknown time Yes Hugo Duarte MD   predniSONE (DELTASONE) 5 MG tablet TAKE 1 TABLET BY MOUTH TWICE DAILY 9/8/2019 at Unknown time Yes Hugo Duarte MD   psyllium (TGT PSYLLIUM FIBER) 0.52 G capsule Take 3 capsules (1.56 g) by mouth daily 9/8/2019 at Unknown time Yes Hugo Duarte MD   spironolactone (ALDACTONE) 25 MG tablet TAKE 1 TABLET BY MOUTH ONCE DAILY 9/8/2019 at Unknown time Yes Hugo Duarte MD   traMADol  (ULTRAM) 50 MG tablet TAKE 2 TABLETS BY MOUTH EVERY 6 HOURS AS NEEDED FOR  MODERATE  PAIN. 9/7/2019 at Unknown time Yes Hugo Duarte MD   blood glucose monitoring (ACCU-CHEK FASTCLIX) lancets Use to test blood sugar four times daily. DX code E11.9. Dispense as covered by patient's insurance Unknown at Unknown time  Hugo Duarte MD   NOVOFINE 30 30G X 8 MM insulin pen needle USE 5 TIMES DAILY Unknown at Unknown time  Hugo Duarte MD   ONETOUCH VERIO IQ test strip USE 1 STRIP TO CHECK GLUCOSE 4 TIMES DAILY Unknown at Unknown time  Hugo Duarte MD       Allergies:  Allergies   Allergen Reactions     Aliskiren Cough     Gabapentin Cough     Liquid Adhesive      Other reaction(s): *Unknown  Paper tapes, fragile skin     Lisinopril Cough     Ace cough     Losartan Cough       Problem List:  Patient Active Problem List   Diagnosis     Anemia     Chronic pain disorder     Corticosteroid dependence (H)     Diabetes mellitus, type II (H)     Diverticular disease of colon     Elevated LFTs     Estrogen deficiency     Hyperlipemia     Hypertension     Neuropathy     Pain medication agreement     Rheumatoid arthritis (H)     Advance Care Planning       Past Medical History:  Past Medical History:   Diagnosis Date     Atrial fibrillation (H)     2000     Chronic pain      Essential (primary) hypertension     No Comments Provided     Extradural and subdural abscess, unspecified (CODE)     2016     Hyperlipidemia     No Comments Provided     NAFLD (nonalcoholic fatty liver disease)     2010,Fatty liver on US and CT.  Iron nl. Hep C neg     Osteomyelitis (H)     2012,IV Vancomycin to resolve, L 3rd digit     Pneumothorax     spondylolysis (right) at age 39 followed by spondylolysis (left) several years later     Psoas muscle abscess (H)     2016,Strep viridans     Rheumatoid arthritis (H)     2000     Type 2 diabetes mellitus without complications (H)     No Comments Provided       Past Surgical History:  Past Surgical  History:   Procedure Laterality Date     ARTHROPLASTY KNEE Bilateral      ARTHROTOMY WRIST Left     Rheumatoid nodule     AS MUSCLE-SKIN FLAP,TRUNK  2017    Left ischial ulcer     BIOPSY BREAST Left      COLONOSCOPY      ,repeat in 10 years per patient     COLONOSCOPY  2010,sigmoid diverticulosis, no polyps, due      COLONOSCOPY  2011,mild sigmoid diverticulosis, EGD biopsies     ESOPHAGOSCOPY, GASTROSCOPY, DUODENOSCOPY (EGD), COMBINED      2011,Normal     EXTRACAPSULAR CATARACT EXTRATION WITH INTRAOCULAR LENS IMPLANT Bilateral      HYSTERECTOMY TOTAL ABDOMINAL, BILATERAL SALPINGO-OOPHORECTOMY, COMBINED      secondary to fibroids     LAMINECTOMY LUMBAR ONE LEVEL      Multiple     REMOVE PORT VASCULAR ACCESS N/A 2018    Procedure: REMOVE PORT VASCULAR ACCESS;  Port Removal;  Surgeon: Jazzy Taylor MD;  Location:  OR       Social History:  Social History     Tobacco Use     Smoking status: Former Smoker     Packs/day: 3.00     Years: 25.00     Pack years: 75.00     Types: Cigarettes     Last attempt to quit: 1974     Years since quittin.3     Smokeless tobacco: Never Used     Tobacco comment: Quit smoking: quit    Substance Use Topics     Alcohol use: No     Alcohol/week: 8.4 oz     Frequency: Never     Comment: Alcoholic Drinks/day: 1 beers/day     Drug use: No     Comment: Drug use: No       Review of Systems:  10 point review of systems obtained and pertinent positive and negative findings noted in HPI. Review of systems otherwise negative.      Physical Exam     Vital signs: /55   Pulse 90   Temp 98.2  F (36.8  C) (Tympanic)   Resp 17   Ht 1.524 m (5')   Wt 55 kg (121 lb 4.8 oz)   SpO2 95%   BMI 23.69 kg/m      Physical Exam:    General: awake and alert, comfortable  HEENT: atraumatic, no scleral injection, no nasal discharge, neck supple  Chest wall: palpation of the chest wall does not reproduce symptoms  Chest: clear to auscultation  bilaterally without wheezes or crackles, non labored respirations  Cardiovascular: regular rate and rhythm, no murmurs or gallops  Abdomen: soft, nontender, no rebound or guarding, nondistended  Rectal Exam: Stool is black, guaiac positive  Extremities: no deformities, edema, or tenderness  Skin: warm, dry, no rashes  Neuro: alert and oriented x 3, moving extremities x 4, ambulates without difficulty    Medical Decision Making & ED Course     Karen Duncan is a 74 year old female presenting with chest pain. Differential includes acute coronary syndrome, pulmonary embolism, aortic dissection, pneumonia, esophageal spasm, gastroesophageal reflux, reactive airway disease, chest wall pain. Concern for a life threatening etiology of symptoms prompts EKG, CXR, and laboratory evaluation. Given the patient's risk factors and concerning history for acute coronary syndrome, negative studies are insufficient to rule out a coronary event in the ED and the patient required a second troponin which was also negative.  Hemoglobin was significantly low at 6.8 and corresponded with her black tarry stools most likely this is a GI bleed.  Her headaches, shortness of breath, and chest pain are most likely related to this.  She was transfused 2 units of packed red blood cells here in the emergency department.  She is given a bolus of Protonix.  The case discussed with the hospitalist on call who agrees to admit the patient.      I have reviewed the patient's EKG, medical records, medical imaging, and laboratory studies    Diagnosis & Disposition     Diagnosis:  1. Gastrointestinal hemorrhage, unspecified gastrointestinal hemorrhage type        Disposition:  Admission    MD Tracy Spann Theresa M, MD  09/08/19 9099

## 2019-09-08 NOTE — ED NOTES
Patient having increased SOB and dizziness with tranfers to commode. Ni Cohen RN on 9/8/2019 at 12:06 PM

## 2019-09-08 NOTE — PROGRESS NOTES
Chart accessed pending admission to [facility]. Patient assigned to room 311 with caregiver Sinai. Yusra Streeter RN on 9/8/2019 at 2:48 PM

## 2019-09-09 PROBLEM — K62.3 PARTIAL RECTAL PROLAPSE: Status: ACTIVE | Noted: 2017-05-21

## 2019-09-09 PROBLEM — I35.0 AORTIC STENOSIS: Status: ACTIVE | Noted: 2017-07-20

## 2019-09-09 PROBLEM — A49.02 MRSA INFECTION: Status: ACTIVE | Noted: 2017-05-21

## 2019-09-09 PROBLEM — Z79.52 CURRENT CHRONIC USE OF SYSTEMIC STEROIDS: Status: ACTIVE | Noted: 2017-05-17

## 2019-09-09 NOTE — PROGRESS NOTES
09/09/19 1200   Quick Adds   Type of Visit Initial PT Evaluation   Living Environment   Lives With spouse   Living Arrangements house   Home Accessibility no concerns   Transportation Anticipated family or friend will provide   Self-Care   Usual Activity Tolerance fair   Current Activity Tolerance poor   Equipment Currently Used at Home walker, rolling   Functional Level Prior   Ambulation 1-->assistive equipment   Transferring 1-->assistive equipment   Toileting 0-->independent   Bathing 2-->assistive person   Communication 0-->understands/communicates without difficulty   Swallowing 0-->swallows foods/liquids without difficulty   Cognition 0 - no cognition issues reported   Fall history within last six months no   Which of the above functional risks had a recent onset or change? none   General Information   Referring Physician Shannon   Patient/Family Goals Statement return home when able   Precautions/Limitations fall precautions  (contact precautions)   Weight-Bearing Status - LLE full weight-bearing   Weight-Bearing Status - RLE full weight-bearing   Cognitive Status Examination   Orientation orientation to person, place and time   Level of Consciousness alert   Follows Commands and Answers Questions 100% of the time   Personal Safety and Judgment intact   Memory intact   Pain Assessment   Patient Currently in Pain No   Integumentary/Edema   Integumentary/Edema Comments no notable edema in LEs   Posture    Posture Forward head position;Protracted shoulders   Range of Motion (ROM)   ROM Comment WFL LEs   Strength   Strength Comments WFL LEs   Bed Mobility   Bed Mobility Comments minimal assist   Transfer Skills   Transfer Comments minimal assist to CGA with 4ww   Gait   Gait Comments minimal assist to CGA with 4ww   Balance   Balance Comments only fair with use of 4ww   Sensory Examination   Sensory Perception Comments appears intact to light touch in LEs   Coordination   Coordination no deficits were  identified   General Therapy Interventions   Planned Therapy Interventions bed mobility training;gait training;transfer training   Clinical Impression   Criteria for Skilled Therapeutic Intervention yes, treatment indicated   PT Diagnosis impaired mobility   Influenced by the following impairments fatigue, weakness   Functional limitations due to impairments activity tolerance, gait stability   Clinical Presentation Stable/Uncomplicated   Clinical Decision Making (Complexity) Low complexity   Therapy Frequency Daily   Predicted Duration of Therapy Intervention (days/wks) 2-3 days   Anticipated Equipment Needs at Discharge walker   Anticipated Discharge Disposition Home with Home Therapy   Risk & Benefits of therapy have been explained Yes   Patient, Family & other staff in agreement with plan of care Yes   Total Evaluation Time   Total Evaluation Time (Minutes) 15

## 2019-09-09 NOTE — PLAN OF CARE
Discharge Planner OT   Patient plan for discharge: to return home with  as previous but open to home care therapies, feel they would be beneficial   Current status: Pt pleasant, needed some encouragement to get OOB due to feeling tired, but moved supine to sit with min assist, transferred to recliner with min assist of 1-2 using FWW, completed grooming with set up only while seated   Barriers to return to prior living situation: some weakness from baseline   Recommendations for discharge: home with family assist as previous and home PT/OT   Rationale for recommendations: see above        Entered by: Anne Mandel 09/09/2019 11:47 AM

## 2019-09-09 NOTE — PLAN OF CARE
Discharge Planner PT   Patient plan for discharge: home with family and home care PT  Current status: minimal assist for bed mobilities, transfers and ambulation; poor activity tolerance  Barriers to return to prior living situation: fatigue, weakness  Recommendations for discharge: continued PT  Rationale for recommendations: to promote strength, stability and safe mobility       Entered by: Carter Webber 09/09/2019 12:54 PM

## 2019-09-09 NOTE — PHARMACY-ADMISSION MEDICATION HISTORY
Pharmacy -- Admission Medication Reconciliation    Prior to admission (PTA) medications were reviewed and the patient's PTA medication list was updated.    Sources Consulted: patientEstela    The reliability of this Medication Reconciliation is: Reliability: Reliable    The following significant changes were made:  CHANGED evening dose of Lantus insulin to 9units    In addition, the patient's allergies were reviewed with the patient and updated as follows:   Allergies: Aliskiren; Gabapentin; Liquid adhesive; Lisinopril; and Losartan     Note gabapentin was removed as patient is currently taking this medication.    The pharmacist has reviewed with the patient that all personal medications should be removed from the building or locked in the belongings safe.  Patient shall only take medications ordered by the physician and administered by the nursing staff.       Medication barriers identified: none - patient knows her meds well   Medication adherence concerns: none   Understanding of emergency medications: n/a    [facility], located within [facility], 9/9/2019,  10:17 AM

## 2019-09-09 NOTE — CONSULTS
General Surgery Consult    CHIEF COMPLAINT / REASON FOR PROCEDURE:  anemia    PERTINENT HISTORY   Patient is a 74 year old female who presents today for colonoscopy and upper endoscopy for anemia. Hgb was 6 and now 8 after transfusion. Has had black stool but is on iron.   Last colonoscopy/EGD 2011.  Patient on prednisone for RA.     Past Medical History:   Diagnosis Date     Atrial fibrillation (H)     2000     Chronic pain      Essential (primary) hypertension     No Comments Provided     Extradural and subdural abscess, unspecified (CODE)     2016     Hyperlipidemia     No Comments Provided     NAFLD (nonalcoholic fatty liver disease)     2010,Fatty liver on US and CT.  Iron nl. Hep C neg     Osteomyelitis (H)     2012,IV Vancomycin to resolve, L 3rd digit     Pneumothorax     spondylolysis (right) at age 39 followed by spondylolysis (left) several years later     Psoas muscle abscess (H)     2016,Strep viridans     Rheumatoid arthritis (H)     2000     Type 2 diabetes mellitus without complications (H)     No Comments Provided     Past Surgical History:   Procedure Laterality Date     ARTHROPLASTY KNEE Bilateral      ARTHROTOMY WRIST Left 2010    Rheumatoid nodule     AS MUSCLE-SKIN FLAP,TRUNK  2017    Left ischial ulcer     BIOPSY BREAST Left      COLONOSCOPY      2001,repeat in 10 years per patient     COLONOSCOPY  02/18/2010 2010,sigmoid diverticulosis, no polyps, due 2020     COLONOSCOPY  01/01/2011 2011,mild sigmoid diverticulosis, EGD biopsies     ESOPHAGOSCOPY, GASTROSCOPY, DUODENOSCOPY (EGD), COMBINED      2/2011,Normal     EXTRACAPSULAR CATARACT EXTRATION WITH INTRAOCULAR LENS IMPLANT Bilateral      HYSTERECTOMY TOTAL ABDOMINAL, BILATERAL SALPINGO-OOPHORECTOMY, COMBINED      secondary to fibroids     LAMINECTOMY LUMBAR ONE LEVEL      Multiple     REMOVE PORT VASCULAR ACCESS N/A 6/27/2018    Procedure: REMOVE PORT VASCULAR ACCESS;  Port Removal;  Surgeon: Jazzy Taylor MD;  Location: [facility]        Bleeding tendencies:  No    ALLERGIES/SENSITIVITIES:   Allergies   Allergen Reactions     Aliskiren Cough     Gabapentin Cough     Liquid Adhesive      Other reaction(s): *Unknown  Paper tapes, fragile skin     Lisinopril Cough     Ace cough     Losartan Cough        CURRENT MEDICATIONS:    Prior to Admission medications    Medication Sig Start Date End Date Taking? Authorizing Provider   amLODIPine (NORVASC) 2.5 MG tablet TAKE 1 TABLET BY MOUTH ONCE DAILY 5/21/19  Yes Hugo Duarte MD   ascorbic acid (VITAMIN C) 500 MG tablet Take 2 tablets (1,000 mg) by mouth daily 3/27/18  Yes Hugo Duarte MD   atorvastatin (LIPITOR) 20 MG tablet TAKE 1 TABLET BY MOUTH AT BEDTIME 4/25/19  Yes Hugo Duarte MD   baclofen (LIORESAL) 10 MG tablet TAKE 1 TABLET BY MOUTH THREE TIMES DAILY 7/22/19  Yes Hugo Duarte MD   cloNIDine (CATAPRES) 0.1 MG tablet TAKE 1 TABLET BY MOUTH ONCE DAILY 5/16/19  Yes Hugo Duarte MD   Cyanocobalamin (VITAMIN B-12 CR) 1000 MCG TBCR Take 1 tablet by mouth daily 3/27/18  Yes Hugo Duarte MD   diltiazem (TIAZAC) 360 MG 24 hr capsule TAKE ONE CAPSULE BY MOUTH ONCE DAILY 11/28/18  Yes Hugo Duarte MD   docusate sodium (COLACE) 100 MG capsule Take 2 capsules (200 mg) by mouth 2 times daily 3/27/18  Yes Hugo Duarte MD   estradiol (ESTRACE) 0.5 MG tablet TAKE 1 TABLET BY MOUTH ONCE DAILY 4/12/19  Yes Hugo Duarte MD   Ferrous Sulfate (IRON) 325 (65 Fe) MG tablet TAKE 1 TABLET BY MOUTH TWICE DAILY 5/16/19  Yes Hugo Duarte MD   FLORASTOR 250 MG capsule TAKE 2 CAPSULES BY MOUTH TWICE DAILY 1/14/19  Yes Hugo Duarte MD   furosemide (LASIX) 40 MG tablet TAKE 1 TABLET BY MOUTH ONCE DAILY IN THE MORNING 4/4/19  Yes Hugo Duarte MD   gabapentin (NEURONTIN) 600 MG tablet Take 1 tablet (600 mg) by mouth 3 times daily 7/26/19  Yes Hugo Duarte MD   HYDROcodone-acetaminophen (NORCO) 5-325 MG tablet Take 1 tablet by mouth 4 times daily 8/23/19  Yes Hugo Duarte MD   insulin  glargine (LANTUS SOLOSTAR) 100 UNIT/ML pen INJECT 14 UNITS SUBCUTANEOUSLY IN THE MORNING AND 5 IN THE EVENING 8/23/19  Yes Hugo Duarte MD   metFORMIN (GLUCOPHAGE-XR) 500 MG 24 hr tablet TAKE 2 TABLETS BY MOUTH TWICE DAILY WITH MEALS 4/30/19  Yes Hugo Duarte MD   NOVOLOG FLEXPEN 100 UNIT/ML soln INJECT 4 TIMES DAILY PER SLIDING SCALE. 24 UNITS MAX DAILY 1/29/19  Yes Hugo Duarte MD   predniSONE (DELTASONE) 5 MG tablet TAKE 1 TABLET BY MOUTH TWICE DAILY 3/6/19  Yes Hugo Duarte MD   psyllium (TGT PSYLLIUM FIBER) 0.52 G capsule Take 3 capsules (1.56 g) by mouth daily 3/27/18  Yes Hugo Duarte MD   spironolactone (ALDACTONE) 25 MG tablet TAKE 1 TABLET BY MOUTH ONCE DAILY 3/13/19  Yes Hugo Duarte MD   traMADol (ULTRAM) 50 MG tablet TAKE 2 TABLETS BY MOUTH EVERY 6 HOURS AS NEEDED FOR  MODERATE  PAIN. 8/9/19  Yes Hugo Duarte MD   blood glucose monitoring (ACCU-CHEK FASTCLIX) lancets Use to test blood sugar four times daily. DX code E11.9. Dispense as covered by patient's insurance 8/26/19   Hugo Duarte MD   NOVOFINE 30 30G X 8 MM insulin pen needle USE 5 TIMES DAILY 8/15/19   Hugo Duarte MD   ONETOUCH VERIO IQ test strip USE 1 STRIP TO CHECK GLUCOSE 4 TIMES DAILY 6/11/19   Hugo Duarte MD       Physical Exam:  /67   Pulse 94   Temp 98.2  F (36.8  C) (Tympanic)   Resp 14   Ht 1.524 m (5')   Wt 56.7 kg (125 lb)   SpO2 97%   BMI 24.41 kg/m    EXAM: Frail woman.   Chest/Respiratory Exam: Normal - Clear to auscultation without rales, rhonchi, or wheezing.  Cardiovascular Exam: normal, regular rate and rhythm  Abdomen: soft and nontender.      PLAN: COLONOSCOPY and upper endoscopy .  Patient understands risks of bleeding, perforation, potential inability to reach cecum, aspiration and wishes to proceed. MAC needed for age.   Bowel prep today for procedures tomorrow

## 2019-09-09 NOTE — PLAN OF CARE
Pt is A&O x4. VSS. /67   Pulse 94   Temp 98.2  F (36.8  C) (Tympanic)   Resp 14   Ht 1.524 m (5')   Wt 53.3 kg (117 lb 9.6 oz)   SpO2 97%   BMI 22.97 kg/m      Complains of pain in lower back. PRN tylenol administered, not effective. Alternative interventions of heat, ice and repositioning were applied, some relief. Tramadol administer PRN for pain later in shift, effective.    Pt complains of dyspnea on exertion. LS crackles RLL. +1 edema to left hand. Pt states flair up of RA.     Pt up multiple times to the commode. Five small stool occurrences. Black in color. Pt states I think in had a bowel movement when performing abiola cares, nurse noticed what looked to be a prolapsed rectum.   Buttock has blanchable redness bilaterally. Pt repositioning self throughout the night. Scattered bruising throughout. Hyperpigmentation BLE. Will continue to monitor.     Latosha Le RN on 9/9/2019 at 5:16 AM

## 2019-09-09 NOTE — PROGRESS NOTES
[facility]    Medicine Progress Note - Hospitalist Service       Date of Admission:  9/8/2019  Assessment & Plan      Karen Duncan is a 74 year old female who presents with symptomatic anemia and acute versus subacute GI bleed.  Her symptoms and risk factors suggest more likely upper GI bleed and hemoglobin has been stable since getting 2U PRBC on admit.  She continues to have maroon/dark stools this morning.    Principal Problem:    Anemia    Assessment: Acute versus subacute GI bleed, no hemoptysis or hematuria, significant decrease in blood counts in the last 2 weeks with progressive symptoms over the last 1 week, now resolved after transfusion.  Will check Hgb Q4 hrs during day and monitor closely for any signs or symptoms of recurrent symptomatic anemia over the next 24 hours with plan for both upper and lower endoscopy tomorrow AM.    Plan:  - trend hemoglobins at 1000 and 1400.   - IV Pepcid and Protonix   - appreciate general surgery consult   - Bowel prep today   - TSH within normal limits    Leukocytosis  Assessment: Slight increase over the last 2 weeks, peripheral smear without evidence of myelodysplastic features.  Chest x-ray with clear right lower lobe infiltrate with significant cough without production.  Will benefit from empiric pneumonia treatment given her chronic immunosuppression with serial imaging to ensure resolution of findings.  Plan: - Trend daily   - iv ceftri/azithromycin day #2   - follow up sputum and blood cultures   - follow up urine strep/legionella     Diabetes mellitus, type II (H)    Assessment: Well-controlled with last hemoglobin A1c of 6.1 and may be falsely low due to occult blood loss.  BS stable since admit.    Plan: - Continue lantus 7 units every morning   - ISS AC at bedtime, hold metformin      Hypertension    Assessment: Hemodynamically stable    Plan: - Continue home clonidine   - continue to hold other home regimen and restart pending  "clinical course      Rheumatoid arthritis (H)    Assessment: Chronic pain and steroid-dependent    Plan: - Continue home prednisone and chronic pain regiment    FEN: clear diet until npo for endoscopy, normal saline at 75mL/hr, mg/k replacement protocol  PPX: Pepcid, Pronix SCD's          Diet: Clear Liquid Diet    DVT Prophylaxis: Anti-embolisim stockings (TEDs)  Edwards Catheter: not present  Code Status: Full Code      Disposition Plan   Expected discharge: 1-2d, recommended to prior living arrangement once hemoglobin stable.  Entered: Will Ortega MD 09/09/2019, 9:18 AM       The patient's care was discussed with the Patient and Patient's Family.    Will Ortega MD  Hospitalist Service  [facility]    ______________________________________________________________________    Interval History   Patient reports developing epigastric pain this morning.  Relates it to transfer to chair from bed.  Took [place] and now pain improved.  Later on states that it seems to be related to \"stress/anxiety\".  Not related to oral intake.  Continues to have dark stools that are gelatinous in nature.  Denies any shortness of breath, confusion, chest pain, orthostasis or other symptoms of anemia.    No fevers over night.  Minimal cough, nonproductive.    Data reviewed today: I reviewed all medications, new labs and imaging results over the last 24 hours. I personally reviewed the chest x-ray image(s) showing RLL infiltrate.    Physical Exam   Vital Signs: Temp: 97.6  F (36.4  C) Temp src: Tympanic BP: (!) 160/72 Pulse: 112 Heart Rate: 96 Resp: 16 SpO2: 98 % O2 Device: None (Room air)    Weight: 125 lbs 0 oz  Constitutional: awake, alert, cooperative, no apparent distress, and appears stated age  Respiratory: Clear toascultation bilaterally.  No increased respiratory effort.   Cardiovascular: Regular rate and rhythm.  2/6 ALFONSO @ LUSB  GI: Abdomen Soft, mild epigastric tenderness.  No masses, rebound or " guarding.   Musculoskeletal: No synovitis presently.  Muscle strength age and body habitus appropriateas well as equal and even.   Neuropsychiatric: General: normal, calm and normal eye contact  Orientation: oriented to self, place, time and situation  Memory and insight: normal, memory for past and recent events intact and thought process normal    Data   Recent Labs   Lab 09/09/19  0600 09/09/19  0020 09/08/19  1355 09/08/19  1053   WBC 17.4*  --   --  17.3*   HGB 9.0* 8.6*  --  6.8*   MCV 94  --   --  100     --   --  289   INR  --   --   --  1.07     --   --  138   POTASSIUM 4.2  --   --  3.7   CHLORIDE 107  --   --  103   CO2 25  --   --  22   BUN 48*  --   --  62*   CR 0.63  --   --  0.78   ANIONGAP 8  --   --  13   MOLLY 8.3*  --   --  8.4*   *  --   --  166*   ALBUMIN  --   --   --  3.2*   PROTTOTAL  --   --   --  4.9*   BILITOTAL  --   --   --  0.3   ALKPHOS  --   --   --  83   ALT  --   --   --  13   AST  --   --   --  14   TROPI  --   --  <0.030 <0.030     Recent Results (from the past 24 hour(s))   XR Chest 2 Views    Narrative    EXAM:   XR Chest, 2 Views     EXAM DATE/TIME:   9/8/2019 11:09 AM     CLINICAL HISTORY:   74 years old, female; Angina; Additional info: Cp     TECHNIQUE:   Imaging protocol: XR of the chest   Views: 2 views.     COMPARISON:   CR XR CHEST 2 VIEWS PA AND LATERAL 5/27/2017 6:51 PM     FINDINGS:   Tubes, catheters and devices: Left Port-A-Cath has been removed.   Lungs: There is right basilar airspace opacity compatible with pneumonia.   Pleural space: There is small right pleural effusion.   Heart/Mediastinum: There is hiatus hernia.   Bones/joints: Degenerative changes are noted in right shoulder. Metallic plate   and screws are noted in thoracolumbar spine.       Impression    IMPRESSION:   Right basilar airspace opacity and pleural effusion.     THIS DOCUMENT HAS BEEN ELECTRONICALLY SIGNED BY INOCENCIA GRAMAJO MD

## 2019-09-09 NOTE — PLAN OF CARE
Assessment complete see flowsheet, Vss WDL. Pt with multiple loose black jelly like stools. C/O stabbing pain across abdomen. Pt very unhappy with being here and how long things take. Dr. Ortega made aware. Continue to monitor.

## 2019-09-09 NOTE — PROGRESS NOTES
:    Met with patient and her  in room to discuss discharge planning needs.  I offered patient SNF options and she declined and stated she would like to go home with home care.   was in agreement with discharge plan. I gave her home care options and she chose [facility] from list.  I made referral and left message with ELIEL Galdamez.  anticipated discharge in a few days back home.    KEN Varela on 9/9/2019 at 8:44 AM    :    [facility] called and they will accept patient at discharge for PT/OT/RN services.   will continue to follow.    KEN Varela on 9/9/2019 at 11:02 AM

## 2019-09-10 PROBLEM — K29.70 GASTRITIS: Status: ACTIVE | Noted: 2019-01-01

## 2019-09-10 PROBLEM — K63.5 COLON POLYPS: Status: ACTIVE | Noted: 2019-01-01

## 2019-09-10 PROBLEM — K44.9 HIATAL HERNIA: Status: ACTIVE | Noted: 2019-01-01

## 2019-09-10 NOTE — PROGRESS NOTES
[facility]    Medicine Progress Note - Hospitalist Service       Date of Admission:  9/8/2019  Assessment & Plan         Karen Duncan is a 74 year old female who presents with symptomatic anemia and acute versus subacute GI bleed.  Her symptoms and risk factors suggest more likely upper GI bleed.  EGD revealed significant gastritis but no blood presently in the stomach.  There was some dark drainage from the terminal ileum into the colon.  At this point suspect either resolved gastric bleed or small bowel bleed.  She has required 4 units of blood thus far but hemoglobin was 8.4 at 9:50am and she is hemodynamically stable. Will recheck at 5 PM and if remains stable will recheck in a.m.  If it is falling and she requires transfusion or if she becomes symptomatic will need transfer for GI specialty involvement.     Principal Problem:    Anemia    Assessment: Acute versus subacute GI bleed, no hemoptysis or hematuria, significant decrease in blood counts in the last 2 weeks with progressive symptoms over the last 1 week.  Endoscopy suggests resolved gastric bleed versus small intestine bleed.    Plan:  - trend hemoglobins at 1700 and in AM   - IV Pepcid and Protonix.  Add Carafate.   - appreciate general surgery consult   - TSH within normal limits    Leukocytosis  Assessment: Slight increase over the last 2 weeks, peripheral smear without evidence of myelodysplastic features.  Chest x-ray with clear right lower lobe infiltrate with significant cough without production.  Will benefit from ongoing empiric pneumonia treatment given her chronic immunosuppression with serial imaging to ensure resolution of findings.  Her leukocytosis appears to be resolving on antibiotics.  Plan:    - Trend daily   - iv ceftri/azithromycin day #3   - no sputum produced and blood cultures negative thus far   - urine strep/legionella negative     Diabetes mellitus, type II (H)    Assessment: Well-controlled with  last hemoglobin A1c of 6.1 and may be falsely low due to occult blood loss.  BS stable since admit.    Plan: - Continue lantus 7 units every morning   - ISS AC at bedtime, hold metformin      Hypertension    Assessment: Hemodynamically stable    Plan: - Continue home clonidine   - continue to hold other home regimen and restart pending clinical course      Rheumatoid arthritis (H)    Assessment: Chronic pain and steroid-dependent    Plan: - Continue home prednisone and chronic pain regiment    FEN: diet per surgery normal saline at 75mL/hr, mg/k replacement protocol  PPX: Pepcid, Protonix SCD's        Diet: NPO for Medical/Clinical Reasons Except for: Meds    DVT Prophylaxis: Pneumatic Compression Devices  Edwards Catheter: not present  Code Status: Full Code      Disposition Plan   Expected discharge: Tomorrow, recommended to prior living arrangement once antibiotic plan established and hemoglobin stable.  Entered: Will Ortega MD 09/10/2019, 11:52 AM       The patient's care was discussed with the Patient and Patient's Family.    Will Ortega MD  Hospitalist Service  [facility]    ______________________________________________________________________    Interval History   She reports that she feels well.  She came out of endoscopy quite painful.  Polyarticular pain.  Was treated with fentanyl in PACU.  Was given hydrocodone and then a dose of Dilaudid and now is very comfortable.  Her hemoglobin was quite low this morning.  She was apparently not having any symptoms of anemia however or at least that she can identify presently.  She was given 2 units of blood.    No fever.  Minimal cough.  Does not feel short of breath.    Data reviewed today: I reviewed all medications, new labs and imaging results over the last 24 hours. I personally reviewed no images or EKG's today.    Physical Exam   Vital Signs: Temp: 99.6  F (37.6  C) Temp src: Temporal BP: (!) 143/72 Pulse: 110 Heart Rate: 107  Resp: 24 SpO2: 93 % O2 Device: None (Room air)    Weight: 125 lbs 6.4 oz  Constitutional: awake, alert, cooperative, no apparent distress, and appears stated age  Respiratory: No increased work of breathing, good air exchange, clear to auscultation bilaterally, no crackles or wheezing  Cardiovascular: Regular rate and rhythm  GI: Abdomen soft, currently nontender.  No rebound or guarding.  Musculoskeletal: There is no redness, warmth, or swelling of the joints.  Full range of motion noted.  Motor strength is 5 out of 5 all extremities bilaterally.  Tone is normal.  Neuropsychiatric: General: normal, calm and normal eye contact  Level of consciousness: alert / normal  Memory and insight: normal, memory for past and recent events intact and thought process normal    Data   Recent Labs   Lab 09/10/19  0952 09/10/19  0450 09/09/19  1007 09/09/19  0600  09/08/19  1355 09/08/19  1053   WBC 13.0* 14.4*  --  17.4*  --   --  17.3*   HGB 8.4* 5.4* 8.3* 9.0*   < >  --  6.8*   MCV 90 101*  --  94  --   --  100   * 187  --  252  --   --  289   INR  --   --   --   --   --   --  1.07   NA  --   --   --  140  --   --  138   POTASSIUM  --  3.7  --  4.2  --   --  3.7   CHLORIDE  --   --   --  107  --   --  103   CO2  --   --   --  25  --   --  22   BUN  --   --   --  48*  --   --  62*   CR  --   --   --  0.63  --   --  0.78   ANIONGAP  --   --   --  8  --   --  13   MOLLY  --   --   --  8.3*  --   --  8.4*   GLC  --   --   --  179*  --   --  166*   ALBUMIN  --   --   --   --   --   --  3.2*   PROTTOTAL  --   --   --   --   --   --  4.9*   BILITOTAL  --   --   --   --   --   --  0.3   ALKPHOS  --   --   --   --   --   --  83   ALT  --   --   --   --   --   --  13   AST  --   --   --   --   --   --  14   TROPI  --   --   --   --   --  <0.030 <0.030    < > = values in this interval not displayed.     No results found for this or any previous visit (from the past 24 hour(s)).

## 2019-09-10 NOTE — OP NOTE
PROCEDURE NOTE    DATE OF SERVICE: 9/10/2019    SURGEON: Coleman Queen MD    PRE-OP DIAGNOSIS:    Rectal bleeding        POST-OP DIAGNOSIS:  Same  Gastritis  Possible paraesophageal hiatal hernia  Extensive diffuse Diverticulosis  Polyps at cecum, AC, HF and mid TC  Large rectal prolapse    PROCEDURE:   EGD  Colonoscopy with snare polypectomy        ANESTHESIA:  FORTUNATO Love CRNA    INDICATION FOR THE PROCEDURE: The patient is a 74 year old female with anemia and bleeding . The patient has no other complaints  . After explaining the risks to include bleeding, perforation, potential inability toreach the cecum, the patient wished to proceed.    PROCEDURE:After adequate sedation, the patient was in the left lateral decubitus position.The esophagoscope was passed under direct vision into the esophagus and onto the second portion of the duodenum.  The duodenum was unremarkable.  The antrum was erythematous and striped without gross blood.   The scope was retroflexed.  The GE junction and fundus were with large odd hiatal hernia, paraesophageal .  Scope was straightened and pulled back.  The distal esophagus was without blood and sharp z-line .   The remaining esophagus was unremarkable . The scope was removed.      Rectal exam was performed.  There was lax tone and  About 10 cm of prolapse.  The colonoscope was introduced into the rectum and advanced to the cecum with Marked difficulty due to tortuosity of sigmoid.  The patient's prep was good, there was old blood throughout colon and dark green/black material from TI..  The terminal cecum was reached.  The TI, cecum, ascending, transverse, descending and sigmoid colon was with extensive diverticulosis. At base of cecum there was flat polyp that was elevated with saline, snared and completely removed , then clipped to stop oozing. Additional polyps were snared and hot biopsied from cecum. In AC, a large over 2 cm flat polyp was completely removed by snare, placed in Cueto  net and extracted. Scope re-advanced to the area. Additional pedunculated and flat polyp of 1 cm or less were completely removed from HF and mid TC.  .   The rectum was remarkable for irritation,friability from prolapse .  The scope was  removed.  The patient tolerated the procedure well.     ESTIMATED BLOOD LOSS: none    COMPLICATIONS:  None    TISSUE REMOVED:  Yes    RECOMMEND:    PPI, avoid NSAID's, consider CT scan to evaluate gastric anatomy  Follow-up pending pathology  Fiber  Given literature on diverticulosis  If continues to bleed may need capsule endoscopy to evaluate small bowel.    Coleman Queen MD FACS

## 2019-09-10 NOTE — PROGRESS NOTES
MD Vo notified of critical lab, HGB 5.4 new telephone orders for 2 units red blood cells. Tahir Brarera RN on 9/10/2019 at 5:57 AM

## 2019-09-10 NOTE — PLAN OF CARE
Pt A & O x 4 at beginning of shift, low grade temp 99.8, HR tachycardic 90's-130's. Lung sounds clear, O2 93% on RA. Bowel sounds active throughout, many watery stools, this shift with maroon flecks, pt NPO since midnight.Pt has rectum protruding, MD Ortega aware, no new orders at this time.   Patient reports 8/10 generalized, shoulder, neck and hip pain, PRN norco, tylenol,tramadol,and benadryl given throughout shift with little to no relief, see MAR. Pt became slightly confused after dose of benadryl, reoriented easily. will continue to monitor.Tahir Barrera RN on 9/10/2019 at 4:34 AM

## 2019-09-10 NOTE — PLAN OF CARE
Patient arrived from PACU only oriented to self, very restless due to generalized joint pain, MD updated, new order for IV hydromorphone obtained and given with relief. Confusion settled throughout the day, now oriented X4. No more bloody stools since surgery this am. Will continue to monitor. Maria Del Carmen Villafuerte RN on 9/10/2019 at 5:08 PM      Amberly-MD updated on hgb. New orders to redraw at midnight call if below 7.

## 2019-09-10 NOTE — ANESTHESIA CARE TRANSFER NOTE
Patient: Karen Duncan    Procedure(s):  ESOPHAGOGASTRODUODENOSCOPY (EGD)  COLONOSCOPY, WITH POLYPECTOMY AND BIOPSY    Diagnosis: anemia  Diagnosis Additional Information: No value filed.    Anesthesia Type:   MAC     Note:  Airway :Room Air  Patient transferred to:PACU  Handoff Report: Identifed the Patient, Identified the Reponsible Provider, Reviewed the pertinent medical history, Discussed the surgical course, Reviewed Intra-OP anesthesia mangement and issues during anesthesia, Set expectations for post-procedure period and Allowed opportunity for questions and acknowledgement of understanding      Vitals: (Last set prior to Anesthesia Care Transfer)    CRNA VITALS  9/10/2019 0842 - 9/10/2019 0914      9/10/2019             Pulse:  102    Ht Rate:  102    SpO2:  97 %    Resp Rate (set):  10                Electronically Signed By: KELLIE MO CRNA  September 10, 2019  9:14 AM

## 2019-09-10 NOTE — ANESTHESIA POSTPROCEDURE EVALUATION
Patient: Karen Duncan    Procedure(s):  ESOPHAGOGASTRODUODENOSCOPY (EGD)  COLONOSCOPY, WITH POLYPECTOMY AND BIOPSY    Diagnosis:anemia  Diagnosis Additional Information: No value filed.    Anesthesia Type:  MAC    Note:  Anesthesia Post Evaluation    Patient location during evaluation: PACU  Patient participation: Able to fully participate in evaluation  Level of consciousness: awake and alert  Pain management: adequate  Airway patency: patent  Cardiovascular status: acceptable  Respiratory status: acceptable  Hydration status: acceptable  PONV: none       Comments: Pain is a chronic issue for her so her level of discomfort is acceptable to return to the floor        Last vitals:  Vitals:    09/10/19 0940 09/10/19 0950 09/10/19 0955   BP: 118/86 (!) 172/104    Pulse: 111 110    Resp:      Temp:   100  F (37.8  C)   SpO2: 92% 93% 96%         Electronically Signed By: KELLIE MO CRNA  September 10, 2019  10:20 AM

## 2019-09-10 NOTE — ANESTHESIA PREPROCEDURE EVALUATION
Anesthesia Pre-Procedure Evaluation    Patient: Karen Duncan   MRN: [id number] : 1945          Preoperative Diagnosis: anemia    Procedure(s):  ESOPHAGOGASTRODUODENOSCOPY, WITH BIOPSY  COLONOSCOPY    Past Medical History:   Diagnosis Date     Atrial fibrillation (H)          Chronic pain      Essential (primary) hypertension     No Comments Provided     Extradural and subdural abscess, unspecified (CODE)          Hyperlipidemia     No Comments Provided     NAFLD (nonalcoholic fatty liver disease)     ,Fatty liver on US and CT.  Iron nl. Hep C neg     Osteomyelitis (H)     ,IV Vancomycin to resolve, L 3rd digit     Pneumothorax     spondylolysis (right) at age 39 followed by spondylolysis (left) several years later     Psoas muscle abscess (H)     ,Strep viridans     Rheumatoid arthritis (H)          Type 2 diabetes mellitus without complications (H)     No Comments Provided     Past Surgical History:   Procedure Laterality Date     ARTHROPLASTY KNEE Bilateral      ARTHROTOMY WRIST Left     Rheumatoid nodule     AS MUSCLE-SKIN FLAP,TRUNK  2017    Left ischial ulcer     BIOPSY BREAST Left      COLONOSCOPY      ,repeat in 10 years per patient     COLONOSCOPY  2010,sigmoid diverticulosis, no polyps, due 2020     COLONOSCOPY  2011,mild sigmoid diverticulosis, EGD biopsies     ESOPHAGOSCOPY, GASTROSCOPY, DUODENOSCOPY (EGD), COMBINED      2011,Normal     EXTRACAPSULAR CATARACT EXTRATION WITH INTRAOCULAR LENS IMPLANT Bilateral      HYSTERECTOMY TOTAL ABDOMINAL, BILATERAL SALPINGO-OOPHORECTOMY, COMBINED      secondary to fibroids     LAMINECTOMY LUMBAR ONE LEVEL      Multiple     REMOVE PORT VASCULAR ACCESS N/A 2018    Procedure: REMOVE PORT VASCULAR ACCESS;  Port Removal;  Surgeon: Jazzy Taylor MD;  Location:  OR       Anesthesia Evaluation     . Pt has had prior anesthetic. Type: General and MAC    History of anesthetic complications   -  PONV        ROS/MED HX    ENT/Pulmonary:     (+), . Other pulmonary disease RLL empirically being treated for pneumonia d/t steroid suppression therapy. .    Neurologic:  - neg neurologic ROS     Cardiovascular: Comment: She denies chest pain but has been sob d/t anemia.  Pulse is tachy and bounding    (+) Dyslipidemia, hypertension----. : . . . :. dysrhythmias a-fib, valvular problems/murmurs type: AS .       METS/Exercise Tolerance: Comment: She had been using a walker until this illness occurred. 1 - Eating, dressing   Hematologic:     (+) Anemia, -      Musculoskeletal:   (+) arthritis,  -       GI/Hepatic: Comment: Abdominal pain d/t possible GI bleed    (+) GERD Asymptomatic on medication,       Renal/Genitourinary:         Endo:     (+) type I DM, Diabetic complications: neuropathy, Chronic steroid usage for Arthritis .      Psychiatric:  - neg psychiatric ROS       Infectious Disease: Comment: MRSA urine  (+) MRSA,       Malignancy:      - no malignancy   Other:    (+) No chance of pregnancy C-spine cleared: N/A, H/O Chronic Pain,H/O chronic opiod use , other significant disability                         Physical Exam      Airway   Mallampati: III  TM distance: <3 FB  Neck ROM: limited    Dental   (+) upper dentures and lower dentures    Cardiovascular   Rhythm and rate: regular and abnormal    PE comment: Tachy and bounding pulse    Pulmonary (+) decreased breath sounds     PE comment: RLL infiltrated on chest x ray            Lab Results   Component Value Date    WBC 14.4 (H) 09/10/2019    HGB 5.4 (LL) 09/10/2019    HCT 17.4 (L) 09/10/2019     09/10/2019    CRP 0.6 (H) 04/01/2019    SED 10 04/01/2019     09/09/2019    POTASSIUM 3.7 09/10/2019    CHLORIDE 107 09/09/2019    CO2 25 09/09/2019    BUN 48 (H) 09/09/2019    CR 0.63 09/09/2019     (H) 09/09/2019    MOLLY 8.3 (L) 09/09/2019    PHOS 2.3 (L) 01/15/2017    MAG 1.7 (L) 09/10/2019    ALBUMIN 3.2 (L) 09/08/2019    PROTTOTAL 4.9 (L)  09/08/2019    ALT 13 09/08/2019    AST 14 09/08/2019    ALKPHOS 83 09/08/2019    BILITOTAL 0.3 09/08/2019    BILIDIRECT 0.08 01/17/2017    LIPASE 26.6 06/18/2016    PTT 25 09/08/2019    INR 1.07 09/08/2019       Preop Vitals  BP Readings from Last 3 Encounters:   09/10/19 117/57   08/23/19 134/82   07/26/19 136/82    Pulse Readings from Last 3 Encounters:   09/10/19 121   08/23/19 68   07/26/19 72      Resp Readings from Last 3 Encounters:   09/10/19 18   08/23/19 18   07/26/19 18    SpO2 Readings from Last 3 Encounters:   09/10/19 96%   06/27/18 96%   01/19/17 97%      Temp Readings from Last 1 Encounters:   09/10/19 99.7  F (37.6  C) (Tympanic)    Ht Readings from Last 1 Encounters:   09/08/19 1.524 m (5')      Wt Readings from Last 1 Encounters:   09/10/19 56.9 kg (125 lb 6.4 oz)    Estimated body mass index is 24.49 kg/m  as calculated from the following:    Height as of this encounter: 1.524 m (5').    Weight as of this encounter: 56.9 kg (125 lb 6.4 oz).       Anesthesia Plan      History & Physical Review      ASA Status:  4 emergent.    NPO Status:  > 8 hours    Plan for MAC with Propofol induction. Reason for MAC:  Chronic cardiopulmonary disease (G9)    Pt agrees to rescind her DNR status during the perioperative period.  I did discuss with her the possibility of needing ICU stay.  Awaiting 2nd unit of blood.  First unit infusing at this time      Postoperative Care  Postoperative pain management:  IV analgesics.      Consents  Anesthetic plan, risks, benefits and alternatives discussed with:  Patient and Spouse..                 KELLIE MO CRNA

## 2019-09-10 NOTE — PROGRESS NOTES
Pt blood sugar 237, MD Vo aware, no new orders at this time, will continue to monitor. Tahir Barrera RN on 9/10/2019 at 4:42 AM

## 2019-09-10 NOTE — PROGRESS NOTES
Blood transfusion started 0630, patient tolerated well, lung sounds clear. Consent signed,patient to OR, will continue to monitor. Tahir Barrera RN on 9/10/2019 at 6:54 AM

## 2019-09-10 NOTE — PROGRESS NOTES
:    Patients  had some questions regarding his ambulance coverage through his insurance company,.  Apparently there is a chance patient may need to be transferred to [facility] at some point if she does not get better.   stated they received a $5,000 bill from the ambulance service before and does not want to pay for that again.  I encouraged him to call his insurance company to find out what exact coverage he may have for ambulance coverage.   would rather take her via private care instead if she has to be transferred.  MD was updated.  Patients  did not have any further questions at this time.    KEN Varela on 9/10/2019 at 12:13 PM

## 2019-09-10 NOTE — INTERVAL H&P NOTE
History and Physical Update    The history and physical has been reviewed and the patient has been examined.  Changes to the history or physical condition are as follows:    Had maroon flecks during night in bowel prep. Hgb 5.4, getting transfused.    MAC needed for age, co-morbidities.     Coleman Queen MD

## 2019-09-10 NOTE — PHARMACY-CONSULT NOTE
Pharmacy - Transfer Medication Reconciliation     The patient's transfer medication orders have been compared to the medication administration record and to the Prior to Admissions Medications list - any noted discrepancies were resolved with the MD.     Thank you. Pharmacy will continue to monitor.     Myriam Momin RPH ....................  9/10/2019   12:17 PM

## 2019-09-10 NOTE — PLAN OF CARE
Discharge Planner PT   Patient approached by PT/OT on several occassions this afternoon, however, she did not wish to participate today.       Entered by: Carter Webber 09/10/2019 4:26 PM

## 2019-09-11 NOTE — PROGRESS NOTES
:    Anticipated discharge in 1-2 days to home with home care unless patient needs to be transferred to [place] at some pont.  I called ELIEL Galdamez at [facility] and gave her discharge update.   will continue to follow.    KEN Varela on 9/11/2019 at 12:20 PM

## 2019-09-11 NOTE — PLAN OF CARE
Patient up in chair for breakfast, ate 100% regular diet. States pain is improving. Had one small black stool. Will continue to monitor. Maria Del Carmen Villafuerte RN on 9/11/2019 at 10:01 AM  /59 (BP Location: Right arm)   Pulse 80   Temp 98.1  F (36.7  C) (Tympanic)   Resp 22   Ht 1.524 m (5')   Wt 56.9 kg (125 lb 6.4 oz)   SpO2 97%   BMI 24.49 kg/m      1300- Noon Cardizem held per MD for soft BP. First unit of PRBC infused tolerated well. Second infusing now.

## 2019-09-11 NOTE — PROGRESS NOTES
[facility]    Medicine Progress Note - Hospitalist Service       Date of Admission:  9/8/2019  Assessment & Plan            Karen Duncan is a 74 year old female who presents with symptomatic anemia and acute versus subacute GI bleed.  Her symptoms and risk factors suggest more likely upper GI bleed.  EGD revealed significant gastritis but no blood presently in the stomach.  There was some dark drainage from the terminal ileum into the colon.  At this point suspect either resolved gastric bleed or small bowel bleed.  She has required 4 units of blood thus far and is hemodynamically stable.  Hemoglobin very slowly trended down overnight and she is now at 7.1.  She does have a history of ASCVD and it is difficult to tell if she has completely resolved bleeding or we will continue to lose a little bit more blood.  I think benefits outweigh risks of additional transfusion this morning.  Plan on rechecking hemoglobin once complete and again tomorrow.  If hemoglobin remains stable at that point I would consider discharge to home with complete acid suppression and close follow-up in clinic for recheck of hemoglobin and consultation with GI when able.    Principal Problem:    Anemia    Assessment: Acute versus subacute GI bleed, no hemoptysis or hematuria, significant decrease in blood counts in the last 2 weeks with progressive symptoms over the last 1 week.  Endoscopy suggests resolved gastric bleed versus small intestine bleed.    Plan:  -2 units packed red blood cells.     -Trend hemoglobins at 1700 and in AM   - IV Pepcid and Protonix.  Continue Carafate.   - TSH within normal limits    Leukocytosis  Assessment: Slight increase over the last 2 weeks, peripheral smear without evidence of myelodysplastic features.  Chest x-ray with clear right lower lobe infiltrate with significant cough without production.  Will benefit from ongoing empiric pneumonia treatment given her chronic  "immunosuppression with serial imaging to ensure resolution of findings.  Her leukocytosis continues to resolve  Plan:    - Trend daily   - iv ceftri/azithromycin day #4 -transition to Ceftin on discharge   - no sputum produced and blood cultures negative thus far   - urine strep/legionella negative     Diabetes mellitus, type II (H)    Assessment: Well-controlled with last hemoglobin A1c of 6.1 and may be falsely low due to occult blood loss.  BS stable since admit.    Plan: - Continue lantus 7 units every morning   - ISS AC at bedtime, hold metformin      Hypertension    Assessment: Hemodynamically stable    Plan: - Continue home clonidine   -Continue short acting diltiazem for total daily dosage equivalent to her home regimen.   - continue to hold amlodipine at this time      Rheumatoid arthritis (H)    Assessment: Chronic pain and steroid-dependent    Plan: - Continue home prednisone and chronic pain regiment    FEN: diet per surgery normal saline at 25mL/hr, mg/k replacement protocol  PPX: Pepcid, Protonix SCD's      Diet: Advance Diet as Tolerated: Regular Diet Adult    DVT Prophylaxis: Pneumatic Compression Devices  Edwards Catheter: not present  Code Status: Full Code      Disposition Plan   Expected discharge: Tomorrow, recommended to prior living arrangement once antibiotic plan established and hemoglobin stable.  Entered: Will Ortega MD 09/11/2019, 10:49 AM       The patient's care was discussed with the Patient and Patient's Family.    Will Ortega MD  Hospitalist Service  [facility]    ______________________________________________________________________    Interval History   Patient overall reports that she is feeling \"much better\".  Reports improved energy level.  Abdominal pain has essentially resolved which is also a significant improvement for her.  Did still have some blackness in her stool but overall less stooling frequency and no more maroon/red component.  " Appetite slowly improving.  No longer having any epigastric pain or heartburn.    No fevers.  Feels that cough has resolved.  Denies any shortness of breath.    Data reviewed today: I reviewed all medications, new labs and imaging results over the last 24 hours. I personally reviewed no images or EKG's today.    Physical Exam   Vital Signs: Temp: 98.1  F (36.7  C) Temp src: Tympanic BP: 123/59 Pulse: 80 Heart Rate: 79 Resp: 22 SpO2: 97 % O2 Device: None (Room air)    Weight: 125 lbs 6.4 oz  Constitutional: awake, alert, cooperative, no apparent distress, and appears stated age  Respiratory: No increased work of breathing, good air exchange, clear to auscultation bilaterally, no crackles or wheezing  Cardiovascular: Regular rate and rhythm.  2 out of 6 systolic ejection murmur heard best at left upper sternal border  GI: Abdomen Soft, non-tender.  No masses, rebound or guarding.   Musculoskeletal: Generalized weakness with no synovitis.  No tenderness with palpation of the joints or musculature today.  Neuropsychiatric: General: normal, calm and normal eye contact  Orientation: oriented to self, place, time and situation  Memory and insight: normal, memory for past and recent events intact and thought process normal    Data   Recent Labs   Lab 09/11/19  0440 09/10/19  2215 09/10/19  1700 09/10/19  0952 09/10/19  0450  09/09/19  0600  09/08/19  1355 09/08/19  1053   WBC 12.1*  --   --  13.0* 14.4*  --  17.4*  --   --  17.3*   HGB 7.1* 7.4* 7.6* 8.4* 5.4*   < > 9.0*   < >  --  6.8*   MCV 91  --   --  90 101*  --  94  --   --  100   *  --   --  145* 187  --  252  --   --  289   INR  --   --   --   --   --   --   --   --   --  1.07     --   --   --   --   --  140  --   --  138   POTASSIUM 3.7  --   --   --  3.7  --  4.2  --   --  3.7   CHLORIDE 112*  --   --   --   --   --  107  --   --  103   CO2 21  --   --   --   --   --  25  --   --  22   BUN 21  --   --   --   --   --  48*  --   --  62*   CR 0.53*  --    --   --   --   --  0.63  --   --  0.78   ANIONGAP 8  --   --   --   --   --  8  --   --  13   MOLLY 7.5*  --   --   --   --   --  8.3*  --   --  8.4*   *  --   --   --   --   --  179*  --   --  166*   ALBUMIN  --   --   --   --   --   --   --   --   --  3.2*   PROTTOTAL  --   --   --   --   --   --   --   --   --  4.9*   BILITOTAL  --   --   --   --   --   --   --   --   --  0.3   ALKPHOS  --   --   --   --   --   --   --   --   --  83   ALT  --   --   --   --   --   --   --   --   --  13   AST  --   --   --   --   --   --   --   --   --  14   TROPI  --   --   --   --   --   --   --   --  <0.030 <0.030    < > = values in this interval not displayed.     No results found for this or any previous visit (from the past 24 hour(s)).

## 2019-09-11 NOTE — PROGRESS NOTES
Chart accessed due to a call from Dr Ortega wanting to know what pt 2200 hemoglobin came back at.  This information was relayed to MD.  No new orders at this time.  Informed primary nurse of phone call.  Anne Pitts RN............................ 9/10/2019 10:48 PM

## 2019-09-11 NOTE — PLAN OF CARE
Patient is alert and oriented x 4. Reports generalized pain of a level 8-10 out of 10. Ultram, Norco, and Dilaudid administered per MAR. Follow up pain level was a 0/10. Hgb this AM was 7.1. Blood pressure 110//58. Heart rate 79-89. Ambulated to the bathroom with assist x 1. Voiding without difficulty. Bowel sounds are audible and active. Vital signs:  Temp: 99.5  F (37.5  C) Temp src: Tympanic BP: 132/64 Pulse: 110 Heart Rate: 79 Resp: 20 SpO2: 95 % O2 Device: None (Room air)     Laura Somers RN on 9/11/2019 at 6:52 AM

## 2019-09-12 PROBLEM — Z86.0101 H/O ADENOMATOUS POLYP OF COLON: Status: ACTIVE | Noted: 2019-01-01

## 2019-09-12 PROBLEM — J18.9 PNEUMONIA OF RIGHT LOWER LOBE DUE TO INFECTIOUS ORGANISM: Status: ACTIVE | Noted: 2019-01-01

## 2019-09-12 NOTE — PHARMACY - DISCHARGE MEDICATION RECONCILIATION AND EDUCATION
Pharmacy:  Discharge Counseling and Medication Reconciliation    Karen Duncan  [address] [id number] [phone] (home)   74 year old female  PCP: Hugo Duarte    Allergies: Aliskiren; Liquid adhesive; Lisinopril; and Losartan    Discharge Counseling:    Pharmacist met with patient (and/or family) today to review the medication portion of the After Visit Summary (with an emphasis on NEW medications) and to address patient's questions/concerns.    Summary of Education: met with patient and discussed new medications.  Went over duration, administration and side effects of each.  Talked about taking pepcid and protonix before she eats and their purposed.  Stressed importance of finishing full course of abx.  Patient understood.    Materials Provided:  MedCounselor sheets printed from Clinical Pharmacology on: ceftin, pepcid, protonix    Discharge Medication Reconciliation:    It has been determined that the patient has an adequate supply of medications available or which can be obtained from the patient's preferred pharmacy, which HE/SHE has confirmed as: [facility].     Thank you for the consult.    Myriam Momin RPH........September 12, 2019 10:04 AM

## 2019-09-12 NOTE — PLAN OF CARE
Patient restless this morning states 10/10 pain. PRN medication given, see MAR. Will continue to monitor.   BP (!) 144/82 (BP Location: Right arm)   Pulse 80   Temp 98.5  F (36.9  C) (Tympanic)   Resp 18   Ht 1.524 m (5')   Wt 56.9 kg (125 lb 6.4 oz)   SpO2 96%   BMI 24.49 kg/m

## 2019-09-12 NOTE — PROGRESS NOTES
:    Discharge today to home with [facility] for PT/OT/RN services. I called Denice RN at [facility] [place] and gave her update.  I will fax orders when I receive them.  No further needs at this time.    KEN Varela on 9/12/2019 at 10:21 AM

## 2019-09-12 NOTE — PROGRESS NOTES
Discharge Note      Data:  Karen Duncan discharged to home at 1030 via wheel chair. Accompanied by spouse and staff.    Action:  Written discharge/follow-up instructions were provided to patient and spouse. Prescriptions sent to patients preferred pharmacy. All belongings sent with patient. Referrals for [facility], gastrology and rheumatology. Nurse to nurse to [facility]. Patient to make follow up appointments for specialists. Phone number given.    Response:  Karen and  verbalized understanding of discharge instructions, reason for discharge, and necessary follow-up appointments.

## 2019-09-12 NOTE — DISCHARGE SUMMARY
[facility]  Hospitalist Discharge Summary       Date of Admission:  9/8/2019  Date of Discharge:  9/12/2019  Discharging Provider: Will Ortega MD      Discharge Diagnoses   GI bleed causing symptomatic anemia with unclear source -   Potentially resolved gastric source versus small bowel source.  Probable right lower lobe community-acquired pneumonia  Chronic leukocytosis  Chronic rheumatoid arthritis  Chronic with acute worsening weakness and mobility limitation    Follow-ups Needed After Discharge   Follow-up Appointments     Follow-up and recommended labs and tests       Please follow-up with Dr. Duarte or one of his partners in the next week  Please schedule an appointment with gastroenterology and rheumatology             Unresulted Labs Ordered in the Past 30 Days of this Admission     Date and Time Order Name Status Description    9/10/2019 0823 Surgical pathology exam In process     9/8/2019 1532 Blood culture Preliminary       These results will be followed up by ordering physician, myself or Dr. Duarte.    Discharge Disposition   Discharged to home  Condition at discharge: Stable    Hospital Course               Karen Duncan is a 74 year old female who presents with symptomatic anemia and acute versus subacute GI bleed.  Her symptoms and risk factors suggested more likely upper GI bleed.  EGD revealed significant gastritis but no blood presently in the stomach.  There was some dark drainage from the terminal ileum into the colon.  At this point suspect resolved gastric bleed and less likely could be small bowel bleed.  She required 6 units of blood, and on discharge hemoglobin had increased to 11.1 after being given 2 units of blood with a pretransfusion hemoglobin of 7.1 yesterday morning.  Stool yesterday morning with minimal darkness.    Principal Problem:    Anemia    Assessment: Appears to have resolved at least for the time fusion.  Endoscopy suggests resolved gastric  bleed versus less likely small intestine bleed.  Has responded well to acid suppression with H2 blocker and PPI.  No longer having any abdominal pain.  Feels great and all symptoms resolved.    Plan:  - required 6 units packed red blood cell throughout hospitalization.   -Suggest follow-up hemoglobin early next week in outpatient clinic.   -Continue oral Pepcid and Protonix for the time being.  No alcohol or tobacco.  Minimize caffeine.  No NSAIDs.  Will need to continue prednisone.   -Referral placed for GI consultation as well as rheumatology.  GI consultation could be canceled if she maintains normal hemoglobin for the next several weeks.    Leukocytosis  Assessment: Slight increase over the last 2 weeks, peripheral smear without evidence of myelodysplastic features.  Chest x-ray with clear right lower lobe infiltrate with significant cough without production.  Leukocytosis improved significantly with initiation of antibiotics.    Plan:    -Completed 5 days of azithromycin.  Continue 5 additional days of Ceftin which will be 10 days of cephalosporin.   -Consider follow-up chest x-ray as outpatient to ensure resolution   - no sputum produced and blood cultures negative thus far   - urine strep/legionella negative     Diabetes mellitus, type II (H)    Assessment: Well-controlled with last hemoglobin A1c of 6.1 and may be falsely low due to occult blood loss.  BS stable since admit.    Plan: -Resume home diabetic regimen.      Hypertension    Assessment: Hemodynamically stable    Plan: -Resume home antihypertensive dosages and medications.      Rheumatoid arthritis (H)    Assessment: Chronic pain and steroid-dependent    Plan: - Continue home prednisone and chronic pain regiment        Documentation of Face to Face and Certification for Home Health Services    I certify that patient: Karen Duncan is under my care and that I, or a nurse practitioner or physician's assistant working with me, had a face-to-face  encounter that meets the physician face-to-face encounter requirements with this patient on: 9/12/2019.    This encounter with the patient was in whole, or in part, for the following medical condition, which is the primary reason for home health care: Chronic rheumatoid arthritis with subacute weakness due to prolonged hospitalization from GI bleed.    I certify that, based on my findings, the following services are medically necessary home health services: Occupational Therapy and Physical Therapy.    My clinical findings support the need for the above services because: Occupational Therapy Services are needed to assess and treat cognitive ability and address ADL safety due to impairment in mobility. and Physical Therapy Services are needed to assess and treat the following functional impairments: Mobility due to balance and weakness.    Further, I certify that my clinical findings support that this patient is homebound (i.e. absences from home require considerable and taxing effort and are for medical reasons or Rast[employer] services or infrequently or of short duration when for other reasons) because: Requires assistance of another person or specialized equipment to access medical services because patient: Requires supervision of another for safe transfer and use of walker...    Based on the above findings. I certify that this patient is confined to the home and needs intermittent skilled nursing care, physical therapy and/or speech therapy.  The patient is under my care, and I have initiated the establishment of the plan of care.  This patient will be followed by a physician who will periodically review the plan of care.  Physician/Provider to provide follow up care: Hugo Duarte    Attending hospital physician (the Medicare certified [place] provider): Will Ortega MD  Physician Signature: See electronic signature associated with these discharge orders.  Date: 9/12/2019    Consultations This Hospital Stay    SURGERY GENERAL IP CONSULT  PHYSICAL THERAPY ADULT IP CONSULT  OCCUPATIONAL THERAPY ADULT IP CONSULT  SOCIAL WORK IP CONSULT  SOCIAL WORK IP CONSULT    Code Status   Full Code    Time Spent on this Encounter   I, Will Ortega MD, personally saw the patient today and spent greater than 30 minutes discharging this patient.       Will Ortega MD  [facility]  ______________________________________________________________________    Physical Exam   Vital Signs: Temp: 98.5  F (36.9  C) Temp src: Tympanic BP: (!) 144/82   Heart Rate: 68 Resp: 18 SpO2: 96 % O2 Device: None (Room air)    Weight: 125 lbs 6.4 oz  Constitutional: awake, alert, cooperative, no apparent distress, and appears stated age  Respiratory: No increased work of breathing, good air exchange, clear to auscultation bilaterally, no crackles or wheezing  Cardiovascular: Regular rate and rhythm.  No change in cardiac murmur.  GI: Abdomen Soft, non-tender.  No masses, rebound or guarding.   Musculoskeletal: No active synovitis but chronic arthritic changes noted.  Patient does have generalized weakness due to prolonged hospitalization and prehospitalization debility.  She would benefit from home care for strengthening and balance improvement to reduce risk for falls.  Neuropsychiatric: General: normal, calm and normal eye contact  Orientation: oriented to self, place, time and situation  Memory and insight: normal, memory for past and recent events intact and thought process normal       Primary Care Physician   LEO GIVENS    Discharge Orders      Home Care PT Referral for Hospital Discharge      RHEUMATOLOGY REFERRAL      GASTROENTEROLOGY ADULT REF CONSULT ONLY      Reason for your hospital stay    You presented with symptomatic anemia and active GI bleed.  You underwent endoscopy which suggested either a bleed from your stomach that had resolved or perhaps a bleed in your small intestine.  Unfortunately you will need a  gastroenterologist to further work this up but fortunately your hemoglobin stabilized with us giving you medication to reduce the acid in your stomach.  Your prednisone is likely to blame but with your severe rheumatoid arthritis there is not an easy alternative.  You can certainly follow-up with rheumatology and I do think seeing gastroenterology as an outpatient would be a good idea.    Also have had persistent elevated white count and on imaging it looks like you may have a right lower lobe pneumonia.  We started you on antibiotics and your white count improved but did not completely resolve.  I would suggest follow-up on this in clinic as well.  We will have you continue an oral antibiotic for an additional 5 days.     Follow-up and recommended labs and tests     Please follow-up with Dr. Duarte or one of his partners in the next week  Please schedule an appointment with gastroenterology and rheumatology     Activity    Your activity upon discharge: activity as tolerated with walker     MD face to face encounter    Documentation of Face to Face and Certification for Home Health Services    I certify that patient: Karen Duncan is under my care and that I, or a nurse practitioner or physician's assistant working with me, had a face-to-face encounter that meets the physician face-to-face encounter requirements with this patient on: 9/12/2019.    This encounter with the patient was in whole, or in part, for the following medical condition, which is the primary reason for home health care: Chronic rheumatoid arthritis with subacute weakness due to prolonged hospitalization from GI bleed.    I certify that, based on my findings, the following services are medically necessary home health services: Occupational Therapy and Physical Therapy.    My clinical findings support the need for the above services because: Occupational Therapy Services are needed to assess and treat cognitive ability and address ADL safety  due to impairment in mobility. and Physical Therapy Services are needed to assess and treat the following functional impairments: Mobility due to balance and weakness.    Further, I certify that my clinical findings support that this patient is homebound (i.e. absences from home require considerable and taxing effort and are for medical reasons or [employer] services or infrequently or of short duration when for other reasons) because: Requires assistance of another person or specialized equipment to access medical services because patient: Requires supervision of another for safe transfer...    Based on the above findings. I certify that this patient is confined to the home and needs intermittent skilled nursing care, physical therapy and/or speech therapy.  The patient is under my care, and I have initiated the establishment of the plan of care.  This patient will be followed by a physician who will periodically review the plan of care.  Physician/Provider to provide follow up care: Hugo Duarte    Attending hospital physician (the Medicare certified [place] provider): Will Ortega MD  Physician Signature: See electronic signature associated with these discharge orders.  Date: 9/12/2019     Full Code     Diet    Follow this diet upon discharge: Orders Placed This Encounter  Regular Diet Adult -we did discuss continued abstinence from  tobacco and alcohol and attempted reduction in caffeine.  She understands to stay off all NSAIDs but will need to continue prednisone for now.       Significant Results and Procedures   Most Recent 3 CBC's:  Recent Labs   Lab Test 09/12/19 0525 09/11/19  1800 09/11/19  0440  09/10/19  0952   WBC 14.2*  --  12.1*  --  13.0*   HGB 11.0* 10.1* 7.1*   < > 8.4*   MCV 90  --  91  --  90   *  --  126*  --  145*    < > = values in this interval not displayed.     Most Recent 3 BMP's:  Recent Labs   Lab Test 09/12/19  0525 09/11/19  0440 09/10/19  0450 09/09/19  0600  09/08/19  1053   NA  --  141  --  140 138   POTASSIUM 3.6 3.7 3.7 4.2 3.7   CHLORIDE  --  112*  --  107 103   CO2  --  21  --  25 22   BUN  --  21  --  48* 62*   CR  --  0.53*  --  0.63 0.78   ANIONGAP  --  8  --  8 13   MOLLY  --  7.5*  --  8.3* 8.4*   GLC  --  233*  --  179* 166*     Most Recent 6 Bacteria Isolates From Any Culture (See EPIC Reports for Culture Details):  Recent Labs   Lab Test 09/08/19  1545   CULT No growth after 4 days     Most Recent TSH and T4:No lab results found.  Most Recent 6 glucoses:  Recent Labs   Lab Test 09/11/19  0440 09/09/19  0600 09/08/19  1053 03/19/19  1130 03/27/18  1642 11/14/17  1437   * 179* 166* 144* 195* 273*     Most Recent Urinalysis:  Recent Labs   Lab Test 09/08/19  1102   COLOR Yellow   APPEARANCE Clear   URINEGLC Negative   URINEBILI Negative   URINEKETONE Negative   SG 1.010   UBLD Negative   URINEPH 6.5   PROTEIN Negative   UROBILINOGEN 0.2   NITRITE Negative   LEUKEST Negative   ,   Results for orders placed or performed during the hospital encounter of 09/08/19   XR Chest 2 Views    Narrative    EXAM:   XR Chest, 2 Views     EXAM DATE/TIME:   9/8/2019 11:09 AM     CLINICAL HISTORY:   74 years old, female; Angina; Additional info: Cp     TECHNIQUE:   Imaging protocol: XR of the chest   Views: 2 views.     COMPARISON:   CR XR CHEST 2 VIEWS PA AND LATERAL 5/27/2017 6:51 PM     FINDINGS:   Tubes, catheters and devices: Left Port-A-Cath has been removed.   Lungs: There is right basilar airspace opacity compatible with pneumonia.   Pleural space: There is small right pleural effusion.   Heart/Mediastinum: There is hiatus hernia.   Bones/joints: Degenerative changes are noted in right shoulder. Metallic plate   and screws are noted in thoracolumbar spine.       Impression    IMPRESSION:   Right basilar airspace opacity and pleural effusion.     THIS DOCUMENT HAS BEEN ELECTRONICALLY SIGNED BY INOCENCIA GRAMAJO MD       Discharge Medications   Current Discharge  Medication List      START taking these medications    Details   cefuroxime (CEFTIN) 250 MG tablet Take 1 tablet (250 mg) by mouth every 12 hours for 5 days  Qty: 10 tablet, Refills: 0    Associated Diagnoses: Pneumonia due to infectious organism, unspecified laterality, unspecified part of lung      famotidine (PEPCID) 20 MG tablet Take 1 tablet (20 mg) by mouth 2 times daily  Qty: 60 tablet, Refills: 0    Associated Diagnoses: Gastritis, presence of bleeding unspecified, unspecified chronicity, unspecified gastritis type      pantoprazole (PROTONIX) 40 MG EC tablet Take 1 tablet (40 mg) by mouth daily  Qty: 30 tablet, Refills: 1    Associated Diagnoses: Gastritis, presence of bleeding unspecified, unspecified chronicity, unspecified gastritis type         CONTINUE these medications which have NOT CHANGED    Details   amLODIPine (NORVASC) 2.5 MG tablet TAKE 1 TABLET BY MOUTH ONCE DAILY  Qty: 90 tablet, Refills: 2    Associated Diagnoses: Hypertension      ascorbic acid (VITAMIN C) 500 MG tablet Take 2 tablets (1,000 mg) by mouth daily  Qty: 30 tablet      atorvastatin (LIPITOR) 20 MG tablet TAKE 1 TABLET BY MOUTH AT BEDTIME  Qty: 90 tablet, Refills: 3    Associated Diagnoses: Hyperlipidemia, unspecified hyperlipidemia type      baclofen (LIORESAL) 10 MG tablet TAKE 1 TABLET BY MOUTH THREE TIMES DAILY  Qty: 90 tablet, Refills: 3    Comments: Please consider 90 day supplies to promote better adherence  Associated Diagnoses: Chronic pain disorder      cloNIDine (CATAPRES) 0.1 MG tablet TAKE 1 TABLET BY MOUTH ONCE DAILY  Qty: 90 tablet, Refills: 1    Associated Diagnoses: Hypertension      Cyanocobalamin (VITAMIN B-12 CR) 1000 MCG TBCR Take 1 tablet by mouth daily  Qty: 30 tablet      diltiazem (TIAZAC) 360 MG 24 hr capsule TAKE ONE CAPSULE BY MOUTH ONCE DAILY  Qty: 90 capsule, Refills: 3    Associated Diagnoses: Essential hypertension      docusate sodium (COLACE) 100 MG capsule Take 2 capsules (200 mg) by mouth 2  times daily  Qty: 60 capsule      estradiol (ESTRACE) 0.5 MG tablet TAKE 1 TABLET BY MOUTH ONCE DAILY  Qty: 90 tablet, Refills: 2    Associated Diagnoses: Estrogen deficiency      Ferrous Sulfate (IRON) 325 (65 Fe) MG tablet TAKE 1 TABLET BY MOUTH TWICE DAILY  Qty: 180 tablet, Refills: 1    Associated Diagnoses: Anemia, unspecified type      FLORASTOR 250 MG capsule TAKE 2 CAPSULES BY MOUTH TWICE DAILY  Qty: 120 capsule, Refills: 11    Comments: Please consider 90 day supplies to promote better adherence  Associated Diagnoses: Non-specific colitis      furosemide (LASIX) 40 MG tablet TAKE 1 TABLET BY MOUTH ONCE DAILY IN THE MORNING  Qty: 90 tablet, Refills: 2    Associated Diagnoses: Essential hypertension      gabapentin (NEURONTIN) 600 MG tablet Take 1 tablet (600 mg) by mouth 3 times daily  Qty: 90 tablet, Refills: 11    Associated Diagnoses: Neuropathy      HYDROcodone-acetaminophen (NORCO) 5-325 MG tablet Take 1 tablet by mouth 4 times daily  Qty: 120 tablet, Refills: 0    Associated Diagnoses: Chronic pain disorder      insulin glargine (LANTUS SOLOSTAR) 100 UNIT/ML pen INJECT 14 UNITS SUBCUTANEOUSLY IN THE MORNING AND 9 IN THE EVENING  Qty: 30 mL, Refills: 3    Comments: If Lantus is not covered by insurance, may substitute Basaglar at same dose and frequency.    Associated Diagnoses: Type 2 diabetes mellitus with other diabetic kidney complication, with long-term current use of insulin (H)      metFORMIN (GLUCOPHAGE-XR) 500 MG 24 hr tablet TAKE 2 TABLETS BY MOUTH TWICE DAILY WITH MEALS  Qty: 360 tablet, Refills: 3    Associated Diagnoses: Type 2 diabetes mellitus without complication, with long-term current use of insulin (H)      NOVOLOG FLEXPEN 100 UNIT/ML soln INJECT 4 TIMES DAILY PER SLIDING SCALE. 24 UNITS MAX DAILY  Qty: 15 mL, Refills: 3    Comments: Please consider 90 day supplies to promote better adherence  Associated Diagnoses: Type 2 diabetes mellitus with other diabetic kidney complication, with  long-term current use of insulin (H)      predniSONE (DELTASONE) 5 MG tablet TAKE 1 TABLET BY MOUTH TWICE DAILY  Qty: 180 tablet, Refills: 1    Associated Diagnoses: Rheumatoid arthritis, involving unspecified site, unspecified rheumatoid factor presence (H)      psyllium (TGT PSYLLIUM FIBER) 0.52 G capsule Take 3 capsules (1.56 g) by mouth daily  Qty: 540 capsule      spironolactone (ALDACTONE) 25 MG tablet TAKE 1 TABLET BY MOUTH ONCE DAILY  Qty: 90 tablet, Refills: 3    Associated Diagnoses: Essential hypertension      traMADol (ULTRAM) 50 MG tablet TAKE 2 TABLETS BY MOUTH EVERY 6 HOURS AS NEEDED FOR  MODERATE  PAIN.  Qty: 112 tablet, Refills: 3    Associated Diagnoses: Neuropathy      blood glucose monitoring (ACCU-CHEK FASTCLIX) lancets Use to test blood sugar four times daily. DX code E11.9. Dispense as covered by patient's insurance  Qty: 400 each, Refills: 1    Associated Diagnoses: Diabetes mellitus, type II (H)      NOVOFINE 30 30G X 8 MM insulin pen needle USE 5 TIMES DAILY  Qty: 500 each, Refills: 1    Associated Diagnoses: Type 2 diabetes mellitus without complication (H)      ONETOUCH VERIO IQ test strip USE 1 STRIP TO CHECK GLUCOSE 4 TIMES DAILY  Qty: 400 strip, Refills: 2    Associated Diagnoses: Uncontrolled diabetes with kidney complications (H)           Allergies   Allergies   Allergen Reactions     Aliskiren Cough     Liquid Adhesive      Other reaction(s): *Unknown  Paper tapes, fragile skin     Lisinopril Cough     Ace cough     Losartan Cough

## 2019-09-12 NOTE — PLAN OF CARE
Patient reports generalized pain of a level 8/10. Scheduled Norco administered per MAR. Follow up pain level was a 0/10. Pain increased back up to 9-10 out of 10. Ultram and Dilaudid administered per MAR. Cold packs and warm packs applied. Patient reports minimal relief of pain.  HS blood sugar was 211. 1 unit of Novolog administered per MAR. 0200 blood sugar was 194. Dyspnea on exertion. Lung sounds clear in upper lobes and diminished in bases. Respirations 20.  Up to commode with assist x 1 and a walker. Voiding without difficulty. Vital signs:  Temp: 98.5  F (36.9  C) Temp src: Tympanic BP: (!) 144/82 Pulse: 80 Heart Rate: 68 Resp: 18 SpO2: 96 % O2 Device: None (Room air)     Laura Somers RN on 9/12/2019 at 7:36 AM

## 2019-09-13 NOTE — TELEPHONE ENCOUNTER
Transitional Care Management Phone Call    Summary of hospitalization:  [facility] discharge summary reviewed    DISCHARGE DIAGNOSIS:   GI bleed causing symptomatic anemia with unclear source -   Potentially resolved gastric source versus small bowel source.  Probable right lower lobe community-acquired pneumonia  Chronic leukocytosis  Chronic rheumatoid arthritis  Chronic with acute worsening weakness and mobility limitation    DATE OF DISCHARGE: 9/12/19    Diagnostic Tests/Treatments reviewed.  Follow up needed:     Please follow-up with Dr. Duarte or one of his partners in the next week.    Please schedule an appointment with gastroenterology and rheumatology     Post Discharge Medication Reconciliation: discharge medications reconciled and changed, per note/orders (see AVS).    Vitamin C 500 mg tablet, sig: Take 2 tablets (1,000 mg) by mouth daily: Pt states she only takes one tablet daily. Medication note added.    Medications reviewed by: by myself, along with Patient over the phone.    Problems taking medications regularly:  None  Problems adhering to non-medication therapy:  None    Other Healthcare Providers Involved in Patient s Care:         Dr. Duarte (PCP), Gastroenterology, Rheumatology, [facility]     Update since discharge: Improved. Still feeling pretty tired.    Plan of care communicated with patient. Pt has not heard back from     Just a friendly reminder that you appointment is   Next 5 appointments (look out 90 days)    Sep 17, 2019  1:40 PM CDT  Office Visit with Hugo Duarte MD  [facility] ([facility]) [address]  [place] [id number] [phone]      We encourage you to keep this appointment.  Please remember to bring all of your pills in their bottles (including any vitamins or over the counter pills) with you to your appointment.   The patient indicates understanding of these issues and agrees with  the plan of care.   Yes, plans to follow plan of care and keep the scheduled appointment.    Was the patient contacted within the 2 business days or other approved timeframe?  Yes  Was the Medication reconciliation and management done since the patient was discharged? Yes    Patient states Home Care Nurse just walked in the door, and is requesting a call back later to complete medication reconciliation.  Anne Torres RN .............. 9/13/2019   10:35 PM    Attempted returning call to Patient to complete medication reconciliation. Left message on machine to call back. Anne Torres RN .............. 9/13/2019  2:20 PM    Medication reconciliation completed.    Anne Torres RN .............. 9/13/2019  3:22 PM

## 2019-09-16 NOTE — TELEPHONE ENCOUNTER
Routing refill request to provider for review/approval because:  Drug not on the [facility] refill protocol   LOV: 8/23/19  Damaris Arora RN on 9/16/2019 at 4:23 PM
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
## 2019-09-17 NOTE — NURSING NOTE
The patient is here today to be seen for a hospital follow up and a refill on her medications.  Josefa Pandya LPN on 9/17/2019 at 1:37 PM  Chief Complaint   Patient presents with     RECHECK       Initial /88 (BP Location: Right arm, Patient Position: Sitting, Cuff Size: Adult Regular)   Pulse 76   Temp 97.2  F (36.2  C) (Tympanic)   Resp 18   Ht 1.524 m (5')   Wt 56.7 kg (125 lb)   Breastfeeding? No   BMI 24.41 kg/m   Estimated body mass index is 24.41 kg/m  as calculated from the following:    Height as of this encounter: 1.524 m (5').    Weight as of this encounter: 56.7 kg (125 lb).  Medication Reconciliation: complete    Josefa Pandya LPN

## 2019-09-20 NOTE — TELEPHONE ENCOUNTER
Called and spoke with patient   S-(situation): patient called and states that she has been having episodes of incontinence of stool    B-(background):  has had since discharge from hospital on 9/12 for GI bleed, but forgot to mention to Dr. Duarte at appointment on 9/17/19.      A-(assessment): states for about an hour in the morning she feels like she has to go but then nothing when on toilet then leaves bathroom and a little bit later feels like she has to go again and when she gets to the bathroom she already had gone.   States does not have any episodes through out day.  States there is a little blood  Which she usually has as she had hemorrhoids.   States is black because she is on iron and it is so hard to clean up.  States it is not diarrhea , does not feel dehydrated, feels get enough water.  Does have rectal prolapse.  States did take 2 antidiarrheal pills today to see if that helps.       R-(recommendations): patient  has appointment with Dr. Duarte next Thursday.  No available appointments in clinic.  Will route to Dr. Argueta for review and consideration

## 2019-09-20 NOTE — TELEPHONE ENCOUNTER
After patient's name and date of birth verified the below information was given.    Leticia Moss LPN ---- 9/20/2019 2:03 PM

## 2019-09-20 NOTE — TELEPHONE ENCOUNTER
Noted. Agree with plan.  Antidiarrhea pills.  ER if needed. Follow-up with Hugo Duarte as scheduled.     Encourage patient to call on Monday with an update.    Issa Argueta MD

## 2019-09-20 NOTE — TELEPHONE ENCOUNTER
Pt aware PCP is gone Mon. Would like a call then--is having bowel movements and not feeling or making it on time constant--call on Monday-If worse states will go ER

## 2019-09-26 PROBLEM — R19.7 DIARRHEA OF PRESUMED INFECTIOUS ORIGIN: Status: ACTIVE | Noted: 2019-01-01

## 2019-09-26 NOTE — NURSING NOTE
The patient is here today to have a follow up visit.  Josefa Pandya LPN on 9/26/2019 at 11:14 AM  Chief Complaint   Patient presents with     RECHECK       Initial /82 (BP Location: Left arm, Patient Position: Sitting, Cuff Size: Adult Regular)   Pulse 68   Temp 98.2  F (36.8  C) (Tympanic)   Resp 16   Wt 56.7 kg (125 lb)   Breastfeeding? No   BMI 24.41 kg/m   Estimated body mass index is 24.41 kg/m  as calculated from the following:    Height as of 9/17/19: 1.524 m (5').    Weight as of this encounter: 56.7 kg (125 lb).  Medication Reconciliation: complete    Josefa Pandya LPN
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
## 2019-10-04 NOTE — TELEPHONE ENCOUNTER
Routing refill request to provider for review/approval because:  Noted as prescribed by Dr. Ortega , hospitalist    Will route to Hugo Duarte for review and consideration of refill.    Damaris Arora RN on 10/4/2019 at 7:56 AM

## 2019-10-06 NOTE — PROGRESS NOTES
0352/0352-01  Karen Duncan 74 year old female   Admission date:10/6/2019   Residence:  home  Code status: Full Code   Isolation:Contact MRSA urine  Principal Problem:Anemia   Diet: full liquids  Mobility Status:  Assist of 1  Discharge timeline & plan: unsure of discharge date and/or discharge needs at this time.  Vital signs:  Temp: 98.8  F (37.1  C) Temp src: Tympanic BP: 125/54 Pulse: 77 Heart Rate: 79 Resp: 16 SpO2: 97 % O2 Device: Nasal cannula Oxygen Delivery: 2 LPM(decreased to 1L) Height: 152.4 cm (5') Weight: 56.7 kg (125 lb)  Estimated body mass index is 24.41 kg/m  as calculated from the following:    Height as of this encounter: 1.524 m (5').    Weight as of this encounter: 56.7 kg (125 lb).  Abnormal Physical Assessment:  Bruising over arms and scattered on body- small wound on inner right shin (covered with clear dressing), edema in left arm/hand r/t IV infiltrate (stated that she bumped the site with transfer to [facility]), SOB, tender joints r/t arthritis   Last Pain/PRN Medication given:  none  Finger stick POCT blood sugars:  154  Labs:   hgb- 6.7, WBC- 21.9, trop- 33  Telemetry: No  Pending tests/procedures planned:  Recheck Hgb  Comments:  2nd unit of blood transfusing- no adverse effects from infusion      SAFETY CHECKLIST  Arm Bands/Risk clasps correct and in place (DNR, Fall risk, Allergy, Latex, Limb):  Yes  IVF and rate ordered correct: Yes  Physical assessment verification(IV site, wounds, dressing intact, incisions, LDA's, neuro, CIWA...): Yes  Environmental assessment (bed/chair alarm on, call light, side rails, restraints, sitter....): Yes  Whiteboard updated by oncoming RN:Yes  Georgina Burns RN on 10/6/2019 at 6:51 PM     Bedside Handoff complete, safety checks verified by writer and are correct.    Swathi Casey RN on 10/6/2019 at 7:19 PM

## 2019-10-06 NOTE — ED PROVIDER NOTES
History     Chief Complaint   Patient presents with     Shortness of Breath     Chest Pain     HPI  Karen Duncan is a 74 year old female who is here complaining of shortness of breath.  She states this is been coming on for about a week now.  Also complains of chest pressure that she has had for the same amount of time.  Never really had any bad episodes of chest pain with this.  She says she cannot really do anything without getting very short of breath.  She has a chronic cough and that is really no different from normal.  No fevers or chills.  Has not noticed any increased swelling in her ankles.  Because of her arthritis and back pain she cannot lay flat normally so she cannot say if she is short of breath while lying down.    Allergies:  Allergies   Allergen Reactions     Aliskiren Cough     Liquid Adhesive      Other reaction(s): *Unknown  Paper tapes, fragile skin     Lisinopril Cough     Ace cough     Losartan Cough       Problem List:    Patient Active Problem List    Diagnosis Date Noted     Diarrhea of presumed infectious origin 09/26/2019     Priority: Medium     Pneumonia of right lower lobe due to infectious organism (H) 09/12/2019     Priority: Medium     Gastritis 09/10/2019     Priority: Medium     Hiatal hernia, possibly paraesophageal 09/10/2019     Priority: Medium     H/O adenomatous polyp of colon advanced tubulovillous 09/10/2019     Priority: Medium     Leukocytosis 09/08/2019     Priority: Medium     Advance Care Planning 06/13/2018     Priority: Medium     Diabetes mellitus, type II (H) 01/16/2018     Priority: Medium     Overview:   a system change updated this record. This will not affect patient care or billing. This comment can be deleted.       Hyperlipemia 01/16/2018     Priority: Medium     Neuropathy 01/16/2018     Priority: Medium     Aortic stenosis 07/20/2017     Priority: Medium     MRSA infection 05/21/2017     Priority: Medium     Overview:   Date & Source of First  Known MRSA: 5/17/17 URINE     Date and source of negative screens that qualify* for resolution of MRSA from infection table:   NONE    *2 sets of MRSA negative screens (previous positive site(s) if applicable and bilateral anterior nares) at least 7 days apart are required to resolve. Screening exclusions include dialysis, long term care residence, antibiotics within the past 7 days, chronic wounds or invasive devices, & recurrent MRSA infections. Please contact Infection Prevention for your facility if questions.       Rectal prolapse 05/21/2017     Priority: Medium     Current chronic use of systemic steroids 05/17/2017     Priority: Medium     Chronic pain disorder 04/18/2017     Priority: Medium     Pain medication agreement 04/18/2017     Priority: Medium     Anemia 01/16/2017     Priority: Medium     Elevated LFTs 01/16/2017     Priority: Medium     Corticosteroid dependence (H) 01/15/2017     Priority: Medium     Impaired mobility and activities of daily living 11/04/2016     Priority: Medium     Dyslipidemia 10/18/2016     Priority: Medium     Estrogen deficiency 08/22/2016     Priority: Medium     Elevated transaminase level 06/29/2016     Priority: Medium     Gallbladder calculus without cholecystitis 06/29/2016     Priority: Medium     Hypertension 10/25/2012     Priority: Medium     Diverticular disease of colon 02/18/2010     Priority: Medium     Piriformis syndrome 10/19/2009     Priority: Medium     Rheumatoid arthritis (H) 12/07/2006     Priority: Medium        Past Medical History:    Past Medical History:   Diagnosis Date     Atrial fibrillation (H)      Chronic pain      Essential (primary) hypertension      Extradural and subdural abscess, unspecified (CODE)      Hyperlipidemia      NAFLD (nonalcoholic fatty liver disease)      Osteomyelitis (H)      Pneumothorax      Psoas muscle abscess (H)      Rheumatoid arthritis (H)      Type 2 diabetes mellitus without complications (H)        Past  Surgical History:    Past Surgical History:   Procedure Laterality Date     ARTHROPLASTY KNEE Bilateral      ARTHROTOMY WRIST Left 2010    Rheumatoid nodule     AS MUSCLE-SKIN FLAP,TRUNK  2017    Left ischial ulcer     BIOPSY BREAST Left      COLONOSCOPY      2001,repeat in 10 years per patient     COLONOSCOPY  02/18/2010 2010,sigmoid diverticulosis, no polyps, due 2020     COLONOSCOPY  01/01/2011 2011,mild sigmoid diverticulosis, EGD biopsies     COLONOSCOPY N/A 9/10/2019    F/U 2022 Tubulovillous adenomas, advanced if medically appropriate     ESOPHAGOSCOPY, GASTROSCOPY, DUODENOSCOPY (EGD), COMBINED      2/2011,Normal     ESOPHAGOSCOPY, GASTROSCOPY, DUODENOSCOPY (EGD), COMBINED N/A 9/10/2019    Procedure: ESOPHAGOGASTRODUODENOSCOPY (EGD);  Surgeon: Coleman Queen MD;  Location:  OR     EXTRACAPSULAR CATARACT EXTRATION WITH INTRAOCULAR LENS IMPLANT Bilateral      HYSTERECTOMY TOTAL ABDOMINAL, BILATERAL SALPINGO-OOPHORECTOMY, COMBINED      secondary to fibroids     LAMINECTOMY LUMBAR ONE LEVEL      Multiple     REMOVE PORT VASCULAR ACCESS N/A 6/27/2018    Procedure: REMOVE PORT VASCULAR ACCESS;  Port Removal;  Surgeon: Jazzy Taylor MD;  Location:  OR       Family History:    Family History   Problem Relation Age of Onset     Other - See Comments Father         brain aneurysm.     Diabetes Father      Other - See Comments Mother         Stroke/Hx thrombosis     Cancer Brother         Throat     Other - See Comments Brother         Agent orange     Pulmonary Embolism Brother      Breast Cancer Maternal Grandmother         Cancer-breast     Cancer Sister         Cervical     Chronic Obstructive Pulmonary Disease Sister      Other - See Comments Other         Several other family members including cousins who have had brain aneurysms.     Diabetes Son         Diabetes     Diabetes Son         Diabetes     Diabetes Daughter         Diabetes     Other - See Comments Other         8 Siblings     Cancer Maternal  Aunt         Cancer,Uterine       Social History:  Marital Status:   [2]  Social History     Tobacco Use     Smoking status: Former Smoker     Packs/day: 3.00     Years: 25.00     Pack years: 75.00     Types: Cigarettes     Last attempt to quit: 1974     Years since quittin.3     Smokeless tobacco: Never Used     Tobacco comment: Quit smoking: quit    Substance Use Topics     Alcohol use: No     Alcohol/week: 14.0 standard drinks     Frequency: Never     Comment: Alcoholic Drinks/day: 1 beers/day     Drug use: No     Comment: Drug use: No        Medications:    amLODIPine (NORVASC) 2.5 MG tablet  ascorbic acid (VITAMIN C) 500 MG tablet  aspirin (ASA) 81 MG tablet  atorvastatin (LIPITOR) 20 MG tablet  baclofen (LIORESAL) 10 MG tablet  calcium carbonate (CALCIUM CARBONATE) 600 MG tablet  cloNIDine (CATAPRES) 0.1 MG tablet  diltiazem (TIAZAC) 360 MG 24 hr capsule  diphenhydrAMINE (BENADRYL) 25 MG tablet  estradiol (ESTRACE) 0.5 MG tablet  famotidine (PEPCID) 20 MG tablet  Ferrous Sulfate (IRON) 325 (65 Fe) MG tablet  FLORASTOR 250 MG capsule  furosemide (LASIX) 40 MG tablet  gabapentin (NEURONTIN) 600 MG tablet  HYDROcodone-acetaminophen (NORCO) 5-325 MG tablet  insulin glargine (LANTUS SOLOSTAR) 100 UNIT/ML pen  metFORMIN (GLUCOPHAGE-XR) 500 MG 24 hr tablet  NOVOFINE 30 30G X 8 MM insulin pen needle  NOVOLOG FLEXPEN 100 UNIT/ML soln  potassium 99 MG TABS  predniSONE (DELTASONE) 5 MG tablet  spironolactone (ALDACTONE) 25 MG tablet  traMADol (ULTRAM) 50 MG tablet  blood glucose monitoring (ACCU-CHEK FASTCLIX) lancets  Cyanocobalamin (VITAMIN B-12 CR) 1000 MCG TBCR  docusate sodium (COLACE) 100 MG capsule  magnesium oxide (MAG-OX) 400 (241.3 Mg) MG tablet  omega 3 1000 MG CAPS  ONETOUCH VERIO IQ test strip  pantoprazole (PROTONIX) 40 MG EC tablet  psyllium (TGT PSYLLIUM FIBER) 0.52 G capsule  vitamin D3 (CHOLECALCIFEROL) 1000 units (25 mcg) tablet          Review of Systems   Constitutional: Negative  for chills and fever.   HENT: Negative for congestion.    Eyes: Negative for visual disturbance.   Respiratory: Positive for cough, chest tightness and shortness of breath.    Cardiovascular: Positive for chest pain.   Gastrointestinal: Negative for nausea and vomiting.   Genitourinary: Negative for dysuria.   Musculoskeletal: Negative for arthralgias.   Skin: Negative for rash.   Neurological: Negative for light-headedness.   Psychiatric/Behavioral: Negative for agitation.       Physical Exam   BP: 127/49  Pulse: 80  Heart Rate: 98  Temp: 98.8  F (37.1  C)  Resp: 28  Height: 152.4 cm (5')  Weight: 56.7 kg (125 lb)  SpO2: 92 %      Physical Exam  Vitals signs and nursing note reviewed.   Constitutional:       Appearance: She is well-developed.   HENT:      Head: Normocephalic and atraumatic.   Eyes:      Conjunctiva/sclera: Conjunctivae normal.   Neck:      Musculoskeletal: Normal range of motion and neck supple.   Cardiovascular:      Rate and Rhythm: Normal rate and regular rhythm.      Heart sounds: Normal heart sounds.   Pulmonary:      Effort: Pulmonary effort is normal.      Breath sounds: Normal breath sounds.   Skin:     General: Skin is warm and dry.   Neurological:      Mental Status: She is alert and oriented to person, place, and time.   Psychiatric:         Mood and Affect: Mood normal.         Behavior: Behavior normal.         ED Course        Procedures               EKG Interpretation:      Interpreted by Pietro Herr MD  Time reviewed: 1250  Symptoms at time of EKG: sob   Rhythm: normal sinus   Rate: normal  Axis: normal  Ectopy: none  Conduction: normal  ST Segments/ T Waves: No ST-T wave changes  Q Waves: none  Comparison to prior: Unchanged    Clinical Impression: normal EKG           Results for orders placed or performed during the hospital encounter of 10/06/19 (from the past 24 hour(s))   CBC with platelets differential   Result Value Ref Range    WBC 21.9 (H) 4.0 - 11.0 10e9/L    RBC Count  2.26 (L) 3.8 - 5.2 10e12/L    Hemoglobin 6.7 (LL) 11.7 - 15.7 g/dL    Hematocrit 22.2 (L) 35.0 - 47.0 %    MCV 98 78 - 100 fl    MCH 29.6 26.5 - 33.0 pg    MCHC 30.2 (L) 31.5 - 36.5 g/dL    RDW 23.6 (H) 10.0 - 15.0 %    Platelet Count 266 150 - 450 10e9/L    Diff Method Automated Method     % Neutrophils 82.1 %    % Lymphocytes 5.1 %    % Monocytes 8.1 %    % Eosinophils 0.2 %    % Basophils 0.3 %    % Immature Granulocytes 4.2 %    Absolute Neutrophil 18.0 (H) 1.6 - 8.3 10e9/L    Absolute Lymphocytes 1.1 0.8 - 5.3 10e9/L    Absolute Monocytes 1.8 (H) 0.0 - 1.3 10e9/L    Absolute Eosinophils 0.0 0.0 - 0.7 10e9/L    Absolute Basophils 0.1 0.0 - 0.2 10e9/L    Abs Immature Granulocytes 0.9 (H) 0 - 0.4 10e9/L   Comprehensive metabolic panel   Result Value Ref Range    Sodium 137 134 - 144 mmol/L    Potassium 4.4 3.5 - 5.1 mmol/L    Chloride 100 98 - 107 mmol/L    Carbon Dioxide 20 (L) 21 - 31 mmol/L    Anion Gap 17 (H) 3 - 14 mmol/L    Glucose 157 (H) 70 - 105 mg/dL    Urea Nitrogen 56 (H) 7 - 25 mg/dL    Creatinine 1.00 0.60 - 1.20 mg/dL    GFR Estimate 54 (L) >60 mL/min/[1.73_m2]    GFR Estimate If Black 66 >60 mL/min/[1.73_m2]    Calcium 8.9 8.6 - 10.3 mg/dL    Bilirubin Total 0.4 0.3 - 1.0 mg/dL    Albumin 3.4 (L) 3.5 - 5.7 g/dL    Protein Total 5.7 (L) 6.4 - 8.9 g/dL    Alkaline Phosphatase 125 (H) 34 - 104 U/L    ALT 16 7 - 52 U/L    AST 21 13 - 39 U/L   XR Chest Port 1 View    Narrative    PROCEDURE:  XR CHEST PORT 1 VW    HISTORY:  sob.     COMPARISON:  None.    FINDINGS:   The cardiac silhouette is normal in size. The pulmonary vasculature is  normal.  There is abnormal increased density at the right lung base.  This has worsened in comparison to September 17, 2019. There is a  small right-sided pleural effusion. There is some deformity of the  acromion on the right, uncertain if this acute or chronic.      Impression    IMPRESSION:  Right basilar opacity with volume loss demonstrated. The  opacity has worsened  as compared to previous examination. There is a  right-sided pleural effusion which may have also worsened as compared  to previous examination      TERRANCE KAY MD   ABO/Rh type and screen   Result Value Ref Range    ABO O     RH(D) Pos     Antibody Screen Neg     Test Valid Only At [facility]        Specimen Expires 10/09/2019        Medications   0.9% sodium chloride BOLUS (250 mLs Intravenous New Bag 10/6/19 1344)   levofloxacin (LEVAQUIN) infusion 750 mg (750 mg Intravenous New Bag 10/6/19 1357)   aspirin (ASA) chewable tablet 324 mg (324 mg Oral Given 10/6/19 1247)       Assessments & Plan (with Medical Decision Making)     I have reviewed the nursing notes.    I have reviewed the findings, diagnosis, plan and need for follow up with the patient.  Patient appears to have recurrence of both of her issues during her recent hospitalization of GI bleed with anemia and right lower lobe pneumonia.  She has an order placed for type and screen and transfusion of 2 units of packed red blood cells.  I have also ordered some Levaquin as she was recently hospitalized and was on Zithromax and Rocephin and has not resolved.  I spoken with Dr. brooks, hospitalist who has accepted the admission.    New Prescriptions    No medications on file       Final diagnoses:   Gastrointestinal hemorrhage, unspecified gastrointestinal hemorrhage type   Anemia, unspecified type   Pneumonia of right lower lobe due to infectious organism (H)       10/6/2019   [facility] [place]     Pietro Herr MD  10/06/19 7709

## 2019-10-06 NOTE — ED TRIAGE NOTES
Patient brought in from home for shortness of breath, weakness, and pain in upper chest and neck.   Per spouse patient has been feeling weak and had  shortness of breath for about a week.

## 2019-10-06 NOTE — PHARMACY
Pharmacy- Renal Dose Adjustment    Patient Active Problem List   Diagnosis     Anemia     Chronic pain disorder     Corticosteroid dependence (H)     Diabetes mellitus, type II (H)     Diverticular disease of colon     Elevated LFTs     Estrogen deficiency     Hyperlipemia     Hypertension     Neuropathy     Pain medication agreement     Rheumatoid arthritis (H)     Advance Care Planning     Leukocytosis     Aortic stenosis     Current chronic use of systemic steroids     Dyslipidemia     Elevated transaminase level     MRSA infection     Impaired mobility and activities of daily living     Gallbladder calculus without cholecystitis     Rectal prolapse     Piriformis syndrome     Gastritis     Hiatal hernia, possibly paraesophageal     H/O adenomatous polyp of colon advanced tubulovillous     Pneumonia of right lower lobe due to infectious organism (H)     Diarrhea of presumed infectious origin        Relevant Labs:  Recent Labs   Lab Test 10/06/19  1257 09/26/19  1134   WBC 21.9* 19.1*   HGB 6.7* 10.1*    469*        CrCl: 40 mL/min    No intake or output data in the 24 hours ending 10/06/19 1626       Per Renal Dose Adjustment Protocol, will adjust:  Levofloxacin to 750 mg IV q48 hours for CrCl 20-50 mL/min for dx: CAP    Will continue to follow and make adjustments accordingly. Thank You.    Radha Marin [facility] ....................  10/6/2019   4:26 PM

## 2019-10-06 NOTE — H&P
[facility]    History and Physical  Hospitalist       Date of Admission:  10/6/2019    Assessment & Plan   Karen Duncan is a 74 year old female who presents with recurrent symptomatic anemia.     Principal Problem:    Anemia    Assessment: Recent EGD and colonoscopy 6 weeks ago with gastritis but no other clear source of blood loss, hemoglobin since that time stabilized but has now worsened once again.  Given her ongoing GERD symptoms, chronic prednisone use, chronic hiatal hernia more likely has recurrent gastritis and acute blood loss although previously there was potential concern for small bowel blood loss.  Discussed options with patient including transfer to tertiary care center for gastroenterology consult and consideration for capsule endoscopy versus transfusion, aggressive re-treatment of gastritis to ensure stability and CT scan to evaluate for other etiologies of bleeding with close monitoring and patient does not want to transfer unless she absolutely needs to pending clinical course.    Plan: - CT abdomen/pelvis   -Transfused 2 units PRBCs   - trend hemoglobin   - hold home aspirin and fish oil   - continue home estrogen supplement   - Carafate 4 times daily   - recheck H. Pylori    CT scan results reviewed and findings discussed with patient.  Also discussed findings with interpreting radiologist.  No evidence of rectal cancer given her recent findings of colonoscopy, but will benefit from diagnostic and therapeutic thoracentesis for cytology and culture results to evaluate for possible malignancy.  Patient agreeable to pursuing this in a.m.      Pneumonia of right lower lobe due to infectious organism (H)    Assessment: Worsening right-sided effusion and parenchymal volume loss.  May be having a component of aspiration.    Plan: - Obtain blood cultures (first dose of IV antibiotic in ER before blood cultures obtained)   - IV Levaquin day 1   - CT chest with contrast   -  obtain sputum culture if able   - scheduled nebulizer's   - Trend white blood cell count   - SLP evaluation in a.m.    Active Problems:    Corticosteroid dependence (H)    Assessment: chronic for RA    Plan: - continue home prednisone      Diabetes mellitus, type II (H)    Assessment: Well-controlled with last hemoglobin A1c of 6.1    Plan: - Decrease home Lantus to 7 units every morning given clear liquid only diet   -ISS AC at bedtime      Hypertension    Assessment: stable    Plan: -Hold home Norvasc, continue home clonidine, diltiazem, Lasix and Aldactone in a.m.      Rheumatoid arthritis (H)    Assessment: Chronic ability issues and joint deformity.    Plan: - continue steroids and outpatient home regimen as above    FEN: clear diet, normal saline at 0mL/hr, mg/k replacement protocol  PPX: SCD's     Code Status: Full Code    Gurinder Ledesma    Primary Care Physician   LEO GIVENS    Chief Complaint   dyspnea    History is obtained from the patient and chart review.    History of Present Illness   Karen Duncan is a 74 year old female who presents with recurrent symptomatic anemia.  Patient was hospitalized in early September for right lower lobe pneumonia as well as symptomatic anemia.  She underwent panendoscopy with noted gastritis, is continued with treatment for this and completed course of antibiotics.  Her chest x-ray cleared, her hemoglobin remained stable as an outpatient but over the last 72 hours she has had intermittent stomach upset and a sour taste in her throat frequently as well as intermittent epigastric pain and she began to feel more short of breath.    She presented to the ER today, there was initially given an aspirin after lab work returned was started on 2 unit blood transfusion, started on IV Levaquin and subsequently admitted for further management.    Past Medical History    I have reviewed this patient's medical history and updated it with pertinent information if needed.   Past  Medical History:   Diagnosis Date     Atrial fibrillation (H)     2000     Chronic pain      Essential (primary) hypertension     No Comments Provided     Extradural and subdural abscess, unspecified (CODE)     2016     Hyperlipidemia     No Comments Provided     NAFLD (nonalcoholic fatty liver disease)     2010,Fatty liver on US and CT.  Iron nl. Hep C neg     Osteomyelitis (H)     2012,IV Vancomycin to resolve, L 3rd digit     Pneumothorax     spondylolysis (right) at age 39 followed by spondylolysis (left) several years later     Psoas muscle abscess (H)     2016,Strep viridans     Rheumatoid arthritis (H)     2000     Type 2 diabetes mellitus without complications (H)     No Comments Provided       Past Surgical History   I have reviewed this patient's surgical history and updated it with pertinent information if needed.  Past Surgical History:   Procedure Laterality Date     ARTHROPLASTY KNEE Bilateral      ARTHROTOMY WRIST Left 2010    Rheumatoid nodule     AS MUSCLE-SKIN FLAP,TRUNK  2017    Left ischial ulcer     BIOPSY BREAST Left      COLONOSCOPY      2001,repeat in 10 years per patient     COLONOSCOPY  02/18/2010 2010,sigmoid diverticulosis, no polyps, due 2020     COLONOSCOPY  01/01/2011 2011,mild sigmoid diverticulosis, EGD biopsies     COLONOSCOPY N/A 9/10/2019    F/U 2022 Tubulovillous adenomas, advanced if medically appropriate     ESOPHAGOSCOPY, GASTROSCOPY, DUODENOSCOPY (EGD), COMBINED      2/2011,Normal     ESOPHAGOSCOPY, GASTROSCOPY, DUODENOSCOPY (EGD), COMBINED N/A 9/10/2019    Procedure: ESOPHAGOGASTRODUODENOSCOPY (EGD);  Surgeon: Coleman Queen MD;  Location: [facility] OR     EXTRACAPSULAR CATARACT EXTRATION WITH INTRAOCULAR LENS IMPLANT Bilateral      HYSTERECTOMY TOTAL ABDOMINAL, BILATERAL SALPINGO-OOPHORECTOMY, COMBINED      secondary to fibroids     LAMINECTOMY LUMBAR ONE LEVEL      Multiple     REMOVE PORT VASCULAR ACCESS N/A 6/27/2018    Procedure: REMOVE PORT VASCULAR ACCESS;  Port  Removal;  Surgeon: Jzazy Taylor MD;  Location:  OR       Prior to Admission Medications   Prior to Admission Medications   Prescriptions Last Dose Informant Patient Reported? Taking?   Cyanocobalamin (VITAMIN B-12 CR) 1000 MCG TBCR Unknown at Unknown time  Yes No   Sig: Take 1 tablet by mouth daily   FLORASTOR 250 MG capsule 10/6/2019 at Unknown time  No Yes   Sig: TAKE 2 CAPSULES BY MOUTH TWICE DAILY   Ferrous Sulfate (IRON) 325 (65 Fe) MG tablet 10/6/2019 at Unknown time  No Yes   Sig: TAKE 1 TABLET BY MOUTH TWICE DAILY   HYDROcodone-acetaminophen (NORCO) 5-325 MG tablet 10/6/2019 at Unknown time  No Yes   Sig: Take 1 tablet by mouth 4 times daily   NOVOFINE 30 30G X 8 MM insulin pen needle 10/6/2019 at Unknown time  No Yes   Sig: USE 5 TIMES DAILY   NOVOLOG FLEXPEN 100 UNIT/ML soln 10/6/2019 at Unknown time  No Yes   Sig: INJECT 4 TIMES DAILY PER SLIDING SCALE. 24 UNITS MAX DAILY   ONETOUCH VERIO IQ test strip Unknown at Unknown time  No No   Sig: USE 1 STRIP TO CHECK GLUCOSE 4 TIMES DAILY   amLODIPine (NORVASC) 2.5 MG tablet 10/5/2019 at Unknown time  No Yes   Sig: TAKE 1 TABLET BY MOUTH ONCE DAILY   ascorbic acid (VITAMIN C) 500 MG tablet 10/5/2019 at Unknown time  Yes Yes   Sig: Take 2 tablets (1,000 mg) by mouth daily   aspirin (ASA) 81 MG tablet 10/6/2019 at Unknown time  Yes Yes   Sig: Take 1 tablet (81 mg) by mouth daily   atorvastatin (LIPITOR) 20 MG tablet 10/5/2019 at Unknown time  No Yes   Sig: TAKE 1 TABLET BY MOUTH AT BEDTIME   baclofen (LIORESAL) 10 MG tablet 10/6/2019 at Unknown time  No Yes   Sig: TAKE 1 TABLET BY MOUTH THREE TIMES DAILY   blood glucose monitoring (ACCU-CHEK FASTCLIX) lancets Unknown at Unknown time  No No   Sig: Use to test blood sugar four times daily. DX code E11.9. Dispense as covered by patient's insurance   calcium carbonate (CALCIUM CARBONATE) 600 MG tablet 10/5/2019 at Unknown time  Yes Yes   Sig: Take 1 tablet (600 mg) by mouth 2 times daily (with meals)   cloNIDine  (CATAPRES) 0.1 MG tablet 10/5/2019 at Unknown time  No Yes   Sig: TAKE 1 TABLET BY MOUTH ONCE DAILY   diltiazem (TIAZAC) 360 MG 24 hr capsule 10/5/2019 at Unknown time  No Yes   Sig: TAKE ONE CAPSULE BY MOUTH ONCE DAILY   diphenhydrAMINE (BENADRYL) 25 MG tablet 10/5/2019 at Unknown time  Yes Yes   Sig: Take 2 tablets (50 mg) by mouth nightly as needed for itching or allergies   docusate sodium (COLACE) 100 MG capsule Unknown at Unknown time  Yes No   Sig: Take 2 capsules (200 mg) by mouth 2 times daily   estradiol (ESTRACE) 0.5 MG tablet 10/6/2019 at Unknown time  No Yes   Sig: TAKE 1 TABLET BY MOUTH ONCE DAILY   famotidine (PEPCID) 20 MG tablet 10/6/2019 at Unknown time  No Yes   Sig: TAKE 1 TABLET BY MOUTH TWICE DAILY   furosemide (LASIX) 40 MG tablet 10/6/2019 at Unknown time  No Yes   Sig: TAKE 1 TABLET BY MOUTH ONCE DAILY IN THE MORNING   gabapentin (NEURONTIN) 600 MG tablet 10/6/2019 at Unknown time  No Yes   Sig: Take 1 tablet (600 mg) by mouth 3 times daily   insulin glargine (LANTUS SOLOSTAR) 100 UNIT/ML pen 10/5/2019 at Unknown time  Yes Yes   Sig: INJECT 14 UNITS SUBCUTANEOUSLY IN THE MORNING AND 9 IN THE EVENING   magnesium oxide (MAG-OX) 400 (241.3 Mg) MG tablet Unknown at Unknown time  Yes No   Sig: Take 1 tablet (400 mg) by mouth daily   metFORMIN (GLUCOPHAGE-XR) 500 MG 24 hr tablet 10/6/2019 at Unknown time  No Yes   Sig: TAKE 2 TABLETS BY MOUTH TWICE DAILY WITH MEALS   omega 3 1000 MG CAPS Unknown at Unknown time  Yes No   Sig: Take 1,000 mg by mouth daily   pantoprazole (PROTONIX) 40 MG EC tablet   No No   Sig: Take 1 tablet (40 mg) by mouth daily   potassium 99 MG TABS 10/6/2019 at Unknown time  Yes Yes   Sig: Take 1 mg by mouth daily   predniSONE (DELTASONE) 5 MG tablet 10/6/2019 at Unknown time  No Yes   Sig: TAKE 1 TABLET BY MOUTH TWICE DAILY   psyllium (TGT PSYLLIUM FIBER) 0.52 G capsule Unknown at Unknown time  Yes No   Sig: Take 3 capsules (1.56 g) by mouth daily   spironolactone (ALDACTONE)  25 MG tablet 10/6/2019 at Unknown time  No Yes   Sig: TAKE 1 TABLET BY MOUTH ONCE DAILY   traMADol (ULTRAM) 50 MG tablet 10/6/2019 at 1200  No Yes   Sig: TAKE 2 TABLETS BY MOUTH EVERY 6 HOURS AS NEEDED FOR  MODERATE  PAIN.   vitamin D3 (CHOLECALCIFEROL) 1000 units (25 mcg) tablet Unknown at Unknown time  Yes No   Sig: Take by mouth 2 times daily      Facility-Administered Medications: None     Allergies   Allergies   Allergen Reactions     Aliskiren Cough     Liquid Adhesive      Other reaction(s): *Unknown  Paper tapes, fragile skin     Lisinopril Cough     Ace cough     Losartan Cough       Social History   I have reviewed this patient's social history and updated it with pertinent information if needed. Karen Duncan  reports that she quit smoking about 45 years ago. Her smoking use included cigarettes. She has a 75.00 pack-year smoking history. She has never used smokeless tobacco. She reports that she does not drink alcohol or use drugs.    Family History   I have reviewed this patient's family history and updated it with pertinent information if needed.   Family History   Problem Relation Age of Onset     Other - See Comments Father         brain aneurysm.     Diabetes Father      Other - See Comments Mother         Stroke/Hx thrombosis     Cancer Brother         Throat     Other - See Comments Brother         Agent orange     Pulmonary Embolism Brother      Breast Cancer Maternal Grandmother         Cancer-breast     Cancer Sister         Cervical     Chronic Obstructive Pulmonary Disease Sister      Other - See Comments Other         Several other family members including cousins who have had brain aneurysms.     Diabetes Son         Diabetes     Diabetes Son         Diabetes     Diabetes Daughter         Diabetes     Other - See Comments Other         8 Siblings     Cancer Maternal Aunt         Cancer,Uterine       Review of Systems     REVIEW OF SYSTEMS:    Constitutional: poor energy and appetite, no  recent sick contacts, no fevers.   Eyes: no changes in vision  Ears, nose, mouth, throat, and face: no mouth sores, dysphagia, or odynophagia  Respiratory: no shortness of breath, clear productive sputum, no wheezing, no obvious aspiration symptoms.    Cardiovascular: no chest pain, palpitations, orthopnea, increased lower extremity edema, or syncope.   Gastrointestinal: no constipation, diarrhea, nausea, vomiting or consistent abdominal pain.  She has had an upset stomach over the last number of days.  Genitourinary: no dysuria, hematuria, urgency or frequency.   Musculoskeletal: no new pain to extremities or falls.   Neurological: no new weakness, tingling, numbness.   Endocrine: blood sugars have been well controlled.     Physical Exam   Temp: 98.7  F (37.1  C) Temp src: Tympanic BP: 119/59 Pulse: 75 Heart Rate: 80 Resp: 20 SpO2: 97 % O2 Device: Nasal cannula Oxygen Delivery: 2 LPM  Vital Signs with Ranges  Temp:  [98.7  F (37.1  C)-98.8  F (37.1  C)] 98.7  F (37.1  C)  Pulse:  [75-82] 75  Heart Rate:  [75-98] 80  Resp:  [11-28] 20  BP: (107-128)/(49-73) 119/59  SpO2:  [91 %-99 %] 97 %  125 lbs 0 oz    Exam:  GENERAL: Talkative, in no apparent distress.  Head: normocephalic and atraumatic  Eyes: anicteric and non-injected sclera  Nose: no rhinorrhea or epistaxis.   Throat: moist mucous membranes with no active oral lesions.  NECK: Supple, jugular venous distension not present.  CARDIOVASCULAR: regular rate and rhythm, no murmurs, rubs, or gallops. Normal S1/S2. No lower extremity edema.   RESPIRATORY: clear to auscultation bilaterally, no wheezes, no crackles.  Diminished breath sounds at the right lung base with scant rhonchi.  No accessory muscle use or evidence of respiratory distress.    GI: soft, non-tender deep palpation in all quadrants, non-distended, normoactive bowel sounds.  MUSCULOSKELETAL: warm and well perfused, 2+ dorsalis pedis pulses.  Chronic swan-neck deformities of her MCPs  SKIN: Chronically  thin skin with chronic appearing ecchymoses of her bilateral forearms.  Right anterior shin with a subcentimeter healing ulceration.  NEUROLOGY: AAOx3, follows commands, speech and language without focal deficits.       Data   Data reviewed today:  I personally reviewed the chest x-ray image(s) showing Worsening right lower lobe effusion with parenchymal volume loss.  Recent Labs   Lab 10/06/19  1257   WBC 21.9*   HGB 6.7*   MCV 98         POTASSIUM 4.4   CHLORIDE 100   CO2 20*   BUN 56*   CR 1.00   ANIONGAP 17*   MOLLY 8.9   *   ALBUMIN 3.4*   PROTTOTAL 5.7*   BILITOTAL 0.4   ALKPHOS 125*   ALT 16   AST 21       Recent Results (from the past 24 hour(s))   XR Chest Port 1 View    Narrative    PROCEDURE:  XR CHEST PORT 1 VW    HISTORY:  sob.     COMPARISON:  None.    FINDINGS:   The cardiac silhouette is normal in size. The pulmonary vasculature is  normal.  There is abnormal increased density at the right lung base.  This has worsened in comparison to September 17, 2019. There is a  small right-sided pleural effusion. There is some deformity of the  acromion on the right, uncertain if this acute or chronic.      Impression    IMPRESSION:  Right basilar opacity with volume loss demonstrated. The  opacity has worsened as compared to previous examination. There is a  right-sided pleural effusion which may have also worsened as compared  to previous examination      TERRANCE KAY MD

## 2019-10-06 NOTE — PLAN OF CARE
Patient alert, oriented and pleasant.  Is assist of 1 transfer in room.  Blood infusion stopped due to infiltrated IV- left arm swollen- ice applied.  New IV in right wrist and blood resumed.  Contrast done for scan.  Gerogina Burns RN on 10/6/2019 at 6:44 PM

## 2019-10-07 NOTE — PROGRESS NOTES
[facility]    Medicine Progress Note - Hospitalist Service       Date of Admission:  10/6/2019  Assessment & Plan   Karen Duncan is a 74 year old female who presents with recurrent symptomatic anemia.     Principal Problem:    Anemia    Assessment: Recent EGD and colonoscopy 3-4 weeks ago with gastritis but no other clear source of blood loss, hemoglobin again acutely depressed on admit.  Given her ongoing GERD symptoms, chronic prednisone use, chronic hiatal hernia more likely has recurrent gastritis and acute blood loss.    Plan:  - Stable since transfusion of 2 units PRBCs   - recheck hemoglobin in AM   - hold home aspirin and fish oil   - continue home estrogen supplement   - Carafate 4 times daily   - recheck H. Pylori           Pneumonia of right lower lobe due to infectious organism (H)    Assessment: Worsening right-sided effusion and parenchymal volume loss.  May be having a component of aspiration.    Plan: - Obtain blood cultures (first dose of IV antibiotic in ER before blood cultures obtained)   - IV Levaquin day 2   - follow up thoracentesis fluid analysis    - obtain sputum culture if able   - scheduled nebulizer's   - Trend white blood cell count   - SLP evaluation in a.m.    Active Problems:    Corticosteroid dependence (H)    Assessment: chronic for RA    Plan: - continue home prednisone      Diabetes mellitus, type II (H)    Assessment: Well-controlled with last hemoglobin A1c of 6.1.  -284    Plan: - Continued decreased home Lantus to 7 units every morning given clear liquid only diet   -ISS AC at bedtime      Hypertension    Assessment: stable    Plan: -Hold to hold home Norvasc, continue home clonidine, diltiazem, Lasix and Aldactone in a.m.      Rheumatoid arthritis (H)    Assessment: Chronic ability issues and joint deformity.    Plan: - continue steroids and outpatient home regimen as above    FEN: clear diet, normal saline at 0mL/hr, mg/k replacement  protocol  PPX: SCD's            Diet: Clear Liquid Diet    DVT Prophylaxis: Pneumatic Compression Devices  Edwards Catheter: not present  Code Status: Full Code      Disposition Plan   Expected discharge: 1-2d, recommended to prior living arrangement once antibiotic plan established and hemoglobin stable.  Entered: Will Ortega MD 10/07/2019, 3:18 PM       The patient's care was discussed with the Patient.    Will Ortega MD  Hospitalist Service  [facility]    ______________________________________________________________________    Interval History   She reports that she feels much better today.  Denies any shortness of breath.  Has not been up moving around but reports that chronically she does not move very much.  Typically will just go short distances at home with a 4 wheeled walker.  She did have a few bowel movements today and did not notice any blood or black stools.    No fevers.  Feels that her breathing has improved significantly throughout the day and also after the thoracentesis.      Data reviewed today: I reviewed all medications, new labs and imaging results over the last 24 hours. I personally reviewed no images or EKG's today.    Physical Exam   Vital Signs: Temp: 98.4  F (36.9  C) Temp src: Tympanic BP: (!) 144/56 Pulse: 81 Heart Rate: 85 Resp: 18 SpO2: 95 % O2 Device: None (Room air) Oxygen Delivery: 1 LPM(pt placed on RA)  Weight: 126 lbs 1.65 oz  Constitutional: awake, alert, cooperative, no apparent distress, and appears stated age  Respiratory: Scattered rhonchi right greater than left.  Clear with deep inspiration.  Cardiovascular: Regular rate and rhythm.  2 out of 6 systolic ejection murmur.  GI: Abdomen soft, nontender  Musculoskeletal: Generalized weakness with no focal deficits  Neuropsychiatric: General: normal, calm and normal eye contact  Orientation: oriented to self, place, time and situation  Memory and insight: memory for past and recent events intact  and thought process normal    Data   Recent Labs   Lab 10/07/19  0416 10/06/19  2112 10/06/19  1257   WBC 18.8*  --  21.9*   HGB 8.7*  --  6.7*   MCV 95  --  98     --  266     --  137   POTASSIUM 4.1  --  4.4   CHLORIDE 100  --  100   CO2 25  --  20*   BUN 51*  --  56*   CR 0.84  --  1.00   ANIONGAP 12  --  17*   MOLLY 8.2*  --  8.9   *  --  157*   ALBUMIN  --   --  3.4*   PROTTOTAL  --  5.4* 5.7*   BILITOTAL  --   --  0.4   ALKPHOS  --   --  125*   ALT  --   --  16   AST  --   --  21     Recent Results (from the past 24 hour(s))   CT Chest w Contrast    Narrative    PROCEDURE: CT CHEST W CONTRAST 10/6/2019 7:42 PM    HISTORY: Chest pain or SOB, pleurisy or effusion suspected    COMPARISONS: None.    Meds/Dose Given: 100ml visipaque 320    TECHNIQUE: CT scan of the chest with IV contrast sagittal and coronal  reconstructions were obtained.    FINDINGS: There is a moderate size loculated right pleural effusion.  There is right middle and right lower lobe volume loss seen. The left  lung is clear. There is a large hiatal hernia. There are no hilar or  mediastinal masses lymphadenopathy. Axillary and supraclavicular lymph  nodes appear normal. The upper portion of the liver spleen and  pancreas appear normal. There are no adrenal masses. Degenerative  changes are present in the thoracic spine.         Impression    IMPRESSION:   1. Moderate size right-sided pleural effusion.  2. Significant right middle and right lower lobe volume loss. The  right middle lobe is severely collapsed and a right middle lobe mass  cannot be excluded.    TERRANCE KAY MD   CT Abdomen Pelvis w Contrast    Narrative    EXAMINATION: CT ABDOMEN PELVIS W CONTRAST, 10/6/2019 7:43 PM    TECHNIQUE:  Helical CT images from the lung bases through the  symphysis pubis were obtained  with IV contrast. Contrast dose: 100ml  visipaque 320    COMPARISON: none    HISTORY: recurrent GI bleeding, please evalute for  intraabdominal  malignancy    FINDINGS:      There are several masses seen within the liver which are suspicious  for malignancy. The largest mass measures 12 mm in diameter. There is  no biliary ductal enlargement and no calcified gallstones are seen.    The the spleen and pancreas appear normal.    The adrenal glands are normal.    The right and left kidneys are free of masses or hydronephrosis.    The periaortic lymph nodes are normal in caliber.    No intraperitoneal masses or inflammatory changes are noted.    The bladder appears normal. There is some thickening of the distal  rectum correlation with physical physical exam exclude the possibility  of a rectal mass is recommended  There is a destructive lesion seen in the left ilium measuring  approximately 1 cm in diameter  The severe degenerative changes and postoperative changes are noted in  the thoracic and lumbar spine      Impression    IMPRESSION: Masses within the liver suspicious for malignancy. Focal  rounded destructive lesion in the ilium on the left suspicious for  malignancy. Bowel wall thickening in the rectum. The possibility of a  rectal malignancy should be considered.     TERRANCE KAY MD   XR Chest 2 Views    Narrative    PROCEDURE:  XR CHEST 2 VW    HISTORY:  post thoracentesis.     COMPARISON:  10/6/2019    FINDINGS:   The cardiac silhouette is normal in size. The pulmonary vasculature is  normal.  There are persistent airspace opacities in the right lung  base. The right pleural effusion is considerably smaller in the  interval. No pneumothorax.      Impression    IMPRESSION:  Decreased right pleural effusion following thoracentesis.  No pneumothorax. Residual airspace disease at the right lung base.      ANGLEICA SANON MD   US Thoracentesis    Narrative    EXAM: Ultrasound guided thoracentesis    CLINICAL HISTORY: Right pleural effusion.      Comparison: CT chest in 6/20/2019    PROCEDURE: Ultrasound scanning was performed of the  chest to localize  the largest pocket of fluid in the right chest. Informed consent was  obtained and a timeout procedure was performed. The area was prepped  and draped in the usual sterile fashion. 1% lidocaine was used for  local anesthesia. A pleural catheter was then advanced into the  effusion and 750 mL of bloody fluid was drained. The patient tolerated  the procedure well without immediate complication.    The fluid was sent to lab as requested.      Impression    IMPRESSION: Ultrasound-guided thoracentesis with removal of 750 mL  bloody fluid.    ANGELICA SANON MD

## 2019-10-07 NOTE — PROGRESS NOTES
Uneventful thoracentesis.  Patient tolerated procedure well.  750 mLs of pink colored fluid from right lung.  Fluid was sent for lab.  Patient to xray to verify no pneumothorax.  Patient was cleared by radiologist to return to [facility].

## 2019-10-07 NOTE — PROGRESS NOTES
:    Called Denice at [facility] and left voicemail with update. Anticipated discharge date unknown at this time.  will continue to follow.     JERRICA Cordova on 10/7/2019 at 1:22 PM

## 2019-10-07 NOTE — PROGRESS NOTES
0352/0352-01  Karen Duncan 74 year old female   Admission date:10/6/2019   Residence: Home  Code status: Full Code   Isolation:Contact   Principal Problem:Anemia   Post Op Day #: NA  Peds:No  [place]: NA  Diet: clear liquids  Mobility Status: 1-2A with walker  Discharge timeline & plan: unsure of discharge date and/or discharge needs at this time.  Vital signs:  Temp: 98  F (36.7  C) Temp src: Tympanic BP: 126/56 Pulse: 77 Heart Rate: 76 Resp: 18 SpO2: 95 % O2 Device: Nasal cannula Oxygen Delivery: 2 LPM Height: 152.4 cm (5') Weight: 56.7 kg (125 lb)  Estimated body mass index is 24.41 kg/m  as calculated from the following:    Height as of this encounter: 1.524 m (5').    Weight as of this encounter: 56.7 kg (125 lb).  Abnormal Physical Assessment:PU to sacrum, very large bruise to left extremity, ice applied, pain 10/10 to low back and hips uses Narco and ice to reduce  Last Pain/PRN Medication given:0312 Norco PRN  Finger stick POCT blood sugars:111 at HS no insulin coverage given  Labs:   Your basic metabolic panel results:  Lab Results   Component Value Date     10/07/2019       (Sodium/Salt)  Lab Results   Component Value Date    POTASSIUM 4.1 10/07/2019     Lab Results   Component Value Date     10/07/2019       (Glucose/Blood Sugar/Diabetic Screen)  Lab Results   Component Value Date    MOLLY 8.2 10/07/2019       (Calcium)  Lab Results   Component Value Date    CR 0.84 10/07/2019       (Kidney Function)       Your hemoglobin result  Lab Results   Component Value Date    HGB 8.7 10/07/2019       Telemetry: No  Pending tests/procedures planned:   Comments:      SAFETY CHECKLIST  Arm Bands/Risk clasps correct and in place (DNR, Fall risk, Allergy, Latex, Limb):  Yes  IVF and rate ordered correct: Yes  Physical assessment verification(IV site, wounds, dressing intact, incisions, LDA's, neuro, CIWA...): Yes  Environmental assessment (bed/chair alarm on, call light, side rails, restraints,  sitter....): Yes  Whiteboard updated by oncsofia RN:Yes    Swathi Casey RN on 10/7/2019 at 4:56 AM    Bedside Handoff complete, safety checks verified by writer and are correct.Report from ELIEL Echevarria RN on 10/7/2019 at 7:24 AM

## 2019-10-07 NOTE — TELEPHONE ENCOUNTER
"Request for Home Care Physical Therapy orders as follows:    PT eval and treat date:      Continuation frequency =  2 x week x ___2_ weeks              Effective date = 10/7/19    Interventions include:    Therapeutic Exercise  Gait Training  Gait/Balance/Dysfunction  Home Exercise Program  Home Safety Assessment      Acknowledging this order with an \"OK\" will suffice. Thank you for your time.  Please call if you have any questions or concerns.    Therapist: Ada Wolf PT    Request for Home Care Occupational Therapy orders as follows:    OT eval and treat date:      Continuation frequency =  2 x week x _2___ weeks               Effective date = 10/7/19    Interventions include:    Therapeutic Exercise  ADL training  Energy conservation  Home exercise program  Home safety assessment                 Acknowledging this order with an \"OK\" will suffice. Thank you for your time.  Please call if you have any questions or concerns.    For therapist: Janie Orozco OT  "

## 2019-10-07 NOTE — PLAN OF CARE
Problem: Adult Inpatient Plan of Care  Goal: Plan of Care Review  10/7/2019 1013 by Joellen Pathak, RN  Note:   Pt A&O x4, up to bedside commode with assist of 1 with walker and gait belt. Pt receiving PRN Norco for chronic lower back and hip pain. Dressing on coccyx remains clean, dry, and intact. Pt to have thoracentesis this afternoon. Pt and  educated about procedure, opportunity to ask question. Pt remains 1L O2, LS clear with dim bases bilaterally. Fall precautions in place. Will continue to monitor.   Joellen Pathak, RN on 10/7/2019 at 10:19 AM    Pt tolerated Thoracentesis well. VSS, pt continues to get Norco for chronic back pain. Pt on room air after thoracentesis.    Joellen Pathak RN on 10/7/2019 at 7:31 PM          Respiratory

## 2019-10-07 NOTE — TELEPHONE ENCOUNTER
Routing refill request to provider for review/approval because:  Patient noted as admitted to hospital yesterday    LVO: 9/26/19    Damaris Arora RN on 10/7/2019 at 10:26 AM

## 2019-10-07 NOTE — PHARMACY-ADMISSION MEDICATION HISTORY
Pharmacy -- Admission Medication Reconciliation    Prior to admission (PTA) medications were reviewed and the patient's PTA medication list was updated.    Sources Consulted: patientEstela    The reliability of this Medication Reconciliation is: Reliability: Reliable    The following significant changes were made:  ADDED non-prescription CBD oil    In addition, the patient's allergies were reviewed with the patient and updated as follows:   Allergies: Aliskiren; Liquid adhesive; Lisinopril; and Losartan    The pharmacist has reviewed with the patient that all personal medications should be removed from the building or locked in the belongings safe.  Patient shall only take medications ordered by the physician and administered by the nursing staff.       Medication barriers identified: none, patient knows her meds well   Medication adherence concerns: no   Understanding of emergency medications: n/a    Abigail Peres [facility], 10/7/2019,  9:59 AM

## 2019-10-07 NOTE — PLAN OF CARE
"Up to BSC several times at least Q2h, has pressure ulcer to sacrum, covered with adhesive foam dressing, reports pain 10/10 to low back and hips, has been given PRN Norco x's 2 PRN and this was effective to reduce pain to a tolerable level but not for entire 4 hours, has also been given ice, positioned onto side with pillow between legs, and states Tylenol \"helps\" will give that now.     /56   Pulse 77   Temp 98  F (36.7  C) (Tympanic)   Resp 18   Ht 1.524 m (5')   Wt 56.7 kg (125 lb)   SpO2 95%   BMI 24.41 kg/m      Swathi Casey RN on 10/7/2019 at 6:07 AM    "

## 2019-10-08 NOTE — PROGRESS NOTES
:    Called Denice at [facility] and left voicemail with discharge update. Anticipated discharge date unknown at this time.  will continue to follow.     JERRICA Cordova on 10/8/2019 at 1:13 PM

## 2019-10-08 NOTE — PROGRESS NOTES
Patient stated she is feeling better today.  Pain meds given for chronic back pain--2 tabs of Norco.  Assist of 1 to the commode..stand and pivot.

## 2019-10-08 NOTE — PROGRESS NOTES
0352/0352-01  Karen Duncan 74 year old female   Admission date:10/6/2019   Residence:  Home with spouse  Code status: Full Code   Isolation:Contact   Principal Problem:Anemia   Post Op Day #: NA  Diet: clear liquids  Mobility Status: A1 with walker and gait belt  Discharge timeline & plan: unsure of discharge date and/or discharge needs at this time.    Vital signs:  Temp: 97.9  F (36.6  C) Temp src: Tympanic BP: 128/51 Pulse: 81 Heart Rate: 89 Resp: 18 SpO2: 93 % O2 Device: None (Room air) Oxygen Delivery: 1 LPM(pt placed on RA) Height: 152.4 cm (5') Weight: 57.2 kg (126 lb 1.7 oz)    Abnormal Physical Assessment: Pt weak and having chronic back pain. LS diminished, bruises noted on extremities. Pressure ulcer on Coccyx remains covered with dressing.    Last Pain/PRN Medication given: Norco at 1813  Finger stick POCT blood sugars:167, 284, 234  Labs: Hgb: 8.7 at 1529  Telemetry: No  Pending tests/procedures planned: Swallow study with camera  Comments:      SAFETY CHECKLIST  Arm Bands/Risk clasps correct and in place (DNR, Fall risk, Allergy, Latex, Limb):  Yes  IVF and rate ordered correct: Yes  Physical assessment verification(IV site, wounds, dressing intact, incisions, LDA's, neuro, CIWA...): Yes  Environmental assessment (bed/chair alarm on, call light, side rails, restraints, sitter....): Yes  Whiteboard updated by oncoming RN:Yes    Joellen Pathak RN on 10/7/2019 at 7:35 PM

## 2019-10-08 NOTE — PROGRESS NOTES
0352/0352-01  Karen Duncan 74 year old female   Admission date:10/6/2019   Residence: Home with .  Code status: Full Code   Isolation:Contact   Principal Problem:Anemia   Post Op Day #: 1 Thoracentesis 10/7/19     Diet: clear liquids  Mobility Status: X1  Discharge timeline & plan: unsure of discharge date and/or discharge needs at this time.  Vital signs:  Temp: 98.8  F (37.1  C) Temp src: Tympanic BP: 115/58 Pulse: 81 Heart Rate: 88 Resp: 18 SpO2: 92 % O2 Device: None (Room air) Oxygen Delivery: 1 LPM(pt placed on RA) Height: 152.4 cm (5') Weight: 57.2 kg (126 lb 1.7 oz)  Estimated body mass index is 24.63 kg/m  as calculated from the following:    Height as of this encounter: 1.524 m (5').    Weight as of this encounter: 57.2 kg (126 lb 1.7 oz).  Abnormal Physical Assessment: Pt weak and having chronic back pain difficult to control with PRN ultram and Norco. LS diminished, bruises noted on extremities. Pressure ulcer on Coccyx remains covered with dressing. No signs of active bleeding.   Last Pain/PRN Medication given:Norco 0310  Finger stick POCT blood sugars:205, 199.  Labs:   Your basic metabolic panel results:  Lab Results   Component Value Date     10/08/2019       (Sodium/Salt)  Lab Results   Component Value Date    POTASSIUM 4.0 10/08/2019     Lab Results   Component Value Date     10/08/2019       (Glucose/Blood Sugar/Diabetic Screen)  Lab Results   Component Value Date    MOLLY 7.9 10/08/2019       (Calcium)  Lab Results   Component Value Date    CR 0.71 10/08/2019       (Kidney Function)   Telemetry: No  Pending tests/procedures planned:NA  Comments:      SAFETY CHECKLIST  Arm Bands/Risk clasps correct and in place (DNR, Fall risk, Allergy, Latex, Limb):  Yes  IVF and rate ordered correct: Yes  Physical assessment verification(IV site, wounds, dressing intact, incisions, LDA's, neuro, CIWA...): Yes  Environmental assessment (bed/chair alarm on, call light, side rails, restraints,  sitter....): Yes  Whiteboard updated by kayleen RN:Yes    Sinai Martins RN on 10/8/2019 at 6:42 AM    Bedside Handoff complete, safety checks verified by writer and are correct.    Report given per Sinai Ortega RN.............................10/8/2019 7:29 AM

## 2019-10-08 NOTE — PLAN OF CARE
Problem: Adult Inpatient Plan of Care  Goal: Plan of Care Review  10/8/2019 1821 by Joellen Pathak, RN  Note:   Writer took over at 1500. Pt A&O X4, VSS, pt afebrile. LS clear with diminished bases bilaterally, HR regular. Pt skin remains bruised on arms. Pt started on regular diet. Continues to have lower back pain, Tramadol given at 1802. Dinner BG was 285, 3 units of novolog given. Fall precautions in place. Contact isolation maintained.  Joellen Pathak, RN on 10/8/2019 at 6:27 PM    /70 (BP Location: Right arm)   Pulse 94   Temp 98.2  F (36.8  C) (Tympanic)   Resp 16   Ht 1.524 m (5')   Wt 57.2 kg (126 lb 1.7 oz)   SpO2 95%   BMI 24.63 kg/m

## 2019-10-08 NOTE — PROGRESS NOTES
[facility]    Medicine Progress Note - Hospitalist Service       Date of Admission:  10/6/2019  Assessment & Plan   Karen Duncan is a 74 year old female who presents with recurrent symptomatic anemia and right lower and middle lobe committee acquired pneumonia.    Principal Problem:    Anemia    Assessment: Recent EGD and colonoscopy 3-4 weeks ago with gastritis but no other clear source of blood loss, hemoglobin again acutely depressed on admit.  Given her ongoing GERD symptoms, chronic prednisone use, chronic hiatal hernia more likely has recurrent gastritis and acute blood loss.    Plan:  -Hemoglobin fell again this morning but was stable at noon.  Will recheck at 9 PM and transfuse if hemoglobin less than 7.1.     - hold home aspirin and fish oil   - continue home estrogen supplement   - Continue Protonix and Carafate 4 times daily   - Add pepcid bid   - recheck H. Pylori           Pneumonia of right lower lobe due to infectious organism (H)    Assessment: Worsening right-sided effusion and parenchymal volume loss.  Swallow study did not reveal any aspiration.  White count gradually normalizing.    Plan: -Monitor blood cultures -thus far negative (first dose of IV antibiotic in ER before blood cultures obtained)   - IV Levaquin day 3   - follow up rest of thoracentesis fluid analysis (Gram stain negative, /uL)    - obtain sputum culture if able   - scheduled nebulizer's   - Trend white blood cell count       Active Problems:    Corticosteroid dependence (H)    Assessment: chronic for RA    Plan: - continue home prednisone      Diabetes mellitus, type II (H)    Assessment: Well-controlled with last hemoglobin A1c of 6.1.  -365    Plan: - Increase Lantus to 9 bid    -ISS AC at bedtime      Hypertension    Assessment: stable    Plan: -continue home clonidine, diltiazem, Lasix and Aldactone in a.m.      Rheumatoid arthritis (H)    Assessment: Chronic disability issues and  joint deformity.    Plan: - continue steroids and outpatient home regimen as above    FEN: clear diet, normal saline at 0mL/hr, mg/k replacement protocol  PPX: SCD's          Diet: Regular Diet Adult    DVT Prophylaxis: Pneumatic Compression Devices  Edwards Catheter: not present  Code Status: Full Code      Disposition Plan   Expected discharge: 1-2d, recommended to prior living arrangement once antibiotic plan established and hemoglobin stable.  Entered: Will Ortega MD 10/08/2019, 3:58 PM       The patient's care was discussed with the Patient.    Will Ortega MD  Hospitalist Service  [facility]    ______________________________________________________________________    Interval History   Patient reports shortness of breath has improved significantly.  No longer on oxygen.  Reports minimal to no cough.  No fevers or chills overnight.  Denies any chest pain.  No new symptoms concerning for anemia or worsening/new cardiopulmonary disease.  Patient does report that she had a large bowel movement this morning that has remained dark.  She reports it was slightly loose.  Has been similar in quality to previous bowel movements and she relates the darkness to chronic iron intake.  It should be noted that she has not been given iron in the hospital for the last 48 hours.  She denies any belly pain.  Still has a little bit of burning in her throat and stomach.    Data reviewed today: I reviewed all medications, new labs and imaging results over the last 24 hours. I personally reviewed no images or EKG's today.    Physical Exam   Vital Signs: Temp: 98  F (36.7  C) Temp src: Tympanic BP: 115/70 Pulse: 94 Heart Rate: 88 Resp: 16 SpO2: 95 % O2 Device: None (Room air)    Weight: 126 lbs 1.65 oz  Constitutional: awake, alert, cooperative, no apparent distress, and appears stated age  Respiratory: No increased work of breathing, good air exchange, clear to auscultation bilaterally, no crackles or  wheezing  Cardiovascular: The rate and rhythm.  1 out of 6 systolic ejection murmur heard at left upper sternal border  GI: Abdomen soft, nontender  Neuropsychiatric: General: normal, calm and normal eye contact  Orientation: oriented to self, place, time and situation  Memory and insight: memory for past and recent events intact and thought process normal    Data   Recent Labs   Lab 10/08/19  1100 10/08/19  0412 10/07/19  1529 10/07/19  0416 10/06/19  2112 10/06/19  1257   WBC  --  15.7*  --  18.8*  --  21.9*   HGB 7.6* 7.3* 8.7* 8.7*  --  6.7*   MCV  --  98  --  95  --  98   PLT  --  184  --  221  --  266   NA  --  136  --  137  --  137   POTASSIUM  --  4.0  --  4.1  --  4.4   CHLORIDE  --  101  --  100  --  100   CO2  --  27  --  25  --  20*   BUN  --  46*  --  51*  --  56*   CR  --  0.71  --  0.84  --  1.00   ANIONGAP  --  8  --  12  --  17*   MOLLY  --  7.9*  --  8.2*  --  8.9   GLC  --  175*  --  142*  --  157*   ALBUMIN  --   --   --   --   --  3.4*   PROTTOTAL  --   --   --   --  5.4* 5.7*   BILITOTAL  --   --   --   --   --  0.4   ALKPHOS  --   --   --   --   --  125*   ALT  --   --   --   --   --  16   AST  --   --   --   --   --  21     Recent Results (from the past 24 hour(s))   XR Video Swallow w/o Esophagram    Narrative    VIDEO SWALLOWING EVALUATION   10/8/2019 12:00 PM     HISTORY: Right lower lobe pneumonia    COMPARISON: None.    FLUOROSCOPY TIME: .72 minutes.    Findings: The patient swallowed various consistencies during  fluoroscopic observation. No episodes of penetration or aspiration  were witnessed.     Impression    IMPRESSION:  No episodes of penetration or aspiration were witnessed.    DIANE ONEAL MD

## 2019-10-08 NOTE — PROGRESS NOTES
Bedside Handoff complete, safety checks verified with ELIEL lux. All questions answered at this time.     Sinai Martins RN on 10/7/2019 at 7:31 PM

## 2019-10-08 NOTE — PROGRESS NOTES
0352/0352-01  Karen Duncan 74 year old female   Admission date:10/6/2019   Residence: Home with spouse  Code status: Full Code   Isolation:Contact   Principal Problem:Anemia   Post Op Day #: NA  Diet: regular - started today at dinner  Mobility Status: Assist 1  Discharge timeline & plan: unsure of discharge date and/or discharge needs at this time.    Vital signs:  Temp: 98.2  F (36.8  C) Temp src: Tympanic BP: 108/70 Pulse: 94 Heart Rate: 95 Resp: 16 SpO2: 95 % O2 Device: None (Room air) Oxygen Delivery: 1 LPM(pt placed on RA) Height: 152.4 cm (5') Weight: 57.2 kg (126 lb 1.7 oz)    Abnormal Physical Assessment: LS clear with diminished bases bilaterally. Skin remains bruised on arms. Pt started on regular diet. Chronic lower back pain.  Last Pain/PRN Medication given: Tramadol given at 1802.  Finger stick POCT blood sugars: Dinner BG was 285  Labs: See lab results  Telemetry: No  Pending tests/procedures planned: None known  Comments: Contact isolation maintained      SAFETY CHECKLIST  Arm Bands/Risk clasps correct and in place (DNR, Fall risk, Allergy, Latex, Limb):  Yes  IVF and rate ordered correct: Yes  Physical assessment verification(IV site, wounds, dressing intact, incisions, LDA's, neuro, CIWA...): Yes  Environmental assessment (bed/chair alarm on, call light, side rails, restraints, sitter....): Yes  Whiteboard updated by oncoming RN:Yes  Bedside Handoff complete, safety checks verified by writer and are correct.  From Joellen. Tahir Barrera RN on 10/8/2019 at 7:50 PM

## 2019-10-08 NOTE — PLAN OF CARE
Pt weak and having chronic back pain difficult to control with PRN ultram and Norco.Ice used in addition to PRN medications. LS diminished, bruises noted on extremities. Pressure ulcer on Coccyx remains covered with dressing. No active signs of bleeding present. VSS.     Sinai Martins RN on 10/8/2019 at 5:30 AM

## 2019-10-08 NOTE — PLAN OF CARE
/70   Pulse 94   Temp 98  F (36.7  C) (Tympanic)   Resp 16   Ht 1.524 m (5')   Wt 57.2 kg (126 lb 1.7 oz)   SpO2 93%   BMI 24.63 kg/m      Pt remains VS.  Hemoglobin increased to 7.6 with recheck at 1100.  PRN pain medication adequate for pain control.  Pt to xray at 1115.  Continue to monitor.  Nanette Ortega RN.............................10/8/2019 11:37 AM

## 2019-10-09 NOTE — PROGRESS NOTES
Pt received blood three days prior, consent in chart, pt had no further questions at this time, will continue to monitor. Tahir Barrera RN on 10/9/2019 at 2:49 AM

## 2019-10-09 NOTE — PHARMACY
Pharmacy- Renal Dose Adjustment    Patient Active Problem List   Diagnosis     Anemia     Chronic pain disorder     Corticosteroid dependence (H)     Diabetes mellitus, type II (H)     Diverticular disease of colon     Elevated LFTs     Estrogen deficiency     Hyperlipemia     Hypertension     Neuropathy     Pain medication agreement     Rheumatoid arthritis (H)     Advance Care Planning     Leukocytosis     Aortic stenosis     Current chronic use of systemic steroids     Dyslipidemia     Elevated transaminase level     MRSA infection     Impaired mobility and activities of daily living     Gallbladder calculus without cholecystitis     Rectal prolapse     Piriformis syndrome     Gastritis     Hiatal hernia, possibly paraesophageal     H/O adenomatous polyp of colon advanced tubulovillous     Pneumonia of right lower lobe due to infectious organism (H)     Diarrhea of presumed infectious origin        Relevant Labs:  Recent Labs   Lab Test 10/09/19  0924 10/09/19  0105  10/08/19  0412   WBC 18.6*  --   --  15.7*   HGB 8.9* 6.5*   < > 7.3*     --   --  184    < > = values in this interval not displayed.        CrCl: 46      Intake/Output Summary (Last 24 hours) at 10/9/2019 1053  Last data filed at 10/9/2019 0923  Gross per 24 hour   Intake 765 ml   Output 1600 ml   Net -835 ml          Per Renal Dose Adjustment Protocol, will adjust:  Famotidine injection to daily      Will continue to follow and make adjustments accordingly. Thank You.    Abigail Peres [facility] ....................  10/9/2019   10:53 AM

## 2019-10-09 NOTE — PROGRESS NOTES
10/09/19 0700   General Information   Onset Date 10/06/19   Start of Care Date 10/08/19   Referring Physician Dr. Will Ortega   Patient Profile Review/OT: Additional Occupational Profile Info See Profile for full history and prior level of function   Patient/Family Goals Statement To assess safety and function of swallow for PO intake.    Swallowing Evaluation Videofluoroscopic evaluation   Behaviorial Observations WNL (within normal limits)   Mode of current nutrition Oral diet   Type of oral diet Regular;Thin liquid   Respiratory Status Room air  (Pt short of breath and dizzy with transfers)   Clinical Swallow Evaluation   Oral Musculature generally intact   Structural Abnormalities none present   Dentition uses dentures to eat   Mucosal Quality good;dry  (Min dryness)   Mandibular Strength and Mobility impaired  (Increased mastication time)   Oral Labial Strength and Mobility WFL   Lingual Strength and Mobility other (see comments)  (Reduced tongue base retraction)   Velar Elevation impaired   Buccal Strength and Mobility intact   Laryngeal Function Voicing initiated;Swallow   Rationale for completing additional evaluation To assess for safety and function of swallow for PO intake.    VFSS Evaluation   VFSS Additional Documentation Yes   VFSS Eval: Radiology   Radiologist Dr. Abner Lazcano   Views Taken left lateral   Physical Location of Procedure Seated upright in chair   VFSS Eval: Thin Liquid Texture Trial   Mode of Presentation, Thin Liquid cup;self-fed   Order of Presentation 1,6   Preparatory Phase Poor bolus control   Oral Phase, Thin Liquid Residue in oral cavity;other (see comments);Premature pharyngeal entry  (Decreased tongue base retraction. )   Pharyngeal Phase, Thin Liquid Delayed swallow reflex;Pharyngeal wall coating;Residue in valleculae;Residue in pyriform sinus;other (see comments)  (decreased anterior hyoid movement)   Rosenbek's Penetration Aspiration Scale: Thin Liquid Trial  Results 3 - contrast remains above the vocal cords, visible residue remains (penetration)   Diagnostic Statement Pt presents with min oropharyngeal dysphagia, as characterized by penetration, decreased tongue base retraction, decreased bolus control, mod oral and min pharyngeal residue, inconsistent velar elevation as demonstrated by min bolus escape to the nasopharynx, and decreased anterior hyoid movement. Dysphagia may be impacted by decreased strength.   VFSS Eval: Nectar Thick Liquid Texture Trial   Mode of Presentation, Nectar cup;self-fed   Order of Presentation 2   Preparatory Phase Poor bolus control   Oral Phase, Nectar Residue in oral cavity;Premature pharyngeal entry   Pharyngeal Phase, Nectar Delayed swallow reflex;Pharyngeal wall coating;Residue in valleculae;Residue in pyriform sinus   Rosenbek's Penetration Aspiration Scale: Nectar-Thick Liquid Trial Results 1 - no aspiration, contrast does not enter airway   Diagnostic Statement Pt presents with min oropharyngeal dysphagia, as characterized by penetration, decreased tongue base retraction, decreased bolus control, mod oral and min pharyngeal residue, min premature pharyngeal entry, min delayed onset of the pharyngeal swallow, inconsistent velar elevation as demonstrated by min bolus escape to the nasopharynx, and decreased anterior hyoid movement. Dysphagia may be impacted by decreased strength.   VFSS Eval: Puree Solid Texture Trial   Order of Presentation 3   Preparatory Phase WFL   Oral Phase, Puree Residue in oral cavity;Premature pharyngeal entry;other (see comments)  (Decreased tongue base and velar function)   Pharyngeal Phase, Puree Delayed swallow reflex;Pharyngeal wall coating;Residue in valleculae;Residue in pyriform sinus   Rosenbek's Penetration Aspiration Scale: Puree Food Trial Results 3 - contrast remains above the vocal cords, visible residue remains (penetration)   Diagnostic Statement Pt presents with min oropharyngeal dysphagia,  as characterized by penetration, decreased tongue base retraction, decreased bolus control, oral and pharyngeal residue, decreased velar elevation as demonstrated by min bolus escape to the nasopharynx, premature pharyngeal entry, decreased anterior hyoid movement, and delayed onset of the pharyngeal swallow. Dysphagia may be impacted by decreased strength.   VFSS Eval: Semisolid Texture Trial   Order of Presentation 4   Oral Phase, Semisolid Residue in oral cavity;Premature pharyngeal entry;other (see comments)  (Decreased tongue base retraction. )   Pharyngeal Phase, Semisolid Pharyngeal wall coating;Residue in valleculae;Delayed swallow reflex   Rosenbek's Penetration Aspiration Scale: Semisolid Food Trial Results 2 - contrast enters airway, remains above the vocal cords, no residue remains (penetration)   Diagnostic Statement Pt presents with min oropharyngeal dysphagia, as characterized by penetration, decreased tongue base retraction, decreased bolus control, oral and pharyngeal residue, decreased velar elevation as demonstrated by min bolus escape to the nasopharynx, premature pharyngeal entry, decreased anterior hyoid movement, and delayed onset of the pharyngeal swallow to the level of the pyriforms. Dysphagia may be impacted by decreased strength.   VFSS Eval: Solid Food Texture Trial   Mode of Presentation, Solid fed by clinician   Order of Presentation 5   Preparatory Phase Insufficient mastication  (Increased mastication time. )   Oral Phase, Solid Residue in oral cavity;other (see comments)  (Mod decreased tongue base retraction and residue)   Pharyngeal Phase, Solid Pharyngeal wall coating;Residue in valleculae   Rosenbek's Penetration Aspiration Scale: Solid Food Trial Results 3 - contrast remains above the vocal cords, visible residue remains (penetration)   Successful Strategies Trialed During Procedure, Solid   (Min to mod residue cleared with dry swallow. Trace pen. )   Diagnostic Statement Pt  presents with min oropharyngeal dysphagia, as characterized by penetration, increased mastication time, decreased tongue base retraction, decreased bolus control, mod oral and min pharyngeal residue, decreased velar elevation as demonstrated by min bolus escape to the nasopharynx (however, this may be associated with residue), and decreased anterior hyoid movement. Dysphagia may be impacted by decreased strength.   Esophageal Phase of Swallow   Esophageal comments Not assessed per MD at this time.    General Therapy Interventions   Planned Therapy Interventions Dysphagia Treatment   Dysphagia treatment Instruction of safe swallow strategies;Oropharyngeal exercise training   Intervention Comments Pt may benefit from outpatient speech therapy to target strengthening to reduce residue.    Swallow Eval: Clinical Impressions   Skilled Criteria for Therapy Intervention Skilled criteria met.  Treatment indicated.  (Criteria met for OP. This SLP to follow-up with results x1)   Functional Assessment Scale (FAS) 6   Dysphagia Outcome Severity Scale (HUY) Level 6 - HUY   Treatment Diagnosis Minimal Dysphagia   Diet texture recommendations Regular diet;Thin liquids   Recommended Feeding/Eating Techniques alternate between small bites and sips of food/liquid  (Repeat Dry swallows x2.)   Therapy Frequency Other (see comments)  (SLP to follow-up with results/recommendations. )   Anticipated Discharge Disposition extended care facility   Risks and Benefits of Treatment have been explained. Yes   Patient, family and/or staff in agreement with Plan of Care Yes   Clinical Impression Comments Pt trialed with thin and nectar liquids, as well as puree, semi-solid, and solid textures. Pt did not demonstrate basilia aspiration across trials. Pt demonstrated penetrations. Pt also presented with decreased bolus control, decreased velar elevation, min oral and pharyngeal residue, inconsistent premature pharyngeal entry, inconsistent delayed  onset of the pharyngeal swallow, decreased tongue base retraction, and decreased anterior hyoid movement. Dysphagia may be impacted by decreased strength. Trace amounts of residue from solids were cleared with repeat swallows; however, trace penetration observed. Pt may benefit from outpatient speech therapy to target strength and use of compensatory strategies. Recommendations include: Regular diet with thin liquids, produce 2 dry swallows between each bite or sip, and alternate one sip of liquid with one bite of food.   Total Evaluation Time   Total Evaluation Time (Minutes) 45

## 2019-10-09 NOTE — PROGRESS NOTES
0352/0352-01  Karen Duncan 74 year old female   Admission date:10/6/2019   Residence: home with spouse  Code status: Full Code   Isolation:Contact   Principal Problem:Anemia   Post Op Day #: NA  Peds:YES, No  [place]: enriqueta  Diet: Carbohydrate Controlled Diet  Mobility Status: 1 assist  Discharge timeline & plan: unsure of discharge date and/or discharge needs at this time.  Vital signs:  Temp: 98.2  F (36.8  C) Temp src: Tympanic BP: 131/63 Pulse: 94 Heart Rate: 82 Resp: 18 SpO2: 94 % O2 Device: None (Room air) Oxygen Delivery: 1 LPM(pt placed on RA) Height: 152.4 cm (5') Weight: 51.6 kg (113 lb 12.1 oz)  Estimated body mass index is 22.22 kg/m  as calculated from the following:    Height as of this encounter: 1.524 m (5').    Weight as of this encounter: 51.6 kg (113 lb 12.1 oz).  Abnormal Physical Assessment:bruised throughout. Pt dizzy and weak, blood being transfused, 2 nd unit started 0615.  1 medium dark tarry stool this shift.  Last Pain/PRN Medication given:Norco 0306, Tramadol 0153  Finger stick POCT blood sugars:210, 253  Labs:   Your hemoglobin result  Lab Results   Component Value Date    HGB 6.5 10/09/2019       Telemetry: No  Pending tests/procedures planned: RBC transfusing  Comments:  Please call for morning lab draw when blood is done transfusing.    SAFETY CHECKLIST  Arm Bands/Risk clasps correct and in place (DNR, Fall risk, Allergy, Latex, Limb):  Yes  IVF and rate ordered correct: Yes  Physical assessment verification(IV site, wounds, dressing intact, incisions, LDA's, neuro, CIWA...): Yes  Environmental assessment (bed/chair alarm on, call light, side rails, restraints, sitter....): Yes  Whiteboard updated by oncoming RN:Yes    Bedside Handoff complete, safety checks verified by writer and are correct. Report received from ELIEL Haro. Wendy Weinberg RN on 10/9/2019 at 7:19 AM

## 2019-10-09 NOTE — PROGRESS NOTES
Pt tolerated blood transfusion well this AM. VSS. Rating pain 8-10 out of 10 in the lower back and being given PRN Norco. Medication effective. Complaints of heart burn and was given PRN Tums, see MAR. Lungs are clear with regular heart rhythm. Chronic cough present. Skin very bruised to both arms with scattered bruising throughout body. Sore on right shin covered with Tegaderm. Bottom pink to color and is covered with Allevyn dressing. Prolapsed rectum present at times. Had 2 dark brown/black stools and urinating without difficulty. Blood sugars thus far 223, 372, 323, and 242. Wendy Weinberg RN on 10/9/2019 at 5:43 PM

## 2019-10-09 NOTE — PLAN OF CARE
Discharge Planner SLP   Patient plan for discharge: [facility]  Current status: Pt trialed with thin and nectar liquids, as well as puree, semi-solid, and solid textures. Pt did not demonstrate blanka aspiration across trials. Pt demonstrated penetrations. Pt also presented with min oral and pharyngeal residue, decreased bolus control, and decreased anterior hyoid movement. Pt may benefit from outpatient speech therapy to target strength and use of compensatory strategies. Recommendations include:Regular diet with thin liquids, produce 2 dry swallows between each bite or sip, and alternate one sip of liquid with one bite of food.  Barriers to return to prior living situation: None at this time.   Recommendations for discharge: Recommendations include:Regular diet with thin liquids, produce 2 dry swallows between each bite or sip, and alternate one sip of liquid with one bite of food.  Rationale for recommendations: Results of VFSS on 10/08/2019.       Entered by: Ni Brown 10/08/2019 7:15 PM

## 2019-10-09 NOTE — PLAN OF CARE
Pt A & O x 4, afebrile, vital signs stable. Lung sounds clear diminished in the bases, O2 97% on RA. Pt ecchymotic throughout body.pt reports 10/10 lower back pain, PRN Norco and Tramadol given, see MAR. Blood transfusion started 0250, will continue to monitor. Tahir Barrera RN on 10/9/2019 at 3:50 AM  x1 tarry stool this shift. Tahir Barrera RN on 10/9/2019 at 6:19 AM

## 2019-10-09 NOTE — DISCHARGE SUMMARY
[facility]  Hospitalist Discharge Summary       Date of Admission:  10/6/2019  Date of Discharge:  10/9/2019  Discharging Provider: Will Ortega MD      Discharge Diagnoses   Persistent GI bleed suspect small bowel versus gastric origin  Probable malignancy with 1 cm destructive lesion on left ilium and lesion in liver   Persistent/recurrent right middle and lower lobe  Pneumonia -improving    Follow-ups Needed After Discharge       Unresulted Labs Ordered in the Past 30 Days of this Admission     Date and Time Order Name Status Description    10/6/2019 2300 Blastomyces Agn Quant EIA Blood In process     10/6/2019 2300 Histoplasma capsulatum antigen In process     10/6/2019 2022 Blastomyces Agn Quant EIA Non Blood In process     10/6/2019 2022 Fungus Culture, non-blood Preliminary     10/6/2019 2022 Anaerobic bacterial culture In process     10/6/2019 2022 Fluid Culture Aerobic Bacterial In process     10/6/2019 1609 Blood culture Preliminary     10/6/2019 1609 Blood culture Preliminary           Discharge Disposition   Discharged to home  Condition at discharge: Serious    Hospital Course   Karen Duncan is a 74 year old female who presents with recurrent symptomatic anemia due to active GI bleed.  She has required 4U PRBC and is again having multiple black stools and symptoms concerning for anemia.    On admission was found to have right lower and middle lobe community acquired pneumonia as well as effusion.  Pleural effusion was tapped and did show elevated LDH, and protein levels when compared with serum.  Symptoms have improved on levaquin and she is no longer on oxygen.    She was also found on abdominal CT to have 1 cm destructive left ilium lesion as well as liver masses.    Principal Problem:    Anemia    Assessment: Recent EGD and colonoscopy by Dr. Queen last admission in mid Sept did not show any ulceration or active bleeding.  We have not been able to maintain her  hemoglobin or stop her GI bleed with Protonix 40 mg IV twice daily, Pepcid 20 mg twice daily and 4 units of blood.  She again had multiple black stools today.    Plan:  -Patient was given 2 units packed red blood cells this morning.  She is again starting to feel short of breath.  Will recheck hemoglobin and if falling will send her with blood.   -Discussed with Anne need for GI intervention.  We are unable to perform small bowel studies and after discussion again with Dr. Queen he does not feel that he has anything to offer her in terms of repeat endoscopy.   - Continue Protonix and pepcid BID as well as Carafate 4 times daily   - H. Pylori negative times two           Pneumonia of right lower lobe due to infectious organism (H)    Assessment: Worsening right-sided effusion and parenchymal volume loss.  Swallow study did not reveal any aspiration.  White count improved until today.  Symptoms resolved with exception of episodic SOB related to Hgb level.    Plan: -Monitor blood cultures -thus far negative (first dose of IV antibiotic in ER before blood cultures obtained)   - IV Levaquin day 4   - follow up rest of thoracentesis fluid analysis (Gram stain negative, /uL, LDHm 476, Protein 3.1, Glucose 207 )    - obtain sputum culture if able   - scheduled nebulizer's   - Continue to trend white blood cell count       Active Problems:    Corticosteroid dependence (H)    Assessment: chronic for RA    Plan: - continue home prednisone      Diabetes mellitus, type II (H)    Assessment: Well-controlled with last hemoglobin A1c of 6.1.  -323    Plan: - Continue Lantus to 9 bid    -ISS AC at bedtime      Hypertension    Assessment: stable    Plan: -continue home clonidine, diltiazem, Lasix and Aldactone in a.m.      Rheumatoid arthritis (H)    Assessment: Chronic disability issues and joint deformity.    Plan: - continue steroids and outpatient home regimen as above    FEN: clear diet, normal saline at 0mL/hr,  mg/k replacement protocol  PPX: SCD's       Consultations This Hospital Stay   SPEECH LANGUAGE PATH ADULT IP CONSULT  SOCIAL WORK IP CONSULT    Code Status   Full Code    Time Spent on this Encounter   I, Will Ortega MD, personally saw the patient today and spent greater than 30 minutes discharging this patient.       Will Ortega MD  [facility]  ______________________________________________________________________    Physical Exam   Vital Signs: Temp: 98.8  F (37.1  C) Temp src: Tympanic BP: (!) 140/62   Heart Rate: 90 Resp: 18 SpO2: 93 % O2 Device: None (Room air)    Weight: 113 lbs 12.12 oz  Constitutional: awake, alert, cooperative, no apparent distress, and appears stated age  Respiratory: Patient does have some persistent right lower lobe rhonchi but this is significantly improved.  No longer with significant dullness at the base compared with left.  Cardiovascular: Regular rate and rhythm.  2 out of 6 systolic ejection murmur.  No peripheral edema.  No JVD.  GI: Abdomen is soft, nontender  Musculoskeletal: No synovitis.  Muscle strength age and body habitus appropriateas well as equal and even.   Neuropsychiatric: General: calm and normal eye contact  Orientation: oriented to self, place, time and situation  Memory and insight: memory for past and recent events intact and thought process normal       Primary Care Physician   LEO GIVENS    Discharge Orders   No discharge procedures on file.    Significant Results and Procedures   Most Recent 3 CBC's:  Recent Labs   Lab Test 10/09/19  0924 10/09/19  0105 10/08/19  2030  10/08/19  0412  10/07/19  0416   WBC 18.6*  --   --   --  15.7*  --  18.8*   HGB 8.9* 6.5* 7.4*   < > 7.3*   < > 8.7*   MCV 96  --   --   --  98  --  95     --   --   --  184  --  221    < > = values in this interval not displayed.     Most Recent 3 BMP's:  Recent Labs   Lab Test 10/09/19  0924 10/08/19  0412 10/07/19  0416   * 136 137    POTASSIUM 4.2 4.0 4.1   CHLORIDE 99 101 100   CO2 22 27 25   BUN 55* 46* 51*   CR 0.89 0.71 0.84   ANIONGAP 12 8 12   MOLLY 7.7* 7.9* 8.2*   * 175* 142*     Most Recent 3 INR's:  Recent Labs   Lab Test 09/08/19  1053 05/27/17  1844 03/02/17  1135   INR 1.07 1.2 1.2     Most Recent 6 Bacteria Isolates From Any Culture (See EPIC Reports for Culture Details):  Recent Labs   Lab Test 10/07/19  1400 10/07/19  0600 10/06/19  1618 09/08/19  1545   CULT Culture negative after 18 hours Canceled, Test credited  >10 Squamous epithelial cells/low power field indicates oral contamination. Please   recollect.  * No growth after 3 days  No growth after 3 days No growth after 6 days   ,   Results for orders placed or performed during the hospital encounter of 10/06/19   XR Chest Port 1 View    Narrative    PROCEDURE:  XR CHEST PORT 1 VW    HISTORY:  sob.     COMPARISON:  None.    FINDINGS:   The cardiac silhouette is normal in size. The pulmonary vasculature is  normal.  There is abnormal increased density at the right lung base.  This has worsened in comparison to September 17, 2019. There is a  small right-sided pleural effusion. There is some deformity of the  acromion on the right, uncertain if this acute or chronic.      Impression    IMPRESSION:  Right basilar opacity with volume loss demonstrated. The  opacity has worsened as compared to previous examination. There is a  right-sided pleural effusion which may have also worsened as compared  to previous examination      TERRANCE KAY MD   CT Chest w Contrast    Narrative    PROCEDURE: CT CHEST W CONTRAST 10/6/2019 7:42 PM    HISTORY: Chest pain or SOB, pleurisy or effusion suspected    COMPARISONS: None.    Meds/Dose Given: 100ml visipaque 320    TECHNIQUE: CT scan of the chest with IV contrast sagittal and coronal  reconstructions were obtained.    FINDINGS: There is a moderate size loculated right pleural effusion.  There is right middle and right lower lobe  volume loss seen. The left  lung is clear. There is a large hiatal hernia. There are no hilar or  mediastinal masses lymphadenopathy. Axillary and supraclavicular lymph  nodes appear normal. The upper portion of the liver spleen and  pancreas appear normal. There are no adrenal masses. Degenerative  changes are present in the thoracic spine.         Impression    IMPRESSION:   1. Moderate size right-sided pleural effusion.  2. Significant right middle and right lower lobe volume loss. The  right middle lobe is severely collapsed and a right middle lobe mass  cannot be excluded.    TERRANCE KAY MD   CT Abdomen Pelvis w Contrast    Narrative    EXAMINATION: CT ABDOMEN PELVIS W CONTRAST, 10/6/2019 7:43 PM    TECHNIQUE:  Helical CT images from the lung bases through the  symphysis pubis were obtained  with IV contrast. Contrast dose: 100ml  visipaque 320    COMPARISON: none    HISTORY: recurrent GI bleeding, please evalute for intraabdominal  malignancy    FINDINGS:      There are several masses seen within the liver which are suspicious  for malignancy. The largest mass measures 12 mm in diameter. There is  no biliary ductal enlargement and no calcified gallstones are seen.    The the spleen and pancreas appear normal.    The adrenal glands are normal.    The right and left kidneys are free of masses or hydronephrosis.    The periaortic lymph nodes are normal in caliber.    No intraperitoneal masses or inflammatory changes are noted.    The bladder appears normal. There is some thickening of the distal  rectum correlation with physical physical exam exclude the possibility  of a rectal mass is recommended  There is a destructive lesion seen in the left ilium measuring  approximately 1 cm in diameter  The severe degenerative changes and postoperative changes are noted in  the thoracic and lumbar spine      Impression    IMPRESSION: Masses within the liver suspicious for malignancy. Focal  rounded destructive lesion  in the ilium on the left suspicious for  malignancy. Bowel wall thickening in the rectum. The possibility of a  rectal malignancy should be considered.     TERRANCE KAY MD   US Thoracentesis    Narrative    EXAM: Ultrasound guided thoracentesis    CLINICAL HISTORY: Right pleural effusion.      Comparison: CT chest in 6/20/2019    PROCEDURE: Ultrasound scanning was performed of the chest to localize  the largest pocket of fluid in the right chest. Informed consent was  obtained and a timeout procedure was performed. The area was prepped  and draped in the usual sterile fashion. 1% lidocaine was used for  local anesthesia. A pleural catheter was then advanced into the  effusion and 750 mL of bloody fluid was drained. The patient tolerated  the procedure well without immediate complication.    The fluid was sent to lab as requested.      Impression    IMPRESSION: Ultrasound-guided thoracentesis with removal of 750 mL  bloody fluid.    ANGELICA SANON MD   XR Chest 2 Views    Narrative    PROCEDURE:  XR CHEST 2 VW    HISTORY:  post thoracentesis.     COMPARISON:  10/6/2019    FINDINGS:   The cardiac silhouette is normal in size. The pulmonary vasculature is  normal.  There are persistent airspace opacities in the right lung  base. The right pleural effusion is considerably smaller in the  interval. No pneumothorax.      Impression    IMPRESSION:  Decreased right pleural effusion following thoracentesis.  No pneumothorax. Residual airspace disease at the right lung base.      ANGELICA SANON MD   XR Video Swallow w/o Esophagram    Narrative    VIDEO SWALLOWING EVALUATION   10/8/2019 12:00 PM     HISTORY: Right lower lobe pneumonia    COMPARISON: None.    FLUOROSCOPY TIME: .72 minutes.    Findings: The patient swallowed various consistencies during  fluoroscopic observation. No episodes of penetration or aspiration  were witnessed.     Impression    IMPRESSION:  No episodes of penetration or aspiration were  witnessed.    DIANE ONEAL MD       Discharge Medications   Current Discharge Medication List      CONTINUE these medications which have NOT CHANGED    Details   amLODIPine (NORVASC) 2.5 MG tablet TAKE 1 TABLET BY MOUTH ONCE DAILY  Qty: 90 tablet, Refills: 2    Associated Diagnoses: Hypertension      ascorbic acid (VITAMIN C) 500 MG tablet Take 2 tablets (1,000 mg) by mouth daily  Qty: 30 tablet      aspirin (ASA) 81 MG tablet Take 1 tablet (81 mg) by mouth daily  Qty: 30 tablet, Refills: 0      atorvastatin (LIPITOR) 20 MG tablet TAKE 1 TABLET BY MOUTH AT BEDTIME  Qty: 90 tablet, Refills: 3    Associated Diagnoses: Hyperlipidemia, unspecified hyperlipidemia type      baclofen (LIORESAL) 10 MG tablet TAKE 1 TABLET BY MOUTH THREE TIMES DAILY  Qty: 90 tablet, Refills: 3    Comments: Please consider 90 day supplies to promote better adherence  Associated Diagnoses: Chronic pain disorder      calcium carbonate (CALCIUM CARBONATE) 600 MG tablet Take 1 tablet (600 mg) by mouth 2 times daily (with meals)  Qty: 30 each, Refills: 0      cloNIDine (CATAPRES) 0.1 MG tablet TAKE 1 TABLET BY MOUTH ONCE DAILY  Qty: 90 tablet, Refills: 1    Associated Diagnoses: Hypertension      diltiazem (TIAZAC) 360 MG 24 hr capsule TAKE ONE CAPSULE BY MOUTH ONCE DAILY  Qty: 90 capsule, Refills: 3    Associated Diagnoses: Essential hypertension      diphenhydrAMINE (BENADRYL) 25 MG tablet Take 2 tablets (50 mg) by mouth nightly as needed for itching or allergies  Qty: 30 tablet, Refills: 0      estradiol (ESTRACE) 0.5 MG tablet TAKE 1 TABLET BY MOUTH ONCE DAILY  Qty: 90 tablet, Refills: 2    Associated Diagnoses: Estrogen deficiency      famotidine (PEPCID) 20 MG tablet TAKE 1 TABLET BY MOUTH TWICE DAILY  Qty: 180 tablet, Refills: 3    Comments: Please consider 90 day supplies to promote better adherence  Associated Diagnoses: Gastritis, presence of bleeding unspecified, unspecified chronicity, unspecified gastritis type      Ferrous  Sulfate (IRON) 325 (65 Fe) MG tablet TAKE 1 TABLET BY MOUTH TWICE DAILY  Qty: 180 tablet, Refills: 1    Associated Diagnoses: Anemia, unspecified type      FLORASTOR 250 MG capsule TAKE 2 CAPSULES BY MOUTH TWICE DAILY  Qty: 120 capsule, Refills: 11    Comments: Please consider 90 day supplies to promote better adherence  Associated Diagnoses: Non-specific colitis      furosemide (LASIX) 40 MG tablet TAKE 1 TABLET BY MOUTH ONCE DAILY IN THE MORNING  Qty: 90 tablet, Refills: 2    Associated Diagnoses: Essential hypertension      gabapentin (NEURONTIN) 600 MG tablet Take 1 tablet (600 mg) by mouth 3 times daily  Qty: 90 tablet, Refills: 11    Associated Diagnoses: Neuropathy      HEMP OIL OR EXTRACT OR OTHER CBD CANNABINOID, NOT MEDICAL CANNABIS, Take by mouth 2 times daily      HYDROcodone-acetaminophen (NORCO) 5-325 MG tablet Take 1 tablet by mouth 4 times daily  Qty: 120 tablet, Refills: 0    Associated Diagnoses: Chronic pain disorder      insulin glargine (LANTUS SOLOSTAR) 100 UNIT/ML pen INJECT 14 UNITS SUBCUTANEOUSLY IN THE MORNING AND 9 IN THE EVENING  Qty: 30 mL, Refills: 3    Comments: If Lantus is not covered by insurance, may substitute Basaglar at same dose and frequency.    Associated Diagnoses: Type 2 diabetes mellitus with other diabetic kidney complication, with long-term current use of insulin (H)      metFORMIN (GLUCOPHAGE-XR) 500 MG 24 hr tablet TAKE 2 TABLETS BY MOUTH TWICE DAILY WITH MEALS  Qty: 360 tablet, Refills: 3    Associated Diagnoses: Type 2 diabetes mellitus without complication, with long-term current use of insulin (H)      potassium 99 MG TABS Take 1 mg by mouth daily  Qty: 30 tablet, Refills: 0      predniSONE (DELTASONE) 5 MG tablet TAKE 1 TABLET BY MOUTH TWICE DAILY  Qty: 180 tablet, Refills: 3    Associated Diagnoses: Rheumatoid arthritis, involving unspecified site, unspecified rheumatoid factor presence (H)      spironolactone (ALDACTONE) 25 MG tablet TAKE 1 TABLET BY MOUTH ONCE  DAILY  Qty: 90 tablet, Refills: 3    Associated Diagnoses: Essential hypertension      traMADol (ULTRAM) 50 MG tablet TAKE 2 TABLETS BY MOUTH EVERY 6 HOURS AS NEEDED FOR  MODERATE  PAIN.  Qty: 112 tablet, Refills: 3    Associated Diagnoses: Neuropathy      blood glucose monitoring (ACCU-CHEK FASTCLIX) lancets Use to test blood sugar four times daily. DX code E11.9. Dispense as covered by patient's insurance  Qty: 400 each, Refills: 1    Associated Diagnoses: Diabetes mellitus, type II (H)      Cyanocobalamin (VITAMIN B-12 CR) 1000 MCG TBCR Take 1 tablet by mouth daily  Qty: 30 tablet      docusate sodium (COLACE) 100 MG capsule Take 2 capsules (200 mg) by mouth 2 times daily  Qty: 60 capsule      magnesium oxide (MAG-OX) 400 (241.3 Mg) MG tablet Take 1 tablet (400 mg) by mouth daily  Qty: 30 tablet, Refills: 0      NOVOFINE 30 30G X 8 MM insulin pen needle USE 5 TIMES DAILY  Qty: 500 each, Refills: 1    Associated Diagnoses: Type 2 diabetes mellitus without complication (H)      NOVOLOG FLEXPEN 100 UNIT/ML soln INJECT SUBCUTANEOUSLY 4 TIMES DAILY PER  SLIDING  SCALE.  MAX  OF  24  UNITS  DAILY  Qty: 15 mL, Refills: 3    Comments: Please consider 90 day supplies to promote better adherence  Associated Diagnoses: Type 2 diabetes mellitus with other diabetic kidney complication, with long-term current use of insulin (H)      omega 3 1000 MG CAPS Take 1,000 mg by mouth daily  Qty: 30 capsule, Refills: 0      ONETOUCH VERIO IQ test strip USE 1 STRIP TO CHECK GLUCOSE 4 TIMES DAILY  Qty: 400 strip, Refills: 2    Associated Diagnoses: Uncontrolled diabetes with kidney complications (H)      pantoprazole (PROTONIX) 40 MG EC tablet Take 1 tablet (40 mg) by mouth daily  Qty: 30 tablet, Refills: 1    Associated Diagnoses: Gastritis, presence of bleeding unspecified, unspecified chronicity, unspecified gastritis type      psyllium (TGT PSYLLIUM FIBER) 0.52 G capsule Take 3 capsules (1.56 g) by mouth daily  Qty: 540 capsule       vitamin D3 (CHOLECALCIFEROL) 1000 units (25 mcg) tablet Take 1,000 Units by mouth 2 times daily   Qty: 30 tablet, Refills: 0           Allergies   Allergies   Allergen Reactions     Adhesive Tape Other (See Comments)     Fragile skin - paper tape only, also no liquid adhesives     Aliskiren Cough     Lisinopril Cough     Losartan Cough

## 2019-10-09 NOTE — PROGRESS NOTES
Second unit of blood now transfusing. Pt afebrile, vss, pt tolerating well, will continue to monitor. Tahir Barrera RN on 10/9/2019 at 6:35 AM

## 2019-10-09 NOTE — PLAN OF CARE
Discharge Planner SLP   Patient plan for discharge: Prior living environment. Pt informed therapist on discharge plans.   Current status: Pt educated on outcomes of VFSS. Informed of safe swallow strategies, outpatient speech therapy recommendations, and written handout.   Barriers to return to prior living situation: None at this time.   Recommendations for discharge: Due to residue, penetration, and risk of aspiration, pt recommended to complete 1-2 dry swallows after each swallow and/or bite, alternate consistencies, and small bites/sips. Pt recommended to continue with outpatient speech therapy to target strengthening of swallow and implementation of safe swallow strategies.   Rationale for recommendations: Results of VFSS on 10/08/2019.       Entered by: Ni Brown 10/09/2019 11:23 AM

## 2019-10-09 NOTE — PROGRESS NOTES
:    Left a message for Denice at [facility] and left a message with a discharge update.  Anticipated patient will discharge home 1-2 days.     KEN Lewis on 10/9/2019 at 1:04 PM

## 2019-10-09 NOTE — PROGRESS NOTES
Pt reporting dizziness, weakness, MD Ortega aware. New telephone orders for HGB stat, 2 unit red blood cells for hgb 6.5, will continue to monitor. Tahir Barrera RN on 10/9/2019 at 1:51 AM

## 2019-10-09 NOTE — PROGRESS NOTES
"Patient has complaints of feeling dizzy, lightheaded, weak, and warm. Stated, \"I feel the same way I did last night when I was bleeding.\"  VS checked to be Temp: 97.8  F (36.6  C) Temp src: Tympanic BP: 133/63   Heart Rate: 93 Resp: 18 SpO2: 94 % O2 Device: None (Room air)    Blood sugar checked to be 323.  Shannon KELLOGG, updated and orders for STAT Hemoglobin placed. Wendy Weinberg RN on 10/9/2019 at 2:48 PM    Hemoglobin 9.6. Shannon KELLOGG, updated. Wendy Weinberg RN on 10/9/2019 at 3:58 PM    "

## 2019-10-10 NOTE — TELEPHONE ENCOUNTER
Unable to complete TCM.  Patient was admitted to [facility]. Mary Beth King RN  ....................  10/10/2019   12:32 PM

## 2019-10-16 ENCOUNTER — MEDICAL CORRESPONDENCE (OUTPATIENT)
Dept: HEALTH INFORMATION MANAGEMENT | Facility: OTHER | Age: 74
End: 2019-10-16

## 2019-11-05 LAB
FUNGUS SPEC CULT: NORMAL
SPECIMEN SOURCE: NORMAL

## 2020-08-26 NOTE — PROGRESS NOTES
Chart review for transfer to [facility].  ELIEL Henry will return call for nurse report.  Georgina Burns RN on 10/6/2019 at 2:43 PM    2-3 days

## 2020-11-26 NOTE — LETTER
September 19, 2018      Karen Duncan  [id number] [address] [id number]        Dear Karen Duncan,    Below are the results of your recent labs:    Results for orders placed or performed in visit on 09/19/18   Hemoglobin A1c   Result Value Ref Range    Hemoglobin A1C 7.3 (H) 4.0 - 6.0 %        Your diabetes test has gone up a little bit from the last time that we checked it but it is still acceptable.  Congratulations on this report and keep up the good work.    Sincerely,        Hugo Duarte MD  Internal Medicine  [facility]                   no

## 2024-06-17 PROBLEM — Z71.89 ADVANCED DIRECTIVES, COUNSELING/DISCUSSION: Status: RESOLVED | Noted: 2018-06-13 | Resolved: 2024-06-17

## 2024-10-28 NOTE — TELEPHONE ENCOUNTER
Patient Information     Patient Name MRN Karen Randle [id number] Female 1945      Telephone Encounter by Lissa Bauer RN at 2017 11:10 AM     Author:  Lissa Bauer RN Service:  (none) Author Type:  NURS- Registered Nurse     Filed:  2017 11:16 AM Encounter Date:  2017 Status:  Signed     :  Lissa Bauer RN (NURS- Registered Nurse)            Calcium Channel Blockers    Office visit in the past 12 months or per provider note.    Last visit with LEO GIVENS was on: 2017 in [facility]  Next visit with LEO GIVENS is on: 2017 in [facility]  Next visit with Internal Medicine is on: 2017 in [facility]    Review last provider visit note.  If BP reviewed and plan is noted, can refill.  Max refill for 12 months from last office visit or per provider note. Prescription refilled per RN Medication Refill Policy.................... LISSA BAUER RN ....................  2017   11:16 AM              
None
